# Patient Record
Sex: MALE | Race: WHITE | NOT HISPANIC OR LATINO | Employment: OTHER | URBAN - METROPOLITAN AREA
[De-identification: names, ages, dates, MRNs, and addresses within clinical notes are randomized per-mention and may not be internally consistent; named-entity substitution may affect disease eponyms.]

---

## 2017-01-24 ENCOUNTER — HOSPITAL ENCOUNTER (OUTPATIENT)
Dept: LAB | Facility: MEDICAL CENTER | Age: 62
DRG: 683 | End: 2017-01-24
Attending: NURSE PRACTITIONER
Payer: MEDICARE

## 2017-01-24 ENCOUNTER — OFFICE VISIT (OUTPATIENT)
Dept: MEDICAL GROUP | Facility: PHYSICIAN GROUP | Age: 62
End: 2017-01-24
Payer: MEDICARE

## 2017-01-24 VITALS
TEMPERATURE: 98.2 F | HEART RATE: 87 BPM | WEIGHT: 194 LBS | BODY MASS INDEX: 27.77 KG/M2 | SYSTOLIC BLOOD PRESSURE: 124 MMHG | DIASTOLIC BLOOD PRESSURE: 86 MMHG | RESPIRATION RATE: 20 BRPM | HEIGHT: 70 IN | OXYGEN SATURATION: 95 %

## 2017-01-24 DIAGNOSIS — E66.3 OVERWEIGHT (BMI 25.0-29.9): ICD-10-CM

## 2017-01-24 DIAGNOSIS — M10.30 GOUT DUE TO RENAL IMPAIRMENT, UNSPECIFIED CHRONICITY, UNSPECIFIED SITE: ICD-10-CM

## 2017-01-24 DIAGNOSIS — I10 ESSENTIAL HYPERTENSION: ICD-10-CM

## 2017-01-24 DIAGNOSIS — Z12.5 SCREENING FOR PROSTATE CANCER: ICD-10-CM

## 2017-01-24 DIAGNOSIS — M25.561 RIGHT ANTERIOR KNEE PAIN: Primary | ICD-10-CM

## 2017-01-24 DIAGNOSIS — G89.29 CHRONIC PAIN OF RIGHT KNEE: ICD-10-CM

## 2017-01-24 DIAGNOSIS — M25.561 CHRONIC PAIN OF RIGHT KNEE: ICD-10-CM

## 2017-01-24 DIAGNOSIS — F41.1 GAD (GENERALIZED ANXIETY DISORDER): ICD-10-CM

## 2017-01-24 LAB
ALBUMIN SERPL BCP-MCNC: 4.4 G/DL (ref 3.2–4.9)
ALBUMIN/GLOB SERPL: 1.3 G/DL
ALP SERPL-CCNC: 86 U/L (ref 30–99)
ALT SERPL-CCNC: 23 U/L (ref 2–50)
ANION GAP SERPL CALC-SCNC: 5 MMOL/L (ref 0–11.9)
AST SERPL-CCNC: 20 U/L (ref 12–45)
BILIRUB SERPL-MCNC: 0.3 MG/DL (ref 0.1–1.5)
BUN SERPL-MCNC: 72 MG/DL (ref 8–22)
CALCIUM SERPL-MCNC: 9.5 MG/DL (ref 8.5–10.5)
CHLORIDE SERPL-SCNC: 107 MMOL/L (ref 96–112)
CHOLEST SERPL-MCNC: 284 MG/DL (ref 100–199)
CO2 SERPL-SCNC: 22 MMOL/L (ref 20–33)
CREAT SERPL-MCNC: 4.24 MG/DL (ref 0.5–1.4)
GLOBULIN SER CALC-MCNC: 3.5 G/DL (ref 1.9–3.5)
GLUCOSE SERPL-MCNC: 85 MG/DL (ref 65–99)
HDLC SERPL-MCNC: 34 MG/DL
LDLC SERPL CALC-MCNC: ABNORMAL MG/DL
POTASSIUM SERPL-SCNC: 6.2 MMOL/L (ref 3.6–5.5)
PROT SERPL-MCNC: 7.9 G/DL (ref 6–8.2)
PSA SERPL DL<=0.01 NG/ML-MCNC: 2.24 NG/ML (ref 0–4)
SODIUM SERPL-SCNC: 134 MMOL/L (ref 135–145)
TRIGL SERPL-MCNC: 472 MG/DL (ref 0–149)
URATE SERPL-MCNC: 4.8 MG/DL (ref 2.5–8.3)

## 2017-01-24 PROCEDURE — G8432 DEP SCR NOT DOC, RNG: HCPCS | Performed by: NURSE PRACTITIONER

## 2017-01-24 PROCEDURE — 99214 OFFICE O/P EST MOD 30 MIN: CPT | Performed by: NURSE PRACTITIONER

## 2017-01-24 PROCEDURE — G8419 CALC BMI OUT NRM PARAM NOF/U: HCPCS | Performed by: NURSE PRACTITIONER

## 2017-01-24 PROCEDURE — 1036F TOBACCO NON-USER: CPT | Performed by: NURSE PRACTITIONER

## 2017-01-24 PROCEDURE — G8484 FLU IMMUNIZE NO ADMIN: HCPCS | Performed by: NURSE PRACTITIONER

## 2017-01-24 PROCEDURE — 3017F COLORECTAL CA SCREEN DOC REV: CPT | Performed by: NURSE PRACTITIONER

## 2017-01-24 RX ORDER — METOPROLOL SUCCINATE 100 MG/1
100 TABLET, EXTENDED RELEASE ORAL DAILY
Qty: 90 TAB | Refills: 3 | Status: SHIPPED | OUTPATIENT
Start: 2017-01-24 | End: 2017-01-24

## 2017-01-24 RX ORDER — COLCHICINE 0.6 MG/1
TABLET ORAL
Qty: 30 TAB | Refills: 0 | Status: SHIPPED | OUTPATIENT
Start: 2017-01-24 | End: 2017-01-25

## 2017-01-24 RX ORDER — SERTRALINE HYDROCHLORIDE 100 MG/1
TABLET, FILM COATED ORAL
Qty: 90 TAB | Refills: 3 | Status: SHIPPED | OUTPATIENT
Start: 2017-01-24 | End: 2017-01-24

## 2017-01-24 RX ORDER — LOSARTAN POTASSIUM 50 MG/1
50 TABLET ORAL DAILY
Qty: 30 TAB | Refills: 0 | Status: ON HOLD | OUTPATIENT
Start: 2017-01-24 | End: 2017-01-27

## 2017-01-24 RX ORDER — SERTRALINE HYDROCHLORIDE 100 MG/1
TABLET, FILM COATED ORAL
Qty: 30 TAB | Refills: 0 | Status: SHIPPED | OUTPATIENT
Start: 2017-01-24 | End: 2017-01-25

## 2017-01-24 RX ORDER — METOPROLOL SUCCINATE 100 MG/1
100 TABLET, EXTENDED RELEASE ORAL DAILY
Qty: 30 TAB | Refills: 0 | Status: SHIPPED | OUTPATIENT
Start: 2017-01-24 | End: 2019-05-08 | Stop reason: SDUPTHER

## 2017-01-24 RX ORDER — COLCHICINE 0.6 MG/1
TABLET ORAL
Qty: 90 TAB | Refills: 1 | Status: SHIPPED | OUTPATIENT
Start: 2017-01-24 | End: 2017-01-24

## 2017-01-24 RX ORDER — ALLOPURINOL 100 MG/1
100 TABLET ORAL DAILY
Qty: 90 TAB | Refills: 3 | Status: SHIPPED | OUTPATIENT
Start: 2017-01-24 | End: 2017-01-24

## 2017-01-24 RX ORDER — ALLOPURINOL 100 MG/1
100 TABLET ORAL DAILY
Qty: 30 TAB | Refills: 0 | Status: ON HOLD | OUTPATIENT
Start: 2017-01-24 | End: 2017-01-27

## 2017-01-24 RX ORDER — LOSARTAN POTASSIUM 50 MG/1
50 TABLET ORAL DAILY
Qty: 90 TAB | Refills: 3 | Status: SHIPPED | OUTPATIENT
Start: 2017-01-24 | End: 2017-01-24

## 2017-01-24 ASSESSMENT — PATIENT HEALTH QUESTIONNAIRE - PHQ9: CLINICAL INTERPRETATION OF PHQ2 SCORE: 0

## 2017-01-24 NOTE — MR AVS SNAPSHOT
"        Garry Benites   2017 1:20 PM   Office Visit   MRN: [id number]    Department:  [facility]   Dept Phone:  [phone]    Description:  Male : 1955   Provider:  PERRY Fu           Reason for Visit     Knee Pain     Medication Refill           Allergies as of 2017     Allergen Noted Reactions    Pcn [Penicillins] 10/21/2015         You were diagnosed with     Right anterior knee pain   [426838]  -  Primary     Chronic pain of right knee   [1048592]       Essential hypertension   [3955886]       SAMMI (generalized anxiety disorder)   [802990]       Solitary kidney   [553933]       Gout due to renal impairment, unspecified chronicity, unspecified site   [7748030]       Overweight (BMI 25.0-29.9)   [815527]       Screening for prostate cancer   [659517]         Vital Signs     Blood Pressure Pulse Temperature Respirations Height Weight    124/86 mmHg 87 36.8 °C (98.2 °F) 20 1.778 m (5' 10\") 87.998 kg (194 lb)    Body Mass Index Oxygen Saturation Smoking Status             27.84 kg/m2 95% Former Smoker         Basic Information     Date Of Birth Sex Race Ethnicity Preferred Language    1955 Male White Non- English      Problem List              ICD-10-CM Priority Class Noted - Resolved    Solitary kidney Q60.0   10/21/2015 - Present    Family history of stroke Z82.3   10/21/2015 - Present    Essential hypertension I10   10/21/2015 - Present    Irregular heart beat I49.9   10/21/2015 - Present    SAMMI (generalized anxiety disorder) F41.1   10/21/2015 - Present    Gout due to renal impairment M10.30   10/21/2015 - Present    First degree heart block by electrocardiogram I44.0   10/21/2015 - Present      Health Maintenance        Date Due Completion Dates    IMM DTaP/Tdap/Td Vaccine (1 - Tdap) 1974 ---    IMM ZOSTER VACCINE 2015 ---    IMM INFLUENZA (1) 2016 10/21/2015    COLONOSCOPY 2023 (Done)    Override on 2013: Done            "   Current Immunizations     Influenza Vaccine Quad Inj (Preserved) 10/21/2015      Below and/or attached are the medications your provider expects you to take. Review all of your home medications and newly ordered medications with your provider and/or pharmacist. Follow medication instructions as directed by your provider and/or pharmacist. Please keep your medication list with you and share with your provider. Update the information when medications are discontinued, doses are changed, or new medications (including over-the-counter products) are added; and carry medication information at all times in the event of emergency situations     Allergies:  PCN - (reactions not documented)               Medications  Valid as of: January 24, 2017 -  6:33 PM    Generic Name Brand Name Tablet Size Instructions for use    Allopurinol (Tab) ZYLOPRIM 100 MG Take 1 Tab by mouth every day.        Colchicine (Tab) COLCRYS 0.6 MG Take 1 tablet by mouth  every day        Hydrocodone-Acetaminophen (Tab) NORCO 5-325 MG Take 1 Tab by mouth every 6 hours as needed.        Losartan Potassium (Tab) COZAAR 50 MG Take 1 Tab by mouth every day.        Metoprolol Succinate (TABLET SR 24 HR) TOPROL  MG Take 1 Tab by mouth every day.        Sertraline HCl (Tab) ZOLOFT 100 MG Take 1 tablet by mouth  every day        .                 Medicines prescribed today were sent to:     [facility] - [address] [address]    Phone: [phone] Fax: [phone]    Open 24 Hours?: Yes    [facility] [address] [place] [address] [address] [address]    Phone: [phone] Fax: [phone]    Open 24 Hours?: No      Medication refill instructions:       If your prescription bottle indicates you have medication refills left, it is not necessary to call your provider’s office. Please contact your pharmacy and they will refill  your medication.    If your prescription bottle indicates you do not have any refills left, you may request refills at any time through one of the following ways: The online LIQUITY system (except Urgent Care), by calling your provider’s office, or by asking your pharmacy to contact your provider’s office with a refill request. Medication refills are processed only during regular business hours and may not be available until the next business day. Your provider may request additional information or to have a follow-up visit with you prior to refilling your medication.   *Please Note: Medication refills are assigned a new Rx number when refilled electronically. Your pharmacy may indicate that no refills were authorized even though a new prescription for the same medication is available at the pharmacy. Please request the medicine by name with the pharmacy before contacting your provider for a refill.        Your To Do List     Future Labs/Procedures Complete By Expires    COMP METABOLIC PANEL  As directed 1/24/2018    LIPID PROFILE  As directed 1/24/2018    PROSTATE SPECIFIC AG SCREENING  As directed 1/25/2018      Referral     A referral request has been sent to our patient care coordination department. Please allow 3-5 business days for us to process this request and contact you either by phone or mail. If you do not hear from us by the 5th business day, please call us at [phone].           LIQUITY Access Code: [email or web address]  Expires: 2/23/2017  1:42 PM    LIQUITY  A secure, online tool to manage your health information     [employer]’s LIQUITY® is a secure, online tool that connects you to your personalized health information from the privacy of your home -- day or night - making it very easy for you to manage your healthcare. Once the activation process is completed, you can even access your medical information using the LIQUITY eliceo, which is available for free in the Apple Eliceo store or Google Play  store.     [employer] provides the following levels of access (as shown below):   My Chart Features   [facility] Doctor [facility]  Specialists [facility]  Urgent  Care Non-[facility]  Primary Care  Doctor   Email your healthcare team securely and privately 24/7 X X X    Manage appointments: schedule your next appointment; view details of past/upcoming appointments X      Request prescription refills. X      View recent personal medical records, including lab and immunizations X X X X   View health record, including health history, allergies, medications X X X X   Read reports about your outpatient visits, procedures, consult and ER notes X X X X   See your discharge summary, which is a recap of your hospital and/or ER visit that includes your diagnosis, lab results, and care plan. X X       How to register for [employer]:  1. Go to  [email or web address].  2. Click on the Sign Up Now box, which takes you to the New Member Sign Up page. You will need to provide the following information:  a. Enter your [employer] Access Code exactly as it appears at the top of this page. (You will not need to use this code after you’ve completed the sign-up process. If you do not sign up before the expiration date, you must request a new code.)   b. Enter your date of birth.   c. Enter your home email address.   d. Click Submit, and follow the next screen’s instructions.  3. Create a [employer] ID. This will be your [employer] login ID and cannot be changed, so think of one that is secure and easy to remember.  4. Create a [employer] password. You can change your password at any time.  5. Enter your Password Reset Question and Answer. This can be used at a later time if you forget your password.   6. Enter your e-mail address. This allows you to receive e-mail notifications when new information is available in [employer].  7. Click Sign Up. You can now view your health information.    For assistance activating your [employer] account, call [phone]

## 2017-01-24 NOTE — PROGRESS NOTES
Chief Complaint   Patient presents with   • Knee Pain   • Medication Refill       HISTORY OF PRESENT ILLNESS: Patient is a 61 y.o. male established patient who presents today to discuss the following.  He has recently moved back from [place] in the cystic back on track with medications and labs.    1. Right anterior knee pain  2. Chronic pain of right knee  Patient fell October 2015.  Significant patellar injury was suspected and an MRI scan was performed.  Unfortunately patient was unable to follow-up with the orthopedic surgeon due to urgent trip to [place] due to the death of his brother.  The pain continues and he would like a new referral to follow-up with some more aggressive treatment.  He states that the knee pain is constant, he avoids over-the-counter or pain medication.  He tries to avoid aggravating activities.    3. Essential hypertension  Patient's blood pressure remains very well controlled with his current medication regimen.  He continues to use metoprolol 100 mg daily as well as losartan 50 mg daily.  He does not monitor his blood pressure routinely at home.  Denies any headache, blurry vision or chest pain.    4. SAMIM (generalized anxiety disorder)  Patient's generalized anxiety remains adequately controlled with current dose of sertraline 100 mg.  He denies any side effects with the medication.  Denies desire to adjust or change the dose at this time.    5. Solitary kidney    6. Gout due to renal impairment, unspecified chronicity, unspecified site  Patient suffers from gout, somewhat severe.  He takes allopurinol 100 mg daily as well as colchicine every other day.  He states that if he does not take the colchicine, he suffers from extreme flareups of gout.  He has tried taking a higher dose of allopurinol however this seems to create gout flareups.    7. Overweight (BMI 25.0-29.9)    8. Screening for prostate cancer     Allergies:Pcn    Current Outpatient Prescriptions Ordered in [facility]    Medication Sig Dispense Refill   • sertraline (ZOLOFT) 100 MG Tab Take 1 tablet by mouth  every day 30 Tab 0   • metoprolol SR (TOPROL XL) 100 MG TABLET SR 24 HR Take 1 Tab by mouth every day. 30 Tab 0   • losartan (COZAAR) 50 MG Tab Take 1 Tab by mouth every day. 30 Tab 0   • colchicine (COLCRYS) 0.6 MG Tab Take 1 tablet by mouth  every day 30 Tab 0   • allopurinol (ZYLOPRIM) 100 MG Tab Take 1 Tab by mouth every day. 30 Tab 0   • hydrocodone-acetaminophen (NORCO) 5-325 MG Tab per tablet Take 1 Tab by mouth every 6 hours as needed. 20 Tab 0     No current Epic-ordered facility-administered medications on file.       Past Medical History   Diagnosis Date   • Gout due to renal impairment    • Hypertension    • Anxiety        Social History   Substance Use Topics   • Smoking status: Former Smoker -- 1.00 packs/day for 25 years     Types: Cigarettes     Quit date: 10/21/1990   • Smokeless tobacco: Never Used   • Alcohol Use: No       Family Status   Relation Status Death Age   • Mother     • Father       Family History   Problem Relation Age of Onset   • Cancer Mother      lymphoma   • Heart Attack Father    • Stroke Father    • Diabetes Neg Hx        ROS:  Review of Systems   Constitutional: Negative for fever, chills, weight loss and malaise/fatigue.   HENT: Negative for ear pain, nosebleeds, congestion, sore throat and neck pain.    Eyes: Negative for blurred vision.   Respiratory: Negative for cough, sputum production, shortness of breath and wheezing.    Cardiovascular: Negative for chest pain, palpitations, orthopnea and leg swelling.   Gastrointestinal: Negative for heartburn, nausea, vomiting and abdominal pain.   Genitourinary: Negative for dysuria, urgency and frequency.   Musculoskeletal: Negative for myalgias, back pain and joint pain.   Skin: Negative for rash and itching.   Neurological: Negative for dizziness, tingling, tremors, sensory change, focal weakness and headaches.  "  Endo/Heme/Allergies: Does not bruise/bleed easily.   Psychiatric/Behavioral: Negative for depression, suicidal ideas and memory loss.  The patient is not nervous/anxious and does not have insomnia.        Exam:  Blood pressure 124/86, pulse 87, temperature 36.8 °C (98.2 °F), resp. rate 20, height 1.778 m (5' 10\"), weight 87.998 kg (194 lb), SpO2 95 %.  General:  Well nourished, well developed male in NAD  Head is grossly normal.  Neck: Thyroid is not enlarged.  Pulmonary: Normal effort. No rales, ronchi, or wheezing.  Cardiovascular: Regular rate and rhythm without murmur.   Extremities: no clubbing, cyanosis.  Prepatellar edema.    Psych:  Mood and affect are normal.  Answering questions appropriately with good eye contact.      Please note that this dictation was created using voice recognition software. I have made every reasonable attempt to correct obvious errors, but I expect that there are errors of grammar and possibly content that I did not discover before finalizing the note.    Assessment/Plan:    1. Right anterior knee pain  REFERRAL TO ORTHOPEDICS   2. Chronic pain of right knee  REFERRAL TO ORTHOPEDICS   3. Essential hypertension  sertraline (ZOLOFT) 100 MG Tab    metoprolol SR (TOPROL XL) 100 MG TABLET SR 24 HR    losartan (COZAAR) 50 MG Tab    colchicine (COLCRYS) 0.6 MG Tab    DISCONTINUED: sertraline (ZOLOFT) 100 MG Tab    DISCONTINUED: metoprolol SR (TOPROL XL) 100 MG TABLET SR 24 HR    DISCONTINUED: losartan (COZAAR) 50 MG Tab    DISCONTINUED: colchicine (COLCRYS) 0.6 MG Tab   4. SAMMI (generalized anxiety disorder)     5. Solitary kidney     6. Gout due to renal impairment, unspecified chronicity, unspecified site  allopurinol (ZYLOPRIM) 100 MG Tab    URIC ACID, SERUM    DISCONTINUED: allopurinol (ZYLOPRIM) 100 MG Tab   7. Overweight (BMI 25.0-29.9)  LIPID PROFILE    COMP METABOLIC PANEL   8. Screening for prostate cancer  PROSTATE SPECIFIC AG SCREENING     1.  Referral to ortho  2.  Fasting " labs today  3.  Refill medications as previously prescribed  4.  RTC in 3 months, sooner if labs warrant discussion.

## 2017-01-25 ENCOUNTER — APPOINTMENT (OUTPATIENT)
Dept: RADIOLOGY | Facility: MEDICAL CENTER | Age: 62
DRG: 683 | End: 2017-01-25
Attending: INTERNAL MEDICINE
Payer: MEDICARE

## 2017-01-25 ENCOUNTER — TELEPHONE (OUTPATIENT)
Dept: MEDICAL GROUP | Facility: PHYSICIAN GROUP | Age: 62
End: 2017-01-25

## 2017-01-25 ENCOUNTER — HOSPITAL ENCOUNTER (INPATIENT)
Facility: MEDICAL CENTER | Age: 62
LOS: 2 days | DRG: 683 | End: 2017-01-27
Attending: EMERGENCY MEDICINE | Admitting: INTERNAL MEDICINE
Payer: MEDICARE

## 2017-01-25 ENCOUNTER — RESOLUTE PROFESSIONAL BILLING HOSPITAL PROF FEE (OUTPATIENT)
Dept: HOSPITALIST | Facility: MEDICAL CENTER | Age: 62
End: 2017-01-25
Payer: MEDICARE

## 2017-01-25 DIAGNOSIS — N18.9 ACUTE ON CHRONIC RENAL FAILURE (HCC): ICD-10-CM

## 2017-01-25 DIAGNOSIS — N17.9 ACUTE ON CHRONIC RENAL FAILURE (HCC): ICD-10-CM

## 2017-01-25 PROBLEM — M10.9 GOUT: Status: ACTIVE | Noted: 2017-01-25

## 2017-01-25 PROBLEM — F41.8 DEPRESSION WITH ANXIETY: Status: ACTIVE | Noted: 2017-01-25

## 2017-01-25 LAB
25(OH)D3 SERPL-MCNC: 21 NG/ML (ref 30–100)
ANION GAP SERPL CALC-SCNC: 9 MMOL/L (ref 0–11.9)
APPEARANCE UR: CLEAR
APTT PPP: 24.6 SEC (ref 24.7–36)
BASOPHILS # BLD AUTO: 0.6 % (ref 0–1.8)
BASOPHILS # BLD: 0.04 K/UL (ref 0–0.12)
BILIRUB UR QL STRIP.AUTO: NEGATIVE
BUN SERPL-MCNC: 76 MG/DL (ref 8–22)
CALCIUM SERPL-MCNC: 9.2 MG/DL (ref 8.5–10.5)
CALCIUM SERPL-MCNC: 9.3 MG/DL (ref 8.5–10.5)
CHLORIDE SERPL-SCNC: 107 MMOL/L (ref 96–112)
CO2 SERPL-SCNC: 21 MMOL/L (ref 20–33)
COLOR UR: YELLOW
CREAT SERPL-MCNC: 4.47 MG/DL (ref 0.5–1.4)
CREAT UR-MCNC: 70 MG/DL
CULTURE IF INDICATED INDCX: NO UA CULTURE
EKG IMPRESSION: NORMAL
EOSINOPHIL # BLD AUTO: 0.29 K/UL (ref 0–0.51)
EOSINOPHIL NFR BLD: 4.6 % (ref 0–6.9)
EPI CELLS #/AREA URNS HPF: ABNORMAL /HPF
ERYTHROCYTE [DISTWIDTH] IN BLOOD BY AUTOMATED COUNT: 47.2 FL (ref 35.9–50)
EST. AVERAGE GLUCOSE BLD GHB EST-MCNC: 117 MG/DL
GFR SERPL CREATININE-BSD FRML MDRD: 13 ML/MIN/1.73 M 2
GLUCOSE SERPL-MCNC: 77 MG/DL (ref 65–99)
GLUCOSE UR STRIP.AUTO-MCNC: 70 MG/DL
HBA1C MFR BLD: 5.7 % (ref 0–5.6)
HCT VFR BLD AUTO: 43.6 % (ref 42–52)
HGB BLD-MCNC: 13.6 G/DL (ref 14–18)
IMM GRANULOCYTES # BLD AUTO: 0.08 K/UL (ref 0–0.11)
IMM GRANULOCYTES NFR BLD AUTO: 1.3 % (ref 0–0.9)
INR PPP: 1.09 (ref 0.87–1.13)
KETONES UR STRIP.AUTO-MCNC: NEGATIVE MG/DL
LEUKOCYTE ESTERASE UR QL STRIP.AUTO: NEGATIVE
LYMPHOCYTES # BLD AUTO: 1.5 K/UL (ref 1–4.8)
LYMPHOCYTES NFR BLD: 23.6 % (ref 22–41)
MAGNESIUM SERPL-MCNC: 2.5 MG/DL (ref 1.5–2.5)
MCH RBC QN AUTO: 29.1 PG (ref 27–33)
MCHC RBC AUTO-ENTMCNC: 31.2 G/DL (ref 33.7–35.3)
MCV RBC AUTO: 93.4 FL (ref 81.4–97.8)
MICRO URNS: ABNORMAL
MONOCYTES # BLD AUTO: 0.54 K/UL (ref 0–0.85)
MONOCYTES NFR BLD AUTO: 8.5 % (ref 0–13.4)
NEUTROPHILS # BLD AUTO: 3.91 K/UL (ref 1.82–7.42)
NEUTROPHILS NFR BLD: 61.4 % (ref 44–72)
NITRITE UR QL STRIP.AUTO: NEGATIVE
NRBC # BLD AUTO: 0 K/UL
NRBC BLD AUTO-RTO: 0 /100 WBC
PH UR STRIP.AUTO: 6 [PH]
PHOSPHATE SERPL-MCNC: 3.9 MG/DL (ref 2.5–4.5)
PLATELET # BLD AUTO: 216 K/UL (ref 164–446)
PMV BLD AUTO: 9.2 FL (ref 9–12.9)
POTASSIUM SERPL-SCNC: 4.8 MMOL/L (ref 3.6–5.5)
PROT UR QL STRIP: 100 MG/DL
PROT UR-MCNC: 186.4 MG/DL (ref 0–15)
PROTHROMBIN TIME: 14.5 SEC (ref 12–14.6)
PTH-INTACT SERPL-MCNC: 235.8 PG/ML (ref 14–72)
RBC # BLD AUTO: 4.67 M/UL (ref 4.7–6.1)
RBC # URNS HPF: ABNORMAL /HPF
RBC UR QL AUTO: NEGATIVE
SODIUM SERPL-SCNC: 137 MMOL/L (ref 135–145)
SODIUM UR-SCNC: 78 MMOL/L
SP GR UR STRIP.AUTO: 1.01
TROPONIN I SERPL-MCNC: <0.01 NG/ML (ref 0–0.04)
WBC # BLD AUTO: 6.4 K/UL (ref 4.8–10.8)
WBC #/AREA URNS HPF: ABNORMAL /HPF

## 2017-01-25 PROCEDURE — 82570 ASSAY OF URINE CREATININE: CPT

## 2017-01-25 PROCEDURE — 84484 ASSAY OF TROPONIN QUANT: CPT

## 2017-01-25 PROCEDURE — 36415 COLL VENOUS BLD VENIPUNCTURE: CPT

## 2017-01-25 PROCEDURE — 82306 VITAMIN D 25 HYDROXY: CPT

## 2017-01-25 PROCEDURE — 99285 EMERGENCY DEPT VISIT HI MDM: CPT

## 2017-01-25 PROCEDURE — 84156 ASSAY OF PROTEIN URINE: CPT

## 2017-01-25 PROCEDURE — 83735 ASSAY OF MAGNESIUM: CPT

## 2017-01-25 PROCEDURE — 81001 URINALYSIS AUTO W/SCOPE: CPT

## 2017-01-25 PROCEDURE — 76775 US EXAM ABDO BACK WALL LIM: CPT

## 2017-01-25 PROCEDURE — 85025 COMPLETE CBC W/AUTO DIFF WBC: CPT

## 2017-01-25 PROCEDURE — 700111 HCHG RX REV CODE 636 W/ 250 OVERRIDE (IP): Performed by: INTERNAL MEDICINE

## 2017-01-25 PROCEDURE — 85730 THROMBOPLASTIN TIME PARTIAL: CPT

## 2017-01-25 PROCEDURE — 84300 ASSAY OF URINE SODIUM: CPT

## 2017-01-25 PROCEDURE — 99223 1ST HOSP IP/OBS HIGH 75: CPT | Mod: AI | Performed by: INTERNAL MEDICINE

## 2017-01-25 PROCEDURE — 84100 ASSAY OF PHOSPHORUS: CPT

## 2017-01-25 PROCEDURE — 770006 HCHG ROOM/CARE - MED/SURG/GYN SEMI*

## 2017-01-25 PROCEDURE — 80048 BASIC METABOLIC PNL TOTAL CA: CPT

## 2017-01-25 PROCEDURE — 700105 HCHG RX REV CODE 258: Performed by: INTERNAL MEDICINE

## 2017-01-25 PROCEDURE — 83970 ASSAY OF PARATHORMONE: CPT

## 2017-01-25 PROCEDURE — 93005 ELECTROCARDIOGRAM TRACING: CPT | Performed by: EMERGENCY MEDICINE

## 2017-01-25 PROCEDURE — 85610 PROTHROMBIN TIME: CPT

## 2017-01-25 PROCEDURE — 83036 HEMOGLOBIN GLYCOSYLATED A1C: CPT

## 2017-01-25 RX ORDER — DOCUSATE SODIUM 100 MG/1
100 CAPSULE, LIQUID FILLED ORAL EVERY MORNING
Status: DISCONTINUED | OUTPATIENT
Start: 2017-01-25 | End: 2017-01-27 | Stop reason: HOSPADM

## 2017-01-25 RX ORDER — LORAZEPAM 2 MG/ML
0.5 INJECTION INTRAMUSCULAR EVERY 6 HOURS PRN
Status: DISCONTINUED | OUTPATIENT
Start: 2017-01-25 | End: 2017-01-27 | Stop reason: HOSPADM

## 2017-01-25 RX ORDER — ONDANSETRON 2 MG/ML
4 INJECTION INTRAMUSCULAR; INTRAVENOUS EVERY 4 HOURS PRN
Status: DISCONTINUED | OUTPATIENT
Start: 2017-01-25 | End: 2017-01-27 | Stop reason: HOSPADM

## 2017-01-25 RX ORDER — ENEMA 19; 7 G/133ML; G/133ML
1 ENEMA RECTAL
Status: DISCONTINUED | OUTPATIENT
Start: 2017-01-25 | End: 2017-01-25

## 2017-01-25 RX ORDER — PROMETHAZINE HYDROCHLORIDE 25 MG/1
12.5-25 SUPPOSITORY RECTAL EVERY 4 HOURS PRN
Status: DISCONTINUED | OUTPATIENT
Start: 2017-01-25 | End: 2017-01-27 | Stop reason: HOSPADM

## 2017-01-25 RX ORDER — COLCHICINE 0.6 MG/1
0.6 TABLET ORAL
COMMUNITY
End: 2017-05-23 | Stop reason: SDUPTHER

## 2017-01-25 RX ORDER — LORAZEPAM 0.5 MG/1
0.5 TABLET ORAL EVERY 6 HOURS PRN
Status: DISCONTINUED | OUTPATIENT
Start: 2017-01-25 | End: 2017-01-27 | Stop reason: HOSPADM

## 2017-01-25 RX ORDER — AMOXICILLIN 250 MG
1 CAPSULE ORAL NIGHTLY
Status: DISCONTINUED | OUTPATIENT
Start: 2017-01-25 | End: 2017-01-27 | Stop reason: HOSPADM

## 2017-01-25 RX ORDER — AMOXICILLIN 250 MG
1 CAPSULE ORAL
Status: DISCONTINUED | OUTPATIENT
Start: 2017-01-25 | End: 2017-01-27 | Stop reason: HOSPADM

## 2017-01-25 RX ORDER — LACTULOSE 20 G/30ML
30 SOLUTION ORAL
Status: DISCONTINUED | OUTPATIENT
Start: 2017-01-25 | End: 2017-01-27 | Stop reason: HOSPADM

## 2017-01-25 RX ORDER — ONDANSETRON 4 MG/1
4 TABLET, ORALLY DISINTEGRATING ORAL EVERY 4 HOURS PRN
Status: DISCONTINUED | OUTPATIENT
Start: 2017-01-25 | End: 2017-01-27 | Stop reason: HOSPADM

## 2017-01-25 RX ORDER — LABETALOL HYDROCHLORIDE 5 MG/ML
10 INJECTION, SOLUTION INTRAVENOUS EVERY 4 HOURS PRN
Status: DISCONTINUED | OUTPATIENT
Start: 2017-01-25 | End: 2017-01-27 | Stop reason: HOSPADM

## 2017-01-25 RX ORDER — SODIUM CHLORIDE 9 MG/ML
INJECTION, SOLUTION INTRAVENOUS CONTINUOUS
Status: DISCONTINUED | OUTPATIENT
Start: 2017-01-25 | End: 2017-01-26

## 2017-01-25 RX ORDER — PROMETHAZINE HYDROCHLORIDE 25 MG/1
12.5-25 TABLET ORAL EVERY 4 HOURS PRN
Status: DISCONTINUED | OUTPATIENT
Start: 2017-01-25 | End: 2017-01-27 | Stop reason: HOSPADM

## 2017-01-25 RX ORDER — HEPARIN SODIUM 5000 [USP'U]/ML
5000 INJECTION, SOLUTION INTRAVENOUS; SUBCUTANEOUS EVERY 8 HOURS
Status: DISCONTINUED | OUTPATIENT
Start: 2017-01-25 | End: 2017-01-27 | Stop reason: HOSPADM

## 2017-01-25 RX ORDER — ACETAMINOPHEN 325 MG/1
650 TABLET ORAL EVERY 6 HOURS PRN
Status: DISCONTINUED | OUTPATIENT
Start: 2017-01-25 | End: 2017-01-27 | Stop reason: HOSPADM

## 2017-01-25 RX ORDER — SERTRALINE HYDROCHLORIDE 100 MG/1
100 TABLET, FILM COATED ORAL DAILY
Status: DISCONTINUED | OUTPATIENT
Start: 2017-01-26 | End: 2017-01-27 | Stop reason: HOSPADM

## 2017-01-25 RX ORDER — SERTRALINE HYDROCHLORIDE 100 MG/1
100 TABLET, FILM COATED ORAL DAILY
COMMUNITY
End: 2017-04-21 | Stop reason: SDUPTHER

## 2017-01-25 RX ORDER — METOPROLOL SUCCINATE 100 MG/1
100 TABLET, EXTENDED RELEASE ORAL DAILY
Status: DISCONTINUED | OUTPATIENT
Start: 2017-01-26 | End: 2017-01-27 | Stop reason: HOSPADM

## 2017-01-25 RX ORDER — BISACODYL 10 MG
10 SUPPOSITORY, RECTAL RECTAL
Status: DISCONTINUED | OUTPATIENT
Start: 2017-01-25 | End: 2017-01-27 | Stop reason: HOSPADM

## 2017-01-25 RX ADMIN — HEPARIN SODIUM 5000 UNITS: 5000 INJECTION, SOLUTION INTRAVENOUS; SUBCUTANEOUS at 21:58

## 2017-01-25 ASSESSMENT — PAIN SCALES - GENERAL
PAINLEVEL_OUTOF10: 0

## 2017-01-25 ASSESSMENT — COPD QUESTIONNAIRES
DURING THE PAST 4 WEEKS HOW MUCH DID YOU FEEL SHORT OF BREATH: NONE/LITTLE OF THE TIME
DO YOU EVER COUGH UP ANY MUCUS OR PHLEGM?: NO/ONLY WITH OCCASIONAL COLDS OR INFECTIONS
COPD SCREENING SCORE: 2
HAVE YOU SMOKED AT LEAST 100 CIGARETTES IN YOUR ENTIRE LIFE: NO/DON'T KNOW

## 2017-01-25 ASSESSMENT — LIFESTYLE VARIABLES
EVER_SMOKED: YES
DO YOU DRINK ALCOHOL: NO

## 2017-01-25 NOTE — CONSULTS
[facility] NEPHROLOGY CONSULT    DATE OF CONSULTATION: 17     REASON FOR CONSULTATION: acute kidney injury and hyperkalemia in setting of solitary kidney and CKD    HISTORY OF PRESENT ILLNESS:   Mr. Benites is a 60 y/o male with PMHx of congenital solitary kidney, HTN, CKD, anxiety, chronic knee pain, and gout who was sent to the ER by his PCP (Dr. Jane) after routine lab work revealed hyperkalemia and acute kidney injury.  Patient denies any symptoms currently and states he is in his normal state of health.  He denies use of NSAIDs and has not started any new medications or changed his diet in any way.  He denies decreased urinary output or dysuria.  He reports his prior nephrologist (Dr. Guillaume in [place]) told him he was at the stage of CKD before needing dialysis and reports his baseline creat was about 1.9-2.1 about 1-2 years ago.      PAST MEDICAL HISTORY:  Essential hypertension  CKD-4 with congenital solitary kidney (baseline creat 2.15)  Gout  Generalized anxiety disorder  Chronic right knee pain    SURGICAL HISTORY:  Left foot orthopedic surgery after crush injury    SOCIAL HISTORY:  25 pack year smoking history.  Quit in .  Denies alcohol or drug use.    FAMILY HISTORY:  Mother had lymphoma.   Father  of stroke.    HOME MEDICATIONS:   Metoprolol  mg QD  Losartan 50 mg QD  Colchicine 0.6 mg every other day  Allopurinol 100 mg QD  Zoloft 100 mg QD    REVIEW OF SYSTEMS:  Constitutional: no fevers, chills, weight change  Eyes: no blurred vision, discharge, eye pain  ENT: no rhinorrhea, sore throat, neck masses  Cardiovascular: no angina, palpitations, PND, orthopnea, edema  Respiratory: no cough, sputum, or dyspnea  GI: no nausea, vomiting, abdominal pain, constipation, or diarrhea  : no dysuria, hematuria, frequency   Musculo-skeletal: no joint or muscle pain  Skin: no rashes or open wounds  Neurological: no headaches, dizziness, motor/sensory loss  Psychological: no anxiety or  depression  All others negative    PHYSICAL EXAMINATION:  GENERAL: Patient is resting comfortably in bed in no acute distress.  VITAL SIGNS: Temp is 97.2 F, HR is 81 bpm, BP is 134/88 mmHg, SpO2 is 96% on RA.  HEAD, EYES, EARS, NOSE, AND THROAT: Examination is atraumatic and normocephalic. Pupils are equal, round, and reactive to light. Mucous membranes are moist.  NECK: Supple. There is no lymphadenopathy.  No JVD  CHEST: Clear to auscultation and percussion.  No rales/ronchi.  HEART: normal S1 and S2, without murmurs, gallops, or rubs.  ABDOMEN: Soft, nontender without organomegaly or mass. Bowel sounds are normal.  NEURO: Patient is A&Ox4. No focal neurological deficits.  EXTREMITIES: Distal pulses palpable bilaterally.  No pedal edema.    LABORATORY DATA:   BUN/creat 72/4.24 > 79/4.47  Na 124 > 137  K 6.2 > 4.8  CO2 22 > 21  Anion gap 5 > 9  Ca 9.5 > 9.2  Alb 4.4  Uric acid 4.8  Hb 13.6  UA: 70 glucose, 100 protein, no infection, pH 6  EKG: HR 74 bpm, QTc 418 ms, normal intervals, NSR, no ischemic changes, no T-wave changes    ASSESSMENT AND PLAN:     # acute kidney injury   # chronic kidney disease  - unsure of patient's baseline; not anuric or oliguric per patient  - no clear inciting factors for MARKY; may be natural progression of patient's CKD-4  - UA shows 100 mg/dL protein with prot/creat ratio 2657  - renal US pending  - FeNa 3.3% suggesting intrinsic kidney injury  - will continue to monitor kidney function and order further workup for bone mineral disease (vit D, PTH)  - renal diet  - renally dose all meds for GFR <15    # glycosuria  - patient is euglycemic with mild anemia (Hb 13.6)  - will check SPEP and UPEP to r/o Fanconi's syndrome    # electrolytes  - hyperkalemia resolved now  - will check Mg/P  - CO2 wnl  - avoid meds with Mg/P/K  - continue to monitor    # hypertension  -  BP stable    # volume status  - patient appears euvolemic    # gout  - continue allopurinol

## 2017-01-25 NOTE — ED NOTES
"Pt ambulatory to triage c/o Abnormal labs. Pt states that his PCP called him today and told him to come to the ER because \"my kidney is failing and my potassium is high\". Pt reports that he only has one kidney.   "

## 2017-01-25 NOTE — IP AVS SNAPSHOT
" After Visit Summary                                                                                                                  Name:Garry Benites  Medical Record Number:[id number]  CSN: [id number]    YOB: 1955   Age: 61 y.o.  Sex: male  HT:1.778 m (5' 10\") WT: 88.5 kg (195 lb 1.7 oz)          Admit Date: 1/25/2017     Discharge Date:   Today's Date: 1/27/2017  Attending Doctor:  Kenny Mishra M.D.                  Allergies:  Pcn            Discharge Instructions       Discharge Instructions    Discharged to home by car with self. Discharged via walking, hospital escort: Yes.  Special equipment needed: Not Applicable    Be sure to schedule a follow-up appointment with your primary care doctor or any specialists as instructed.     Discharge Plan:   Diet Plan: Discussed  Activity Level: Discussed  Confirmed Follow up Appointment: Appointment Scheduled  Confirmed Symptoms Management: Discussed  Medication Reconciliation Updated: Yes  Pneumococcal Vaccine Given - only chart on this line when given: Given (See MAR)  Influenza Vaccine Indication: Indicated: 9 to 64 years of age  Influenza Vaccine Given - only chart on this line when given: Influenza Vaccine Given (See MAR)    I understand that a diet low in cholesterol, fat, and sodium is recommended for good health. Unless I have been given specific instructions below for another diet, I accept this instruction as my diet prescription.   Other diet: Renal Diet      Special Instructions: None    · Is patient discharged on Warfarin / Coumadin?   No                                                                 · Is patient Post Blood Transfusion?  No    Depression / Suicide Risk    As you are discharged from this [facility] facility, it is important to learn how to keep safe from harming yourself.    Recognize the warning signs:  · Abrupt changes in personality, positive or negative- including increase in energy   · Giving away possessions  · Change " in eating patterns- significant weight changes-  positive or negative  · Change in sleeping patterns- unable to sleep or sleeping all the time   · Unwillingness or inability to communicate  · Depression  · Unusual sadness, discouragement and loneliness  · Talk of wanting to die  · Neglect of personal appearance   · Rebelliousness- reckless behavior  · Withdrawal from people/activities they love  · Confusion- inability to concentrate     If you or a loved one observes any of these behaviors or has concerns about self-harm, here's what you can do:  · Talk about it- your feelings and reasons for harming yourself  · Remove any means that you might use to hurt yourself (examples: pills, rope, extension cords, firearm)  · Get professional help from the community (Mental Health, Substance Abuse, psychological counseling)  · Do not be alone:Call your Safe Contact- someone whom you trust who will be there for you.  · Call your local CRISIS HOTLINE 441-6974 or 619-930-8636  · Call your local Children's Mobile Crisis Response Team [place] (081) 260-4777 or wwwTopSchool  · Call the toll free National Suicide Prevention Hotlines   · National Suicide Prevention Lifeline 544-646-SQWK (4628)  · National Hope Line Network 800-SUICIDE (053-4287)        Follow-up Information     1. Follow up with Cristofer Keller M.D. On 2/8/2017.    Specialty:  Nephrology    Contact information    [address] [place] [address]  [phone]           Discharge Medication Instructions:    Below are the medications your physician expects you to take upon discharge:    Review all your home medications and newly ordered medications with your doctor and/or pharmacist. Follow medication instructions as directed by your doctor and/or pharmacist.    Please keep your medication list with you and share with your physician.               Medication List      START taking these medications        Instructions    Cholecalciferol 1000 UNIT  Tabs   Last time this was given:  1,000 Units on 1/27/2017  9:29 AM   Commonly known as:  VITAMIND D3    Take 1 Tab by mouth every day.   Dose:  1000 Units       sodium bicarbonate 650 MG Tabs   Commonly known as:  SODIUM BICARBONATE    Take 1 Tab by mouth 2 times a day.   Dose:  650 mg         CONTINUE taking these medications        Instructions    colchicine 0.6 MG Tabs   Commonly known as:  COLCRYS    Take 0.6 mg by mouth every 48 hours.   Dose:  0.6 mg       metoprolol  MG Tb24   Last time this was given:  100 mg on 1/27/2017  9:29 AM   Commonly known as:  TOPROL XL    Take 1 Tab by mouth every day.   Dose:  100 mg       sertraline 100 MG Tabs   Last time this was given:  100 mg on 1/27/2017  9:29 AM   Commonly known as:  ZOLOFT    Take 100 mg by mouth every day.   Dose:  100 mg         STOP taking these medications     allopurinol 100 MG Tabs   Commonly known as:  ZYLOPRIM       losartan 50 MG Tabs   Commonly known as:  COZAAR               Instructions           Diet / Nutrition:    Follow any diet instructions given to you by your doctor or the dietician, including how much salt (sodium) you are allowed each day.    If you are overweight, talk to your doctor about a weight reduction plan.    Activity:    Remain physically active following your doctor's instructions about exercise and activity.    Rest often.     Any time you become even a little tired or short of breath, SIT DOWN and rest.    Worsening Symptoms:    Report any of the following signs and symptoms to the doctor's office immediately:    *Pain of jaw, arm, or neck  *Chest pain not relieved by medication                               *Dizziness or loss of consciousness  *Difficulty breathing even when at rest   *More tired than usual                                       *Bleeding drainage or swelling of surgical site  *Swelling of feet, ankles, legs or stomach                 *Fever (>100ºF)  *Pink or blood tinged sputum  *Weight gain  (3lbs/day or 5lbs /week)           *Shock from internal defibrillator (if applicable)  *Palpitations or irregular heartbeats                *Cool and/or numb extremities    Stroke Awareness    Common Risk Factors for Stroke include:    Age  Atrial Fibrillation  Carotid Artery Stenosis  Diabetes Mellitus  Excessive alcohol consumption  High blood pressure  Overweight   Physical inactivity  Smoking    Warning signs and symptoms of a stroke include:    *Sudden numbness or weakness of the face, arm or leg (especially on one side of the body).  *Sudden confusion, trouble speaking or understanding.  *Sudden trouble seeing in one or both eyes.  *Sudden trouble walking, dizziness, loss of balance or coordination.Sudden severe headache with no known cause.    It is very important to get treatment quickly when a stroke occurs. If you experience any of the above warning signs, call 911 immediately.                   Disclaimer         Quit Smoking / Tobacco Use:    I understand the use of any tobacco products increases my chance of suffering from future heart disease or stroke and could cause other illnesses which may shorten my life. Quitting the use of tobacco products is the single most important thing I can do to improve my health. For further information on smoking / tobacco cessation call a Toll Free Quit Line at 1-643.396.2550 (*National Cancer [place]) or 1-284.805.6529 (American Lung Association) or you can access the web based program at www.lungQD Vision.org.    [place] Tobacco Users Help Line:  (716) 778-2597       Toll Free: 1-104.814.9410  Quit Tobacco Program [facility] Management Services (529)541-4833    Crisis Hotline:    [place] Crisis Hotline:  8-542-HVTNRNB or 1-988.858.1026    [place] Crisis Hotline:    1-956.268.2237 or 746-444-4978    Discharge Survey:   Thank you for choosing [facility]. We hope we did everything we could to make your hospital stay a pleasant one. You may be receiving a phone survey  and we would appreciate your time and participation in answering the questions. Your input is very valuable to us in our efforts to improve our service to our patients and their families.        My signature on this form indicates that:    1. I have reviewed and understand the above information.  2. My questions regarding this information have been answered to my satisfaction.  3. I have formulated a plan with my discharge nurse to obtain my prescribed medications for home.                  Disclaimer         __________________________________                     __________       ________                       Patient Signature                                                 Date                    Time

## 2017-01-25 NOTE — IP AVS SNAPSHOT
PhotoPharmics Access Code: [email or web address]  Expires: 2/23/2017  1:42 PM    Your email address is not on file at [email or web address].  Email Addresses are required for you to sign up for PhotoPharmics, please contact [phone] to verify your personal information and to provide your email address prior to attempting to register for PhotoPharmics.    Garry Delmis  [address] [place] [address]    PhotoPharmics  A secure, online tool to manage your health information     [facility]’s PhotoPharmics® is a secure, online tool that connects you to your personalized health information from the privacy of your home -- day or night - making it very easy for you to manage your healthcare. Once the activation process is completed, you can even access your medical information using the PhotoPharmics eliceo, which is available for free in the Apple Eliceo store or Google Play store.     To learn more about PhotoPharmics, visit [email or web address]    There are two levels of access available (as shown below):   My Chart Features  [facility] Primary Care Doctor [facility]  Specialists [facility]  Urgent  Care Non-[facility] Primary Care Doctor   Email your healthcare team securely and privately 24/7 X X X    Manage appointments: schedule your next appointment; view details of past/upcoming appointments X      Request prescription refills. X      View recent personal medical records, including lab and immunizations X X X X   View health record, including health history, allergies, medications X X X X   Read reports about your outpatient visits, procedures, consult and ER notes X X X X   See your discharge summary, which is a recap of your hospital and/or ER visit that includes your diagnosis, lab results, and care plan X X  X     How to register for XMOSt:  Once your e-mail address has been verified, follow the following steps to sign up for XMOSt.     1. Go to  [email or web address]  2. Click on the Sign Up Now box, which takes you to the New Member Sign Up page. You will need to  provide the following information:  a. Enter your Sypherlink Access Code exactly as it appears at the top of this page. (You will not need to use this code after you’ve completed the sign-up process. If you do not sign up before the expiration date, you must request a new code.)   b. Enter your date of birth.   c. Enter your home email address.   d. Click Submit, and follow the next screen’s instructions.  3. Create a Sypherlink ID. This will be your Sypherlink login ID and cannot be changed, so think of one that is secure and easy to remember.  4. Create a Sypherlink password. You can change your password at any time.  5. Enter your Password Reset Question and Answer. This can be used at a later time if you forget your password.   6. Enter your e-mail address. This allows you to receive e-mail notifications when new information is available in Sypherlink.  7. Click Sign Up. You can now view your health information.    For assistance activating your Sypherlink account, call [phone]

## 2017-01-25 NOTE — ED PROVIDER NOTES
ED Provider Note    Scribed for Kumar Wynn M.D. by Deysi Burgess. 1/25/2017  9:48 AM    Primary care provider: PERRY Fu  Means of arrival: Walk-in   History obtained from: Patient   History limited by: None     CHIEF COMPLAINT  Chief Complaint   Patient presents with   • Abnormal Labs   • Sent by MD VITAL  Garry Benites is a 61 y.o. male who presents to the Emergency Department sent by his doctor for abnormal labs. His physician told him to come to the ED due to kidney failure and high potassium. 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 He has known kidney issues. His labs were part of a routine follow up and he states he was asymptomatic prior to this. Patient denies nausea or vomiting.       REVIEW OF SYSTEMS  Pertinent positives include abnormal labs.   Pertinent negatives include no nausea, vomiting.    All other systems reviewed and negative.      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    SURGICAL HISTORY  patient denies any surgical history    SOCIAL HISTORY  Social History   Substance Use Topics   • Smoking status: Former Smoker -- 1.00 packs/day for 25 years     Types: Cigarettes     Quit date: 10/21/1990   • Smokeless tobacco: Never Used   • Alcohol Use: No      History   Drug Use No       FAMILY HISTORY  Family History   Problem Relation Age of Onset   • Cancer Mother      lymphoma   • Heart Attack Father    • Stroke Father    • Diabetes Neg Hx        CURRENT MEDICATIONS  No current facility-administered medications on file prior to encounter.     Current Outpatient Prescriptions on File Prior to Encounter   Medication Sig Dispense Refill   • metoprolol SR (TOPROL XL) 100 MG TABLET SR 24 HR Take 1 Tab by mouth every day. 30 Tab 0   • losartan (COZAAR) 50 MG Tab Take 1 Tab by mouth every day. 30 Tab 0   • allopurinol (ZYLOPRIM) 100 MG Tab Take 1 Tab by mouth every day. 30 Tab 0  "      ALLERGIES  Allergies   Allergen Reactions   • Pcn [Penicillins] Unspecified     Pt states \"It was a childhood allergie\".         PHYSICAL EXAM  VITAL SIGNS: /83 mmHg  Pulse 81  Temp(Src) 36.2 °C (97.2 °F) (Temporal)  Resp 18  Ht 1.778 m (5' 10\")  Wt 88.9 kg (195 lb 15.8 oz)  BMI 28.12 kg/m2  SpO2 98%    Nursing note and vitals reviewed.  Constitutional: Well-developed and well-nourished. No distress.   HENT: Head is normocephalic and atraumatic. Oropharynx is clear and moist without exudate or erythema.   Eyes: Pupils are equal, round, and reactive to light. Conjunctiva are normal.   Cardiovascular: Normal rate and regular rhythm. No murmur heard. Normal radial pulses.  Pulmonary/Chest: Breath sounds normal. No wheezes or rales.   Abdominal: Soft and non-tender. No distention    Musculoskeletal: Extremities exhibit normal range of motion without edema or tenderness.   Neurological: Awake, alert and oriented to person, place, and time. No focal deficits noted.  Skin: Skin is warm and dry. No rash.   Psychiatric: Normal mood and affect. Appropriate for clinical situation    EKG Interpretation  See labs below. Interpreted by me.     LABS  Results for orders placed or performed during the hospital encounter of 01/25/17   CBC WITH DIFFERENTIAL   Result Value Ref Range    WBC 6.4 4.8 - 10.8 K/uL    RBC 4.67 (L) 4.70 - 6.10 M/uL    Hemoglobin 13.6 (L) 14.0 - 18.0 g/dL    Hematocrit 43.6 42.0 - 52.0 %    MCV 93.4 81.4 - 97.8 fL    MCH 29.1 27.0 - 33.0 pg    MCHC 31.2 (L) 33.7 - 35.3 g/dL    RDW 47.2 35.9 - 50.0 fL    Platelet Count 216 164 - 446 K/uL    MPV 9.2 9.0 - 12.9 fL    Neutrophils-Polys 61.40 44.00 - 72.00 %    Lymphocytes 23.60 22.00 - 41.00 %    Monocytes 8.50 0.00 - 13.40 %    Eosinophils 4.60 0.00 - 6.90 %    Basophils 0.60 0.00 - 1.80 %    Immature Granulocytes 1.30 (H) 0.00 - 0.90 %    Nucleated RBC 0.00 /100 WBC    Neutrophils (Absolute) 3.91 1.82 - 7.42 K/uL    Lymphs (Absolute) 1.50 1.00 " - 4.80 K/uL    Monos (Absolute) 0.54 0.00 - 0.85 K/uL    Eos (Absolute) 0.29 0.00 - 0.51 K/uL    Baso (Absolute) 0.04 0.00 - 0.12 K/uL    Immature Granulocytes (abs) 0.08 0.00 - 0.11 K/uL    NRBC (Absolute) 0.00 K/uL   BASIC METABOLIC PANEL   Result Value Ref Range    Sodium 137 135 - 145 mmol/L    Potassium 4.8 3.6 - 5.5 mmol/L    Chloride 107 96 - 112 mmol/L    Co2 21 20 - 33 mmol/L    Glucose 77 65 - 99 mg/dL    Bun 76 (HH) 8 - 22 mg/dL    Creatinine 4.47 (H) 0.50 - 1.40 mg/dL    Calcium 9.2 8.5 - 10.5 mg/dL    Anion Gap 9.0 0.0 - 11.9   TROPONIN   Result Value Ref Range    Troponin I <0.01 0.00 - 0.04 ng/mL   PROTHROMBIN TIME (INR)   Result Value Ref Range    PT 14.5 12.0 - 14.6 sec    INR 1.09 0.87 - 1.13   APTT   Result Value Ref Range    APTT 24.6 (L) 24.7 - 36.0 sec   ESTIMATED GFR   Result Value Ref Range    GFR If  16 (A) >60 mL/min/1.73 m 2    GFR If Non  13 (A) >60 mL/min/1.73 m 2   EKG (ER)   Result Value Ref Range    Report       [facility] Emergency Dept.    Test Date:  2017  Pt Name:    TRISTAN LOVE                Department: ER  MRN:        [id number]                      Room:       [facility]  Gender:     M                            Technician: [id number]  :        1955                   Requested By:MEENA IGNACIO  Order #:    [id number]                    Reading MD: MEENA IGNACIO MD    Measurements  Intervals                                Axis  Rate:       74                           P:          40  NJ:         152                          QRS:        -22  QRSD:       94                           T:          54  QT:         376  QTc:        418    Interpretive Statements  SINUS RHYTHM  BORDERLINE LEFT AXIS DEVIATION  No previous ECG available for comparison    Electronically Signed On 2017 10:28:02 PST by MEENA IGNACIO MD     All labs reviewed by me.    COURSE & MEDICAL DECISION MAKING  Nursing notes, VS, PMSFHx reviewed in  chart.       9:48 AM - Patient seen and examined at bedside. Ordered CBC with differential, BMP, troponin, PTT, APTT, urinalysis, and EKG to evaluate his symptoms. The differential diagnoses include but are not limited to: Acute on chronic renal failure, hyperkalemia, electrode abnormality      Lab studies reveal renal insufficiency/renal failure. Potassium has improved today since yesterday. The patient will be admitted to the hospital. I spoke with the on-call nephrologist Dr. Keller who requested the patient have a renal ultrasound, urine electrolytes, and a Taylor catheter placed. I spoke with the on-call hospitalist Dr. Boateng. He accepts the patient for admission.    DISPOSITION:  Patient will be admitted to Dr. Boateng in stable condition.      FINAL IMPRESSION  1. Acute on chronic renal failure (CMS-HCC)    2. Solitary kidney          Deysi VOGEL (Scribvernell), am scribing for, and in the presence of, Kumar Wynn M.D..    Electronically signed by: Deysi Burgess (Jennifer), 1/25/2017    Kumar VOGEL M.D. personally performed the services described in this documentation, as scribed by Deysi Burgess in my presence, and it is both accurate and complete.    The note accurately reflects work and decisions made by me.  Kumar Wynn  1/25/2017  11:35 AM

## 2017-01-25 NOTE — IP AVS SNAPSHOT
1/27/2017          Garry Benites  [address] [facility] [address]    Dear Garry:    [facility] wants to ensure your discharge home is safe and you or your loved ones have had all your questions answered regarding your care after you leave the hospital.    You may receive a telephone call within two days of your discharge.  This call is to make certain you understand your discharge instructions as well as ensure we provided you with the best care possible during your stay with us.     The call will only last approximately 3-5 minutes and will be done by a nurse.    Once again, we want to ensure your discharge home is safe and that you have a clear understanding of any next steps in your care.  If you have any questions or concerns, please do not hesitate to contact us, we are here for you.  Thank you for choosing [facility] for your healthcare needs.    Sincerely,    Clement Banks    [facility]

## 2017-01-25 NOTE — TELEPHONE ENCOUNTER
----- Message from PERRY Fu sent at 1/25/2017  6:56 AM PST -----  Patient needs to go to the ER.  He has dangerously high potassium and acute kidney failure.  Thank you

## 2017-01-25 NOTE — IP AVS SNAPSHOT
" <p align=\"LEFT\"><IMG SRC=\"//[email or web address]\" alt=\"Image\" WIDTH=\"50%\" HEIGHT=\"200\" BORDER=\"\"></p>                   Name:Garry Benites  Medical Record Number:[id number]  CSN: [id number]    YOB: 1955   Age: 61 y.o.  Sex: male  HT:1.778 m (5' 10\") WT: 88.5 kg (195 lb 1.7 oz)          Admit Date: 1/25/2017     Discharge Date:   Today's Date: 1/27/2017  Attending Doctor:  Kenny Mishra M.D.                  Allergies:  Pcn          Follow-up Information     1. Follow up with Cristofer Keller M.D. On 2/8/2017.    Specialty:  Nephrology    Contact information    [facility] [address]  [facility] [id number] [phone]           Medication List      Take these Medications        Instructions    Cholecalciferol 1000 UNIT Tabs   Commonly known as:  VITAMIND D3    Take 1 Tab by mouth every day.   Dose:  1000 Units       colchicine 0.6 MG Tabs   Commonly known as:  COLCRYS    Take 0.6 mg by mouth every 48 hours.   Dose:  0.6 mg       metoprolol  MG Tb24   Commonly known as:  TOPROL XL    Take 1 Tab by mouth every day.   Dose:  100 mg       sertraline 100 MG Tabs   Commonly known as:  ZOLOFT    Take 100 mg by mouth every day.   Dose:  100 mg       sodium bicarbonate 650 MG Tabs   Commonly known as:  SODIUM BICARBONATE    Take 1 Tab by mouth 2 times a day.   Dose:  650 mg         "

## 2017-01-25 NOTE — ED NOTES
"Med rec updated and complete  Allergies reviewed  Pt states \"No vitamins or OTC'S\".  Pt states \"No antibiotics in the last 30 days\".    "

## 2017-01-26 LAB
ALBUMIN SERPL BCP-MCNC: 3.6 G/DL (ref 3.2–4.9)
ALBUMIN/GLOB SERPL: 1.2 G/DL
ALP SERPL-CCNC: 75 U/L (ref 30–99)
ALT SERPL-CCNC: 16 U/L (ref 2–50)
ANION GAP SERPL CALC-SCNC: 6 MMOL/L (ref 0–11.9)
AST SERPL-CCNC: 15 U/L (ref 12–45)
BILIRUB SERPL-MCNC: 0.3 MG/DL (ref 0.1–1.5)
BUN SERPL-MCNC: 70 MG/DL (ref 8–22)
CALCIUM SERPL-MCNC: 8.7 MG/DL (ref 8.5–10.5)
CHLORIDE SERPL-SCNC: 113 MMOL/L (ref 96–112)
CO2 SERPL-SCNC: 18 MMOL/L (ref 20–33)
CREAT SERPL-MCNC: 4.19 MG/DL (ref 0.5–1.4)
EKG IMPRESSION: NORMAL
ERYTHROCYTE [DISTWIDTH] IN BLOOD BY AUTOMATED COUNT: 45.6 FL (ref 35.9–50)
GFR SERPL CREATININE-BSD FRML MDRD: 15 ML/MIN/1.73 M 2
GLOBULIN SER CALC-MCNC: 2.9 G/DL (ref 1.9–3.5)
GLUCOSE SERPL-MCNC: 90 MG/DL (ref 65–99)
HCT VFR BLD AUTO: 40.3 % (ref 42–52)
HGB BLD-MCNC: 12.8 G/DL (ref 14–18)
MCH RBC QN AUTO: 29.1 PG (ref 27–33)
MCHC RBC AUTO-ENTMCNC: 31.8 G/DL (ref 33.7–35.3)
MCV RBC AUTO: 91.6 FL (ref 81.4–97.8)
PLATELET # BLD AUTO: 201 K/UL (ref 164–446)
PMV BLD AUTO: 9.3 FL (ref 9–12.9)
POTASSIUM SERPL-SCNC: 5.8 MMOL/L (ref 3.6–5.5)
PROT SERPL-MCNC: 6.5 G/DL (ref 6–8.2)
RBC # BLD AUTO: 4.4 M/UL (ref 4.7–6.1)
SODIUM SERPL-SCNC: 137 MMOL/L (ref 135–145)
WBC # BLD AUTO: 5.8 K/UL (ref 4.8–10.8)

## 2017-01-26 PROCEDURE — 3E0234Z INTRODUCTION OF SERUM, TOXOID AND VACCINE INTO MUSCLE, PERCUTANEOUS APPROACH: ICD-10-PCS | Performed by: INTERNAL MEDICINE

## 2017-01-26 PROCEDURE — 90471 IMMUNIZATION ADMIN: CPT

## 2017-01-26 PROCEDURE — 700102 HCHG RX REV CODE 250 W/ 637 OVERRIDE(OP): Performed by: INTERNAL MEDICINE

## 2017-01-26 PROCEDURE — 90686 IIV4 VACC NO PRSV 0.5 ML IM: CPT | Performed by: INTERNAL MEDICINE

## 2017-01-26 PROCEDURE — 90670 PCV13 VACCINE IM: CPT | Performed by: INTERNAL MEDICINE

## 2017-01-26 PROCEDURE — 84165 PROTEIN E-PHORESIS SERUM: CPT

## 2017-01-26 PROCEDURE — 36415 COLL VENOUS BLD VENIPUNCTURE: CPT

## 2017-01-26 PROCEDURE — 85027 COMPLETE CBC AUTOMATED: CPT

## 2017-01-26 PROCEDURE — A9270 NON-COVERED ITEM OR SERVICE: HCPCS | Performed by: INTERNAL MEDICINE

## 2017-01-26 PROCEDURE — 93010 ELECTROCARDIOGRAM REPORT: CPT | Performed by: INTERNAL MEDICINE

## 2017-01-26 PROCEDURE — 700111 HCHG RX REV CODE 636 W/ 250 OVERRIDE (IP): Performed by: INTERNAL MEDICINE

## 2017-01-26 PROCEDURE — 84160 ASSAY OF PROTEIN ANY SOURCE: CPT

## 2017-01-26 PROCEDURE — 770006 HCHG ROOM/CARE - MED/SURG/GYN SEMI*

## 2017-01-26 PROCEDURE — 700105 HCHG RX REV CODE 258: Performed by: INTERNAL MEDICINE

## 2017-01-26 PROCEDURE — 99232 SBSQ HOSP IP/OBS MODERATE 35: CPT | Performed by: INTERNAL MEDICINE

## 2017-01-26 PROCEDURE — 80053 COMPREHEN METABOLIC PANEL: CPT

## 2017-01-26 PROCEDURE — 93005 ELECTROCARDIOGRAM TRACING: CPT | Performed by: INTERNAL MEDICINE

## 2017-01-26 RX ORDER — FUROSEMIDE 10 MG/ML
40 INJECTION INTRAMUSCULAR; INTRAVENOUS ONCE
Status: COMPLETED | OUTPATIENT
Start: 2017-01-26 | End: 2017-01-26

## 2017-01-26 RX ADMIN — INFLUENZA A VIRUS A/CALIFORNIA/7/2009 X-179A (H1N1) ANTIGEN (FORMALDEHYDE INACTIVATED), INFLUENZA A VIRUS A/HONG KONG/4801/2014 X-263B (H3N2) ANTIGEN (FORMALDEHYDE INACTIVATED), INFLUENZA B VIRUS B/PHUKET/3073/2013 ANTIGEN (FORMALDEHYDE INACTIVATED), AND INFLUENZA B VIRUS B/BRISBANE/60/2008 ANTIGEN (FORMALDEHYDE INACTIVATED) 0.5 ML: 15; 15; 15; 15 INJECTION, SUSPENSION INTRAMUSCULAR at 11:45

## 2017-01-26 RX ADMIN — SODIUM BICARBONATE: 84 INJECTION, SOLUTION INTRAVENOUS at 12:53

## 2017-01-26 RX ADMIN — PNEUMOCOCCAL 13-VALENT CONJUGATE VACCINE 0.5 ML: 2.2; 2.2; 2.2; 2.2; 2.2; 4.4; 2.2; 2.2; 2.2; 2.2; 2.2; 2.2; 2.2 INJECTION, SUSPENSION INTRAMUSCULAR at 11:42

## 2017-01-26 RX ADMIN — FUROSEMIDE 40 MG: 10 INJECTION, SOLUTION INTRAVENOUS at 11:50

## 2017-01-26 RX ADMIN — SERTRALINE 100 MG: 100 TABLET, FILM COATED ORAL at 08:17

## 2017-01-26 RX ADMIN — METOPROLOL SUCCINATE 100 MG: 100 TABLET, EXTENDED RELEASE ORAL at 08:17

## 2017-01-26 RX ADMIN — SODIUM CHLORIDE: 9 INJECTION, SOLUTION INTRAVENOUS at 06:20

## 2017-01-26 RX ADMIN — VITAMIN D, TAB 1000IU (100/BT) 1000 UNITS: 25 TAB at 11:59

## 2017-01-26 RX ADMIN — HEPARIN SODIUM 5000 UNITS: 5000 INJECTION, SOLUTION INTRAVENOUS; SUBCUTANEOUS at 13:42

## 2017-01-26 ASSESSMENT — ENCOUNTER SYMPTOMS
FOCAL WEAKNESS: 0
FLANK PAIN: 0
NAUSEA: 0
SHORTNESS OF BREATH: 0
ABDOMINAL PAIN: 0
VOMITING: 0
CHILLS: 0
FEVER: 0

## 2017-01-26 ASSESSMENT — PAIN SCALES - GENERAL
PAINLEVEL_OUTOF10: 0

## 2017-01-26 NOTE — PROGRESS NOTES
Two RN skin assessment complete. Bilateral lower extremities dusky and dry. Coccyx tear. Open to air. See wound flowsheet

## 2017-01-26 NOTE — CARE PLAN
Problem: Communication  Goal: The ability to communicate needs accurately and effectively will improve  Outcome: PROGRESSING AS EXPECTED  Patient is able to make needs know. Patient rings call bell appropriately.     Problem: Safety  Goal: Will remain free from injury  Outcome: PROGRESSING AS EXPECTED  Patient did not have a fall this shift. Safety measures intact. Patient up independently. Gait steady. Refusing bed alarm after further education.

## 2017-01-26 NOTE — CARE PLAN
Problem: Safety  Goal: Will remain free from injury  Cont. To refuse bed alarm, safety maintained.  Goal being met

## 2017-01-26 NOTE — PROGRESS NOTES
[facility] Nephrology Progress Note               Author: Josefa Pascal Date & Time created: 1/26/2017  9:52 AM     Interval History:  Mr. Benites is a 62 y/o male with PMHx of congenital solitary kidney, HTN, CKD, anxiety, chronic knee pain, and gout who was sent to the ER by his PCP (Dr. Jane) after routine lab work revealed hyperkalemia and acute kidney injury.      1/25 - initial consultation  1/26 - kidney function improving.  Urine output good.  CO2 trending down, so will change fluids to 1/2 NS with 1.5 amp bicarb to avoid acidosis from saline.  K trending up so will administer 1 dose lasix today.  Start Vit D supplementation.        Review of Systems:  Review of Systems   Constitutional: Negative for fever and chills.   Respiratory: Negative for shortness of breath.    Cardiovascular: Negative for chest pain.   Gastrointestinal: Negative for nausea, vomiting and abdominal pain.   Genitourinary: Negative for dysuria, urgency, frequency, hematuria and flank pain.   Neurological: Negative for focal weakness.   All other systems reviewed and are negative.      Physical Exam:  Physical Exam   Constitutional: He is oriented to person, place, and time. He appears well-developed and well-nourished. No distress.   HENT:   Head: Normocephalic and atraumatic.   Eyes: EOM are normal. Pupils are equal, round, and reactive to light.   Neck: Neck supple. No JVD present.   Cardiovascular: Normal rate, regular rhythm, normal heart sounds and intact distal pulses.  Exam reveals no friction rub.    No murmur heard.  Pulmonary/Chest: Breath sounds normal. No respiratory distress. He has no wheezes. He has no rales.   Abdominal: Soft. Bowel sounds are normal. He exhibits no distension. There is no tenderness.   Musculoskeletal: He exhibits no edema.   Neurological: He is alert and oriented to person, place, and time.   Skin: No erythema.   Psychiatric: He has a normal mood and affect.   Nursing note and vitals  reviewed.      Labs:        Invalid input(s): [id number]  Recent Labs      17   TROPONINI  <0.01     Recent Labs      17   1354  17   0917   1744  17   SODIUM  134*  137   --   137   POTASSIUM  6.2*  4.8   --   5.8*   CHLORIDE  107  107   --   113*   CO2  22  21   --   18*   BUN  72*  76*   --   70*   CREATININE  4.24*  4.47*   --   4.19*   MAGNESIUM   --    --   2.5   --    PHOSPHORUS   --    --   3.9   --    CALCIUM  9.5  9.2  9.3  8.7     Recent Labs      17   1354  17   0917   ALTSGPT  23   --   16   ASTSGOT  20   --   15   ALKPHOSPHAT  86   --   75   TBILIRUBIN  0.3   --   0.3   GLUCOSE  85  77  90     Recent Labs      17   RBC  4.67*  4.40*   HEMOGLOBIN  13.6*  12.8*   HEMATOCRIT  43.6  40.3*   PLATELETCT  216  201   PROTHROMBTM  14.5   --    APTT  24.6*   --    INR  1.09   --      Recent Labs      17   13517   WBC   --   6.4  5.8   NEUTSPOLYS   --   61.40   --    LYMPHOCYTES   --   23.60   --    MONOCYTES   --   8.50   --    EOSINOPHILS   --   4.60   --    BASOPHILS   --   0.60   --    ASTSGOT  20   --   15   ALTSGPT  23   --   16   ALKPHOSPHAT  86   --   75   TBILIRUBIN  0.3   --   0.3           Hemodynamics:  Temp (24hrs), Av.6 °C (97.8 °F), Min:36.4 °C (97.5 °F), Max:36.7 °C (98.1 °F)  Temperature: 36.7 °C (98.1 °F)  Pulse  Av.3  Min: 68  Max: 82Heart Rate (Monitored): 76  Blood Pressure: 158/92 mmHg, NIBP: 127/73 mmHg     Respiratory:    Respiration: 18, Pulse Oximetry: 97 %           Fluids:    Intake/Output Summary (Last 24 hours) at 17 0948  Last data filed at 17 0400   Gross per 24 hour   Intake    540 ml   Output      0 ml   Net    540 ml     Weight: 88.5 kg (195 lb 1.7 oz)  GI/Nutrition:  Orders Placed This Encounter   Procedures   • Diet Order     Standing Status: Standing      Number of Occurrences: 1      Standing Expiration  Date:      Order Specific Question:  Diet:     Answer:  Renal [8]     Medical Decision Making, by Problem:  Active Hospital Problems    Diagnosis   • Acute on chronic renal failure (CMS-HCC) [N17.9, N18.9]   • Depression with anxiety [F41.8]   • Gout [M10.9]   • Solitary kidney [Q60.0]   • Essential hypertension [I10]     ASSESSMENT AND PLAN:     # acute kidney injury    - FeNa 3.3% suggesting intrinsic kidney injury  - kidney function improving with IVF  - good urine ouptut  - renal US shows atrophy of right kidney without hydronephrosis  - continue hydration    # hyperkalemia  - K trending up; Mg wnl  - one dose lasix to reduce K  - continue to monitor    # metabolic acidosis  - likely due to saline administration  - change IVF to 1/2 NS with 75mEq bicarb    # chronic kidney disease  - unsure of patient's baseline; not anuric or oliguric per patient  - UA shows 100 mg/dL protein with prot/creat ratio 2657  - renal diet  - renally dose all meds for GFR <15    # bone mineral disease  - Vit D 21,   - start vit D supplementation  - Ca/PO4 wnl    # glycosuria  - patient is euglycemic with mild anemia (Hb 13.6)  - SPEP and UPEP to r/o Fanconi's syndrome pending    # hypertension  -  BP stable    # volume status  - patient appears euvolemic    # gout  - continue allopurinol  - advise less frequent colchicine use      Core Measures

## 2017-01-26 NOTE — PROGRESS NOTES
Patient arrived to unit at 1630. Patient Orientedx4. Patient denies pain this shift or the need for interventions. Patient has NS running at 125 mL/hr per active order. Patient has dusky BLE; see flowsheet. Coccyx has skin tear, open to air. Wound consults placed. Patient able to reposition self.Patient up independently gait steady. Pt has history of falls and refusing bed alarm after further explanation. No s/sx of distress this shift. Call bell in reach. Safety measures intact. Hourly rounding completed.    1800- Page to Dr. Boateng for the need for Barr placement. Paitent denies difficulty voiding. This RN witnessed pt void in bathroom  1814- Ok to d/c barr order per Dr. Boateng

## 2017-01-26 NOTE — H&P
ATTENDING:  [facility] hospitalist.    CONSULTS:  Nephrology, Dr. Keller.    PRIMARY CARE PHYSICIAN:  CELINA Pineda     CODE STATUS:  Full code.    CHIEF COMPLAINT:  Lab abnormality.    HISTORY OF PRESENT ILLNESS:  This is a 61-year-old male who presented to the   emergency department due to new elevation in his creatinine.  Patient did have   routine labs yesterday, was noted to have a creatinine of 4, his baseline is   around 2.  The patient does have a history of solitary kidney, since birth.    Patient has no complaints, states he has had normal urinary output, has not   been sick, he has had normal oral intake.    PAST MEDICAL HISTORY:  1.  Chronic renal disease.  2.  Hypertension.  3.  Depression with anxiety.  4.  Gout.    PAST SURGICAL HISTORY:  Left foot surgery.    MEDICATIONS:  1.  Allopurinol 100 mg daily.  2.  Colchicine 0.6 mg every 48 hours.  3.  Losartan 50 mg daily.  4.  Metoprolol 100 mg daily.  5.  Zoloft 100 mg daily.    ALLERGIES:  PENICILLIN.    SOCIAL HISTORY:  Denies any tobacco, alcohol or illicit drug use.    FAMILY HISTORY:  Significant for stroke and unknown cancer.    REVIEW OF SYSTEMS:  Complete review of systems obtained with positives noted   in the HPI above, all others negative.    PHYSICAL EXAMINATION:  VITAL SIGNS:  Temperature 36.2, pulse 81, respirations 18, blood pressure   153/83, satting 98% on room air.  GENERAL:  No acute distress, alert and oriented x3.  HEENT:  Normocephalic, atraumatic, moist mucous membranes.  NECK:  No JVD, bruit, thyromegaly, cervical or supraclavicular adenopathy   noted.  CARDIOVASCULAR:  Regular rate and rhythm.  No murmurs, rubs or gallops.  LUNGS:  Clear to auscultation bilaterally.  No crackles, rhonchi or wheezes.  ABDOMEN:  Bowel sounds x4, soft, nontender, nondistended, no   hepatosplenomegaly.  EXTREMITIES:  No clubbing, cyanosis or edema.  SKIN:  No rash or erythema.  NEUROLOGIC:  Cranial nerves II-XII intact.  Extraocular muscles  intact.    LABORATORY DATA:  White count 6.4, hemoglobin 13.6, hematocrit 43.6, platelets   216.  Sodium 134, potassium 4.8, chloride 107, CO2 21, BUN 76, creatinine   4.47, glucose is 77.  Urinalysis was negative for urinary tract infection.    ASSESSMENT AND PLAN:  1.  Acute on chronic renal function --his kidney function has significantly   worsened, creatinine is more than double, Dr. Keller has been consulted,   await additional recommendations.  Patient will receive a renal ultrasound,   urine electrolytes and a Taylor catheter.  We will repeat a BMP in the morning.    The patient will require at least  days in the hospital.  2.  Hypertension -- continue home medications except for his losartan, place   p.r.n., adjust as necessary, restart losartan when kidney function improves.  3.  Gout -- hold his gout medicine at this time, he is not in acute gouty   attack and he does have worsening kidney function.  4.  Depression with anxiety -- continue home meds.  5.  Solitary kidney -- chronic.       ____________________________________     DO TONI UGARTE / BENJAMIN    DD:  01/25/2017 16:19:25  DT:  01/25/2017 18:52:45    D#:  [id number]  Job#:  [id number]

## 2017-01-26 NOTE — PROGRESS NOTES
Hospital Medicine Progress Note, Adult, Complex               Author: Kenny PARISI Luisanabelia Date & Time created: 1/26/2017  11:44 AM    CC:  Kidney failure    Interval History:  Chart was reviewed. The pt is a 60 yo man who was admitted last night with worsening creatinine. He has a solitary kidney. His vrea is down to 4.16 today but K increased. He is making urine. EKG did not show peaked T waves. He has no complaints.     Sono of kidney:    Echogenic right kidney with renal parenchyma thinning consistent with medical renal disease.  No right hydronephrosis.             Review of Systems:  ROS   Pertinent positives/negative as mentioned above.     A complete review of systems was done. All other systems were negative.     Physical Exam:  Physical Exam   Constitutional: Well-developed, well-nourished, (-) diaphoresis, (-) distress  HENT: Normocephalic, atraumatic, (-) tonsillopharyngal congestion, (-) tonsillopharyngeal exudate  Eyes: PERRLA, pink conjuctivae, (-) icteric sclerae  Neck: (-) cervical lymphadenopathy, (-) rigidity  Cardiovascular: RRR, (-) murmurs, (-) rubs, (-) gallops, (-) edema  Pulmonary: Equal chest expansion, (+) clear breath sounds, (-) wheezes/rhonchi, (-) crackles/rales  Abdominal: (+) bowel sounds, soft, (-) tenderness, (-) masses, (-) guarding/rebound  Musculoskeletal: (-) joint swelling, (-) joint tenderness, (-) joint deformities, (-) muscle tenderness, (-) gross limitation of movement of 4 extremities  Neurologic: Non-focal, moves all 4 extremities, sensory grossly intact  Skin: (+) multiple tattoos  Psychiatric: mood/affect WNL, thought content WNL, behavior age appropriate    Labs:        Invalid input(s): [id number]  Recent Labs      01/25/17   0905   TROPONINI  <0.01     Recent Labs      01/24/17   1354  01/25/17   0905  01/25/17   1744  01/26/17   0333   SODIUM  134*  137   --   137   POTASSIUM  6.2*  4.8   --   5.8*   CHLORIDE  107  107   --   113*   CO2  22  21   --   18*   BUN  72*   76*   --   70*   CREATININE  4.24*  4.47*   --   4.19*   MAGNESIUM   --    --   2.5   --    PHOSPHORUS   --    --   3.9   --    CALCIUM  9.5  9.2  9.3  8.7     Recent Labs      17   1354  17   0333   ALTSGPT  23   --   16   ASTSGOT  20   --   15   ALKPHOSPHAT  86   --   75   TBILIRUBIN  0.3   --   0.3   GLUCOSE  85  77  90     Recent Labs      17   0917   0333   RBC  4.67*  4.40*   HEMOGLOBIN  13.6*  12.8*   HEMATOCRIT  43.6  40.3*   PLATELETCT  216  201   PROTHROMBTM  14.5   --    APTT  24.6*   --    INR  1.09   --      Recent Labs      17   1354  17   0333   WBC   --   6.4  5.8   NEUTSPOLYS   --   61.40   --    LYMPHOCYTES   --   23.60   --    MONOCYTES   --   8.50   --    EOSINOPHILS   --   4.60   --    BASOPHILS   --   0.60   --    ASTSGOT  20   --   15   ALTSGPT  23   --   16   ALKPHOSPHAT  86   --   75   TBILIRUBIN  0.3   --   0.3           Hemodynamics:  Temp (24hrs), Av.6 °C (97.8 °F), Min:36.4 °C (97.5 °F), Max:36.7 °C (98.1 °F)  Temperature: 36.7 °C (98.1 °F)  Pulse  Av.3  Min: 68  Max: 82Heart Rate (Monitored): 76  Blood Pressure: 158/92 mmHg, NIBP: 127/73 mmHg     Respiratory:    Respiration: 18, Pulse Oximetry: 97 %           Fluids:    Intake/Output Summary (Last 24 hours) at 17 1144  Last data filed at 17 0400   Gross per 24 hour   Intake    540 ml   Output      0 ml   Net    540 ml     Weight: 88.5 kg (195 lb 1.7 oz)  GI/Nutrition:  Orders Placed This Encounter   Procedures   • Diet Order     Standing Status: Standing      Number of Occurrences: 1      Standing Expiration Date:      Order Specific Question:  Diet:     Answer:  Renal [8]     Medical Decision Making, by Problem:  Active Hospital Problems    Diagnosis   • Acute on chronic renal failure (CMS-HCC) [N17.9, N18.9]  Solitary kidney [Q60.0]  monitor intake and output  EKG-done this morning  Losartan on hold  BMP tomorrow       • Depression with  anxiety [F41.8]  Zoloft     • Gout [M10.9]  Allopurinol     •    • Essential hypertension [I10]  metoprolol       Core Measures  Full code  SQ heparin

## 2017-01-26 NOTE — WOUND TEAM
Wound team consult placed regarding wound to Pt's buttocks.  Upon observation, Pt with a small abrasion on right medial buttock.  Area is dry and showing signs of healing.  No wound team interventions needed at this time.  Leave area USMAN.

## 2017-01-26 NOTE — CARE PLAN
Problem: Safety  Goal: Will remain free from injury  Outcome: PROGRESSING AS EXPECTED  Patient did not have a fall this shift. Safety measure intact. Patient up independently; gait steady. Patient refuses bed alarm    Problem: Bowel/Gastric:  Goal: Normal bowel function is maintained or improved  Outcome: PROGRESSING AS EXPECTED  Patient had a bowel movement this morning 1/26/17 without difficulty

## 2017-01-27 VITALS
SYSTOLIC BLOOD PRESSURE: 133 MMHG | HEART RATE: 75 BPM | HEIGHT: 70 IN | TEMPERATURE: 98.2 F | DIASTOLIC BLOOD PRESSURE: 96 MMHG | WEIGHT: 195.11 LBS | BODY MASS INDEX: 27.93 KG/M2 | RESPIRATION RATE: 15 BRPM | OXYGEN SATURATION: 96 %

## 2017-01-27 LAB
ANION GAP SERPL CALC-SCNC: 8 MMOL/L (ref 0–11.9)
BUN SERPL-MCNC: 67 MG/DL (ref 8–22)
CALCIUM SERPL-MCNC: 9 MG/DL (ref 8.5–10.5)
CHLORIDE SERPL-SCNC: 108 MMOL/L (ref 96–112)
CO2 SERPL-SCNC: 20 MMOL/L (ref 20–33)
CREAT SERPL-MCNC: 4.07 MG/DL (ref 0.5–1.4)
GFR SERPL CREATININE-BSD FRML MDRD: 15 ML/MIN/1.73 M 2
GLUCOSE SERPL-MCNC: 84 MG/DL (ref 65–99)
POTASSIUM SERPL-SCNC: 4.9 MMOL/L (ref 3.6–5.5)
SODIUM SERPL-SCNC: 136 MMOL/L (ref 135–145)

## 2017-01-27 PROCEDURE — 99239 HOSP IP/OBS DSCHRG MGMT >30: CPT | Performed by: INTERNAL MEDICINE

## 2017-01-27 PROCEDURE — 700102 HCHG RX REV CODE 250 W/ 637 OVERRIDE(OP): Performed by: INTERNAL MEDICINE

## 2017-01-27 PROCEDURE — 83883 ASSAY NEPHELOMETRY NOT SPEC: CPT

## 2017-01-27 PROCEDURE — 700105 HCHG RX REV CODE 258: Performed by: INTERNAL MEDICINE

## 2017-01-27 PROCEDURE — A9270 NON-COVERED ITEM OR SERVICE: HCPCS | Performed by: INTERNAL MEDICINE

## 2017-01-27 PROCEDURE — 84156 ASSAY OF PROTEIN URINE: CPT

## 2017-01-27 PROCEDURE — 36415 COLL VENOUS BLD VENIPUNCTURE: CPT

## 2017-01-27 PROCEDURE — 80048 BASIC METABOLIC PNL TOTAL CA: CPT

## 2017-01-27 PROCEDURE — 700112 HCHG RX REV CODE 229: Performed by: INTERNAL MEDICINE

## 2017-01-27 PROCEDURE — 700111 HCHG RX REV CODE 636 W/ 250 OVERRIDE (IP): Performed by: INTERNAL MEDICINE

## 2017-01-27 RX ORDER — SODIUM BICARBONATE 650 MG/1
650 TABLET ORAL 2 TIMES DAILY
Qty: 60 TAB | Refills: 0 | Status: SHIPPED | OUTPATIENT
Start: 2017-01-27 | End: 2019-06-19

## 2017-01-27 RX ADMIN — SERTRALINE 100 MG: 100 TABLET, FILM COATED ORAL at 09:29

## 2017-01-27 RX ADMIN — SODIUM BICARBONATE: 84 INJECTION, SOLUTION INTRAVENOUS at 01:23

## 2017-01-27 RX ADMIN — VITAMIN D, TAB 1000IU (100/BT) 1000 UNITS: 25 TAB at 09:29

## 2017-01-27 RX ADMIN — DOCUSATE SODIUM 100 MG: 100 CAPSULE ORAL at 09:29

## 2017-01-27 RX ADMIN — METOPROLOL SUCCINATE 100 MG: 100 TABLET, EXTENDED RELEASE ORAL at 09:29

## 2017-01-27 ASSESSMENT — ENCOUNTER SYMPTOMS
NAUSEA: 0
VOMITING: 0
ABDOMINAL PAIN: 0
FEVER: 0
FOCAL WEAKNESS: 0
SHORTNESS OF BREATH: 0
CHILLS: 0
FLANK PAIN: 0

## 2017-01-27 ASSESSMENT — LIFESTYLE VARIABLES: EVER_SMOKED: YES

## 2017-01-27 ASSESSMENT — PAIN SCALES - GENERAL: PAINLEVEL_OUTOF10: 0

## 2017-01-27 NOTE — DISCHARGE INSTRUCTIONS
Discharge Instructions    Discharged to home by car with self. Discharged via walking, hospital escort: Yes.  Special equipment needed: Not Applicable    Be sure to schedule a follow-up appointment with your primary care doctor or any specialists as instructed.     Discharge Plan:   Diet Plan: Discussed  Activity Level: Discussed  Confirmed Follow up Appointment: Appointment Scheduled  Confirmed Symptoms Management: Discussed  Medication Reconciliation Updated: Yes  Pneumococcal Vaccine Given - only chart on this line when given: Given (See MAR)  Influenza Vaccine Indication: Indicated: 9 to 64 years of age  Influenza Vaccine Given - only chart on this line when given: Influenza Vaccine Given (See MAR)    I understand that a diet low in cholesterol, fat, and sodium is recommended for good health. Unless I have been given specific instructions below for another diet, I accept this instruction as my diet prescription.   Other diet: Renal Diet      Special Instructions: None    · Is patient discharged on Warfarin / Coumadin?   No                                                                  · Is patient Post Blood Transfusion?  No    Depression / Suicide Risk    As you are discharged from this [facility] facility, it is important to learn how to keep safe from harming yourself.    Recognize the warning signs:  · Abrupt changes in personality, positive or negative- including increase in energy   · Giving away possessions  · Change in eating patterns- significant weight changes-  positive or negative  · Change in sleeping patterns- unable to sleep or sleeping all the time   · Unwillingness or inability to communicate  · Depression  · Unusual sadness, discouragement and loneliness  · Talk of wanting to die  · Neglect of personal appearance   · Rebelliousness- reckless behavior  · Withdrawal from people/activities they love  · Confusion- inability to concentrate     If you or a loved one observes any of these  behaviors or has concerns about self-harm, here's what you can do:  · Talk about it- your feelings and reasons for harming yourself  · Remove any means that you might use to hurt yourself (examples: pills, rope, extension cords, firearm)  · Get professional help from the community (Mental Health, Substance Abuse, psychological counseling)  · Do not be alone:Call your Safe Contact- someone whom you trust who will be there for you.  · Call your local CRISIS HOTLINE 360-9467 or 399-800-1138  · Call your local Children's Mobile Crisis Response Team [place] (616) 118-1522 or www.Cleeng  · Call the toll free National Suicide Prevention Hotlines   · National Suicide Prevention Lifeline 642-958-SURZ (1061)  · National Hope Line Network 800-SUICIDE (372-7693)

## 2017-01-27 NOTE — CARE PLAN
Problem: Safety  Goal: Will remain free from falls  Outcome: PROGRESSING AS EXPECTED  Safety precautions in place. Bed placed in low position. Pt is alert and oriented. Up self, steady. Calls appropriately.     Problem: Venous Thromboembolism (VTW)/Deep Vein Thrombosis (DVT) Prevention:  Goal: Patient will participate in Venous Thrombosis (VTE)/Deep Vein Thrombosis (DVT)Prevention Measures  Outcome: PROGRESSING AS EXPECTED  Receiving Heparin as DVT prophylaxis

## 2017-01-27 NOTE — DISCHARGE SUMMARY
HOSPITAL MEDICINE DISCHARGE SUMMARY    Name: Garry Benites  MRN: [id number]  : 1955  Admit Date: 2017  Discharge Date: 2017  Attending Provider: Kenny Mishra M.D.  Primary Care Physician: PERRY Fu    DISCHARGE DIAGNOSES:   Active Problems:    Acute on chronic renal failure (CMS-HCC) POA: Unknown    Solitary kidney POA: Yes    Essential hypertension POA: Yes    Depression with anxiety POA: Unknown    Gout POA: Unknown  Resolved Problems:    * No resolved hospital problems. *        SUMMARY OF EVENTS LEADING TO ADMISSION:   60 yo man admitted with kidney failure     For further details of history of present illness, past medical/social/family histories, allergies and medication, please refer to copy of admission note by Dr. Boateng on 17.     HOSPITAL COURSE:   The patient was admitted to the hospitalist service after being initially evaluated in the ED where he presented with acute on chronic kidney failure. He said that his creatine wa around 2, a year ago. On admission, it went up to as high as 4.47. IVF was given. Losartan was stopped. Nephrology was consulted as well. When the HCO3 level dropped to 18, bicarbonate drip was started. He is still diuresing well. Dr. Keller said that he believes that the rise in crea is just a gradual progression of his kidney disease. He recommended to discharge the patient and follow up with him on .     With hisclinical improvement, he was deemed ready to be discharged from the hospital as he did not have any further inpatient needs. Patient felt comfortable going home. The discharge plan was discussed with the patient, and he agreed to it.     The patient was subsequently sent home in improved and stable condition.     FOLLOW-UP ISSUES:   Kidney failure  HTN  Gout      DISCHARGE MEDICATIONS:   Prior to Admission medications    Medication Sig Start Date End Date Taking? Authorizing Provider   vitamin D (VITAMIND  D3) 1000 UNIT Tab Take 1 Tab by mouth every day. 1/27/17  Yes Kenny Mishra M.D.   sodium bicarbonate (SODIUM BICARBONATE) 650 MG Tab Take 1 Tab by mouth 2 times a day. 1/27/17  Yes Kenny Mishra M.D.   colchicine (COLCRYS) 0.6 MG Tab Take 0.6 mg by mouth every 48 hours.   Yes Not In System Provider   sertraline (ZOLOFT) 100 MG Tab Take 100 mg by mouth every day.   Yes Not In System Provider   metoprolol SR (TOPROL XL) 100 MG TABLET SR 24 HR Take 1 Tab by mouth every day. 1/24/17   PERRY Fu          FOLLOW-UP APPOINTMENTS:   1. Dr Keller on feb 8. Pt may need to start back on maintenace gout medications aside from colchicine. This is deferred to the nephrologist.       For further details on discharge medications, patient education, diet, and activity, please refer to electronic copy of discharge instructions.       TIME SPENT: 36 minutes, with greater than 50% of the time spent on face-to-face encounter, addressing medical issues, coordination of care, counseling, discharge planning, medication reconciliation, and documentation.

## 2017-01-27 NOTE — PROGRESS NOTES
Patient is being D/C'ed home per MD order. D/C instructions reviewed with patient, verbalized understanding. Patient D/C home with friend/walking, refused W/C. Patient instructed to follow up with Dr. Keller, Nephrology on 2/8/2017, patient verbalized understanding.

## 2017-01-27 NOTE — PROGRESS NOTES
Received report from day shift RN. Discussed POC with pt., verbalized understanding, assumed care @1915. A&Ox4. On room air. Denies pain at this time. Running Sodium Bicarb 8.4% mEq in 1/2 NS at 85ml/hr. Up self, steady. Strict I&O. Refusing bed alarm at this time. Safety precautions in place; treaded socks on, call light within reach, personal belongings within reach, upper bed rails up.

## 2017-01-28 LAB
ALBUMIN SERPL-MCNC: 3.73 G/DL (ref 3.75–5.01)
ALPHA1 GLOB SERPL ELPH-MCNC: 0.24 G/DL (ref 0.19–0.46)
ALPHA2 GLOB SERPL ELPH-MCNC: 0.79 G/DL (ref 0.48–1.05)
B-GLOBULIN SERPL ELPH-MCNC: 0.94 G/DL (ref 0.48–1.1)
GAMMA GLOB SERPL ELPH-MCNC: 1.2 G/DL (ref 0.62–1.51)
INTERPRETATION SERPL IFE-IMP: ABNORMAL
MONOCLON BAND OBS SERPL: ABNORMAL
PATHOLOGY STUDY: ABNORMAL
PROT SERPL-MCNC: 6.9 G/DL (ref 6–8.3)

## 2017-01-29 LAB
ALBUMIN 24H UR QL ELPH: DETECTED
ALPHA1 GLOB 24H UR QL ELPH: DETECTED
ALPHA2 GLOB 24H UR QL ELPH: DETECTED
B-GLOBULIN UR QL ELPH: DETECTED
COLLECT DURATION TIME SPEC: ABNORMAL H
GAMMA GLOB UR QL ELPH: DETECTED
INTERPRETATION UR IFE-IMP: ABNORMAL
KAPPA LC FREE UR-MCNC: 15.6 MG/DL (ref 0.14–2.42)
KAPPA LC FREE/LAMBDA FREE UR: 12.68 RATIO (ref 2.04–10.37)
LAMBDA LC FREE UR-MCNC: 1.23 MG/DL (ref 0.02–0.67)
PROT 24H UR-MRATE: ABNORMAL MG/D (ref 10–140)
TOTAL VOLUME 1105: ABNORMAL ML

## 2017-02-08 ENCOUNTER — HOSPITAL ENCOUNTER (OUTPATIENT)
Dept: LAB | Facility: MEDICAL CENTER | Age: 62
End: 2017-02-08
Attending: NURSE PRACTITIONER
Payer: MEDICARE

## 2017-02-08 LAB
ALBUMIN SERPL BCP-MCNC: 4.4 G/DL (ref 3.2–4.9)
ALBUMIN/GLOB SERPL: 1.4 G/DL
ALP SERPL-CCNC: 107 U/L (ref 30–99)
ALT SERPL-CCNC: 21 U/L (ref 2–50)
ANION GAP SERPL CALC-SCNC: 6 MMOL/L (ref 0–11.9)
APPEARANCE UR: CLEAR
AST SERPL-CCNC: 21 U/L (ref 12–45)
BILIRUB SERPL-MCNC: 0.4 MG/DL (ref 0.1–1.5)
BILIRUB UR QL STRIP.AUTO: NEGATIVE
BUN SERPL-MCNC: 56 MG/DL (ref 8–22)
CALCIUM SERPL-MCNC: 10.1 MG/DL (ref 8.5–10.5)
CHLORIDE SERPL-SCNC: 107 MMOL/L (ref 96–112)
CO2 SERPL-SCNC: 23 MMOL/L (ref 20–33)
COLOR UR AUTO: ABNORMAL
CREAT SERPL-MCNC: 3.46 MG/DL (ref 0.5–1.4)
EPITHELIAL CELLS 1715: ABNORMAL /HPF
GLOBULIN SER CALC-MCNC: 3.2 G/DL (ref 1.9–3.5)
GLUCOSE SERPL-MCNC: 91 MG/DL (ref 65–99)
GLUCOSE UR STRIP.AUTO-MCNC: ABNORMAL MG/DL
KETONES UR STRIP.AUTO-MCNC: NEGATIVE MG/DL
LEUKOCYTE ESTERASE UR QL STRIP.AUTO: NEGATIVE
MAGNESIUM SERPL-MCNC: 2 MG/DL (ref 1.5–2.5)
MICRO URNS: ABNORMAL
NITRITE UR QL STRIP.AUTO: NEGATIVE
PH UR: 6.5 [PH]
PHOSPHATE SERPL-MCNC: 3.4 MG/DL (ref 2.5–4.5)
POTASSIUM SERPL-SCNC: 5.2 MMOL/L (ref 3.6–5.5)
PROT SERPL-MCNC: 7.6 G/DL (ref 6–8.2)
PROT UR QL STRIP: 300 MG/DL
RBC #/AREA URNS HPF: ABNORMAL /HPF
RBC UR QL AUTO: NEGATIVE
SODIUM SERPL-SCNC: 136 MMOL/L (ref 135–145)
SP GR UR STRIP.AUTO: 1.01
TRANS CELLS URNS QL MICRO: ABNORMAL /HPF
URATE SERPL-MCNC: 6 MG/DL (ref 2.5–8.3)
WBC #/AREA URNS HPF: ABNORMAL /HPF

## 2017-02-08 PROCEDURE — 84550 ASSAY OF BLOOD/URIC ACID: CPT

## 2017-02-08 PROCEDURE — 36415 COLL VENOUS BLD VENIPUNCTURE: CPT

## 2017-02-08 PROCEDURE — 83735 ASSAY OF MAGNESIUM: CPT

## 2017-02-08 PROCEDURE — 81001 URINALYSIS AUTO W/SCOPE: CPT

## 2017-02-08 PROCEDURE — 84100 ASSAY OF PHOSPHORUS: CPT

## 2017-02-08 PROCEDURE — 80053 COMPREHEN METABOLIC PANEL: CPT

## 2017-04-21 RX ORDER — SERTRALINE HYDROCHLORIDE 100 MG/1
TABLET, FILM COATED ORAL
Qty: 90 TAB | Refills: 1 | Status: SHIPPED | OUTPATIENT
Start: 2017-04-21 | End: 2017-10-23 | Stop reason: SDUPTHER

## 2017-04-21 NOTE — TELEPHONE ENCOUNTER
Was the patient seen in the last year in this department? Yes     Does patient have an active prescription for medications requested? No     Received Request Via: Pharmacy      Pt met protocol?: Yes, LABS 2/17 OV 1/17

## 2017-05-05 ENCOUNTER — TELEPHONE (OUTPATIENT)
Dept: MEDICAL GROUP | Facility: PHYSICIAN GROUP | Age: 62
End: 2017-05-05

## 2017-05-05 ENCOUNTER — HOSPITAL ENCOUNTER (OUTPATIENT)
Dept: LAB | Facility: MEDICAL CENTER | Age: 62
End: 2017-05-05
Attending: NURSE PRACTITIONER
Payer: MEDICARE

## 2017-05-05 LAB
25(OH)D3 SERPL-MCNC: 21 NG/ML (ref 30–100)
ALBUMIN SERPL BCP-MCNC: 3.8 G/DL (ref 3.2–4.9)
APPEARANCE UR: CLEAR
BASOPHILS # BLD AUTO: 1 % (ref 0–1.8)
BASOPHILS # BLD: 0.05 K/UL (ref 0–0.12)
BILIRUB UR QL STRIP.AUTO: NEGATIVE
BUN SERPL-MCNC: 53 MG/DL (ref 8–22)
CALCIUM SERPL-MCNC: 9.3 MG/DL (ref 8.5–10.5)
CHLORIDE SERPL-SCNC: 111 MMOL/L (ref 96–112)
CO2 SERPL-SCNC: 19 MMOL/L (ref 20–33)
COLOR UR: ABNORMAL
CREAT SERPL-MCNC: 3.4 MG/DL (ref 0.5–1.4)
CREAT UR-MCNC: 102.2 MG/DL
EOSINOPHIL # BLD AUTO: 0.43 K/UL (ref 0–0.51)
EOSINOPHIL NFR BLD: 8.9 % (ref 0–6.9)
EPI CELLS #/AREA URNS HPF: ABNORMAL /HPF
ERYTHROCYTE [DISTWIDTH] IN BLOOD BY AUTOMATED COUNT: 44.6 FL (ref 35.9–50)
GFR SERPL CREATININE-BSD FRML MDRD: 18 ML/MIN/1.73 M 2
GLUCOSE SERPL-MCNC: 97 MG/DL (ref 65–99)
GLUCOSE UR STRIP.AUTO-MCNC: 70 MG/DL
HCT VFR BLD AUTO: 38.9 % (ref 42–52)
HGB BLD-MCNC: 12.4 G/DL (ref 14–18)
IMM GRANULOCYTES # BLD AUTO: 0.03 K/UL (ref 0–0.11)
IMM GRANULOCYTES NFR BLD AUTO: 0.6 % (ref 0–0.9)
KETONES UR STRIP.AUTO-MCNC: NEGATIVE MG/DL
LEUKOCYTE ESTERASE UR QL STRIP.AUTO: NEGATIVE
LYMPHOCYTES # BLD AUTO: 1.39 K/UL (ref 1–4.8)
LYMPHOCYTES NFR BLD: 28.9 % (ref 22–41)
MCH RBC QN AUTO: 28.3 PG (ref 27–33)
MCHC RBC AUTO-ENTMCNC: 31.9 G/DL (ref 33.7–35.3)
MCV RBC AUTO: 88.8 FL (ref 81.4–97.8)
MICRO URNS: ABNORMAL
MONOCYTES # BLD AUTO: 0.51 K/UL (ref 0–0.85)
MONOCYTES NFR BLD AUTO: 10.6 % (ref 0–13.4)
NEUTROPHILS # BLD AUTO: 2.4 K/UL (ref 1.82–7.42)
NEUTROPHILS NFR BLD: 50 % (ref 44–72)
NITRITE UR QL STRIP.AUTO: NEGATIVE
NRBC # BLD AUTO: 0 K/UL
NRBC BLD AUTO-RTO: 0 /100 WBC
PH UR STRIP.AUTO: 6 [PH]
PHOSPHATE SERPL-MCNC: 3.7 MG/DL (ref 2.5–4.5)
PLATELET # BLD AUTO: 189 K/UL (ref 164–446)
PMV BLD AUTO: 10.1 FL (ref 9–12.9)
POTASSIUM SERPL-SCNC: 4.9 MMOL/L (ref 3.6–5.5)
PROT UR QL STRIP: 200 MG/DL
PROT UR-MCNC: 242.7 MG/DL (ref 0–15)
PROT/CREAT UR: 2375 MG/G (ref 15–68)
PTH-INTACT SERPL-MCNC: 199.8 PG/ML (ref 14–72)
RBC # BLD AUTO: 4.38 M/UL (ref 4.7–6.1)
RBC # URNS HPF: ABNORMAL /HPF
RBC UR QL AUTO: NEGATIVE
SODIUM SERPL-SCNC: 140 MMOL/L (ref 135–145)
SP GR UR STRIP.AUTO: 1.01
URATE SERPL-MCNC: 6.2 MG/DL (ref 2.5–8.3)
WBC # BLD AUTO: 4.8 K/UL (ref 4.8–10.8)
WBC #/AREA URNS HPF: ABNORMAL /HPF

## 2017-05-05 PROCEDURE — 84550 ASSAY OF BLOOD/URIC ACID: CPT

## 2017-05-05 PROCEDURE — 81001 URINALYSIS AUTO W/SCOPE: CPT

## 2017-05-05 PROCEDURE — 36415 COLL VENOUS BLD VENIPUNCTURE: CPT

## 2017-05-05 PROCEDURE — 82570 ASSAY OF URINE CREATININE: CPT

## 2017-05-05 PROCEDURE — 83970 ASSAY OF PARATHORMONE: CPT

## 2017-05-05 PROCEDURE — 85025 COMPLETE CBC W/AUTO DIFF WBC: CPT

## 2017-05-05 PROCEDURE — 84156 ASSAY OF PROTEIN URINE: CPT

## 2017-05-05 PROCEDURE — 82306 VITAMIN D 25 HYDROXY: CPT | Mod: GA

## 2017-05-05 PROCEDURE — 80069 RENAL FUNCTION PANEL: CPT

## 2017-05-05 NOTE — TELEPHONE ENCOUNTER
ESTABLISHED PATIENT PRE-VISIT PLANNING     Note: Patient will not be contacted if there is no indication to call.     1.  Reviewed note from last office visit with PCP and/or other med group provider: Yes    2.  If any orders were placed at last visit, do we have Results/Consult Notes?        •  Labs - Labs ordered, completed and results are in chart.       •  Imaging - Imaging was not ordered at last office visit.       •  Referrals - No referrals were ordered at last office visit.    3.  Immunizations were updated in Epic using WebIZ?: Epic matches WebIZ       •  Web Iz Recommendations: PNEUMOVAX (PPSV23) TDAP    4.  Patient is due for the following Health Maintenance Topics:   Health Maintenance Due   Topic Date Due   • Annual Wellness Visit  1955   • IMM DTaP/Tdap/Td Vaccine (1 - Tdap) 09/12/1974   • IMM ZOSTER VACCINE  09/12/2015

## 2017-05-09 ENCOUNTER — OFFICE VISIT (OUTPATIENT)
Dept: MEDICAL GROUP | Facility: PHYSICIAN GROUP | Age: 62
End: 2017-05-09
Payer: MEDICARE

## 2017-05-09 VITALS
OXYGEN SATURATION: 97 % | DIASTOLIC BLOOD PRESSURE: 88 MMHG | HEIGHT: 70 IN | BODY MASS INDEX: 27.63 KG/M2 | RESPIRATION RATE: 16 BRPM | WEIGHT: 193 LBS | HEART RATE: 80 BPM | TEMPERATURE: 98.2 F | SYSTOLIC BLOOD PRESSURE: 130 MMHG

## 2017-05-09 DIAGNOSIS — Z87.891 FORMER SMOKER, STOPPED SMOKING MANY YEARS AGO: ICD-10-CM

## 2017-05-09 DIAGNOSIS — B35.3 TINEA PEDIS OF RIGHT FOOT: ICD-10-CM

## 2017-05-09 DIAGNOSIS — I73.9 ARTERIAL INSUFFICIENCY OF LOWER EXTREMITY (HCC): Primary | ICD-10-CM

## 2017-05-09 PROCEDURE — 99214 OFFICE O/P EST MOD 30 MIN: CPT | Performed by: NURSE PRACTITIONER

## 2017-05-09 PROCEDURE — 3017F COLORECTAL CA SCREEN DOC REV: CPT | Performed by: NURSE PRACTITIONER

## 2017-05-09 PROCEDURE — G8419 CALC BMI OUT NRM PARAM NOF/U: HCPCS | Performed by: NURSE PRACTITIONER

## 2017-05-09 PROCEDURE — G8432 DEP SCR NOT DOC, RNG: HCPCS | Performed by: NURSE PRACTITIONER

## 2017-05-09 PROCEDURE — 1036F TOBACCO NON-USER: CPT | Performed by: NURSE PRACTITIONER

## 2017-05-09 RX ORDER — CLOTRIMAZOLE AND BETAMETHASONE DIPROPIONATE 10; .5 MG/ML; MG/ML
30 LOTION TOPICAL 2 TIMES DAILY
Qty: 1 BOTTLE | Refills: 1 | Status: SHIPPED | OUTPATIENT
Start: 2017-05-09 | End: 2019-06-19 | Stop reason: SDUPTHER

## 2017-05-09 NOTE — MR AVS SNAPSHOT
"        Garry Benites   2017 11:20 AM   Office Visit   MRN: [id number]    Department:  [facility]   Dept Phone:  [phone]    Description:  Male : 1955   Provider:  PERRY Fu           Reason for Visit     Laryngitis     Rash on legs possible allergy to med per patient       Allergies as of 2017     Allergen Noted Reactions    Pcn [Penicillins] 10/21/2015   Unspecified    Pt states \"It was a childhood allergie\".        You were diagnosed with     Arterial insufficiency of lower extremity (CMS-HCC)   [4305089]  -  Primary     Tinea pedis of right foot   [464263]       Former smoker, stopped smoking many years ago   [505164]         Vital Signs     Blood Pressure Pulse Temperature Respirations Height Weight    130/88 mmHg 80 36.8 °C (98.2 °F) 16 1.778 m (5' 10\") 87.544 kg (193 lb)    Body Mass Index Oxygen Saturation Smoking Status             27.69 kg/m2 97% Former Smoker         Basic Information     Date Of Birth Sex Race Ethnicity Preferred Language    1955 Male White Non- English      Problem List              ICD-10-CM Priority Class Noted - Resolved    Solitary kidney Q60.0   10/21/2015 - Present    Family history of stroke Z82.3   10/21/2015 - Present    Essential hypertension I10   10/21/2015 - Present    Irregular heart beat I49.9   10/21/2015 - Present    SAMMI (generalized anxiety disorder) F41.1   10/21/2015 - Present    Gout due to renal impairment M10.30   10/21/2015 - Present    First degree heart block by electrocardiogram I44.0   10/21/2015 - Present    Acute on chronic renal failure (CMS-HCC) N17.9, N18.9 High  2017 - Present    Depression with anxiety F41.8   2017 - Present    Gout M10.9   2017 - Present      Health Maintenance        Date Due Completion Dates    IMM DTaP/Tdap/Td Vaccine (1 - Tdap) 1974 ---    IMM ZOSTER VACCINE 2015 ---    COLONOSCOPY 2023 (Done)    Override on 2013: Done            "   Current Immunizations     13-VALENT PCV PREVNAR 1/26/2017 11:42 AM    Influenza Vaccine Quad Inj (Pf) 1/26/2017 11:45 AM    Influenza Vaccine Quad Inj (Preserved) 10/21/2015      Below and/or attached are the medications your provider expects you to take. Review all of your home medications and newly ordered medications with your provider and/or pharmacist. Follow medication instructions as directed by your provider and/or pharmacist. Please keep your medication list with you and share with your provider. Update the information when medications are discontinued, doses are changed, or new medications (including over-the-counter products) are added; and carry medication information at all times in the event of emergency situations     Allergies:  PCN - Unspecified               Medications  Valid as of: May 09, 2017 -  1:13 PM    Generic Name Brand Name Tablet Size Instructions for use    Cholecalciferol (Tab) VITAMIND D3 1000 UNIT Take 1 Tab by mouth every day.        Clotrimazole-Betamethasone (Lotion) LOTRISONE 1-0.05 % Apply 30 mL to affected area(s) 2 Times a Day.        Colchicine (Tab) COLCRYS 0.6 MG Take 0.6 mg by mouth every 48 hours.        Metoprolol Succinate (TABLET SR 24 HR) TOPROL  MG Take 1 Tab by mouth every day.        Sertraline HCl (Tab) ZOLOFT 100 MG Take 1 tablet by mouth  every day        Sodium Bicarbonate (Tab) SODIUM BICARBONATE 650 MG Take 1 Tab by mouth 2 times a day.        .                 Medicines prescribed today were sent to:     [facility] - [address] [address]    Phone: [phone] Fax: [phone]    Open 24 Hours?: Yes    [facility] [address] [place] [address] [address] [facility]    [address] [facility] [address]    Phone: [phone] Fax: [phone]    Open 24 Hours?: No    [facility] - [place] - [address] [place] [address]  [address] [facility] [address]    Phone: [phone] Fax: [phone]    Open 24 Hours?: No    [facility] [address]    [address] [facility] [place] [address]    Phone: [phone] Fax: [phone]    Open 24 Hours?: No      Medication refill instructions:       If your prescription bottle indicates you have medication refills left, it is not necessary to call your provider’s office. Please contact your pharmacy and they will refill your medication.    If your prescription bottle indicates you do not have any refills left, you may request refills at any time through one of the following ways: The online [employer] system (except Urgent Care), by calling your provider’s office, or by asking your pharmacy to contact your provider’s office with a refill request. Medication refills are processed only during regular business hours and may not be available until the next business day. Your provider may request additional information or to have a follow-up visit with you prior to refilling your medication.   *Please Note: Medication refills are assigned a new Rx number when refilled electronically. Your pharmacy may indicate that no refills were authorized even though a new prescription for the same medication is available at the pharmacy. Please request the medicine by name with the pharmacy before contacting your provider for a refill.        Referral     A referral request has been sent to our patient care coordination department. Please allow 3-5 business days for us to process this request and contact you either by phone or mail. If you do not hear from us by the 5th business day, please call us at [phone].           [employer] Access Code: [email or web address]  Expires: 6/8/2017  1:13 PM    [employer]  A secure, online tool to manage your health information     [employer]® is a secure, online tool that connects you to your personalized health information from the  privacy of your home -- day or night - making it very easy for you to manage your healthcare. Once the activation process is completed, you can even access your medical information using the [employer] eliceo, which is available for free in the [employer] Eliceo store or Google Play store.     [employer] provides the following levels of access (as shown below):   My Chart Features   [facility] Doctor [facility]  Specialists [facility]  Urgent  Care Non-[facility]  Primary Care  Doctor   Email your healthcare team securely and privately 24/7 X X X    Manage appointments: schedule your next appointment; view details of past/upcoming appointments X      Request prescription refills. X      View recent personal medical records, including lab and immunizations X X X X   View health record, including health history, allergies, medications X X X X   Read reports about your outpatient visits, procedures, consult and ER notes X X X X   See your discharge summary, which is a recap of your hospital and/or ER visit that includes your diagnosis, lab results, and care plan. X X       How to register for [employer]:  1. Go to  [email or web address].  2. Click on the Sign Up Now box, which takes you to the New Member Sign Up page. You will need to provide the following information:  a. Enter your [employer] Access Code exactly as it appears at the top of this page. (You will not need to use this code after you’ve completed the sign-up process. If you do not sign up before the expiration date, you must request a new code.)   b. Enter your date of birth.   c. Enter your home email address.   d. Click Submit, and follow the next screen’s instructions.  3. Create a [employer] ID. This will be your [employer] login ID and cannot be changed, so think of one that is secure and easy to remember.  4. Create a [employer] password. You can change your password at any time.  5. Enter your Password Reset Question and Answer. This can be used at a later time if you forget  your password.   6. Enter your e-mail address. This allows you to receive e-mail notifications when new information is available in [employer].  7. Click Sign Up. You can now view your health information.    For assistance activating your [employer] account, call [phone]

## 2017-05-09 NOTE — PROGRESS NOTES
"Chief Complaint   Patient presents with   • Laryngitis   • Rash     on legs possible allergy to med per patient        HISTORY OF PRESENT ILLNESS: Patient is a 61 y.o. male established patient who presents today to discuss the followin. Arterial insufficiency of lower extremity (CMS-HCC) 2. Tinea pedis of right foot 3. Former smoker, stopped smoking many years ago  Patient is concerned about persistent \"rash\" on his lower extremities right worse than left.  He states that he has had open sores in the past that have required wound care.  He has been told many times that he has poor circulation.  He has a 25-pack-year history of smoking, quitting in 19 nineties.  He is not currently under the care of any vascular specialist nor does he wear any compression stockings.  He denies any significant pain in the legs if he is standing or walking for too long.  He has not applied any topical remedies to the leg since the onset.  Denies any drainage from the sites.  Patient recently had right knee surgery, approximately 2 weeks ago.  He believes that the rash has worsened since surgery.    Allergies:Pcn    Current Outpatient Prescriptions Ordered in [facility]   Medication Sig Dispense Refill   • clotrimazole-betamethasone (LOTRISONE) 1-0.05 % Lotion Apply 30 mL to affected area(s) 2 Times a Day. 1 Bottle 1   • sertraline (ZOLOFT) 100 MG Tab Take 1 tablet by mouth  every day 90 Tab 1   • vitamin D (VITAMIND D3) 1000 UNIT Tab Take 1 Tab by mouth every day. 60 Tab 0   • sodium bicarbonate (SODIUM BICARBONATE) 650 MG Tab Take 1 Tab by mouth 2 times a day. 60 Tab 0   • colchicine (COLCRYS) 0.6 MG Tab Take 0.6 mg by mouth every 48 hours.     • metoprolol SR (TOPROL XL) 100 MG TABLET SR 24 HR Take 1 Tab by mouth every day. 30 Tab 0     No current Epic-ordered facility-administered medications on file.       Past Medical History   Diagnosis Date   • Gout due to renal impairment    • Hypertension    • Anxiety        Social History " "  Substance Use Topics   • Smoking status: Former Smoker -- 1.00 packs/day for 25 years     Types: Cigarettes     Quit date: 10/21/1990   • Smokeless tobacco: Never Used   • Alcohol Use: No       Family Status   Relation Status Death Age   • Mother     • Father       Family History   Problem Relation Age of Onset   • Cancer Mother      lymphoma   • Heart Attack Father    • Stroke Father    • Diabetes Neg Hx        ROS: see above    Review of Systems   Constitutional: Negative for fever, chills, weight loss and malaise/fatigue.   HENT: Negative for ear pain, nosebleeds, congestion, sore throat and neck pain.    Eyes: Negative for blurred vision.   Respiratory: Negative for cough, sputum production, shortness of breath and wheezing.    Cardiovascular: Negative for chest pain, palpitations, orthopnea and leg swelling.    Gastrointestinal: Negative for heartburn, nausea, vomiting and abdominal pain.   Genitourinary: Negative for dysuria, urgency and frequency.   Musculoskeletal: Negative for myalgias, back pain and joint pain.   Skin: Negative for rash and itching.   Neurological: Negative for dizziness, tingling, tremors, sensory change, focal weakness and headaches.   Endo/Heme/Allergies: Does not bruise/bleed easily.   Psychiatric/Behavioral: Negative for depression, suicidal ideas and memory loss.  The patient is not nervous/anxious and does not have insomnia.        Exam:  Blood pressure 130/88, pulse 80, temperature 36.8 °C (98.2 °F), resp. rate 16, height 1.778 m (5' 10\"), weight 87.544 kg (193 lb), SpO2 97 %.  General:  Well nourished, well developed male in NAD  Head is grossly normal.  Neck: Thyroid is not enlarged.  Pulmonary: Normal effort.   Cardiovascular: Regular rate.   Extremities: no clubbing, cyanosis.  Significant erythema both lower extremities, right worse than left.  There is flaking, dry skin without any open wounds.  Pedal pulses are diminished.  There is chronic pigmentation " changes throughout the lower extremities with no hair growth past the knee.  Psych:  Mood and affect are normal.  Answering questions appropriately with good eye contact.      Please note that this dictation was created using voice recognition software. I have made every reasonable attempt to correct obvious errors, but I expect that there are errors of grammar and possibly content that I did not discover before finalizing the note.    Assessment/Plan:    1. Arterial insufficiency of lower extremity (CMS-HCC)  REFERRAL TO WOUND CLINIC    ANTI-EMBOLISM STOCKINGS LGE REG   2. Tinea pedis of right foot  clotrimazole-betamethasone (LOTRISONE) 1-0.05 % Lotion        1.  Strongly encouraged patient to establish back with wound care to prevent further breakdown.  2.  Encourage patient to wear compression stockings  3.  Trial of topical Lotrisone  4.  Encourage patient to remain smoke free.  5.  Patient states that he will be traveling for the next 2 weeks.  Strongly encourage patient to be seen in [place] if the symptoms worsen or fail to improve.

## 2017-05-24 RX ORDER — COLCHICINE 0.6 MG/1
TABLET ORAL
Qty: 90 TAB | Refills: 1 | Status: SHIPPED | OUTPATIENT
Start: 2017-05-24 | End: 2017-12-04 | Stop reason: SDUPTHER

## 2017-05-24 NOTE — TELEPHONE ENCOUNTER
Was the patient seen in the last year in this department? Yes     Does patient have an active prescription for medications requested? No     Received Request Via: Pharmacy      Pt met protocol?: Yes    LAST OV 05/09/2017    BP Readings from Last 1 Encounters:   05/09/17 130/88

## 2017-08-28 ENCOUNTER — HOSPITAL ENCOUNTER (OUTPATIENT)
Dept: LAB | Facility: MEDICAL CENTER | Age: 62
End: 2017-08-28
Attending: NURSE PRACTITIONER
Payer: MEDICARE

## 2017-08-28 LAB
ALBUMIN SERPL BCP-MCNC: 3.9 G/DL (ref 3.2–4.9)
APPEARANCE UR: CLEAR
BACTERIA #/AREA URNS HPF: NEGATIVE /HPF
BASOPHILS # BLD AUTO: 0.7 % (ref 0–1.8)
BASOPHILS # BLD: 0.05 K/UL (ref 0–0.12)
BILIRUB UR QL STRIP.AUTO: NEGATIVE
BUN SERPL-MCNC: 61 MG/DL (ref 8–22)
CALCIUM SERPL-MCNC: 9.4 MG/DL (ref 8.5–10.5)
CHLORIDE SERPL-SCNC: 108 MMOL/L (ref 96–112)
CO2 SERPL-SCNC: 17 MMOL/L (ref 20–33)
COLOR UR: YELLOW
CREAT SERPL-MCNC: 4.83 MG/DL (ref 0.5–1.4)
CREAT UR-MCNC: 81.5 MG/DL
EOSINOPHIL # BLD AUTO: 0.35 K/UL (ref 0–0.51)
EOSINOPHIL NFR BLD: 4.9 % (ref 0–6.9)
EPI CELLS #/AREA URNS HPF: ABNORMAL /HPF
ERYTHROCYTE [DISTWIDTH] IN BLOOD BY AUTOMATED COUNT: 47.2 FL (ref 35.9–50)
FASTING STATUS PATIENT QL REPORTED: NORMAL
FASTING STATUS PATIENT QL REPORTED: NORMAL
GFR SERPL CREATININE-BSD FRML MDRD: 12 ML/MIN/1.73 M 2
GLUCOSE SERPL-MCNC: 82 MG/DL (ref 65–99)
GLUCOSE UR STRIP.AUTO-MCNC: 100 MG/DL
HCT VFR BLD AUTO: 41.1 % (ref 42–52)
HGB BLD-MCNC: 13 G/DL (ref 14–18)
HYALINE CASTS #/AREA URNS LPF: ABNORMAL /LPF
IMM GRANULOCYTES # BLD AUTO: 0.07 K/UL (ref 0–0.11)
IMM GRANULOCYTES NFR BLD AUTO: 1 % (ref 0–0.9)
KETONES UR STRIP.AUTO-MCNC: NEGATIVE MG/DL
LEUKOCYTE ESTERASE UR QL STRIP.AUTO: NEGATIVE
LYMPHOCYTES # BLD AUTO: 1.28 K/UL (ref 1–4.8)
LYMPHOCYTES NFR BLD: 17.9 % (ref 22–41)
MCH RBC QN AUTO: 28.6 PG (ref 27–33)
MCHC RBC AUTO-ENTMCNC: 31.6 G/DL (ref 33.7–35.3)
MCV RBC AUTO: 90.5 FL (ref 81.4–97.8)
MICRO URNS: ABNORMAL
MONOCYTES # BLD AUTO: 0.54 K/UL (ref 0–0.85)
MONOCYTES NFR BLD AUTO: 7.6 % (ref 0–13.4)
NEUTROPHILS # BLD AUTO: 4.85 K/UL (ref 1.82–7.42)
NEUTROPHILS NFR BLD: 67.9 % (ref 44–72)
NITRITE UR QL STRIP.AUTO: NEGATIVE
NRBC # BLD AUTO: 0 K/UL
NRBC BLD AUTO-RTO: 0 /100 WBC
PH UR STRIP.AUTO: 6 [PH]
PHOSPHATE SERPL-MCNC: 4.5 MG/DL (ref 2.5–4.5)
PLATELET # BLD AUTO: 202 K/UL (ref 164–446)
PMV BLD AUTO: 9.9 FL (ref 9–12.9)
POTASSIUM SERPL-SCNC: 4.4 MMOL/L (ref 3.6–5.5)
PROT UR QL STRIP: 300 MG/DL
PROT UR-MCNC: 280.8 MG/DL (ref 0–15)
PROT/CREAT UR: 3445 MG/G (ref 15–68)
PTH-INTACT SERPL-MCNC: 216.3 PG/ML (ref 14–72)
RBC # BLD AUTO: 4.54 M/UL (ref 4.7–6.1)
RBC # URNS HPF: ABNORMAL /HPF
RBC UR QL AUTO: ABNORMAL
SODIUM SERPL-SCNC: 137 MMOL/L (ref 135–145)
SP GR UR STRIP.AUTO: 1.01
URATE SERPL-MCNC: 7.2 MG/DL (ref 2.5–8.3)
UROBILINOGEN UR STRIP.AUTO-MCNC: 0.2 MG/DL
WBC # BLD AUTO: 7.1 K/UL (ref 4.8–10.8)
WBC #/AREA URNS HPF: ABNORMAL /HPF

## 2017-08-28 PROCEDURE — 82570 ASSAY OF URINE CREATININE: CPT

## 2017-08-28 PROCEDURE — 84550 ASSAY OF BLOOD/URIC ACID: CPT

## 2017-08-28 PROCEDURE — 83970 ASSAY OF PARATHORMONE: CPT

## 2017-08-28 PROCEDURE — 36415 COLL VENOUS BLD VENIPUNCTURE: CPT

## 2017-08-28 PROCEDURE — 80069 RENAL FUNCTION PANEL: CPT

## 2017-08-28 PROCEDURE — 81001 URINALYSIS AUTO W/SCOPE: CPT

## 2017-08-28 PROCEDURE — 84156 ASSAY OF PROTEIN URINE: CPT

## 2017-08-28 PROCEDURE — 85025 COMPLETE CBC W/AUTO DIFF WBC: CPT

## 2017-11-04 ENCOUNTER — HOSPITAL ENCOUNTER (OUTPATIENT)
Dept: LAB | Facility: MEDICAL CENTER | Age: 62
End: 2017-11-04
Attending: NURSE PRACTITIONER
Payer: MEDICARE

## 2017-11-04 LAB
ALBUMIN SERPL BCP-MCNC: 3.8 G/DL (ref 3.2–4.9)
ALBUMIN/GLOB SERPL: 1 G/DL
ALP SERPL-CCNC: 96 U/L (ref 30–99)
ALT SERPL-CCNC: 66 U/L (ref 2–50)
ANION GAP SERPL CALC-SCNC: 9 MMOL/L (ref 0–11.9)
ANISOCYTOSIS BLD QL SMEAR: ABNORMAL
APPEARANCE UR: CLEAR
AST SERPL-CCNC: 36 U/L (ref 12–45)
BACTERIA #/AREA URNS HPF: NEGATIVE /HPF
BASOPHILS # BLD AUTO: 0 % (ref 0–1.8)
BASOPHILS # BLD: 0 K/UL (ref 0–0.12)
BILIRUB SERPL-MCNC: 0.3 MG/DL (ref 0.1–1.5)
BILIRUB UR QL STRIP.AUTO: NEGATIVE
BUN SERPL-MCNC: 76 MG/DL (ref 8–22)
CALCIUM SERPL-MCNC: 9.8 MG/DL (ref 8.5–10.5)
CHLORIDE SERPL-SCNC: 111 MMOL/L (ref 96–112)
CO2 SERPL-SCNC: 15 MMOL/L (ref 20–33)
COLOR UR: YELLOW
CREAT SERPL-MCNC: 4.13 MG/DL (ref 0.5–1.4)
CREAT UR-MCNC: 64.5 MG/DL
EOSINOPHIL # BLD AUTO: 0.27 K/UL (ref 0–0.51)
EOSINOPHIL NFR BLD: 2.6 % (ref 0–6.9)
EPI CELLS #/AREA URNS HPF: NEGATIVE /HPF
ERYTHROCYTE [DISTWIDTH] IN BLOOD BY AUTOMATED COUNT: 45.7 FL (ref 35.9–50)
EST. AVERAGE GLUCOSE BLD GHB EST-MCNC: 128 MG/DL
GFR SERPL CREATININE-BSD FRML MDRD: 15 ML/MIN/1.73 M 2
GIANT PLATELETS BLD QL SMEAR: NORMAL
GLOBULIN SER CALC-MCNC: 3.9 G/DL (ref 1.9–3.5)
GLUCOSE SERPL-MCNC: 92 MG/DL (ref 65–99)
GLUCOSE UR STRIP.AUTO-MCNC: NEGATIVE MG/DL
HBA1C MFR BLD: 6.1 % (ref 0–5.6)
HCT VFR BLD AUTO: 43.2 % (ref 42–52)
HGB BLD-MCNC: 13.6 G/DL (ref 14–18)
HYALINE CASTS #/AREA URNS LPF: ABNORMAL /LPF
KETONES UR STRIP.AUTO-MCNC: NEGATIVE MG/DL
LEUKOCYTE ESTERASE UR QL STRIP.AUTO: NEGATIVE
LYMPHOCYTES # BLD AUTO: 1.71 K/UL (ref 1–4.8)
LYMPHOCYTES NFR BLD: 16.4 % (ref 22–41)
MAGNESIUM SERPL-MCNC: 2.3 MG/DL (ref 1.5–2.5)
MANUAL DIFF BLD: NORMAL
MCH RBC QN AUTO: 28.3 PG (ref 27–33)
MCHC RBC AUTO-ENTMCNC: 31.5 G/DL (ref 33.7–35.3)
MCV RBC AUTO: 90 FL (ref 81.4–97.8)
METAMYELOCYTES NFR BLD MANUAL: 1.7 %
MICRO URNS: ABNORMAL
MICROCYTES BLD QL SMEAR: ABNORMAL
MONOCYTES # BLD AUTO: 0.45 K/UL (ref 0–0.85)
MONOCYTES NFR BLD AUTO: 4.3 % (ref 0–13.4)
MORPHOLOGY BLD-IMP: NORMAL
MYELOCYTES NFR BLD MANUAL: 1.7 %
NEUTROPHILS # BLD AUTO: 7.62 K/UL (ref 1.82–7.42)
NEUTROPHILS NFR BLD: 71.6 % (ref 44–72)
NEUTS BAND NFR BLD MANUAL: 1.7 % (ref 0–10)
NITRITE UR QL STRIP.AUTO: NEGATIVE
NRBC # BLD AUTO: 0 K/UL
NRBC BLD AUTO-RTO: 0 /100 WBC
OVALOCYTES BLD QL SMEAR: NORMAL
PH UR STRIP.AUTO: 6 [PH]
PHOSPHATE SERPL-MCNC: 4.9 MG/DL (ref 2.5–4.5)
PLATELET # BLD AUTO: 309 K/UL (ref 164–446)
PLATELET BLD QL SMEAR: NORMAL
PMV BLD AUTO: 9.5 FL (ref 9–12.9)
POTASSIUM SERPL-SCNC: 5.5 MMOL/L (ref 3.6–5.5)
PROT SERPL-MCNC: 7.7 G/DL (ref 6–8.2)
PROT UR QL STRIP: 300 MG/DL
PROT UR-MCNC: 161.6 MG/DL (ref 0–15)
PROT/CREAT UR: 2505 MG/G (ref 15–68)
RBC # BLD AUTO: 4.8 M/UL (ref 4.7–6.1)
RBC # URNS HPF: ABNORMAL /HPF
RBC BLD AUTO: PRESENT
RBC UR QL AUTO: ABNORMAL
SODIUM SERPL-SCNC: 135 MMOL/L (ref 135–145)
SP GR UR STRIP.AUTO: 1.01
URATE SERPL-MCNC: 7.1 MG/DL (ref 2.5–8.3)
UROBILINOGEN UR STRIP.AUTO-MCNC: 0.2 MG/DL
WBC # BLD AUTO: 10.4 K/UL (ref 4.8–10.8)
WBC #/AREA URNS HPF: ABNORMAL /HPF

## 2017-11-04 PROCEDURE — 85027 COMPLETE CBC AUTOMATED: CPT

## 2017-11-04 PROCEDURE — 36415 COLL VENOUS BLD VENIPUNCTURE: CPT

## 2017-11-04 PROCEDURE — 84156 ASSAY OF PROTEIN URINE: CPT

## 2017-11-04 PROCEDURE — 85007 BL SMEAR W/DIFF WBC COUNT: CPT

## 2017-11-04 PROCEDURE — 84550 ASSAY OF BLOOD/URIC ACID: CPT

## 2017-11-04 PROCEDURE — 81001 URINALYSIS AUTO W/SCOPE: CPT

## 2017-11-04 PROCEDURE — 82570 ASSAY OF URINE CREATININE: CPT

## 2017-11-04 PROCEDURE — 83036 HEMOGLOBIN GLYCOSYLATED A1C: CPT | Mod: GA

## 2017-11-04 PROCEDURE — 80053 COMPREHEN METABOLIC PANEL: CPT

## 2017-11-04 PROCEDURE — 84100 ASSAY OF PHOSPHORUS: CPT

## 2017-11-04 PROCEDURE — 83735 ASSAY OF MAGNESIUM: CPT

## 2017-12-05 NOTE — TELEPHONE ENCOUNTER
MA- Pt needs to establish with new PCP.     Was the patient seen in the last year in this department? Yes     Does patient have an active prescription for medications requested? No     Received Request Via: Pharmacy      Pt met protocol?: Yes, OV 5/17

## 2017-12-07 RX ORDER — COLCHICINE 0.6 MG/1
TABLET ORAL
Qty: 90 TAB | Refills: 1 | Status: SHIPPED | OUTPATIENT
Start: 2017-12-07 | End: 2019-05-08 | Stop reason: SDUPTHER

## 2017-12-07 NOTE — TELEPHONE ENCOUNTER
Was the patient seen in the last year in this department? Yes     Does patient have an active prescription for medications requested? No     Received Request Via: Pharmacy      Pt met protocol?: Yes, labs 11/17, ov 5/17, note say pt est with ANDREW Yuan but no appt showing

## 2018-01-23 RX ORDER — SERTRALINE HYDROCHLORIDE 100 MG/1
TABLET, FILM COATED ORAL
OUTPATIENT
Start: 2018-01-23

## 2018-01-23 NOTE — TELEPHONE ENCOUNTER
Was the patient seen in the last year in this department? {Yes/No:20266}    Does patient have an active prescription for medications requested? {Yes/No:20266}    Received Request Via: {REFILL PATIENT/PHARMACY:[id number]}      Pt met protocol?: {YES (DEF)/NO:96903}

## 2018-04-03 ENCOUNTER — HOSPITAL ENCOUNTER (OUTPATIENT)
Dept: LAB | Facility: MEDICAL CENTER | Age: 63
End: 2018-04-03
Attending: INTERNAL MEDICINE
Payer: MEDICARE

## 2018-04-03 PROCEDURE — 80069 RENAL FUNCTION PANEL: CPT

## 2018-04-03 PROCEDURE — 36415 COLL VENOUS BLD VENIPUNCTURE: CPT

## 2018-04-04 LAB
ALBUMIN SERPL BCP-MCNC: 4 G/DL (ref 3.2–4.9)
BUN SERPL-MCNC: 71 MG/DL (ref 8–22)
CALCIUM SERPL-MCNC: 9.2 MG/DL (ref 8.5–10.5)
CHLORIDE SERPL-SCNC: 103 MMOL/L (ref 96–112)
CO2 SERPL-SCNC: 23 MMOL/L (ref 20–33)
CREAT SERPL-MCNC: 5.57 MG/DL (ref 0.5–1.4)
GLUCOSE SERPL-MCNC: 87 MG/DL (ref 65–99)
PHOSPHATE SERPL-MCNC: 4.8 MG/DL (ref 2.5–4.5)
POTASSIUM SERPL-SCNC: 4.9 MMOL/L (ref 3.6–5.5)
SODIUM SERPL-SCNC: 137 MMOL/L (ref 135–145)

## 2018-06-08 ENCOUNTER — HOSPITAL ENCOUNTER (OUTPATIENT)
Dept: LAB | Facility: MEDICAL CENTER | Age: 63
End: 2018-06-08
Attending: NURSE PRACTITIONER
Payer: MEDICARE

## 2018-06-08 LAB
ALBUMIN SERPL BCP-MCNC: 4 G/DL (ref 3.2–4.9)
ALBUMIN/GLOB SERPL: 1 G/DL
ALP SERPL-CCNC: 79 U/L (ref 30–99)
ALT SERPL-CCNC: 12 U/L (ref 2–50)
ANION GAP SERPL CALC-SCNC: 11 MMOL/L (ref 0–11.9)
APPEARANCE UR: CLEAR
AST SERPL-CCNC: 19 U/L (ref 12–45)
BACTERIA #/AREA URNS HPF: NEGATIVE /HPF
BASOPHILS # BLD AUTO: 0.6 % (ref 0–1.8)
BASOPHILS # BLD: 0.03 K/UL (ref 0–0.12)
BILIRUB SERPL-MCNC: 0.5 MG/DL (ref 0.1–1.5)
BILIRUB UR QL STRIP.AUTO: NEGATIVE
BUN SERPL-MCNC: 92 MG/DL (ref 8–22)
CALCIUM SERPL-MCNC: 9.4 MG/DL (ref 8.5–10.5)
CHLORIDE SERPL-SCNC: 107 MMOL/L (ref 96–112)
CO2 SERPL-SCNC: 18 MMOL/L (ref 20–33)
COLOR UR: YELLOW
CREAT SERPL-MCNC: 6.82 MG/DL (ref 0.5–1.4)
CREAT UR-MCNC: 69.9 MG/DL
EOSINOPHIL # BLD AUTO: 0.25 K/UL (ref 0–0.51)
EOSINOPHIL NFR BLD: 4.6 % (ref 0–6.9)
EPI CELLS #/AREA URNS HPF: NEGATIVE /HPF
ERYTHROCYTE [DISTWIDTH] IN BLOOD BY AUTOMATED COUNT: 44.4 FL (ref 35.9–50)
EST. AVERAGE GLUCOSE BLD GHB EST-MCNC: 111 MG/DL
GLOBULIN SER CALC-MCNC: 3.9 G/DL (ref 1.9–3.5)
GLUCOSE SERPL-MCNC: 82 MG/DL (ref 65–99)
GLUCOSE UR STRIP.AUTO-MCNC: 100 MG/DL
HBA1C MFR BLD: 5.5 % (ref 0–5.6)
HCT VFR BLD AUTO: 39.5 % (ref 42–52)
HGB BLD-MCNC: 12.5 G/DL (ref 14–18)
HYALINE CASTS #/AREA URNS LPF: ABNORMAL /LPF
IMM GRANULOCYTES # BLD AUTO: 0.05 K/UL (ref 0–0.11)
IMM GRANULOCYTES NFR BLD AUTO: 0.9 % (ref 0–0.9)
KETONES UR STRIP.AUTO-MCNC: NEGATIVE MG/DL
LEUKOCYTE ESTERASE UR QL STRIP.AUTO: NEGATIVE
LYMPHOCYTES # BLD AUTO: 1.37 K/UL (ref 1–4.8)
LYMPHOCYTES NFR BLD: 25.3 % (ref 22–41)
MAGNESIUM SERPL-MCNC: 2.3 MG/DL (ref 1.5–2.5)
MCH RBC QN AUTO: 28.5 PG (ref 27–33)
MCHC RBC AUTO-ENTMCNC: 31.6 G/DL (ref 33.7–35.3)
MCV RBC AUTO: 90.2 FL (ref 81.4–97.8)
MICRO URNS: ABNORMAL
MONOCYTES # BLD AUTO: 0.43 K/UL (ref 0–0.85)
MONOCYTES NFR BLD AUTO: 7.9 % (ref 0–13.4)
NEUTROPHILS # BLD AUTO: 3.28 K/UL (ref 1.82–7.42)
NEUTROPHILS NFR BLD: 60.7 % (ref 44–72)
NITRITE UR QL STRIP.AUTO: NEGATIVE
NRBC # BLD AUTO: 0 K/UL
NRBC BLD-RTO: 0 /100 WBC
PH UR STRIP.AUTO: 6 [PH]
PHOSPHATE SERPL-MCNC: 6.8 MG/DL (ref 2.5–4.5)
PLATELET # BLD AUTO: 232 K/UL (ref 164–446)
PMV BLD AUTO: 9.6 FL (ref 9–12.9)
POTASSIUM SERPL-SCNC: 5.1 MMOL/L (ref 3.6–5.5)
PROT SERPL-MCNC: 7.9 G/DL (ref 6–8.2)
PROT UR QL STRIP: 300 MG/DL
PROT UR-MCNC: 206 MG/DL (ref 0–15)
PROT/CREAT UR: 2947 MG/G (ref 15–68)
RBC # BLD AUTO: 4.38 M/UL (ref 4.7–6.1)
RBC # URNS HPF: ABNORMAL /HPF
RBC UR QL AUTO: ABNORMAL
SODIUM SERPL-SCNC: 136 MMOL/L (ref 135–145)
SP GR UR STRIP.AUTO: 1.01
URATE SERPL-MCNC: 8.2 MG/DL (ref 2.5–8.3)
UROBILINOGEN UR STRIP.AUTO-MCNC: 0.2 MG/DL
WBC # BLD AUTO: 5.4 K/UL (ref 4.8–10.8)
WBC #/AREA URNS HPF: ABNORMAL /HPF

## 2018-06-08 PROCEDURE — 82570 ASSAY OF URINE CREATININE: CPT

## 2018-06-08 PROCEDURE — 84100 ASSAY OF PHOSPHORUS: CPT

## 2018-06-08 PROCEDURE — 84550 ASSAY OF BLOOD/URIC ACID: CPT

## 2018-06-08 PROCEDURE — 80053 COMPREHEN METABOLIC PANEL: CPT

## 2018-06-08 PROCEDURE — 84156 ASSAY OF PROTEIN URINE: CPT

## 2018-06-08 PROCEDURE — 81001 URINALYSIS AUTO W/SCOPE: CPT

## 2018-06-08 PROCEDURE — 36415 COLL VENOUS BLD VENIPUNCTURE: CPT

## 2018-06-08 PROCEDURE — 83735 ASSAY OF MAGNESIUM: CPT

## 2018-06-08 PROCEDURE — 85025 COMPLETE CBC W/AUTO DIFF WBC: CPT

## 2018-06-08 PROCEDURE — 83036 HEMOGLOBIN GLYCOSYLATED A1C: CPT | Mod: GZ

## 2018-07-12 ENCOUNTER — HOSPITAL ENCOUNTER (OUTPATIENT)
Dept: LAB | Facility: MEDICAL CENTER | Age: 63
End: 2018-07-12
Attending: NURSE PRACTITIONER
Payer: MEDICARE

## 2018-07-18 ENCOUNTER — OFFICE VISIT (OUTPATIENT)
Dept: URGENT CARE | Facility: PHYSICIAN GROUP | Age: 63
End: 2018-07-18
Payer: MEDICARE

## 2018-07-18 ENCOUNTER — HOSPITAL ENCOUNTER (OUTPATIENT)
Facility: MEDICAL CENTER | Age: 63
End: 2018-07-18
Attending: NURSE PRACTITIONER
Payer: MEDICARE

## 2018-07-18 VITALS
WEIGHT: 199 LBS | HEART RATE: 97 BPM | SYSTOLIC BLOOD PRESSURE: 152 MMHG | BODY MASS INDEX: 28.49 KG/M2 | RESPIRATION RATE: 18 BRPM | OXYGEN SATURATION: 95 % | TEMPERATURE: 98.3 F | HEIGHT: 70 IN | DIASTOLIC BLOOD PRESSURE: 98 MMHG

## 2018-07-18 DIAGNOSIS — L03.114 LEFT ARM CELLULITIS: ICD-10-CM

## 2018-07-18 PROCEDURE — 83970 ASSAY OF PARATHORMONE: CPT

## 2018-07-18 PROCEDURE — 36415 COLL VENOUS BLD VENIPUNCTURE: CPT

## 2018-07-18 PROCEDURE — 82306 VITAMIN D 25 HYDROXY: CPT

## 2018-07-18 PROCEDURE — 84156 ASSAY OF PROTEIN URINE: CPT

## 2018-07-18 PROCEDURE — 99214 OFFICE O/P EST MOD 30 MIN: CPT | Performed by: PHYSICIAN ASSISTANT

## 2018-07-18 PROCEDURE — 81001 URINALYSIS AUTO W/SCOPE: CPT

## 2018-07-18 PROCEDURE — 82570 ASSAY OF URINE CREATININE: CPT

## 2018-07-18 RX ORDER — DOXYCYCLINE HYCLATE 100 MG
100 TABLET ORAL 2 TIMES DAILY
Qty: 20 TAB | Refills: 0 | Status: SHIPPED | OUTPATIENT
Start: 2018-07-18 | End: 2019-05-08

## 2018-07-18 NOTE — PROGRESS NOTES
Chief Complaint   Patient presents with   • Arm Pain     left arm pain/ swelling/ redness from shunts x1day        HISTORY OF PRESENT ILLNESS: Patient is a 62 y.o. male who presents today for the following:    Patient comes in for evaluation of left arm redness that started yesterday. He had surgery for fistula placement 7/16/18. He has not yet started dialysis. No change in urine output. He denies fever, night sweats, and chills. He has all but the surgeon tomorrow. He has not taken any over-the-counter medication.    Patient Active Problem List    Diagnosis Date Noted   • Acute on chronic renal failure (HCC) 01/25/2017     Priority: High   • Depression with anxiety 01/25/2017   • Gout 01/25/2017   • Solitary kidney 10/21/2015   • Family history of stroke 10/21/2015   • Essential hypertension 10/21/2015   • Irregular heart beat 10/21/2015   • SAMMI (generalized anxiety disorder) 10/21/2015   • Gout due to renal impairment 10/21/2015   • First degree heart block by electrocardiogram 10/21/2015       Allergies:Pcn [penicillins]    Current Outpatient Prescriptions Ordered in [facility]   Medication Sig Dispense Refill   • doxycycline (VIBRAMYCIN) 100 MG Tab Take 1 Tab by mouth 2 times a day. 20 Tab 0   • colchicine (COLCRYS) 0.6 MG Tab TAKE 1 TABLET BY MOUTH  EVERY DAY 90 Tab 1   • sertraline (ZOLOFT) 100 MG Tab TAKE 1 TABLET BY MOUTH  EVERY DAY 90 Tab 0   • vitamin D (VITAMIND D3) 1000 UNIT Tab Take 1 Tab by mouth every day. 60 Tab 0   • metoprolol SR (TOPROL XL) 100 MG TABLET SR 24 HR Take 1 Tab by mouth every day. 30 Tab 0   • clotrimazole-betamethasone (LOTRISONE) 1-0.05 % Lotion Apply 30 mL to affected area(s) 2 Times a Day. 1 Bottle 1   • sodium bicarbonate (SODIUM BICARBONATE) 650 MG Tab Take 1 Tab by mouth 2 times a day. 60 Tab 0     No current Epic-ordered facility-administered medications on file.        Past Medical History:   Diagnosis Date   • Anxiety    • Gout due to renal impairment    • Hypertension   "      Social History   Substance Use Topics   • Smoking status: Former Smoker     Packs/day: 1.00     Years: 25.00     Types: Cigarettes     Quit date: 10/21/1990   • Smokeless tobacco: Never Used   • Alcohol use No       Family Status   Relation Status   • Mother    • Father    • Neg Hx      Family History   Problem Relation Age of Onset   • Cancer Mother      lymphoma   • Heart Attack Father    • Stroke Father    • Diabetes Neg Hx        Review of Systems:    Constitutional ROS: No unexpected change in weight, No weakness, No fatigue  Musculoskeletal/Extremities ROS: No peripheral edema, No pain, redness or swelling on the joints  Hematologic/Lymphatic ROS: No chills, No night sweats, No weight loss    Exam:  Blood pressure 152/98, pulse 97, temperature 36.8 °C (98.3 °F), resp. rate 18, height 1.778 m (5' 10\"), weight 90.3 kg (199 lb), SpO2 95 %.  General: Well developed, well nourished. No distress.  Pulmonary: Unlabored respiratory effort.    Cardiovascular: Brisk cap refill left hand.  Extremities: Bandage at the AC fossa of the left arm, consistent with patient's history of present illness. Diffuse erythema is noted extending from the bandage to the left shoulder with a well-demarcated line. Generalized tenderness is noted in the area of erythema. Erythema is not quite circumferentially around the left upper arm. No erythema is distal to the AC fossa.  Neurologic: Grossly nonfocal. No facial asymmetry noted.  Skin: Warm, dry, good turgor.    Psych: Normal mood. Alert and oriented x3. Judgment and insight is normal.    Assessment/Plan:  Take all medications as directed. Follow-up with surgeon tomorrow as scheduled. Discussed strict ER precautions.  1. Left arm cellulitis  doxycycline (VIBRAMYCIN) 100 MG Tab       "

## 2018-07-19 ENCOUNTER — HOSPITAL ENCOUNTER (OUTPATIENT)
Dept: LAB | Facility: MEDICAL CENTER | Age: 63
End: 2018-07-19
Attending: NURSE PRACTITIONER
Payer: MEDICARE

## 2018-07-19 LAB
25(OH)D3 SERPL-MCNC: 20 NG/ML (ref 30–100)
25(OH)D3 SERPL-MCNC: NORMAL NG/ML (ref 30–100)
ALBUMIN SERPL BCP-MCNC: 4 G/DL (ref 3.2–4.9)
ALBUMIN/GLOB SERPL: 1.1 G/DL
ALP SERPL-CCNC: 79 U/L (ref 30–99)
ALT SERPL-CCNC: 17 U/L (ref 2–50)
ANION GAP SERPL CALC-SCNC: 10 MMOL/L (ref 0–11.9)
APPEARANCE UR: CLEAR
APPEARANCE UR: NORMAL
AST SERPL-CCNC: 17 U/L (ref 12–45)
BACTERIA #/AREA URNS HPF: NEGATIVE /HPF
BACTERIA #/AREA URNS HPF: NORMAL /HPF
BASOPHILS # BLD AUTO: 0.7 % (ref 0–1.8)
BASOPHILS # BLD: 0.07 K/UL (ref 0–0.12)
BILIRUB SERPL-MCNC: 0.3 MG/DL (ref 0.1–1.5)
BILIRUB UR QL STRIP.AUTO: NEGATIVE
BILIRUB UR QL STRIP.AUTO: NORMAL
BUN SERPL-MCNC: 55 MG/DL (ref 8–22)
CALCIUM SERPL-MCNC: 9.7 MG/DL (ref 8.5–10.5)
CHLORIDE SERPL-SCNC: 105 MMOL/L (ref 96–112)
CO2 SERPL-SCNC: 20 MMOL/L (ref 20–33)
COLOR UR: NORMAL
COLOR UR: YELLOW
CREAT SERPL-MCNC: 5.22 MG/DL (ref 0.5–1.4)
CREAT UR-MCNC: 102.9 MG/DL
CREAT UR-MCNC: NORMAL MG/DL
EOSINOPHIL # BLD AUTO: 0.34 K/UL (ref 0–0.51)
EOSINOPHIL NFR BLD: 3.6 % (ref 0–6.9)
EPI CELLS #/AREA URNS HPF: ABNORMAL /HPF
EPI CELLS #/AREA URNS HPF: NORMAL /HPF
ERYTHROCYTE [DISTWIDTH] IN BLOOD BY AUTOMATED COUNT: 47.5 FL (ref 35.9–50)
GLOBULIN SER CALC-MCNC: 3.7 G/DL (ref 1.9–3.5)
GLUCOSE SERPL-MCNC: 75 MG/DL (ref 65–99)
GLUCOSE UR STRIP.AUTO-MCNC: 100 MG/DL
GLUCOSE UR STRIP.AUTO-MCNC: NORMAL MG/DL
HCT VFR BLD AUTO: 40.5 % (ref 42–52)
HGB BLD-MCNC: 12.6 G/DL (ref 14–18)
HYALINE CASTS #/AREA URNS LPF: ABNORMAL /LPF
HYALINE CASTS #/AREA URNS LPF: NORMAL /LPF
IMM GRANULOCYTES # BLD AUTO: 0.11 K/UL (ref 0–0.11)
IMM GRANULOCYTES NFR BLD AUTO: 1.1 % (ref 0–0.9)
KETONES UR STRIP.AUTO-MCNC: NEGATIVE MG/DL
KETONES UR STRIP.AUTO-MCNC: NORMAL MG/DL
LEUKOCYTE ESTERASE UR QL STRIP.AUTO: ABNORMAL
LEUKOCYTE ESTERASE UR QL STRIP.AUTO: NORMAL
LYMPHOCYTES # BLD AUTO: 1.81 K/UL (ref 1–4.8)
LYMPHOCYTES NFR BLD: 18.9 % (ref 22–41)
MAGNESIUM SERPL-MCNC: 2 MG/DL (ref 1.5–2.5)
MCH RBC QN AUTO: 28.6 PG (ref 27–33)
MCHC RBC AUTO-ENTMCNC: 31.1 G/DL (ref 33.7–35.3)
MCV RBC AUTO: 92 FL (ref 81.4–97.8)
MICRO URNS: ABNORMAL
MICRO URNS: NORMAL
MONOCYTES # BLD AUTO: 0.84 K/UL (ref 0–0.85)
MONOCYTES NFR BLD AUTO: 8.8 % (ref 0–13.4)
NEUTROPHILS # BLD AUTO: 6.4 K/UL (ref 1.82–7.42)
NEUTROPHILS NFR BLD: 66.9 % (ref 44–72)
NITRITE UR QL STRIP.AUTO: NEGATIVE
NITRITE UR QL STRIP.AUTO: NORMAL
NRBC # BLD AUTO: 0 K/UL
NRBC BLD-RTO: 0 /100 WBC
PH UR STRIP.AUTO: 6 [PH]
PH UR STRIP.AUTO: NORMAL [PH]
PHOSPHATE SERPL-MCNC: 5 MG/DL (ref 2.5–4.5)
PLATELET # BLD AUTO: 221 K/UL (ref 164–446)
PMV BLD AUTO: 10 FL (ref 9–12.9)
POTASSIUM SERPL-SCNC: 5.7 MMOL/L (ref 3.6–5.5)
PROT SERPL-MCNC: 7.7 G/DL (ref 6–8.2)
PROT UR QL STRIP: 300 MG/DL
PROT UR QL STRIP: NORMAL MG/DL
PROT UR-MCNC: 332.5 MG/DL (ref 0–15)
PROT UR-MCNC: NORMAL MG/DL (ref 0–15)
PROT/CREAT UR: 3231 MG/G (ref 15–68)
PROT/CREAT UR: NORMAL MG/G (ref 15–68)
PTH-INTACT SERPL-MCNC: 434.5 PG/ML (ref 14–72)
PTH-INTACT SERPL-MCNC: NORMAL PG/ML (ref 14–72)
RBC # BLD AUTO: 4.4 M/UL (ref 4.7–6.1)
RBC # URNS HPF: ABNORMAL /HPF
RBC # URNS HPF: NORMAL /HPF
RBC UR QL AUTO: ABNORMAL
RBC UR QL AUTO: NORMAL
SODIUM SERPL-SCNC: 135 MMOL/L (ref 135–145)
SP GR UR STRIP.AUTO: 1.01
SP GR UR STRIP.AUTO: NORMAL
URATE SERPL-MCNC: 7.4 MG/DL (ref 2.5–8.3)
UROBILINOGEN UR STRIP.AUTO-MCNC: 0.2 MG/DL
UROBILINOGEN UR STRIP.AUTO-MCNC: NORMAL MG/DL
WBC # BLD AUTO: 9.6 K/UL (ref 4.8–10.8)
WBC #/AREA URNS HPF: ABNORMAL /HPF
WBC #/AREA URNS HPF: NORMAL /HPF

## 2018-07-19 PROCEDURE — 82570 ASSAY OF URINE CREATININE: CPT

## 2018-07-19 PROCEDURE — 87077 CULTURE AEROBIC IDENTIFY: CPT

## 2018-07-19 PROCEDURE — 82306 VITAMIN D 25 HYDROXY: CPT

## 2018-07-19 PROCEDURE — 80053 COMPREHEN METABOLIC PANEL: CPT

## 2018-07-19 PROCEDURE — 87086 URINE CULTURE/COLONY COUNT: CPT

## 2018-07-19 PROCEDURE — 84550 ASSAY OF BLOOD/URIC ACID: CPT

## 2018-07-19 PROCEDURE — 84100 ASSAY OF PHOSPHORUS: CPT

## 2018-07-19 PROCEDURE — 83970 ASSAY OF PARATHORMONE: CPT

## 2018-07-19 PROCEDURE — 81001 URINALYSIS AUTO W/SCOPE: CPT

## 2018-07-19 PROCEDURE — 83735 ASSAY OF MAGNESIUM: CPT

## 2018-07-19 PROCEDURE — 84156 ASSAY OF PROTEIN URINE: CPT

## 2018-07-19 PROCEDURE — 85025 COMPLETE CBC W/AUTO DIFF WBC: CPT

## 2018-07-21 LAB
BACTERIA UR CULT: ABNORMAL
BACTERIA UR CULT: ABNORMAL
SIGNIFICANT IND 70042: ABNORMAL
SITE SITE: ABNORMAL
SOURCE SOURCE: ABNORMAL

## 2018-08-23 ENCOUNTER — HOSPITAL ENCOUNTER (OUTPATIENT)
Dept: LAB | Facility: MEDICAL CENTER | Age: 63
End: 2018-08-23
Attending: NURSE PRACTITIONER
Payer: MEDICARE

## 2018-08-23 LAB
ALBUMIN SERPL BCP-MCNC: 4.1 G/DL (ref 3.2–4.9)
ALBUMIN/GLOB SERPL: 1.1 G/DL
ALP SERPL-CCNC: 66 U/L (ref 30–99)
ALT SERPL-CCNC: 18 U/L (ref 2–50)
ANION GAP SERPL CALC-SCNC: 7 MMOL/L (ref 0–11.9)
AST SERPL-CCNC: 17 U/L (ref 12–45)
BILIRUB SERPL-MCNC: 0.5 MG/DL (ref 0.1–1.5)
BUN SERPL-MCNC: 60 MG/DL (ref 8–22)
CALCIUM SERPL-MCNC: 9.3 MG/DL (ref 8.5–10.5)
CHLORIDE SERPL-SCNC: 107 MMOL/L (ref 96–112)
CO2 SERPL-SCNC: 22 MMOL/L (ref 20–33)
CREAT SERPL-MCNC: 6 MG/DL (ref 0.5–1.4)
GLOBULIN SER CALC-MCNC: 3.6 G/DL (ref 1.9–3.5)
GLUCOSE SERPL-MCNC: 81 MG/DL (ref 65–99)
POTASSIUM SERPL-SCNC: 5.4 MMOL/L (ref 3.6–5.5)
PROT SERPL-MCNC: 7.7 G/DL (ref 6–8.2)
SODIUM SERPL-SCNC: 136 MMOL/L (ref 135–145)

## 2018-08-23 PROCEDURE — 80053 COMPREHEN METABOLIC PANEL: CPT

## 2018-08-23 PROCEDURE — 36415 COLL VENOUS BLD VENIPUNCTURE: CPT

## 2018-09-19 ENCOUNTER — HOSPITAL ENCOUNTER (OUTPATIENT)
Dept: LAB | Facility: MEDICAL CENTER | Age: 63
End: 2018-09-19
Attending: INTERNAL MEDICINE
Payer: MEDICARE

## 2018-09-19 LAB
25(OH)D3 SERPL-MCNC: 35 NG/ML (ref 30–100)
ALBUMIN SERPL BCP-MCNC: 3.8 G/DL (ref 3.2–4.9)
ALBUMIN/GLOB SERPL: 1.1 G/DL
ALP SERPL-CCNC: 71 U/L (ref 30–99)
ALT SERPL-CCNC: 10 U/L (ref 2–50)
ANION GAP SERPL CALC-SCNC: 11 MMOL/L (ref 0–11.9)
APPEARANCE UR: CLEAR
AST SERPL-CCNC: 12 U/L (ref 12–45)
BACTERIA #/AREA URNS HPF: NEGATIVE /HPF
BASOPHILS # BLD AUTO: 0.5 % (ref 0–1.8)
BASOPHILS # BLD: 0.03 K/UL (ref 0–0.12)
BILIRUB SERPL-MCNC: 0.3 MG/DL (ref 0.1–1.5)
BILIRUB UR QL STRIP.AUTO: NEGATIVE
BUN SERPL-MCNC: 68 MG/DL (ref 8–22)
CALCIUM SERPL-MCNC: 9.1 MG/DL (ref 8.5–10.5)
CHLORIDE SERPL-SCNC: 107 MMOL/L (ref 96–112)
CO2 SERPL-SCNC: 19 MMOL/L (ref 20–33)
COLOR UR: YELLOW
CREAT SERPL-MCNC: 5.82 MG/DL (ref 0.5–1.4)
EOSINOPHIL # BLD AUTO: 0.35 K/UL (ref 0–0.51)
EOSINOPHIL NFR BLD: 5.5 % (ref 0–6.9)
EPI CELLS #/AREA URNS HPF: NEGATIVE /HPF
ERYTHROCYTE [DISTWIDTH] IN BLOOD BY AUTOMATED COUNT: 48.1 FL (ref 35.9–50)
FASTING STATUS PATIENT QL REPORTED: NORMAL
GLOBULIN SER CALC-MCNC: 3.5 G/DL (ref 1.9–3.5)
GLUCOSE SERPL-MCNC: 101 MG/DL (ref 65–99)
GLUCOSE UR STRIP.AUTO-MCNC: 100 MG/DL
HCT VFR BLD AUTO: 38.4 % (ref 42–52)
HGB BLD-MCNC: 12.2 G/DL (ref 14–18)
HYALINE CASTS #/AREA URNS LPF: ABNORMAL /LPF
IMM GRANULOCYTES # BLD AUTO: 0.03 K/UL (ref 0–0.11)
IMM GRANULOCYTES NFR BLD AUTO: 0.5 % (ref 0–0.9)
KETONES UR STRIP.AUTO-MCNC: NEGATIVE MG/DL
LEUKOCYTE ESTERASE UR QL STRIP.AUTO: NEGATIVE
LYMPHOCYTES # BLD AUTO: 1.14 K/UL (ref 1–4.8)
LYMPHOCYTES NFR BLD: 18.1 % (ref 22–41)
MAGNESIUM SERPL-MCNC: 2.1 MG/DL (ref 1.5–2.5)
MCH RBC QN AUTO: 29 PG (ref 27–33)
MCHC RBC AUTO-ENTMCNC: 31.8 G/DL (ref 33.7–35.3)
MCV RBC AUTO: 91.2 FL (ref 81.4–97.8)
MICRO URNS: ABNORMAL
MONOCYTES # BLD AUTO: 0.54 K/UL (ref 0–0.85)
MONOCYTES NFR BLD AUTO: 8.6 % (ref 0–13.4)
NEUTROPHILS # BLD AUTO: 4.22 K/UL (ref 1.82–7.42)
NEUTROPHILS NFR BLD: 66.8 % (ref 44–72)
NITRITE UR QL STRIP.AUTO: NEGATIVE
NRBC # BLD AUTO: 0 K/UL
NRBC BLD-RTO: 0 /100 WBC
PH UR STRIP.AUTO: 6.5 [PH]
PHOSPHATE SERPL-MCNC: 5.2 MG/DL (ref 2.5–4.5)
PLATELET # BLD AUTO: 272 K/UL (ref 164–446)
PMV BLD AUTO: 9.6 FL (ref 9–12.9)
POTASSIUM SERPL-SCNC: 5.5 MMOL/L (ref 3.6–5.5)
PROT SERPL-MCNC: 7.3 G/DL (ref 6–8.2)
PROT UR QL STRIP: 100 MG/DL
PTH-INTACT SERPL-MCNC: 291.8 PG/ML (ref 14–72)
RBC # BLD AUTO: 4.21 M/UL (ref 4.7–6.1)
RBC # URNS HPF: ABNORMAL /HPF
RBC UR QL AUTO: ABNORMAL
SODIUM SERPL-SCNC: 137 MMOL/L (ref 135–145)
SP GR UR STRIP.AUTO: 1.01
URATE SERPL-MCNC: 7.6 MG/DL (ref 2.5–8.3)
UROBILINOGEN UR STRIP.AUTO-MCNC: 0.2 MG/DL
WBC # BLD AUTO: 6.3 K/UL (ref 4.8–10.8)
WBC #/AREA URNS HPF: ABNORMAL /HPF

## 2018-09-19 PROCEDURE — 84156 ASSAY OF PROTEIN URINE: CPT

## 2018-09-19 PROCEDURE — 85025 COMPLETE CBC W/AUTO DIFF WBC: CPT

## 2018-09-19 PROCEDURE — 36415 COLL VENOUS BLD VENIPUNCTURE: CPT

## 2018-09-19 PROCEDURE — 80053 COMPREHEN METABOLIC PANEL: CPT

## 2018-09-19 PROCEDURE — 82306 VITAMIN D 25 HYDROXY: CPT

## 2018-09-19 PROCEDURE — 84550 ASSAY OF BLOOD/URIC ACID: CPT

## 2018-09-19 PROCEDURE — 84100 ASSAY OF PHOSPHORUS: CPT

## 2018-09-19 PROCEDURE — 82570 ASSAY OF URINE CREATININE: CPT

## 2018-09-19 PROCEDURE — 81001 URINALYSIS AUTO W/SCOPE: CPT

## 2018-09-19 PROCEDURE — 83735 ASSAY OF MAGNESIUM: CPT

## 2018-09-19 PROCEDURE — 83970 ASSAY OF PARATHORMONE: CPT

## 2018-09-20 LAB
CREAT UR-MCNC: 72.3 MG/DL
PROT UR-MCNC: 198.7 MG/DL (ref 0–15)
PROT/CREAT UR: 2748 MG/G (ref 15–68)

## 2018-10-16 ENCOUNTER — HOSPITAL ENCOUNTER (OUTPATIENT)
Dept: LAB | Facility: MEDICAL CENTER | Age: 63
End: 2018-10-16
Attending: INTERNAL MEDICINE
Payer: MEDICARE

## 2018-10-16 LAB
BASOPHILS # BLD AUTO: 0.5 % (ref 0–1.8)
BASOPHILS # BLD: 0.04 K/UL (ref 0–0.12)
EOSINOPHIL # BLD AUTO: 0.34 K/UL (ref 0–0.51)
EOSINOPHIL NFR BLD: 4.6 % (ref 0–6.9)
ERYTHROCYTE [DISTWIDTH] IN BLOOD BY AUTOMATED COUNT: 47 FL (ref 35.9–50)
HCT VFR BLD AUTO: 42.1 % (ref 42–52)
HGB BLD-MCNC: 13.4 G/DL (ref 14–18)
IMM GRANULOCYTES # BLD AUTO: 0.04 K/UL (ref 0–0.11)
IMM GRANULOCYTES NFR BLD AUTO: 0.5 % (ref 0–0.9)
LYMPHOCYTES # BLD AUTO: 1.17 K/UL (ref 1–4.8)
LYMPHOCYTES NFR BLD: 15.9 % (ref 22–41)
MCH RBC QN AUTO: 28.9 PG (ref 27–33)
MCHC RBC AUTO-ENTMCNC: 31.8 G/DL (ref 33.7–35.3)
MCV RBC AUTO: 90.9 FL (ref 81.4–97.8)
MONOCYTES # BLD AUTO: 0.47 K/UL (ref 0–0.85)
MONOCYTES NFR BLD AUTO: 6.4 % (ref 0–13.4)
NEUTROPHILS # BLD AUTO: 5.31 K/UL (ref 1.82–7.42)
NEUTROPHILS NFR BLD: 72.1 % (ref 44–72)
NRBC # BLD AUTO: 0 K/UL
NRBC BLD-RTO: 0 /100 WBC
PLATELET # BLD AUTO: 207 K/UL (ref 164–446)
PMV BLD AUTO: 9.8 FL (ref 9–12.9)
PTH-INTACT SERPL-MCNC: 464.9 PG/ML (ref 14–72)
RBC # BLD AUTO: 4.63 M/UL (ref 4.7–6.1)
WBC # BLD AUTO: 7.4 K/UL (ref 4.8–10.8)

## 2018-10-16 PROCEDURE — 84550 ASSAY OF BLOOD/URIC ACID: CPT

## 2018-10-16 PROCEDURE — 83970 ASSAY OF PARATHORMONE: CPT

## 2018-10-16 PROCEDURE — 80053 COMPREHEN METABOLIC PANEL: CPT

## 2018-10-16 PROCEDURE — 36415 COLL VENOUS BLD VENIPUNCTURE: CPT

## 2018-10-16 PROCEDURE — 85025 COMPLETE CBC W/AUTO DIFF WBC: CPT

## 2018-10-17 LAB
ALBUMIN SERPL BCP-MCNC: 4.1 G/DL (ref 3.2–4.9)
ALBUMIN/GLOB SERPL: 1.1 G/DL
ALP SERPL-CCNC: 75 U/L (ref 30–99)
ALT SERPL-CCNC: 15 U/L (ref 2–50)
ANION GAP SERPL CALC-SCNC: 11 MMOL/L (ref 0–11.9)
AST SERPL-CCNC: 15 U/L (ref 12–45)
BILIRUB SERPL-MCNC: 0.5 MG/DL (ref 0.1–1.5)
BUN SERPL-MCNC: 79 MG/DL (ref 8–22)
CALCIUM SERPL-MCNC: 8.5 MG/DL (ref 8.5–10.5)
CHLORIDE SERPL-SCNC: 107 MMOL/L (ref 96–112)
CO2 SERPL-SCNC: 23 MMOL/L (ref 20–33)
CREAT SERPL-MCNC: 7.4 MG/DL (ref 0.5–1.4)
GLOBULIN SER CALC-MCNC: 3.7 G/DL (ref 1.9–3.5)
GLUCOSE SERPL-MCNC: 99 MG/DL (ref 65–99)
POTASSIUM SERPL-SCNC: 5.4 MMOL/L (ref 3.6–5.5)
PROT SERPL-MCNC: 7.8 G/DL (ref 6–8.2)
SODIUM SERPL-SCNC: 141 MMOL/L (ref 135–145)
URATE SERPL-MCNC: 8.5 MG/DL (ref 2.5–8.3)

## 2018-11-09 ENCOUNTER — HOSPITAL ENCOUNTER (OUTPATIENT)
Dept: LAB | Facility: MEDICAL CENTER | Age: 63
End: 2018-11-09
Attending: NURSE PRACTITIONER
Payer: MEDICARE

## 2018-11-09 LAB
ALBUMIN SERPL BCP-MCNC: 4 G/DL (ref 3.2–4.9)
ALBUMIN/GLOB SERPL: 1.2 G/DL
ALP SERPL-CCNC: 85 U/L (ref 30–99)
ALT SERPL-CCNC: 16 U/L (ref 2–50)
ANION GAP SERPL CALC-SCNC: 13 MMOL/L (ref 0–11.9)
AST SERPL-CCNC: 20 U/L (ref 12–45)
BILIRUB SERPL-MCNC: 0.3 MG/DL (ref 0.1–1.5)
BUN SERPL-MCNC: 74 MG/DL (ref 8–22)
CALCIUM SERPL-MCNC: 9.6 MG/DL (ref 8.5–10.5)
CHLORIDE SERPL-SCNC: 105 MMOL/L (ref 96–112)
CO2 SERPL-SCNC: 28 MMOL/L (ref 20–33)
CREAT SERPL-MCNC: 7.41 MG/DL (ref 0.5–1.4)
GLOBULIN SER CALC-MCNC: 3.4 G/DL (ref 1.9–3.5)
GLUCOSE SERPL-MCNC: 81 MG/DL (ref 65–99)
HAV IGM SERPL QL IA: NEGATIVE
HBV CORE IGM SER QL: NEGATIVE
HBV SURFACE AG SER QL: NEGATIVE
HCV AB SER QL: NEGATIVE
PHOSPHATE SERPL-MCNC: 5.7 MG/DL (ref 2.5–4.5)
POTASSIUM SERPL-SCNC: 4.4 MMOL/L (ref 3.6–5.5)
PROT SERPL-MCNC: 7.4 G/DL (ref 6–8.2)
SODIUM SERPL-SCNC: 146 MMOL/L (ref 135–145)
URATE SERPL-MCNC: 7.9 MG/DL (ref 2.5–8.3)

## 2018-11-09 PROCEDURE — 84550 ASSAY OF BLOOD/URIC ACID: CPT

## 2018-11-09 PROCEDURE — 36415 COLL VENOUS BLD VENIPUNCTURE: CPT

## 2018-11-09 PROCEDURE — 80074 ACUTE HEPATITIS PANEL: CPT | Mod: GA

## 2018-11-09 PROCEDURE — 80053 COMPREHEN METABOLIC PANEL: CPT

## 2018-11-09 PROCEDURE — 86480 TB TEST CELL IMMUN MEASURE: CPT

## 2018-11-09 PROCEDURE — 84100 ASSAY OF PHOSPHORUS: CPT

## 2018-11-11 LAB
M TB TUBERC IFN-G BLD QL: NEGATIVE
M TB TUBERC IFN-G/MITOGEN IGNF BLD: -0
M TB TUBERC IGNF/MITOGEN IGNF CONTROL: 48.83 [IU]/ML
MITOGEN IGNF BCKGRD COR BLD-ACNC: 0.02 [IU]/ML

## 2019-05-08 ENCOUNTER — OFFICE VISIT (OUTPATIENT)
Dept: MEDICAL GROUP | Facility: PHYSICIAN GROUP | Age: 64
End: 2019-05-08
Payer: MEDICARE

## 2019-05-08 ENCOUNTER — HOSPITAL ENCOUNTER (OUTPATIENT)
Dept: LAB | Facility: MEDICAL CENTER | Age: 64
End: 2019-05-08
Attending: FAMILY MEDICINE
Payer: MEDICARE

## 2019-05-08 VITALS
DIASTOLIC BLOOD PRESSURE: 86 MMHG | BODY MASS INDEX: 29.77 KG/M2 | OXYGEN SATURATION: 95 % | TEMPERATURE: 97.2 F | HEIGHT: 69 IN | WEIGHT: 201 LBS | HEART RATE: 81 BPM | RESPIRATION RATE: 12 BRPM | SYSTOLIC BLOOD PRESSURE: 142 MMHG

## 2019-05-08 DIAGNOSIS — Z51.81 MEDICATION MONITORING ENCOUNTER: ICD-10-CM

## 2019-05-08 DIAGNOSIS — R53.83 FATIGUE, UNSPECIFIED TYPE: ICD-10-CM

## 2019-05-08 DIAGNOSIS — E53.8 VITAMIN B12 DEFICIENCY: ICD-10-CM

## 2019-05-08 DIAGNOSIS — F41.8 DEPRESSION WITH ANXIETY: ICD-10-CM

## 2019-05-08 DIAGNOSIS — B35.1 ONYCHOMYCOSIS: ICD-10-CM

## 2019-05-08 DIAGNOSIS — M25.512 CHRONIC PAIN OF BOTH SHOULDERS: ICD-10-CM

## 2019-05-08 DIAGNOSIS — Z11.3 ROUTINE SCREENING FOR STI (SEXUALLY TRANSMITTED INFECTION): ICD-10-CM

## 2019-05-08 DIAGNOSIS — N18.6 ACUTE RENAL FAILURE SUPERIMPOSED ON CHRONIC KIDNEY DISEASE, ON CHRONIC DIALYSIS, UNSPECIFIED ACUTE RENAL FAILURE TYPE (HCC): ICD-10-CM

## 2019-05-08 DIAGNOSIS — E78.5 DYSLIPIDEMIA: ICD-10-CM

## 2019-05-08 DIAGNOSIS — I10 ESSENTIAL HYPERTENSION: ICD-10-CM

## 2019-05-08 DIAGNOSIS — R73.9 HYPERGLYCEMIA: ICD-10-CM

## 2019-05-08 DIAGNOSIS — N17.9 ACUTE RENAL FAILURE SUPERIMPOSED ON CHRONIC KIDNEY DISEASE, ON CHRONIC DIALYSIS, UNSPECIFIED ACUTE RENAL FAILURE TYPE (HCC): ICD-10-CM

## 2019-05-08 DIAGNOSIS — E55.9 VITAMIN D DEFICIENCY: ICD-10-CM

## 2019-05-08 DIAGNOSIS — G89.29 CHRONIC PAIN OF BOTH SHOULDERS: ICD-10-CM

## 2019-05-08 DIAGNOSIS — H26.9 CATARACT OF BOTH EYES, UNSPECIFIED CATARACT TYPE: ICD-10-CM

## 2019-05-08 DIAGNOSIS — M25.511 CHRONIC PAIN OF BOTH SHOULDERS: ICD-10-CM

## 2019-05-08 DIAGNOSIS — Z99.2 ACUTE RENAL FAILURE SUPERIMPOSED ON CHRONIC KIDNEY DISEASE, ON CHRONIC DIALYSIS, UNSPECIFIED ACUTE RENAL FAILURE TYPE (HCC): ICD-10-CM

## 2019-05-08 DIAGNOSIS — M10.30 GOUT DUE TO RENAL IMPAIRMENT, UNSPECIFIED CHRONICITY, UNSPECIFIED SITE: ICD-10-CM

## 2019-05-08 DIAGNOSIS — L60.0 INGROWN TOENAIL: ICD-10-CM

## 2019-05-08 PROBLEM — M10.9 GOUT: Status: RESOLVED | Noted: 2017-01-25 | Resolved: 2019-05-08

## 2019-05-08 LAB
25(OH)D3 SERPL-MCNC: 41 NG/ML (ref 30–100)
ALBUMIN SERPL BCP-MCNC: 4.1 G/DL (ref 3.2–4.9)
ALBUMIN/GLOB SERPL: 1.3 G/DL
ALP SERPL-CCNC: 65 U/L (ref 30–99)
ALT SERPL-CCNC: 31 U/L (ref 2–50)
ANION GAP SERPL CALC-SCNC: 8 MMOL/L (ref 0–11.9)
AST SERPL-CCNC: 19 U/L (ref 12–45)
BASOPHILS # BLD AUTO: 1 % (ref 0–1.8)
BASOPHILS # BLD: 0.06 K/UL (ref 0–0.12)
BILIRUB SERPL-MCNC: 0.4 MG/DL (ref 0.1–1.5)
BUN SERPL-MCNC: 66 MG/DL (ref 8–22)
CALCIUM SERPL-MCNC: 9.5 MG/DL (ref 8.5–10.5)
CHLORIDE SERPL-SCNC: 103 MMOL/L (ref 96–112)
CHOLEST SERPL-MCNC: 177 MG/DL (ref 100–199)
CO2 SERPL-SCNC: 26 MMOL/L (ref 20–33)
CREAT SERPL-MCNC: 7.58 MG/DL (ref 0.5–1.4)
EOSINOPHIL # BLD AUTO: 0.25 K/UL (ref 0–0.51)
EOSINOPHIL NFR BLD: 4.1 % (ref 0–6.9)
ERYTHROCYTE [DISTWIDTH] IN BLOOD BY AUTOMATED COUNT: 55.5 FL (ref 35.9–50)
FASTING STATUS PATIENT QL REPORTED: NORMAL
GLOBULIN SER CALC-MCNC: 3.2 G/DL (ref 1.9–3.5)
GLUCOSE SERPL-MCNC: 85 MG/DL (ref 65–99)
HAV IGM SERPL QL IA: NEGATIVE
HBV CORE IGM SER QL: NEGATIVE
HBV SURFACE AG SER QL: NEGATIVE
HCT VFR BLD AUTO: 42.2 % (ref 42–52)
HCV AB SER QL: NEGATIVE
HDLC SERPL-MCNC: 33 MG/DL
HGB BLD-MCNC: 13.5 G/DL (ref 14–18)
IMM GRANULOCYTES # BLD AUTO: 0.07 K/UL (ref 0–0.11)
IMM GRANULOCYTES NFR BLD AUTO: 1.1 % (ref 0–0.9)
LDLC SERPL CALC-MCNC: 78 MG/DL
LYMPHOCYTES # BLD AUTO: 1.71 K/UL (ref 1–4.8)
LYMPHOCYTES NFR BLD: 27.9 % (ref 22–41)
MCH RBC QN AUTO: 31.6 PG (ref 27–33)
MCHC RBC AUTO-ENTMCNC: 32 G/DL (ref 33.7–35.3)
MCV RBC AUTO: 98.8 FL (ref 81.4–97.8)
MONOCYTES # BLD AUTO: 0.76 K/UL (ref 0–0.85)
MONOCYTES NFR BLD AUTO: 12.4 % (ref 0–13.4)
NEUTROPHILS # BLD AUTO: 3.29 K/UL (ref 1.82–7.42)
NEUTROPHILS NFR BLD: 53.5 % (ref 44–72)
NRBC # BLD AUTO: 0 K/UL
NRBC BLD-RTO: 0 /100 WBC
PLATELET # BLD AUTO: 175 K/UL (ref 164–446)
PMV BLD AUTO: 9.8 FL (ref 9–12.9)
POTASSIUM SERPL-SCNC: 4.6 MMOL/L (ref 3.6–5.5)
PROT SERPL-MCNC: 7.3 G/DL (ref 6–8.2)
RBC # BLD AUTO: 4.27 M/UL (ref 4.7–6.1)
SODIUM SERPL-SCNC: 137 MMOL/L (ref 135–145)
TRIGL SERPL-MCNC: 331 MG/DL (ref 0–149)
TSH SERPL DL<=0.005 MIU/L-ACNC: 0.87 UIU/ML (ref 0.38–5.33)
VIT B12 SERPL-MCNC: 887 PG/ML (ref 211–911)
WBC # BLD AUTO: 6.1 K/UL (ref 4.8–10.8)

## 2019-05-08 PROCEDURE — 99215 OFFICE O/P EST HI 40 MIN: CPT | Performed by: FAMILY MEDICINE

## 2019-05-08 PROCEDURE — 80061 LIPID PANEL: CPT

## 2019-05-08 PROCEDURE — 80074 ACUTE HEPATITIS PANEL: CPT

## 2019-05-08 PROCEDURE — 82306 VITAMIN D 25 HYDROXY: CPT

## 2019-05-08 PROCEDURE — 85025 COMPLETE CBC W/AUTO DIFF WBC: CPT

## 2019-05-08 PROCEDURE — 36415 COLL VENOUS BLD VENIPUNCTURE: CPT

## 2019-05-08 PROCEDURE — 82607 VITAMIN B-12: CPT

## 2019-05-08 PROCEDURE — 84443 ASSAY THYROID STIM HORMONE: CPT

## 2019-05-08 PROCEDURE — 80053 COMPREHEN METABOLIC PANEL: CPT

## 2019-05-08 RX ORDER — SERTRALINE HYDROCHLORIDE 100 MG/1
100 TABLET, FILM COATED ORAL
Qty: 90 TAB | Refills: 0 | Status: SHIPPED | OUTPATIENT
Start: 2019-05-08 | End: 2019-06-19 | Stop reason: SDUPTHER

## 2019-05-08 RX ORDER — METOPROLOL SUCCINATE 100 MG/1
100 TABLET, EXTENDED RELEASE ORAL DAILY
Qty: 90 TAB | Refills: 0 | Status: SHIPPED | OUTPATIENT
Start: 2019-05-08 | End: 2019-06-19 | Stop reason: SDUPTHER

## 2019-05-08 RX ORDER — CINACALCET HYDROCHLORIDE 30 MG/1
30 TABLET, COATED ORAL DAILY
Refills: 11 | Status: ON HOLD | COMMUNITY
Start: 2019-03-02 | End: 2021-01-16 | Stop reason: SDUPTHER

## 2019-05-08 RX ORDER — COLCHICINE 0.6 MG/1
0.6 TABLET ORAL
Qty: 90 TAB | Refills: 0 | Status: SHIPPED | OUTPATIENT
Start: 2019-05-08 | End: 2019-06-19 | Stop reason: SDUPTHER

## 2019-05-08 ASSESSMENT — PATIENT HEALTH QUESTIONNAIRE - PHQ9: CLINICAL INTERPRETATION OF PHQ2 SCORE: 0

## 2019-05-08 NOTE — PATIENT INSTRUCTIONS
Diagnoses and all orders for this visit:    Cataract of both eyes, unspecified cataract type  -     REFERRAL TO OPHTHALMOLOGY    Onychomycosis  -     REFERRAL TO PODIATRY    Ingrown toenail  -     REFERRAL TO PODIATRY    Chronic pain of both shoulders  -     DX-SHOULDER 2+ LEFT; Future  -     DX-SHOULDER 2+ RIGHT; Future    Fatigue, unspecified type  -     CBC WITH DIFFERENTIAL; Future  -     TSH WITH REFLEX TO FT4; Future    Routine screening for STI (sexually transmitted infection)  -     HIV AG/AB COMBO ASSAY SCREENING; Future  -     HEPATITIS PANEL ACUTE(4 COMPONENTS); Future  -     MISCELLANEOUS TEST; Future  -     Chlamydia/GC PCR Urine Or Swab; Future    Medication monitoring encounter  -     Comp Metabolic Panel; Future    Hyperglycemia  -     HEMOGLOBIN A1C; Future    Dyslipidemia  -     Lipid Profile; Future    Vitamin D deficiency  -     VITAMIN D,25 HYDROXY; Future    Vitamin B12 deficiency  -     VITAMIN B12; Future    Depression with anxiety  -     sertraline (ZOLOFT) 100 MG Tab; Take 1 Tab by mouth every day.    Essential hypertension  -     metoprolol SR (TOPROL XL) 100 MG TABLET SR 24 HR; Take 1 Tab by mouth every day.    Gout due to renal impairment, unspecified chronicity, unspecified site  -     colchicine (COLCRYS) 0.6 MG Tab; Take 1 Tab by mouth every day.    Solitary kidney    Acute renal failure superimposed on chronic kidney disease, on chronic dialysis, unspecified acute renal failure type (HCC)    -Will refill metoprolol 100 mg for his blood pressure and sertraline 100 mg for anxiety and depression which is stable and colchicine 0.6 mg every other day for his gout.  At least 2 weeks before your 6-week appointment get fasting lab work 8 hours of no eating but drink plenty of water.  Referral for ophthalmology is sent and for podiatry.  Please call where he got her colonoscopy done to sign HIPAA release form to have this faxed.  Kidney transplant at [facility] and also send a copy to us and  have a copy for yourself.  We will also get bilateral shoulder x-rays as he would like to have a joint injection at his next appointment as he can only take Tylenol 1-2 times a day due to his kidney issues for pain and please also try icing or putting heat compress for 15 to 20 minutes and trying over-the-counter icy  rubs or patches for this pain and doing the stretching and yoga shoulder exercises to avoid frozen shoulder and we will see what the x-rays show and can try at injection at the next appointment or will refer to orthopedic as appropriate.  ER precautions given if any chest pain, shortness of breath, passing out then please go to the ER or call 911.  He has at his dialysis appointment afterwards for which he does dialysis Monday, Wednesday, Friday and working to get as above transplant for kidney for his congenital solitary kidney.    Return in about 6 weeks (around 6/19/2019), or R shoulder injection/FU shoulder imaging, for Long, 40 min appnt, Procedure appnt.

## 2019-05-08 NOTE — PROGRESS NOTES
cc: Establish care, chronic kidney failure on dialysis, congenital solitary kidney    Subjective:     Garry Benites is a 63 y.o. male presenting Establish care, chronic kidney failure on dialysis, congenital solitary kidney.   He reports that he was born with one kidney and over the last 8 months has been on dialysis Monday, Wednesday, Friday and has to go to for dialysis later today so that is why he did not take his metoprolol 100 mg that he takes for hypertension.  He found a match for kidney transplant and is going to [facility] for transplant.   Lives with his brother. Work as . Social Security Disability for Gout and OA and Dialysis. No social or domestic concerns.  He was asked by [facility] transplant clinic to get studies and test done by July 8 such as blood and tissue typing and kit they provided an echo and EKG and stress test and chest x-ray and CT angiogram of the pelvis.  He also needs to go to the dentist for dental clearance and he has that appointment so the other appointments they will be scheduling.  He reports he is having some vision issues and had cataracts in the past and has not seen in ophthalmology needs a new ophthalmologist.  Also requires a podiatrist as he has fungal infections and ingrown nails and had has toenail removal in the past would like this before his transplant.  He also reports bilateral shoulder pain and has injections in the past but it has been over 5 years and denies any recent injury but reports it is hard to raise his bilateral hands above his head and some locking and popping in his shoulders and he denies any surgeries in his shoulders but has had injections and right now he only takes Tylenol sometimes every second day for the pain.  He reports shoulder injections helped in the past and because of his kidneys he cannot take any pain medication so he is only taking some Tylenol and he reports that it is hard for him to lift his hands above his head and pain with  "his shoulders but denies any recent trauma.  Review of systems:     Constitutional: Negative for fever, chills and positive fatigue.   HENT: Negative for sinus pressure, negative for ear pain or hearing loss  Eyes: Negative for blurriness, negative for double vision  Respiratory: Negative for cough and shortness of breath, negative for exertional shortness of breath  Cardiovascular: Negative for leg swelling, negative for palpitations, negative for chest pain  Gastrointestinal: Negative for nausea, vomiting, abdominal pain, constipation and diarrhea.  Genitourinary: Negative for dysuria and hematuria.   Skin: Positive for fungal nails  Neurological: Negative for dizziness, focal weakness and headaches.   Endo/Heme/Allergies: Denies bleeding, bruising, and recurrent allergies.  Psychiatric/Behavioral: Negative for depression and anxiety.        Current Outpatient Prescriptions:   •  SENSIPAR 30 MG Tab, Take 1 Tab by mouth every day., Disp: , Rfl: 11  •  metoprolol SR (TOPROL XL) 100 MG TABLET SR 24 HR, Take 1 Tab by mouth every day., Disp: 90 Tab, Rfl: 0  •  colchicine (COLCRYS) 0.6 MG Tab, Take 1 Tab by mouth every day., Disp: 90 Tab, Rfl: 0  •  sertraline (ZOLOFT) 100 MG Tab, Take 1 Tab by mouth every day., Disp: 90 Tab, Rfl: 0  •  clotrimazole-betamethasone (LOTRISONE) 1-0.05 % Lotion, Apply 30 mL to affected area(s) 2 Times a Day., Disp: 1 Bottle, Rfl: 1  •  vitamin D (VITAMIND D3) 1000 UNIT Tab, Take 1 Tab by mouth every day., Disp: 60 Tab, Rfl: 0  •  sodium bicarbonate (SODIUM BICARBONATE) 650 MG Tab, Take 1 Tab by mouth 2 times a day., Disp: 60 Tab, Rfl: 0    Allergies, past medical history, past surgical history, family history, social history reviewed and updated    Objective:     Vitals: /86 (BP Location: Right arm, Patient Position: Sitting, BP Cuff Size: Adult) Comment: Hasn't taken BP meds due to having dialysis today  Pulse 81   Temp 36.2 °C (97.2 °F) (Temporal)   Resp 12   Ht 1.74 m (5' 8.5\") "   Wt 91.2 kg (201 lb)   SpO2 95%   BMI 30.12 kg/m²   General: Alert, pleasant, NAD  HEENT: Normocephalic.  Nontraumatic. EOMI, no icterus or pallor.  Conjunctivae and lids normal. External ears normal.   Heart: Regular rate and rhythm.  S1 and S2 normal.  No murmurs appreciated.  Respiratory: Normal respiratory effort.  Clear to auscultation bilaterally.  Skin: Warm, dry, no rashes.  Musculoskeletal: Gait is normal.  Moves all extremities well.  Extremities: No leg edema.  Pedal pulses 2+ symmetric.  Bilateral feet fungal and ingrown nails.  Psych:  Affect/mood is normal, judgement is good, memory is intact, grooming is appropriate.    Assessment/Plan:     Diagnoses and all orders for this visit:    Cataract of both eyes, unspecified cataract type  -     REFERRAL TO OPHTHALMOLOGY    Onychomycosis  -     REFERRAL TO PODIATRY    Ingrown toenail  -     REFERRAL TO PODIATRY    Chronic pain of both shoulders  -     DX-SHOULDER 2+ LEFT; Future  -     DX-SHOULDER 2+ RIGHT; Future    Fatigue, unspecified type  -     CBC WITH DIFFERENTIAL; Future  -     TSH WITH REFLEX TO FT4; Future    Routine screening for STI (sexually transmitted infection)  -     HIV AG/AB COMBO ASSAY SCREENING; Future  -     HEPATITIS PANEL ACUTE(4 COMPONENTS); Future  -     MISCELLANEOUS TEST; Future  -     Chlamydia/GC PCR Urine Or Swab; Future    Medication monitoring encounter  -     Comp Metabolic Panel; Future    Hyperglycemia  -     HEMOGLOBIN A1C; Future    Dyslipidemia  -     Lipid Profile; Future    Vitamin D deficiency  -     VITAMIN D,25 HYDROXY; Future    Vitamin B12 deficiency  -     VITAMIN B12; Future    Depression with anxiety  -     sertraline (ZOLOFT) 100 MG Tab; Take 1 Tab by mouth every day.    Essential hypertension  -     metoprolol SR (TOPROL XL) 100 MG TABLET SR 24 HR; Take 1 Tab by mouth every day.    Gout due to renal impairment, unspecified chronicity, unspecified site  -     colchicine (COLCRYS) 0.6 MG Tab; Take 1 Tab by  mouth every day.    Solitary kidney    Acute renal failure superimposed on chronic kidney disease, on chronic dialysis, unspecified acute renal failure type (HCC)    -Will refill metoprolol 100 mg for his blood pressure and sertraline 100 mg for anxiety and depression which is stable and colchicine 0.6 mg every other day for his gout.  At least 2 weeks before your 6-week appointment get fasting lab work 8 hours of no eating but drink plenty of water.  Referral for ophthalmology is sent and for podiatry.  Please call where he got her colonoscopy done to sign HIPAA release form to have this faxed.  Kidney transplant at [facility] and also send a copy to us and have a copy for yourself.  We will also get bilateral shoulder x-rays as he would like to have a joint injection at his next appointment as he can only take Tylenol 1-2 times a day due to his kidney issues for pain and please also try icing or putting heat compress for 15 to 20 minutes and trying over-the-counter icy  rubs or patches for this pain and doing the stretching and yoga shoulder exercises to avoid frozen shoulder and we will see what the x-rays show and can try at injection at the next appointment or will refer to orthopedic as appropriate.  ER precautions given if any chest pain, shortness of breath, passing out then please go to the ER or call 911.  He has at his dialysis appointment afterwards for which he does dialysis Monday, Wednesday, Friday and working to get as above transplant for kidney for his congenital solitary kidney.    Return in about 6 weeks (around 6/19/2019), or R shoulder injection/FU shoulder imaging, for Long, 40 min appnt, Procedure appnt.    Patient was seen for 60 minutes face-to-face of which greater than 50% of the visit was spent in counseling and coordination of care as documented above in their assessment and plan.

## 2019-05-09 ENCOUNTER — TELEPHONE (OUTPATIENT)
Dept: MEDICAL GROUP | Facility: PHYSICIAN GROUP | Age: 64
End: 2019-05-09

## 2019-05-09 NOTE — TELEPHONE ENCOUNTER
Patient is a dialysis patient and please have labs also sent to his nephrologist.  So this is normal and no need to call the patient and please let the lab know that he is a dialysis patient.  Thanks.

## 2019-05-09 NOTE — TELEPHONE ENCOUNTER
1. Caller Name: [facility]                                         Call Back Number: [phone]      Patient approves a detailed voicemail message: N\A    [facility] called to report Critical Lab result. Creatinine came back at 7.58, the normal rage is 0.50-1.40. Please advise.

## 2019-05-10 NOTE — TELEPHONE ENCOUNTER
----- Message from Mark Eric M.D. sent at 5/9/2019  1:05 PM PDT -----  Please call patient and let patient know Creatine is high but stable and continue dialysis so his new baseline and we can do POC A1C at his next appointment, thank you.

## 2019-05-10 NOTE — TELEPHONE ENCOUNTER
Phone Number Called: [phone] (home)       Message: Pt notified on results and message,     Left Message for patient to call back: no

## 2019-05-15 ENCOUNTER — TELEPHONE (OUTPATIENT)
Dept: MEDICAL GROUP | Facility: PHYSICIAN GROUP | Age: 64
End: 2019-05-15

## 2019-05-15 DIAGNOSIS — B35.1 ONYCHOMYCOSIS: ICD-10-CM

## 2019-05-15 DIAGNOSIS — L60.0 INGROWN TOENAIL: ICD-10-CM

## 2019-05-16 NOTE — TELEPHONE ENCOUNTER
Podiatry referral signed and ordered for this patient.  Thanks.  For diagnosis of ingrown toenail and fungal infection onychomycosis.

## 2019-05-17 ENCOUNTER — HOSPITAL ENCOUNTER (OUTPATIENT)
Dept: RADIOLOGY | Facility: MEDICAL CENTER | Age: 64
End: 2019-05-17
Attending: FAMILY MEDICINE
Payer: MEDICARE

## 2019-05-17 DIAGNOSIS — G89.29 CHRONIC PAIN OF BOTH SHOULDERS: ICD-10-CM

## 2019-05-17 DIAGNOSIS — M25.511 CHRONIC PAIN OF BOTH SHOULDERS: ICD-10-CM

## 2019-05-17 DIAGNOSIS — M25.512 CHRONIC PAIN OF BOTH SHOULDERS: ICD-10-CM

## 2019-05-17 PROCEDURE — 73030 X-RAY EXAM OF SHOULDER: CPT | Mod: RT

## 2019-05-20 ENCOUNTER — TELEPHONE (OUTPATIENT)
Dept: MEDICAL GROUP | Facility: PHYSICIAN GROUP | Age: 64
End: 2019-05-20

## 2019-05-21 ENCOUNTER — TELEPHONE (OUTPATIENT)
Dept: MEDICAL GROUP | Facility: PHYSICIAN GROUP | Age: 64
End: 2019-05-21

## 2019-06-19 ENCOUNTER — OFFICE VISIT (OUTPATIENT)
Dept: MEDICAL GROUP | Facility: PHYSICIAN GROUP | Age: 64
End: 2019-06-19
Payer: MEDICARE

## 2019-06-19 VITALS
HEART RATE: 82 BPM | RESPIRATION RATE: 12 BRPM | SYSTOLIC BLOOD PRESSURE: 128 MMHG | BODY MASS INDEX: 30.07 KG/M2 | OXYGEN SATURATION: 95 % | TEMPERATURE: 98.2 F | DIASTOLIC BLOOD PRESSURE: 82 MMHG | HEIGHT: 69 IN | WEIGHT: 203 LBS

## 2019-06-19 DIAGNOSIS — M77.8 RIGHT SHOULDER TENDINITIS: ICD-10-CM

## 2019-06-19 DIAGNOSIS — F41.8 DEPRESSION WITH ANXIETY: ICD-10-CM

## 2019-06-19 DIAGNOSIS — M19.011 LOCALIZED OSTEOARTHRITIS OF RIGHT SHOULDER: ICD-10-CM

## 2019-06-19 DIAGNOSIS — M10.30 GOUT DUE TO RENAL IMPAIRMENT, UNSPECIFIED CHRONICITY, UNSPECIFIED SITE: ICD-10-CM

## 2019-06-19 DIAGNOSIS — E78.1 HIGH TRIGLYCERIDES: ICD-10-CM

## 2019-06-19 DIAGNOSIS — B35.3 TINEA PEDIS OF RIGHT FOOT: ICD-10-CM

## 2019-06-19 DIAGNOSIS — I10 ESSENTIAL HYPERTENSION: ICD-10-CM

## 2019-06-19 DIAGNOSIS — E78.6 LOW HDL (UNDER 40): ICD-10-CM

## 2019-06-19 PROCEDURE — 20610 DRAIN/INJ JOINT/BURSA W/O US: CPT | Mod: RT | Performed by: FAMILY MEDICINE

## 2019-06-19 PROCEDURE — 99999 PR NO CHARGE: CPT | Performed by: FAMILY MEDICINE

## 2019-06-19 PROCEDURE — 99214 OFFICE O/P EST MOD 30 MIN: CPT | Mod: 25 | Performed by: FAMILY MEDICINE

## 2019-06-19 RX ORDER — CINACALCET 30 MG/1
TABLET, FILM COATED ORAL
Refills: 11 | COMMUNITY
Start: 2019-05-22 | End: 2019-06-19

## 2019-06-19 RX ORDER — COLCHICINE 0.6 MG/1
TABLET ORAL
Qty: 90 TAB | Refills: 0 | Status: SHIPPED | OUTPATIENT
Start: 2019-06-19 | End: 2019-06-26 | Stop reason: SDUPTHER

## 2019-06-19 RX ORDER — SERTRALINE HYDROCHLORIDE 100 MG/1
100 TABLET, FILM COATED ORAL
Qty: 90 TAB | Refills: 1 | Status: SHIPPED | OUTPATIENT
Start: 2019-06-19 | End: 2019-06-26 | Stop reason: SDUPTHER

## 2019-06-19 RX ORDER — SEVELAMER CARBONATE 800 MG/1
TABLET, FILM COATED ORAL SEE ADMIN INSTRUCTIONS
Refills: 5 | Status: ON HOLD | COMMUNITY
Start: 2019-06-03 | End: 2021-01-16 | Stop reason: SDUPTHER

## 2019-06-19 RX ORDER — METOPROLOL SUCCINATE 100 MG/1
100 TABLET, EXTENDED RELEASE ORAL DAILY
Qty: 90 TAB | Refills: 1 | Status: SHIPPED | OUTPATIENT
Start: 2019-06-19 | End: 2019-06-26 | Stop reason: SDUPTHER

## 2019-06-19 RX ORDER — TERBINAFINE HYDROCHLORIDE 250 MG/1
250 TABLET ORAL DAILY
COMMUNITY
End: 2019-08-22

## 2019-06-19 RX ORDER — CLOTRIMAZOLE AND BETAMETHASONE DIPROPIONATE 10; .5 MG/ML; MG/ML
30 LOTION TOPICAL 2 TIMES DAILY
Qty: 1 BOTTLE | Refills: 1 | Status: SHIPPED | OUTPATIENT
Start: 2019-06-19 | End: 2019-06-26 | Stop reason: SDUPTHER

## 2019-06-19 RX ORDER — B,C/FOLIC/ZINC/SELENOMETH/D3/E 0.8-12.5MG
TABLET ORAL
Refills: 3 | COMMUNITY
Start: 2019-05-06 | End: 2020-09-02

## 2019-06-19 RX ORDER — TRIAMCINOLONE ACETONIDE 40 MG/ML
40 INJECTION, SUSPENSION INTRA-ARTICULAR; INTRAMUSCULAR ONCE
Status: COMPLETED | OUTPATIENT
Start: 2019-06-19 | End: 2019-06-19

## 2019-06-19 RX ADMIN — TRIAMCINOLONE ACETONIDE 40 MG: 40 INJECTION, SUSPENSION INTRA-ARTICULAR; INTRAMUSCULAR at 12:26

## 2019-06-19 NOTE — PATIENT INSTRUCTIONS
Diagnoses and all orders for this visit:    Right shoulder tendinitis  -     lidocaine-epinephrine 1% [id number] injection 10 mL; 10 mL by Injection route Once.  -     triamcinolone acetonide (KENALOG-40) injection 40 mg; 1 mL by Injection route Once.  -     Consent for Surgery / Procedure    Tinea pedis of right foot  -     clotrimazole-betamethasone (LOTRISONE) 1-0.05 % Lotion; Apply 30 mL to affected area(s) 2 Times a Day.    Gout due to renal impairment, unspecified chronicity, unspecified site  -     colchicine (COLCRYS) 0.6 MG Tab; Take 1 tab p.o. every other day.    Depression with anxiety  -     sertraline (ZOLOFT) 100 MG Tab; Take 1 Tab by mouth every day.    Essential hypertension  -     metoprolol SR (TOPROL XL) 100 MG TABLET SR 24 HR; Take 1 Tab by mouth every day.    Low HDL (under 40)    High triglycerides    Localized osteoarthritis of right shoulder  -     lidocaine-epinephrine 1% [id number] injection 10 mL; 10 mL by Injection route Once.  -     triamcinolone acetonide (KENALOG-40) injection 40 mg; 1 mL by Injection route Once.  -     Consent for Surgery / Procedure    -Went over lab work as above CBC with some mild anemia, hemoglobin 13.5, his GFR is 7 and he is on dialysis, his creatinine is 7.58 but he is on dialysis.  His last creatinine was 7.41.  His TSH thyroid is within normal limits, his lipids show that his HDL is low at 33 and his triglycerides are high at 331.  Vitamin B12 within normal limits, vitamin D within normal limits, hepatitis panel negative.  And due to his kidney disease and being on dialysis Monday Wednesday Friday he will talk to his nephrologist and we will hold and not start his atorvastatin cholesterol medication due to his kidneys and he will find out with nephrology.  Refilled and continue all other medication as his blood pressure stable on metoprolol 100 mg once a day and sertraline 100 mg daily for depression and colchicine every other day for gout and refilled his  antifungal and steroid cream for his foot and will continue to see podiatrist for his fungal toe infection which she is on terbinafine and they will recheck his kidney function and liver function and please let nephrologist kidney doctor know that you are on a new medication of terbinafine.  He has not had any gout attack and his blood pressure is stable.  He tolerated his right shoulder anterior injection with Kenalog 40 mg well and he would like to consider left shoulder injection in 8 weeks and we went over x-rays where he has moderate osteoarthritis and aftercare instructions given to keep pressure dressing on and ice for 15 minutes every couple of hours until tomorrow and then can take pressure dressing off and do light exercise to prevent any frozen shoulder and then do exercises in 3 days after light activity and resume exercises and icing as needed and IcyHot over-the- rubs and patches with lidocaine as needed for shoulder pain as he has to avoid all medications due to his kidney.  Aftercare instructions please read if there is any signs of infection inflammation, redness streaking bruising or pus please get this shoulder checked out if there is any signs of infection with fever or streaking.  If there is any bleeding hold pressure for 2 minutes and if the bleeding still does not stop please seek attention.  Otherwise he tolerated this procedure well and he will keep the site area clean and dry and we will see him back in 8 weeks to possibly inject his left shoulder.    Return in about 8 weeks (around 8/14/2019), or left shoulder injection, for Procedure appnt.  Shoulder Exercises  Ask your health care provider which exercises are safe for you. Do exercises exactly as told by your health care provider and adjust them as directed. It is normal to feel mild stretching, pulling, tightness, or discomfort as you do these exercises, but you should stop right away if you feel sudden pain or your pain  gets worse. Do not begin these exercises until told by your health care provider.  RANGE OF MOTION EXERCISES   These exercises warm up your muscles and joints and improve the movement and flexibility of your shoulder. These exercises also help to relieve pain, numbness, and tingling. These exercises involve stretching your injured shoulder directly.  Exercise A: Pendulum   1. Stand near a wall or a surface that you can hold onto for balance.  2. Bend at the waist and let your left / right arm hang straight down. Use your other arm to support you. Keep your back straight and do not lock your knees.  3. Relax your left / right arm and shoulder muscles, and move your hips and your trunk so your left / right arm swings freely. Your arm should swing because of the motion of your body, not because you are using your arm or shoulder muscles.  4. Keep moving your body so your arm swings in the following directions, as told by your health care provider:  ¨ Side to side.  ¨ Forward and backward.  ¨ In clockwise and counterclockwise circles.  5. Continue each motion for __________ seconds, or for as long as told by your health care provider.  6. Slowly return to the starting position.  Repeat __________ times. Complete this exercise __________ times a day.  Exercise B: Flexion, Standing   1. Stand and hold a broomstick, a cane, or a similar object. Place your hands a little more than shoulder-width apart on the object. Your left / right hand should be palm-up, and your other hand should be palm-down.  2. Keep your elbow straight and keep your shoulder muscles relaxed. Push the stick down with your healthy arm to raise your left / right arm in front of your body, and then over your head until you feel a stretch in your shoulder.  ¨ Avoid shrugging your shoulder while you raise your arm. Keep your shoulder blade tucked down toward the middle of your back.  3. Hold for __________ seconds.  4. Slowly return to the starting  position.  Repeat __________ times. Complete this exercise __________ times a day.  Exercise C: Abduction, Standing  1. Stand and hold a broomstick, a cane, or a similar object. Place your hands a little more than shoulder-width apart on the object. Your left / right hand should be palm-up, and your other hand should be palm-down.  2. While keeping your elbow straight and your shoulder muscles relaxed, push the stick across your body toward your left / right side. Raise your left / right arm to the side of your body and then over your head until you feel a stretch in your shoulder.  ¨ Do not raise your arm above shoulder height, unless your health care provider tells you to do that.  ¨ Avoid shrugging your shoulder while you raise your arm. Keep your shoulder blade tucked down toward the middle of your back.  3. Hold for __________ seconds.  4. Slowly return to the starting position.  Repeat __________ times. Complete this exercise __________ times a day.  Exercise D: Internal Rotation   1. Place your left / right hand behind your back, palm-up.  2. Use your other hand to dangle an exercise band, a towel, or a similar object over your shoulder. Grasp the band with your left / right hand so you are holding onto both ends.  3. Gently pull up on the band until you feel a stretch in the front of your left / right shoulder.  ¨ Avoid shrugging your shoulder while you raise your arm. Keep your shoulder blade tucked down toward the middle of your back.  4. Hold for __________ seconds.  5. Release the stretch by letting go of the band and lowering your hands.  Repeat __________ times. Complete this exercise __________ times a day.  STRETCHING EXERCISES   These exercises warm up your muscles and joints and improve the movement and flexibility of your shoulder. These exercises also help to relieve pain, numbness, and tingling. These exercises are done using your healthy shoulder to help stretch the muscles of your injured  shoulder.  Exercise E: Corner Stretch (External Rotation and Abduction)   1.  a doorway with one of your feet slightly in front of the other. This is called a staggered stance. If you cannot reach your forearms to the door frame, stand facing a corner of a room.  2. Choose one of the following positions as told by your health care provider:  ¨ Place your hands and forearms on the door frame above your head.  ¨ Place your hands and forearms on the door frame at the height of your head.  ¨ Place your hands on the door frame at the height of your elbows.  3. Slowly move your weight onto your front foot until you feel a stretch across your chest and in the front of your shoulders. Keep your head and chest upright and keep your abdominal muscles tight.  4. Hold for __________ seconds.  5. To release the stretch, shift your weight to your back foot.  Repeat __________ times. Complete this stretch __________ times a day.  Exercise F: Extension, Standing  1. Stand and hold a broomstick, a cane, or a similar object behind your back.  ¨ Your hands should be a little wider than shoulder-width apart.  ¨ Your palms should face away from your back.  2. Keeping your elbows straight and keeping your shoulder muscles relaxed, move the stick away from your body until you feel a stretch in your shoulder.  ¨ Avoid shrugging your shoulders while you move the stick. Keep your shoulder blade tucked down toward the middle of your back.  3. Hold for __________ seconds.  4. Slowly return to the starting position.  Repeat __________ times. Complete this exercise __________ times a day.  STRENGTHENING EXERCISES   These exercises build strength and endurance in your shoulder. Endurance is the ability to use your muscles for a long time, even after they get tired.  Exercise G: External Rotation   1. Sit in a stable chair without armrests.  2. Secure an exercise band at elbow height on your left / right side.  3. Place a soft object, such  as a folded towel or a small pillow, between your left / right upper arm and your body to move your elbow a few inches away (about 10 cm) from your side.  4. Hold the end of the band so it is tight and there is no slack.  5. Keeping your elbow pressed against the soft object, move your left / right forearm out, away from your abdomen. Keep your body steady so only your forearm moves.  6. Hold for __________ seconds.  7. Slowly return to the starting position.  Repeat __________ times. Complete this exercise __________ times a day.  Exercise H: Shoulder Abduction   1. Sit in a stable chair without armrests, or stand.  2. Hold a __________ weight in your left / right hand, or hold an exercise band with both hands.  3. Start with your arms straight down and your left / right palm facing in, toward your body.  4. Slowly lift your left / right hand out to your side. Do not lift your hand above shoulder height unless your health care provider tells you that this is safe.  ¨ Keep your arms straight.  ¨ Avoid shrugging your shoulder while you do this movement. Keep your shoulder blade tucked down toward the middle of your back.  5. Hold for __________ seconds.  6. Slowly lower your arm, and return to the starting position.  Repeat __________ times. Complete this exercise __________ times a day.  Exercise I: Shoulder Extension  1. Sit in a stable chair without armrests, or stand.  2. Secure an exercise band to a stable object in front of you where it is at shoulder height.  3. Hold one end of the exercise band in each hand. Your palms should face each other.  4. Straighten your elbows and lift your hands up to shoulder height.  5. Step back, away from the secured end of the exercise band, until the band is tight and there is no slack.  6. Squeeze your shoulder blades together as you pull your hands down to the sides of your thighs. Stop when your hands are straight down by your sides. Do not let your hands go behind your  "body.  7. Hold for __________ seconds.  8. Slowly return to the starting position.  Repeat __________ times. Complete this exercise __________ times a day.  Exercise J: Standing Shoulder Row  1. Sit in a stable chair without armrests, or stand.  2. Secure an exercise band to a stable object in front of you so it is at waist height.  3. Hold one end of the exercise band in each hand. Your palms should be in a thumbs-up position.  4. Bend each of your elbows to an \"L\" shape (about 90 degrees) and keep your upper arms at your sides.  5. Step back until the band is tight and there is no slack.  6. Slowly pull your elbows back behind you.  7. Hold for __________ seconds.  8. Slowly return to the starting position.  Repeat __________ times. Complete this exercise __________ times a day.  Exercise K: Shoulder Press-Ups   1. Sit in a stable chair that has armrests. Sit upright, with your feet flat on the floor.  2. Put your hands on the armrests so your elbows are bent and your fingers are pointing forward. Your hands should be about even with the sides of your body.  3. Push down on the armrests and use your arms to lift yourself off of the chair. Straighten your elbows and lift yourself up as much as you comfortably can.  ¨ Move your shoulder blades down, and avoid letting your shoulders move up toward your ears.  ¨ Keep your feet on the ground. As you get stronger, your feet should support less of your body weight as you lift yourself up.  4. Hold for __________ seconds.  5. Slowly lower yourself back into the chair.  Repeat __________ times. Complete this exercise __________ times a day.  Exercise L: Wall Push-Ups   1. Stand so you are facing a stable wall. Your feet should be about one arm-length away from the wall.  2. Lean forward and place your palms on the wall at shoulder height.  3. Keep your feet flat on the floor as you bend your elbows and lean forward toward the wall.  4. Hold for __________ " seconds.  5. Straighten your elbows to push yourself back to the starting position.  Repeat __________ times. Complete this exercise __________ times a day.  This information is not intended to replace advice given to you by your health care provider. Make sure you discuss any questions you have with your health care provider.  Document Released: 11/01/2006 Document Revised: 09/11/2017 Document Reviewed: 08/28/2016  [employer] Patient Education © 2017 [employer]  Joint Injection, Care After  Refer to this sheet in the next few days. These instructions provide you with information on caring for yourself after you have had a joint injection. Your caregiver also may give you more specific instructions. Your treatment has been planned according to current medical practices, but problems sometimes occur. Call your caregiver if you have any problems or questions after your procedure.  After any type of joint injection, it is not uncommon to experience:  · Soreness, swelling, or bruising around the injection site.  · Mild numbness, tingling, or weakness around the injection site caused by the numbing medicine used before or with the injection.  It also is possible to experience the following effects associated with the specific agent after injection:  · Iodine-based contrast agents:  ¨ Allergic reaction (itching, hives, widespread redness, and swelling beyond the injection site).  · Corticosteroids (These effects are rare.):  ¨ Allergic reaction.  ¨ Increased blood sugar levels (If you have diabetes and you notice that your blood sugar levels have increased, notify your caregiver).  ¨ Increased blood pressure levels.  ¨ Mood swings.  · Hyaluronic acid in the use of viscosupplementation.  ¨ Temporary heat or redness.  ¨ Temporary rash and itching.  ¨ Increased fluid accumulation in the injected joint.  These effects all should resolve within a day after your procedure.   HOME CARE INSTRUCTIONS  · Limit yourself  to light activity the day of your procedure. Avoid lifting heavy objects, bending, stooping, or twisting.  · Take prescription or over-the-counter pain medication as directed by your caregiver.  · You may apply ice to your injection site to reduce pain and swelling the day of your procedure. Ice may be applied 03-04 times:  ¨ Put ice in a plastic bag.  ¨ Place a towel between your skin and the bag.  ¨ Leave the ice on for no longer than 15-20 minutes each time.  SEEK IMMEDIATE MEDICAL CARE IF:   · Pain and swelling get worse rather than better or extend beyond the injection site.  · Numbness does not go away.  · Blood or fluid continues to leak from the injection site.  · You have chest pain.  · You have swelling of your face or tongue.  · You have trouble breathing or you become dizzy.  · You develop a fever, chills, or severe tenderness at the injection site that last longer than 1 day.  MAKE SURE YOU:  · Understand these instructions.  · Watch your condition.  · Get help right away if you are not doing well or if you get worse.     This information is not intended to replace advice given to you by your health care provider. Make sure you discuss any questions you have with your health care provider.     Document Released: 08/30/2012 Document Revised: 01/08/2016 Document Reviewed: 08/30/2012  [employer] Patient Education ©2016 [employer]
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
## 2019-06-26 DIAGNOSIS — I10 ESSENTIAL HYPERTENSION: ICD-10-CM

## 2019-06-26 DIAGNOSIS — B35.3 TINEA PEDIS OF RIGHT FOOT: ICD-10-CM

## 2019-06-26 DIAGNOSIS — M10.30 GOUT DUE TO RENAL IMPAIRMENT, UNSPECIFIED CHRONICITY, UNSPECIFIED SITE: ICD-10-CM

## 2019-06-26 DIAGNOSIS — F41.8 DEPRESSION WITH ANXIETY: ICD-10-CM

## 2019-06-26 RX ORDER — SERTRALINE HYDROCHLORIDE 100 MG/1
100 TABLET, FILM COATED ORAL
Qty: 90 TAB | Refills: 1 | Status: SHIPPED | OUTPATIENT
Start: 2019-06-26 | End: 2019-08-22

## 2019-06-26 RX ORDER — METOPROLOL SUCCINATE 100 MG/1
100 TABLET, EXTENDED RELEASE ORAL DAILY
Qty: 90 TAB | Refills: 1 | Status: SHIPPED | OUTPATIENT
Start: 2019-06-26 | End: 2020-05-18

## 2019-06-26 RX ORDER — COLCHICINE 0.6 MG/1
TABLET ORAL
Qty: 90 TAB | Refills: 0 | Status: SHIPPED | OUTPATIENT
Start: 2019-06-26 | End: 2019-08-22 | Stop reason: SDUPTHER

## 2019-06-26 RX ORDER — CLOTRIMAZOLE AND BETAMETHASONE DIPROPIONATE 10; .5 MG/ML; MG/ML
30 LOTION TOPICAL 2 TIMES DAILY
Qty: 1 BOTTLE | Refills: 1 | Status: SHIPPED | OUTPATIENT
Start: 2019-06-26 | End: 2019-08-22

## 2019-06-27 ENCOUNTER — TELEPHONE (OUTPATIENT)
Dept: MEDICAL GROUP | Facility: PHYSICIAN GROUP | Age: 64
End: 2019-06-27

## 2019-06-27 NOTE — TELEPHONE ENCOUNTER
Please call back pharmacy and let pharmacy know that only to apply thin layer to affected area twice a day only so thin layer as this was not on the electronic section and to fill the 30 mL bottle so please change that.  Thanks.

## 2019-06-27 NOTE — TELEPHONE ENCOUNTER
Please let patient know that steroid as needed cream refilled with 1 extra refill of 2.  Also 3-month supply of colchicine refilled and 3-month supply of metoprolol and sertraline refilled with another 3-month supply.  Please keep follow-up appointment in August for further refills and to do a possible joint procedure and see how he is doing and any other follow-up plans.  Thanks.

## 2019-06-27 NOTE — TELEPHONE ENCOUNTER
[facility] [address] [place] [address], [place] - [id number] [facility] [address]  Phone: [phone] Fax: [phone]    Pharmacy received rx for clotrimazole-betamethasone (LOTRISONE) 1-0.05 % Lotion that states apply 30ml 2 times a day.  They state 1 bottle is 30 ml and are asking for verification

## 2019-07-18 ENCOUNTER — OFFICE VISIT (OUTPATIENT)
Dept: URGENT CARE | Facility: PHYSICIAN GROUP | Age: 64
End: 2019-07-18
Payer: MEDICARE

## 2019-07-18 VITALS
TEMPERATURE: 98.1 F | HEART RATE: 82 BPM | RESPIRATION RATE: 16 BRPM | WEIGHT: 203 LBS | OXYGEN SATURATION: 95 % | DIASTOLIC BLOOD PRESSURE: 90 MMHG | HEIGHT: 69 IN | SYSTOLIC BLOOD PRESSURE: 144 MMHG | BODY MASS INDEX: 30.07 KG/M2

## 2019-07-18 DIAGNOSIS — L08.9 INFECTED ABRASION OF LEFT LEG, INITIAL ENCOUNTER: Primary | ICD-10-CM

## 2019-07-18 DIAGNOSIS — L03.119 RECURRENT CELLULITIS OF LOWER LEG: ICD-10-CM

## 2019-07-18 DIAGNOSIS — S80.812A INFECTED ABRASION OF LEFT LEG, INITIAL ENCOUNTER: Primary | ICD-10-CM

## 2019-07-18 PROCEDURE — 99214 OFFICE O/P EST MOD 30 MIN: CPT | Performed by: PHYSICIAN ASSISTANT

## 2019-07-18 RX ORDER — DOXYCYCLINE HYCLATE 100 MG
100 TABLET ORAL 2 TIMES DAILY
Qty: 20 TAB | Refills: 0 | Status: SHIPPED | OUTPATIENT
Start: 2019-07-18 | End: 2019-07-28

## 2019-07-18 RX ORDER — CEPHALEXIN 500 MG/1
500 CAPSULE ORAL 4 TIMES DAILY
COMMUNITY
End: 2019-07-31
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
## 2019-07-23 ASSESSMENT — ENCOUNTER SYMPTOMS: FEVER: 0

## 2019-07-31 ENCOUNTER — NON-PROVIDER VISIT (OUTPATIENT)
Dept: WOUND CARE | Facility: MEDICAL CENTER | Age: 64
End: 2019-07-31
Attending: PHYSICIAN ASSISTANT
Payer: MEDICARE

## 2019-07-31 DIAGNOSIS — I83.028 VENOUS STASIS ULCER OF OTHER PART OF LEFT LOWER LEG LIMITED TO BREAKDOWN OF SKIN WITH VARICOSE VEINS (HCC): Primary | ICD-10-CM

## 2019-07-31 DIAGNOSIS — L97.821 VENOUS STASIS ULCER OF OTHER PART OF LEFT LOWER LEG LIMITED TO BREAKDOWN OF SKIN WITH VARICOSE VEINS (HCC): Primary | ICD-10-CM

## 2019-07-31 PROCEDURE — 97597 DBRDMT OPN WND 1ST 20 CM/<: CPT

## 2019-07-31 PROCEDURE — 99211 OFF/OP EST MAY X REQ PHY/QHP: CPT

## 2019-07-31 NOTE — CERTIFICATION
"Non Provider Encounter- Lower Extremity Ulcer    HISTORY OF PRESENT ILLNESS    START OF CARE IN CLINIC: 07/31/2019    REFERRING PROVIDER: Ariane Vieira PA-c     WOUND- L medial Lower Extremity Ulcer   LOCATION: L medial LE   VARICOSE VEINS: yes   WOUND HISTORY: Pt states he has had wounds in this area 3-4 times over the last 7 years. Most recently it opened up 2 weeks ago when he was working on a motorbike and he scraped it on a kick stand. He has lipodermatosclerosis, hemosiderin staining, mild stasis dermatitis all consistent with CVI. There are no vascular studies on record in Epic. He was given abx by the physician at dialysis and referred to [facility] for management.      PERTINENT PMH: Acute and chronic renal failure (dialyses 3 x week), CVI      IMAGING:none pertinent   VASCULAR STUDIES: none. Ordered venous and arterial studies today 07/31/2019.     LAST  WOUND CULTURE DATE:  Na. Deferred today as there are no clinical ss infection.           COMPRESSION: yes - pt states he was told to wear compression stockings by his  \"years ago\" and wears 20-30mmhg stockings from [facility]. He says he is inconsistent about wearing them.        DIABETES:  no  A1C: na    TOBACCO USE: quit in 1996, smoked for 3 years    RESULTS: none pertinent      CLINIC PRISCILA: performed in clinic 07/31/2019 -  L dp/pt systolics are both 180mmhg. R brachial (dialysis shunt in L) is 150, for PRISCILA's of 1.2. Doppler waveforms are multiphasic.     FALL RISK ASSESSMENT: Pt denies and is not identified as a fall risk   65 years or older     Fall within the last 2 years   Uses ambulatory devices  Loss of protective sensation in feet   Use of prostethic/orthotic    Presence of lower extremity/foot/toe amputation   Taking medication that increases risk (per facility policy)    Interventions Recommended (if any of the above are selected):   Use of Assistive Device:   Supervision with ambulation: Caregiver   Assistance with ambulation: Caregiver   Home safety " "education: Educational material provided    MOST RECENT VASCULAR STUDIES: none. Ordered 07/31/2019    PAST MEDICAL HISTORY:   Past Medical History:   Diagnosis Date   • Allergy    • Anxiety    • Cataract    • Depression    • Gout due to renal impairment    • Hyperlipidemia    • Hypertension    • Kidney disease     Congenital solitary kidney on dialysis 3 times a week.   • Localized osteoarthritis of right shoulder 6/19/2019       PAST SURGICAL HISTORY:   Past Surgical History:   Procedure Laterality Date   • AV FISTULA CREATION     • OTHER ORTHOPEDIC SURGERY          MEDICATIONS:   Current Outpatient Medications   Medication   • cephALEXin (KEFLEX) 500 MG Cap   • clotrimazole-betamethasone (LOTRISONE) 1-0.05 % Lotion   • colchicine (COLCRYS) 0.6 MG Tab   • metoprolol SR (TOPROL XL) 100 MG TABLET SR 24 HR   • sertraline (ZOLOFT) 100 MG Tab   • Multiple Vitamins-Minerals (RENAPLEX-D) Tab   • sevelamer carbonate (RENVELA) 800 MG Tab tablet   • terbinafine (LAMISIL) 250 MG Tab   • SENSIPAR 30 MG Tab   • vitamin D (VITAMIND D3) 1000 UNIT Tab     No current facility-administered medications for this visit.        ALLERGIES:    Allergies   Allergen Reactions   • Cephalexin Rash   • Pcn [Penicillins] Unspecified     Pt states \"It was a childhood allergie\".           SOCIAL HISTORY:   Social History     Socioeconomic History   • Marital status:      Spouse name: Not on file   • Number of children: Not on file   • Years of education: Not on file   • Highest education level: Not on file   Occupational History   • Not on file   Social Needs   • Financial resource strain: Not on file   • Food insecurity:     Worry: Not on file     Inability: Not on file   • Transportation needs:     Medical: Not on file     Non-medical: Not on file   Tobacco Use   • Smoking status: Former Smoker     Packs/day: 0.50     Years: 25.00     Pack years: 12.50     Types: Cigarettes     Last attempt to quit: 10/21/1996     Years since quitting: " 22.7   • Smokeless tobacco: Never Used   Substance and Sexual Activity   • Alcohol use: No     Alcohol/week: 0.0 oz   • Drug use: No   • Sexual activity: Yes     Partners: Female     Comment: Lives with his brother. Work as . Social Security Disability for [place] and OA and Dialysis. No social or domestic concerns.   Lifestyle   • Physical activity:     Days per week: Not on file     Minutes per session: Not on file   • Stress: Not on file   Relationships   • Social connections:     Talks on phone: Not on file     Gets together: Not on file     Attends Anabaptism service: Not on file     Active member of club or organization: Not on file     Attends meetings of clubs or organizations: Not on file     Relationship status: Not on file   • Intimate partner violence:     Fear of current or ex partner: Not on file     Emotionally abused: Not on file     Physically abused: Not on file     Forced sexual activity: Not on file   Other Topics Concern   • Not on file   Social History Narrative   • Not on file       FAMILY HISTORY:   Family History   Problem Relation Age of Onset   • Cancer Mother         lymphoma   • Heart Attack Father    • Stroke Father    • Heart Disease Father        WOUND ASSESSMENT        Wound 07/31/19 Venous Ulcer Leg L medial LE CVI ulcer (Active)   Wound Image   7/31/2019 10:00 AM   Site Assessment Red 7/31/2019 10:00 AM   Deann-wound Assessment Dry;Intact;Callused;Hyperpigmented;Yellow-brown 7/31/2019 10:00 AM   Margins Attached edges;Undefined edges 7/31/2019 10:00 AM   Wound Length (cm) 9 cm 7/31/2019 10:00 AM   Wound Width (cm) 3 cm 7/31/2019 10:00 AM   Wound Depth (cm) 0.1 cm 7/31/2019 10:00 AM   Wound Surface Area (cm^2) 27 cm^2 7/31/2019 10:00 AM   Post Wound Length (cm) 9 cm 7/31/2019 10:00 AM    Post Wound Width (cm) 3 cm 7/31/2019 10:00 AM   Post Wound Depth (cm) 9.1 cm 7/31/2019 10:00 AM   Post Wound Surface Area (cm^2) 27 cm^2 7/31/2019 10:00 AM   Tunneling 0 cm 7/31/2019 10:00 AM      Undermining 0 cm 7/31/2019 10:00 AM   Closure Secondary intention 7/31/2019 10:00 AM   Drainage Amount Small 7/31/2019 10:00 AM   Drainage Description Serosanguineous 7/31/2019 10:00 AM   Non-staged Wound Description Partial thickness 7/31/2019 10:00 AM   Treatments Cleansed 7/31/2019 10:00 AM   Cleansing Approved Wound Cleanser 7/31/2019 10:00 AM   Periwound Protectant Barrier Paste;Skin Protectant wipes to Periwound 7/31/2019 10:00 AM   Dressing Options Viscopaste;Unna Boot 7/31/2019 10:00 AM   Dressing Cleansing/Solutions Normal Saline 7/31/2019 10:00 AM   Dressing Changed New 7/31/2019 10:00 AM   Dressing Status Clean;Dry;Intact 7/31/2019 10:00 AM   Dressing Change Frequency Weekly 7/31/2019 10:00 AM   WOUND NURSE ONLY - Odor None 7/31/2019 10:00 AM   WOUND NURSE ONLY - Pulses 2+;Left;DP;PT;Other (Comments) 7/31/2019 10:00 AM   WOUND NURSE ONLY - Tissue Type and Percentage 100% red viable tissue post CSWD 7/31/2019 10:00 AM            Procedures:     Debridement :  Wound was selectively debrided with a tweezers to remove dipti wound callus and non viable tissue from wound bed. Area debrided <20cm2. Pt deniz well.     Cleansed with:     No rinse cleanser and warm water on washcloth to LE, then wound cleansed with NSS                                                                     Periwound protected with:skin prep, zinc oxide barrier paste   Primary dressing:viscopatch to wound areas   Secondary Dressing:dionnea's boot, coban    Other: nylon      Post debridement photo:               PATIENT EDUCATION VENOUS- Etiology of venous ulceration discussed with patient  - Importance of managing edema for healing of ulcer, and for prevention of new ulcer development  -Need for lifelong compression of lower legs   -Elevate legs above the level of the heart periodically throughout the day.  - Importance of adequate nutrition for wound healing  -Increase protein intake (unless contraindicated by renal status)  -Discussed  importance of smoking cessation.  -Advised to go to ER for any increased redness, swelling, drainage or odor, or if patient develops fever, chills, nausea or vomiting.      Instr pt to call [phone] to schedule arterial and venous studies asap. Instr he will need to unwrap the Unna's boot prior to the study. I gave him a faom dressing and a tubi E to apply afterwards. He understands.

## 2019-07-31 NOTE — PROCEDURES
Wound was selectively debrided with a tweezers to remove dipti wound callus and non viable tissue from wound bed. Area debrided <20cm2. Pt deniz well.

## 2019-07-31 NOTE — PATIENT INSTRUCTIONS
Avoid prolonged standing or sitting without elevating your legs.  - Apply tubigrip to your legs ending 2 fingers below back of knee without wrinkles.     - Multilayer compression wrap (unna boot) to L leg. Do not get wet and keep on for the week. Only remove if temperature or sensation changes.   If compression needs to be removed, un-wrap it do not cut it off.     Should you experience any significant changes in your wound(s), such as infection (redness, swelling, localized heat, increased pain, fever > 101 F, chills) or have any questions regarding your home care instructions, please contact the wound center at [phone]. If after hours, contact your primary care physician or go to the hospital emergency room.   Keep dressing clean, dry and covered while bathing. Only change dressing if it becomes over saturated, soiled or falls off.   Call vascular lab at [phone] to schedule scans of veins and arteries. You must remove the unna boot prior to the scan. Place a foam adhesive dressing and a tubi E compression sock on after scan, until next appt at wound clinic.

## 2019-08-06 ENCOUNTER — OFFICE VISIT (OUTPATIENT)
Dept: WOUND CARE | Facility: MEDICAL CENTER | Age: 64
End: 2019-08-06
Attending: PHYSICIAN ASSISTANT
Payer: MEDICARE

## 2019-08-06 VITALS
RESPIRATION RATE: 17 BRPM | TEMPERATURE: 97 F | SYSTOLIC BLOOD PRESSURE: 136 MMHG | HEART RATE: 80 BPM | DIASTOLIC BLOOD PRESSURE: 87 MMHG

## 2019-08-06 DIAGNOSIS — L97.821 VENOUS STASIS ULCER OF OTHER PART OF LEFT LOWER LEG LIMITED TO BREAKDOWN OF SKIN WITH VARICOSE VEINS (HCC): ICD-10-CM

## 2019-08-06 DIAGNOSIS — I83.028 VENOUS STASIS ULCER OF OTHER PART OF LEFT LOWER LEG LIMITED TO BREAKDOWN OF SKIN WITH VARICOSE VEINS (HCC): ICD-10-CM

## 2019-08-06 DIAGNOSIS — N18.6 ESRD ON DIALYSIS (HCC): ICD-10-CM

## 2019-08-06 DIAGNOSIS — Z99.2 ESRD ON DIALYSIS (HCC): ICD-10-CM

## 2019-08-06 PROCEDURE — 11042 DBRDMT SUBQ TIS 1ST 20SQCM/<: CPT

## 2019-08-06 PROCEDURE — 11042 DBRDMT SUBQ TIS 1ST 20SQCM/<: CPT | Performed by: NURSE PRACTITIONER

## 2019-08-06 ASSESSMENT — ENCOUNTER SYMPTOMS
MYALGIAS: 0
DIZZINESS: 0
NAUSEA: 0
COUGH: 0
VOMITING: 0
HEADACHES: 0
SHORTNESS OF BREATH: 0
NERVOUS/ANXIOUS: 0
ABDOMINAL PAIN: 0
DEPRESSION: 0
CHILLS: 0
FEVER: 0

## 2019-08-06 NOTE — PROGRESS NOTES
" Provider Encounter- Venous Stasis Ulcer    HISTORY OF PRESENT ILLNESS     START OF CARE IN CLINIC: 07/31/2019               REFERRING PROVIDER: Ariane Vieira PA-c                 WOUND- L medial Lower Extremity Ulcer              LOCATION: L medial LE              VARICOSE VEINS: yes              WOUND HISTORY: Pt states he has had wounds in this area 3-4 times over the last 7 years. Most recently it opened up 2 weeks ago when he was working on a motorbike and he scraped it on a kick stand. He has lipodermatosclerosis, hemosiderin staining, mild stasis dermatitis all consistent with CVI. There are no vascular studies on record in Epic. He was given abx by the physician at dialysis and referred to [facility] for management.                 PERTINENT PMH: Acute and chronic renal failure (dialyses 3 x week), CVI                  IMAGING:none pertinent              VASCULAR STUDIES: none. Ordered venous and arterial studies today 07/31/2019.                         LAST  WOUND CULTURE DATE:  Na. Deferred today as there are no clinical ss infection.                       COMPRESSION: yes - pt states he was told to wear compression stockings by his  \"years ago\" and wears 20-30mmhg stockings from [facility]. He says he is inconsistent about wearing them.                 DIABETES:  no  A1C: na     TOBACCO USE: quit in 1996, smoked for 3 years     RESULTS:      CLINIC PRISCILA: performed in clinic 07/31/2019 -  L dp/pt systolics are both 180mmhg. R brachial (dialysis shunt in L) is 150, for PRISCILA's of 1.2. Doppler waveforms are multiphasic.     PAST MEDICAL HISTORY:   Past Medical History:   Diagnosis Date   • Allergy    • Anxiety    • Cataract    • Depression    • Gout due to renal impairment    • Hyperlipidemia    • Hypertension    • Kidney disease     Congenital solitary kidney on dialysis 3 times a week.   • Localized osteoarthritis of right shoulder 6/19/2019     Pt is on dialysis 3x per week.  He states that he hopes to move forward " "with a kidney transplant (living donor) once this wound is healed.     PAST SURGICAL HISTORY:   Past Surgical History:   Procedure Laterality Date   • AV FISTULA CREATION     • OTHER ORTHOPEDIC SURGERY          MEDICATIONS:   Current Outpatient Medications   Medication   • clotrimazole-betamethasone (LOTRISONE) 1-0.05 % Lotion   • colchicine (COLCRYS) 0.6 MG Tab   • metoprolol SR (TOPROL XL) 100 MG TABLET SR 24 HR   • sertraline (ZOLOFT) 100 MG Tab   • Multiple Vitamins-Minerals (RENAPLEX-D) Tab   • sevelamer carbonate (RENVELA) 800 MG Tab tablet   • terbinafine (LAMISIL) 250 MG Tab   • SENSIPAR 30 MG Tab   • vitamin D (VITAMIND D3) 1000 UNIT Tab     No current facility-administered medications for this visit.        ALLERGIES:    Allergies   Allergen Reactions   • Cephalexin Rash   • Pcn [Penicillins] Unspecified     Pt states \"It was a childhood allergie\".         SOCIAL HISTORY:   Social History     Socioeconomic History   • Marital status:      Spouse name: Not on file   • Number of children: Not on file   • Years of education: Not on file   • Highest education level: Not on file   Occupational History   • Not on file   Social Needs   • Financial resource strain: Not on file   • Food insecurity:     Worry: Not on file     Inability: Not on file   • Transportation needs:     Medical: Not on file     Non-medical: Not on file   Tobacco Use   • Smoking status: Former Smoker     Packs/day: 0.50     Years: 25.00     Pack years: 12.50     Types: Cigarettes     Last attempt to quit: 10/21/1996     Years since quittin.8   • Smokeless tobacco: Never Used   Substance and Sexual Activity   • Alcohol use: No     Alcohol/week: 0.0 oz   • Drug use: No   • Sexual activity: Yes     Partners: Female     Comment: Lives with his brother. Work as . Social Security Disability for Gouth and OA and Dialysis. No social or domestic concerns.   Lifestyle   • Physical activity:     Days per week: Not on file     " Minutes per session: Not on file   • Stress: Not on file   Relationships   • Social connections:     Talks on phone: Not on file     Gets together: Not on file     Attends Confucianism service: Not on file     Active member of club or organization: Not on file     Attends meetings of clubs or organizations: Not on file     Relationship status: Not on file   • Intimate partner violence:     Fear of current or ex partner: Not on file     Emotionally abused: Not on file     Physically abused: Not on file     Forced sexual activity: Not on file   Other Topics Concern   • Not on file   Social History Narrative   • Not on file       FAMILY HISTORY:   Family History   Problem Relation Age of Onset   • Cancer Mother         lymphoma   • Heart Attack Father    • Stroke Father    • Heart Disease Father         REVIEW OF SYSTEMS:   Review of Systems   Constitutional: Negative for chills, fever and malaise/fatigue.   Respiratory: Negative for cough and shortness of breath.    Cardiovascular: Negative for chest pain and leg swelling.   Gastrointestinal: Negative for abdominal pain, nausea and vomiting.   Musculoskeletal: Negative for myalgias.   Skin: Negative for itching and rash.   Neurological: Negative for dizziness and headaches.   Psychiatric/Behavioral: Negative for depression. The patient is not nervous/anxious.        PHYSICAL EXAMINATION:   /87   Pulse 80   Temp 36.1 °C (97 °F) (Temporal)   Resp 17   Physical Exam   Constitutional: He is oriented to person, place, and time and well-developed, well-nourished, and in no distress.   HENT:   Head: Normocephalic and atraumatic.   Eyes: Pupils are equal, round, and reactive to light. Conjunctivae and EOM are normal.   Neck: Normal range of motion. Neck supple.   Cardiovascular: Normal rate and intact distal pulses.   Pulmonary/Chest: Effort normal. No respiratory distress.   Musculoskeletal: Normal range of motion. He exhibits no edema or tenderness.   Neurological: He  is alert and oriented to person, place, and time.   Skin: Skin is warm and dry.   LUE AV fistula  Healing left medial LE wound. Hemosiderin staining and skin changes consistent with PVD.  See wound care assessment and photos. +maceration to periwound.    Psychiatric: Mood, affect and judgment normal.     WOUND ASSESSMENT      Wound 07/31/19 Venous Ulcer Leg L medial LE CVI ulcer (Active)   Wound Image    8/6/2019  8:30 AM   Site Assessment Red 8/6/2019  8:30 AM   Deann-wound Assessment Maceration 8/6/2019  8:30 AM   Margins Attached edges;Undefined edges 8/6/2019  8:30 AM   Wound Length (cm) 7.5 cm 8/6/2019  8:30 AM   Wound Width (cm) 3 cm 8/6/2019  8:30 AM   Wound Depth (cm) 0.2 cm 8/6/2019  8:30 AM   Wound Surface Area (cm^2) 22.5 cm^2 8/6/2019  8:30 AM   Post Wound Length (cm) 7.6 cm 8/6/2019  8:30 AM    Post Wound Width (cm) 3.2 cm 8/6/2019  8:30 AM   Post Wound Depth (cm) 0.2 cm 8/6/2019  8:30 AM   Post Wound Surface Area (cm^2) 24.32 cm^2 8/6/2019  8:30 AM   Tunneling 0 cm 8/6/2019  8:30 AM   Undermining 0 cm 8/6/2019  8:30 AM   Closure Secondary intention 8/6/2019  8:30 AM   Drainage Amount Small 8/6/2019  8:30 AM   Drainage Description Serosanguineous 8/6/2019  8:30 AM   Non-staged Wound Description Partial thickness 8/6/2019  8:30 AM   Treatments Cleansed 8/6/2019  8:30 AM   Cleansing Normal Saline Irrigation 8/6/2019  8:30 AM   Periwound Protectant Barrier Paste;Skin Protectant wipes to Periwound 8/6/2019  8:30 AM   Dressing Options Hydrofiber Silver;Hydrofiber;Viscopaste;Unna Boot 8/6/2019  8:30 AM   Dressing Cleansing/Solutions Normal Saline 8/6/2019  8:30 AM   Dressing Changed New 8/6/2019  8:30 AM   Dressing Status Clean;Dry;Intact 8/6/2019  8:30 AM   Dressing Change Frequency Weekly 8/6/2019  8:30 AM   WOUND NURSE ONLY - Odor None 8/6/2019  8:30 AM   WOUND NURSE ONLY - Pulses 2+;Right;Left;DP 8/6/2019  8:30 AM   WOUND NURSE ONLY - Tissue Type and Percentage 100% red viable tissue  8/6/2019  8:30 AM        Pre-debridement photo  Left medial LE- pre debridement        PROCEDURE:   -2% viscous lidocaine applied topically to wound bed for approximately 5 minutes prior to debridement  - curette used to debride wound bed.  Excisional debridement was performed to remove devitalized tissue until healthy, bleeding tissue was visualized.   Approx 5 cm2, debrided  Tissue debrided into the subcutaneous layer.    -Bleeding controlled with manual pressure.   -Wound care completed by Nick Hartley RN, refer to wound flowsheet      Post debridement photo:         PATIENT EDUCATION  - Etiology of venous stasis ulceration discussed with patient  - Importance of managing edema for healing of ulcer, and for prevention of new ulcer development  -Need for lifelong compression of lower legs   -Elevate legs above the level of the heart periodically throughout the day.  - Importance of adequate nutrition for wound healing  -Increase protein intake (unless contraindicated by renal status)  -Advised to go to ER for any increased redness, swelling, drainage or odor, or if patient develops fever, chills, nausea or vomiting.    ASSESSMENT AND PLAN:   1. Venous stasis ulcer of other part of left lower leg limited to breakdown of skin with varicose veins (HCC)  Comments:  Pt states he has had wounds in this area 3-4 times over the last 7 years. Most recently it opened up mid July when he was working on a motorbike and he scraped it on a kick stand.    7/30: Wound macerated. Debridement today. Excisional debridement in clinic today, medically necessary to promote wound healing.    Wound care: zinc to help with maceration. Hydrofiber Silver;Hydrofiber;Viscopaste;Unna Boot     2. ESRD on dialysis (HCC)  Comments: solitary kidney. Pt awaiting transplant. Wounds need to be healed prior.     FU 1 week for assessment/debridement    Please note that this dictation was created using voice recognition software. I have worked with technical experts  from [facility] to optimize the interface.  I have made every reasonable attempt to correct obvious errors, but there may be errors of grammar and possibly content that I did not discover before finalizing the note.

## 2019-08-13 ENCOUNTER — HOSPITAL ENCOUNTER (OUTPATIENT)
Dept: RADIOLOGY | Facility: MEDICAL CENTER | Age: 64
End: 2019-08-13
Attending: NURSE PRACTITIONER
Payer: MEDICARE

## 2019-08-13 ENCOUNTER — OFFICE VISIT (OUTPATIENT)
Dept: WOUND CARE | Facility: MEDICAL CENTER | Age: 64
End: 2019-08-13
Attending: PHYSICIAN ASSISTANT
Payer: MEDICARE

## 2019-08-13 VITALS
RESPIRATION RATE: 18 BRPM | TEMPERATURE: 98.6 F | OXYGEN SATURATION: 94 % | DIASTOLIC BLOOD PRESSURE: 93 MMHG | HEART RATE: 90 BPM | SYSTOLIC BLOOD PRESSURE: 148 MMHG

## 2019-08-13 DIAGNOSIS — L97.821 VENOUS STASIS ULCER OF OTHER PART OF LEFT LOWER LEG LIMITED TO BREAKDOWN OF SKIN WITH VARICOSE VEINS (HCC): ICD-10-CM

## 2019-08-13 DIAGNOSIS — Z99.2 ESRD ON DIALYSIS (HCC): ICD-10-CM

## 2019-08-13 DIAGNOSIS — I83.028 VENOUS STASIS ULCER OF OTHER PART OF LEFT LOWER LEG LIMITED TO BREAKDOWN OF SKIN WITH VARICOSE VEINS (HCC): ICD-10-CM

## 2019-08-13 DIAGNOSIS — N18.6 ESRD ON DIALYSIS (HCC): ICD-10-CM

## 2019-08-13 PROCEDURE — 11042 DBRDMT SUBQ TIS 1ST 20SQCM/<: CPT

## 2019-08-13 PROCEDURE — 11042 DBRDMT SUBQ TIS 1ST 20SQCM/<: CPT | Performed by: NURSE PRACTITIONER

## 2019-08-13 PROCEDURE — 93970 EXTREMITY STUDY: CPT

## 2019-08-13 PROCEDURE — 93922 UPR/L XTREMITY ART 2 LEVELS: CPT

## 2019-08-13 ASSESSMENT — ENCOUNTER SYMPTOMS
FEVER: 0
VOMITING: 0
ABDOMINAL PAIN: 0
SHORTNESS OF BREATH: 0
HEADACHES: 0
CHILLS: 0
DEPRESSION: 0
NAUSEA: 0
DIZZINESS: 0
MYALGIAS: 0
NERVOUS/ANXIOUS: 0
COUGH: 0

## 2019-08-13 NOTE — PROGRESS NOTES
" Provider Encounter- Venous Stasis Ulcer    HISTORY OF PRESENT ILLNESS     START OF CARE IN CLINIC: 07/31/2019               REFERRING PROVIDER: Ariane Vieira PA-c                 WOUND- L medial Lower Extremity Ulcer              LOCATION: L medial LE              VARICOSE VEINS: yes              WOUND HISTORY: Pt states he has had wounds in this area 3-4 times over the last 7 years. Most recently it opened up 2 weeks ago when he was working on a motorbike and he scraped it on a kick stand. He has lipodermatosclerosis, hemosiderin staining, mild stasis dermatitis all consistent with CVI. There are no vascular studies on record in Epic. He was given abx by the physician at dialysis and referred to [facility] for management.                 PERTINENT PMH: Acute and chronic renal failure (dialyses 3 x week), CVI                  IMAGING:none pertinent              VASCULAR STUDIES: none. Ordered venous and arterial studies today 07/31/2019.                         LAST  WOUND CULTURE DATE:  Na. Deferred today as there are no clinical ss infection.                       COMPRESSION: yes - pt states he was told to wear compression stockings by his DrSharon \"years ago\" and wears 20-30mmhg stockings from [employer]. He says he is inconsistent about wearing them.   He just completed venous and arterial studies earlier today, results still pending.                DIABETES:  no  A1C: na     TOBACCO USE: quit in 1996, smoked for 3 years        8/13/2019 : Clinic visit with LYNNE Lopez.  Wound is improving, area decreasing.  Edema well managed with current compression.  Discussed with him again the need for lifelong compression once his wounds are resolved.  Advised him to purchase compression stockings 15 to 20 mmHg, from online or from local drugstore.     RESULTS:      CLINIC PRISCILA: performed in clinic 07/31/2019 -  L dp/pt systolics are both 180mmhg. R brachial (dialysis shunt in L) is 150, for PRISCILA's of 1.2. Doppler waveforms are " "multiphasic.     PAST MEDICAL HISTORY:   Past Medical History:   Diagnosis Date   • Allergy    • Anxiety    • Cataract    • Depression    • Gout due to renal impairment    • Hyperlipidemia    • Hypertension    • Kidney disease     Congenital solitary kidney on dialysis 3 times a week.   • Localized osteoarthritis of right shoulder 6/19/2019     Pt is on dialysis 3x per week.  He states that he hopes to move forward with a kidney transplant (living donor) once this wound is healed.     PAST SURGICAL HISTORY:   Past Surgical History:   Procedure Laterality Date   • AV FISTULA CREATION     • OTHER ORTHOPEDIC SURGERY          MEDICATIONS:   Current Outpatient Medications   Medication   • clotrimazole-betamethasone (LOTRISONE) 1-0.05 % Lotion   • colchicine (COLCRYS) 0.6 MG Tab   • metoprolol SR (TOPROL XL) 100 MG TABLET SR 24 HR   • sertraline (ZOLOFT) 100 MG Tab   • Multiple Vitamins-Minerals (RENAPLEX-D) Tab   • sevelamer carbonate (RENVELA) 800 MG Tab tablet   • terbinafine (LAMISIL) 250 MG Tab   • SENSIPAR 30 MG Tab   • vitamin D (VITAMIND D3) 1000 UNIT Tab     No current facility-administered medications for this visit.        ALLERGIES:    Allergies   Allergen Reactions   • Cephalexin Rash   • Pcn [Penicillins] Unspecified     Pt states \"It was a childhood allergie\".         SOCIAL HISTORY:   Social History     Socioeconomic History   • Marital status:      Spouse name: Not on file   • Number of children: Not on file   • Years of education: Not on file   • Highest education level: Not on file   Occupational History   • Not on file   Social Needs   • Financial resource strain: Not on file   • Food insecurity:     Worry: Not on file     Inability: Not on file   • Transportation needs:     Medical: Not on file     Non-medical: Not on file   Tobacco Use   • Smoking status: Former Smoker     Packs/day: 0.50     Years: 25.00     Pack years: 12.50     Types: Cigarettes     Last attempt to quit: 10/21/1996     " Years since quittin.8   • Smokeless tobacco: Never Used   Substance and Sexual Activity   • Alcohol use: No     Alcohol/week: 0.0 oz   • Drug use: No   • Sexual activity: Yes     Partners: Female     Comment: Lives with his brother. Work as . Social Security Disability for Gouth and OA and Dialysis. No social or domestic concerns.   Lifestyle   • Physical activity:     Days per week: Not on file     Minutes per session: Not on file   • Stress: Not on file   Relationships   • Social connections:     Talks on phone: Not on file     Gets together: Not on file     Attends Sabianist service: Not on file     Active member of club or organization: Not on file     Attends meetings of clubs or organizations: Not on file     Relationship status: Not on file   • Intimate partner violence:     Fear of current or ex partner: Not on file     Emotionally abused: Not on file     Physically abused: Not on file     Forced sexual activity: Not on file   Other Topics Concern   • Not on file   Social History Narrative   • Not on file       FAMILY HISTORY:   Family History   Problem Relation Age of Onset   • Cancer Mother         lymphoma   • Heart Attack Father    • Stroke Father    • Heart Disease Father         REVIEW OF SYSTEMS:   Review of Systems   Constitutional: Negative for chills, fever and malaise/fatigue.   Respiratory: Negative for cough and shortness of breath.    Cardiovascular: Negative for chest pain and leg swelling.   Gastrointestinal: Negative for abdominal pain, nausea and vomiting.   Musculoskeletal: Negative for myalgias.   Skin: Negative for itching and rash.   Neurological: Negative for dizziness and headaches.   Psychiatric/Behavioral: Negative for depression. The patient is not nervous/anxious.        PHYSICAL EXAMINATION:   /93   Pulse 90   Temp 37 °C (98.6 °F)   Resp 18   SpO2 94%   Physical Exam   Constitutional: He is oriented to person, place, and time and well-developed,  well-nourished, and in no distress.   HENT:   Head: Normocephalic and atraumatic.   Eyes: Pupils are equal, round, and reactive to light. Conjunctivae and EOM are normal.   Neck: Normal range of motion. Neck supple.   Cardiovascular: Normal rate and intact distal pulses.   Pulmonary/Chest: Effort normal. No respiratory distress.   Musculoskeletal: Normal range of motion. He exhibits no edema or tenderness.   Neurological: He is alert and oriented to person, place, and time.   Skin: Skin is warm and dry.   LUE AV fistula  Healing left medial LE wound. Hemosiderin staining and skin changes consistent with PVD.  See wound care assessment and photos. +maceration to periwound.    Psychiatric: Mood, affect and judgment normal.     WOUND ASSESSMENT    Wound 07/31/19 Venous Ulcer Leg L medial LE CVI ulcer (Active)   Wound Image    8/13/2019  1:30 PM   Site Assessment Red 8/13/2019  1:30 PM   Deann-wound Assessment Maceration 8/13/2019  1:30 PM   Margins Attached edges;Undefined edges 8/13/2019  1:30 PM   Wound Length (cm) 7 cm 8/13/2019  1:30 PM   Wound Width (cm) 2.4 cm 8/13/2019  1:30 PM   Wound Depth (cm) 0.2 cm 8/13/2019  1:30 PM   Wound Surface Area (cm^2) 16.8 cm^2 8/13/2019  1:30 PM   Post Wound Length (cm) 7 cm 8/13/2019  1:30 PM    Post Wound Width (cm) 2.4 cm 8/13/2019  1:30 PM   Post Wound Depth (cm) 0.2 cm 8/13/2019  1:30 PM   Post Wound Surface Area (cm^2) 16.8 cm^2 8/13/2019  1:30 PM   Tunneling 0 cm 8/13/2019  1:30 PM   Undermining 0 cm 8/13/2019  1:30 PM   Closure Secondary intention 8/13/2019  1:30 PM   Drainage Amount Small 8/13/2019  1:30 PM   Drainage Description Serosanguineous 8/13/2019  1:30 PM   Non-staged Wound Description Partial thickness 8/13/2019  1:30 PM   Treatments Cleansed 8/13/2019  1:30 PM   Cleansing Normal Saline Irrigation 8/13/2019  1:30 PM   Periwound Protectant Barrier Paste;Skin Protectant wipes to Periwound 8/13/2019  1:30 PM   Dressing Options Hydrofiber  Silver;Hydrofiber;Viscopaste;Unna Boot 8/13/2019  1:30 PM   Dressing Cleansing/Solutions Normal Saline 8/13/2019  1:30 PM   Dressing Changed Changed 8/13/2019  1:30 PM   Dressing Status Clean;Dry;Intact 8/13/2019  1:30 PM   Dressing Change Frequency Weekly 8/13/2019  1:30 PM   WOUND NURSE ONLY - Odor None 8/13/2019  1:30 PM   WOUND NURSE ONLY - Pulses Left;2+;DP;PT 8/13/2019  1:30 PM   WOUND NURSE ONLY - Exposed Structures None 8/13/2019  1:30 PM   WOUND NURSE ONLY - Tissue Type and Percentage Pre 50% exudate, 50% pink; post: 100% red viable 8/13/2019  1:30 PM          PROCEDURE:   -2% viscous lidocaine applied topically to wound bed for approximately 5 minutes prior to debridement  - curette used to debride wound bed.  Excisional debridement was performed to remove devitalized tissue until healthy, bleeding tissue was visualized.   Approx 3 cm2, debrided  Tissue debrided into the subcutaneous layer.    -Bleeding controlled with manual pressure.   -Wound care completed by Jennifer Lance RN, CWOCN, CFCN, refer to wound flowsheet            PATIENT EDUCATION  - Etiology of venous stasis ulceration discussed with patient  - Importance of managing edema for healing of ulcer, and for prevention of new ulcer development  -Need for lifelong compression of lower legs   -Elevate legs above the level of the heart periodically throughout the day.  - Importance of adequate nutrition for wound healing  -Increase protein intake (unless contraindicated by renal status)  -Advised to go to ER for any increased redness, swelling, drainage or odor, or if patient develops fever, chills, nausea or vomiting.    ASSESSMENT AND PLAN:   1. Venous stasis ulcer of other part of left lower leg limited to breakdown of skin with varicose veins (HCC)  Comments:  Pt states he has had wounds in this area 3-4 times over the last 7 years. Most recently it opened up mid July when he was working on a motorbike and he scraped it on a kick  stand.    8/13: Wound area has decreased significantly since last assessment.  Measured as a cluster rather than 3 separate wounds, and thus total area is somewhat distorted.  -Arterial and venous studies completed today, results pending.  Vascular referral if indicated by study results.  -Excisional debridement of wound in clinic today, medically necessary to promote wound healing.  -Patient to return to clinic weekly for assessment debridement     Wound care: zinc to help with maceration. Hydrofiber Silver;Hydrofiber;Viscopaste;Unna Boot     2. ESRD on dialysis (HCC)  Comments: Complicating factor.  Solitary kidney. Pt awaiting transplant. Wounds need to be healed prior.         Please note that this dictation was created using voice recognition software. I have worked with technical experts from [employer] to optimize the interface.  I have made every reasonable attempt to correct obvious errors, but there may be errors of grammar and possibly content that I did not discover before finalizing the note.

## 2019-08-20 ENCOUNTER — OFFICE VISIT (OUTPATIENT)
Dept: WOUND CARE | Facility: MEDICAL CENTER | Age: 64
End: 2019-08-20
Attending: PHYSICIAN ASSISTANT
Payer: MEDICARE

## 2019-08-20 VITALS
DIASTOLIC BLOOD PRESSURE: 79 MMHG | SYSTOLIC BLOOD PRESSURE: 122 MMHG | HEART RATE: 100 BPM | OXYGEN SATURATION: 92 % | TEMPERATURE: 98.6 F

## 2019-08-20 DIAGNOSIS — Z99.2 ESRD ON DIALYSIS (HCC): ICD-10-CM

## 2019-08-20 DIAGNOSIS — I83.028 VENOUS STASIS ULCER OF OTHER PART OF LEFT LOWER LEG LIMITED TO BREAKDOWN OF SKIN WITH VARICOSE VEINS (HCC): ICD-10-CM

## 2019-08-20 DIAGNOSIS — L97.821 VENOUS STASIS ULCER OF OTHER PART OF LEFT LOWER LEG LIMITED TO BREAKDOWN OF SKIN WITH VARICOSE VEINS (HCC): ICD-10-CM

## 2019-08-20 DIAGNOSIS — N18.6 ESRD ON DIALYSIS (HCC): ICD-10-CM

## 2019-08-20 DIAGNOSIS — I73.9 PVD (PERIPHERAL VASCULAR DISEASE) (HCC): ICD-10-CM

## 2019-08-20 PROCEDURE — 11042 DBRDMT SUBQ TIS 1ST 20SQCM/<: CPT

## 2019-08-20 PROCEDURE — 11042 DBRDMT SUBQ TIS 1ST 20SQCM/<: CPT | Performed by: NURSE PRACTITIONER

## 2019-08-20 ASSESSMENT — ENCOUNTER SYMPTOMS
NERVOUS/ANXIOUS: 0
NAUSEA: 0
FEVER: 0
COUGH: 0
VOMITING: 0
MYALGIAS: 0
HEADACHES: 0
DIZZINESS: 0
CHILLS: 0
SHORTNESS OF BREATH: 0
DEPRESSION: 0
ABDOMINAL PAIN: 0

## 2019-08-20 ASSESSMENT — PAIN SCALES - GENERAL: PAINLEVEL: NO PAIN

## 2019-08-20 NOTE — PATIENT INSTRUCTIONS
Avoid prolonged standing or sitting without elevating your legs.    - Multilayer compression wrap to left leg. Do not get wet and keep on for the week. Only remove if temperature or sensation changes.   If compression needs to be removed, un-wrap it do not cut it off.     Should you experience any significant changes in your wound(s), such as infection (redness, swelling, localized heat, increased pain, fever > 101 F, chills) or have any questions regarding your home care instructions, please contact the wound center at [phone]. If after hours, contact your primary care physician or go to the hospital emergency room.   Keep dressing clean, dry and covered while bathing. Only change dressing if it becomes over saturated, soiled or falls off.     Your compression stockings will come from [employer].

## 2019-08-20 NOTE — PROGRESS NOTES
" Provider Encounter- Venous Stasis Ulcer    HISTORY OF PRESENT ILLNESS     START OF CARE IN CLINIC: 07/31/2019               REFERRING PROVIDER: Ariane Vieira PA-c                 WOUND- L medial Lower Extremity Ulcer              LOCATION: L medial LE              VARICOSE VEINS: yes              WOUND HISTORY: Pt states he has had wounds in this area 3-4 times over the last 7 years. Most recently it opened up 2 weeks ago when he was working on a motorbike and he scraped it on a kick stand. He has lipodermatosclerosis, hemosiderin staining, mild stasis dermatitis all consistent with CVI. There are no vascular studies on record in Epic. He was given abx by the physician at dialysis and referred to [facility] for management.                 PERTINENT PMH: Acute and chronic renal failure (dialyses 3 x week), CVI                  IMAGING:none pertinent              VASCULAR STUDIES: none. Ordered venous and arterial studies today 07/31/2019.                         LAST  WOUND CULTURE DATE:  Na. Deferred today as there are no clinical ss infection.                       COMPRESSION: yes - pt states he was told to wear compression stockings by his Dr. \"years ago\" and wears 20-30mmhg stockings from [employer]. He says he is inconsistent about wearing them.   He just completed venous and arterial studies earlier today, results still pending.                DIABETES:  no  A1C: na     TOBACCO USE: quit in 1996, smoked for 3 years    8/13/2019 : Clinic visit with LYNNE Lopez.  Wound is improving, area decreasing.  Edema well managed with current compression.  Discussed with him again the need for lifelong compression once his wounds are resolved.  Advised him to purchase compression stockings 15 to 20 mmHg, from online or from local drugstore.    8/20/19: Clinic visit with LYNNE Chan, FNP-BC. Patient reports feeling well. pt is very happy with the wound progress stating the area is significantly decreasing. Denies wound " drainage, odor. Denies erythema, pain. Chronic swelling. Pt reports working long hours on his feet. Wound improving with compression.      RESULTS:      CLINIC PRISCILA: performed in clinic 07/31/2019 -  L dp/pt systolics are both 180mmhg. R brachial (dialysis shunt in L) is 150, for PRISCILA's of 1.2. Doppler waveforms are multiphasic.     Venous US 8/13/19:    LE Vein Reflux   Report     Vascular Laboratory   CONCLUSIONS   Left lower extremity -   Chronic, fully recanalized fibrous thrombus throughout the popliteal vein.    Significant reflux within the femoral, popliteal and greater saphenous    veins.     Right lower extremity -   No DVT.   Significant reflux within the femoral, popliteal and greater saphenous    veins.    PRISCILA 8/13/19:    Vascular Laboratory   Conclusions   There is no evidence of major arterial disease demonstrated.     PAST MEDICAL HISTORY:   Past Medical History:   Diagnosis Date   • Allergy    • Anxiety    • Cataract    • Depression    • Gout due to renal impairment    • Hyperlipidemia    • Hypertension    • Kidney disease     Congenital solitary kidney on dialysis 3 times a week.   • Localized osteoarthritis of right shoulder 6/19/2019     Pt is on dialysis 3x per week.  He states that he hopes to move forward with a kidney transplant (living donor) once this wound is healed.     PAST SURGICAL HISTORY:   Past Surgical History:   Procedure Laterality Date   • AV FISTULA CREATION     • OTHER ORTHOPEDIC SURGERY          MEDICATIONS:   Current Outpatient Medications   Medication   • clotrimazole-betamethasone (LOTRISONE) 1-0.05 % Lotion   • colchicine (COLCRYS) 0.6 MG Tab   • metoprolol SR (TOPROL XL) 100 MG TABLET SR 24 HR   • sertraline (ZOLOFT) 100 MG Tab   • Multiple Vitamins-Minerals (RENAPLEX-D) Tab   • sevelamer carbonate (RENVELA) 800 MG Tab tablet   • terbinafine (LAMISIL) 250 MG Tab   • SENSIPAR 30 MG Tab   • vitamin D (VITAMIND D3) 1000 UNIT Tab     No current facility-administered medications  "for this visit.        ALLERGIES:    Allergies   Allergen Reactions   • Cephalexin Rash   • Pcn [Penicillins] Unspecified     Pt states \"It was a childhood allergie\".         SOCIAL HISTORY:   Social History     Socioeconomic History   • Marital status:      Spouse name: Not on file   • Number of children: Not on file   • Years of education: Not on file   • Highest education level: Not on file   Occupational History   • Not on file   Social Needs   • Financial resource strain: Not on file   • Food insecurity:     Worry: Not on file     Inability: Not on file   • Transportation needs:     Medical: Not on file     Non-medical: Not on file   Tobacco Use   • Smoking status: Former Smoker     Packs/day: 0.50     Years: 25.00     Pack years: 12.50     Types: Cigarettes     Last attempt to quit: 10/21/1996     Years since quittin.8   • Smokeless tobacco: Never Used   Substance and Sexual Activity   • Alcohol use: No     Alcohol/week: 0.0 oz   • Drug use: No   • Sexual activity: Yes     Partners: Female     Comment: Lives with his brother. Work as . Social Security Disability for [place] and OA and Dialysis. No social or domestic concerns.   Lifestyle   • Physical activity:     Days per week: Not on file     Minutes per session: Not on file   • Stress: Not on file   Relationships   • Social connections:     Talks on phone: Not on file     Gets together: Not on file     Attends [employer] service: Not on file     Active member of club or organization: Not on file     Attends meetings of clubs or organizations: Not on file     Relationship status: Not on file   • Intimate partner violence:     Fear of current or ex partner: Not on file     Emotionally abused: Not on file     Physically abused: Not on file     Forced sexual activity: Not on file   Other Topics Concern   • Not on file   Social History Narrative   • Not on file       FAMILY HISTORY:   Family History   Problem Relation Age of Onset   • Cancer " Mother         lymphoma   • Heart Attack Father    • Stroke Father    • Heart Disease Father         REVIEW OF SYSTEMS:   Review of Systems   Constitutional: Negative for chills, fever and malaise/fatigue.   Respiratory: Negative for cough and shortness of breath.    Cardiovascular: Negative for chest pain and leg swelling.   Gastrointestinal: Negative for abdominal pain, nausea and vomiting.   Musculoskeletal: Negative for myalgias.   Skin: Negative for itching and rash.   Neurological: Negative for dizziness and headaches.   Psychiatric/Behavioral: Negative for depression. The patient is not nervous/anxious.        PHYSICAL EXAMINATION:   /79 (BP Location: Left arm, Patient Position: Sitting, BP Cuff Size: Adult)   Pulse 100   Temp 37 °C (98.6 °F) (Temporal)   SpO2 92%   Physical Exam   Constitutional: He is oriented to person, place, and time and well-developed, well-nourished, and in no distress.   HENT:   Head: Normocephalic and atraumatic.   Eyes: Pupils are equal, round, and reactive to light. Conjunctivae and EOM are normal.   Neck: Normal range of motion. Neck supple.   Cardiovascular: Normal rate and intact distal pulses.   Pulmonary/Chest: Effort normal. No respiratory distress.   Musculoskeletal: Normal range of motion. He exhibits no edema or tenderness.   Neurological: He is alert and oriented to person, place, and time.   Skin: Skin is warm and dry.   LUE AV fistula  Healing left medial LE wound. Hemosiderin staining and skin changes consistent with PVD.  See wound care assessment and photos. +maceration to periwound.    Psychiatric: Mood, affect and judgment normal.     WOUND ASSESSMENT       Wound 07/31/19 Venous Ulcer Leg L medial LE CVI ulcer (Active)   Wound Image    8/20/2019  9:15 AM   Site Assessment Red 8/20/2019  9:15 AM   Deann-wound Assessment Yellow-brown;Scar tissue 8/20/2019  9:15 AM   Margins Attached edges 8/20/2019  9:15 AM   Wound Length (cm) 1.5 cm 8/20/2019  9:15 AM    Wound Width (cm) 1.2 cm 8/20/2019  9:15 AM   Wound Depth (cm) 0.1 cm 8/20/2019  9:15 AM   Wound Surface Area (cm^2) 1.8 cm^2 8/20/2019  9:15 AM   Post Wound Length (cm) 1 cm 8/20/2019  9:15 AM    Post Wound Width (cm) 1 cm 8/20/2019  9:15 AM   Post Wound Depth (cm) 0.1 cm 8/20/2019  9:15 AM   Post Wound Surface Area (cm^2) 1 cm^2 8/20/2019  9:15 AM   Tunneling 0 cm 8/20/2019  9:15 AM   Undermining 0 cm 8/20/2019  9:15 AM   Closure Secondary intention 8/20/2019  9:15 AM   Drainage Amount Small 8/20/2019  9:15 AM   Drainage Description Serosanguineous 8/20/2019  9:15 AM   Non-staged Wound Description Partial thickness 8/20/2019  9:15 AM   Treatments Cleansed;Site care 8/20/2019  9:15 AM   Cleansing Normal Saline Irrigation 8/20/2019  9:15 AM   Periwound Protectant Skin Protectant wipes to Periwound;Skin Moisturizer 8/20/2019  9:15 AM   Dressing Options Hydrofiber Silver;Viscopaste;Unna Boot 8/20/2019  9:15 AM   Dressing Cleansing/Solutions Normal Saline 8/20/2019  9:15 AM   Dressing Changed Changed 8/20/2019  9:15 AM   Dressing Status Clean;Dry;Intact 8/20/2019  9:15 AM   Dressing Change Frequency Weekly 8/20/2019  9:15 AM   WOUND NURSE ONLY - Odor None 8/20/2019  9:15 AM   WOUND NURSE ONLY - Pulses 2+;Left;DP 8/20/2019  9:15 AM   WOUND NURSE ONLY - Exposed Structures None 8/20/2019  9:15 AM   WOUND NURSE ONLY - Tissue Type and Percentage 100% red with non-viable flaky tissue intermixed pre debridement 8/20/2019  9:15 AM       PROCEDURE:   -2% viscous lidocaine applied topically to wound bed for approximately 5 minutes prior to debridement  - curette used to debride wound bed.  Excisional debridement was performed to remove devitalized tissue until healthy, bleeding tissue was visualized.   Approx 1 cm2, debrided  Tissue debrided into the subcutaneous layer.    -Bleeding controlled with manual pressure.   -Wound care completed by Gail Galindo RN, CWOCN, CFCN, refer to wound flowsheet        PATIENT EDUCATION  -  Etiology of venous stasis ulceration discussed with patient  - Importance of managing edema for healing of ulcer, and for prevention of new ulcer development  -Need for lifelong compression of lower legs   -Elevate legs above the level of the heart periodically throughout the day.  - Importance of adequate nutrition for wound healing  -Increase protein intake (unless contraindicated by renal status)  -Advised to go to ER for any increased redness, swelling, drainage or odor, or if patient develops fever, chills, nausea or vomiting.    ASSESSMENT AND PLAN:   1. Venous stasis ulcer of other part of left lower leg limited to breakdown of skin with varicose veins (HCC), PVD  Comments:  Pt states he has had wounds in this area 3-4 times over the last 7 years. Most recently it opened up mid July when he was working on a motorbike and he scraped it on a kick stand.    8/20/19: Wound area has decreased significantly since last assessment.  Measured as a cluster rather than separate wounds, and thus total area is somewhat distorted.  -Vascular referral  -Excisional debridement of wound in clinic today, medically necessary to promote wound healing.  -Patient to return to clinic weekly for assessment debridement     Wound care: Hydrofiber Silver;Viscopaste;Unna Boot    2. ESRD on dialysis ([facility])  Comments: Complicating factor.  Solitary kidney. Pt awaiting transplant. Wounds need to be healed prior.         Please note that this dictation was created using voice recognition software. I have worked with technical experts from [employer] to optimize the interface.  I have made every reasonable attempt to correct obvious errors, but there may be errors of grammar and possibly content that I did not discover before finalizing the note.

## 2019-08-20 NOTE — PROCEDURES
Non-selective debridement with ajay board to superior aspect of wound area to reveal 100% epithelialized intact skin beneath of flaky non-viable area removed.

## 2019-08-22 ENCOUNTER — OFFICE VISIT (OUTPATIENT)
Dept: MEDICAL GROUP | Facility: PHYSICIAN GROUP | Age: 64
End: 2019-08-22
Payer: MEDICARE

## 2019-08-22 VITALS
HEIGHT: 70 IN | SYSTOLIC BLOOD PRESSURE: 138 MMHG | OXYGEN SATURATION: 94 % | RESPIRATION RATE: 14 BRPM | WEIGHT: 207.4 LBS | DIASTOLIC BLOOD PRESSURE: 92 MMHG | HEART RATE: 79 BPM | TEMPERATURE: 97.9 F | BODY MASS INDEX: 29.69 KG/M2

## 2019-08-22 DIAGNOSIS — M10.30 GOUT DUE TO RENAL IMPAIRMENT, UNSPECIFIED CHRONICITY, UNSPECIFIED SITE: ICD-10-CM

## 2019-08-22 DIAGNOSIS — F32.5 DEPRESSION, MAJOR, IN REMISSION (HCC): ICD-10-CM

## 2019-08-22 DIAGNOSIS — M19.012 LOCALIZED OSTEOARTHRITIS OF LEFT SHOULDER: ICD-10-CM

## 2019-08-22 DIAGNOSIS — L03.116 CELLULITIS OF LEFT LOWER EXTREMITY: ICD-10-CM

## 2019-08-22 DIAGNOSIS — I10 ESSENTIAL HYPERTENSION: ICD-10-CM

## 2019-08-22 PROBLEM — B35.1 ONYCHOMYCOSIS: Status: RESOLVED | Noted: 2019-05-08 | Resolved: 2019-08-22

## 2019-08-22 PROCEDURE — 99214 OFFICE O/P EST MOD 30 MIN: CPT | Performed by: FAMILY MEDICINE

## 2019-08-22 RX ORDER — COLCHICINE 0.6 MG/1
TABLET ORAL
Qty: 90 TAB | Refills: 0 | Status: SHIPPED | OUTPATIENT
Start: 2019-08-22 | End: 2020-07-23 | Stop reason: SDUPTHER

## 2019-08-22 NOTE — PROGRESS NOTES
cc: Hypertension stable, cellulitis of left lower leg improving, depression in remission, osteoarthritis of left shoulder    Subjective:     Garry Benites is a 63 y.o. male presenting June 19 we did his right shoulder injection which he tolerated well and which did help his right shoulder pain and he still has some tenderness but it still doing better.  He would like to get his left shoulder injection done in the future.  He is still doing dialysis.  He is still waiting on the kidney transplant.  He is taking his blood pressure medications.  Sertraline is working okay for his depression.  He Ramesh saw the foot doctor.  He saw vascular surgery and he might be getting surgery for the veins in his legs.  He was in the urgent care for urinary tract infection for which they gave antibiotics and he is urinating okay now.  He also had cellulitis and was given antibiotics for this and is getting wound care on his left lower leg.  Reports this is healing well.  It is currently dressed.  He was given doxycycline for his cellulitis July 18.  He has wound care appointment again next Tuesday.  He is going to try to get compression socks as this will definitely help.  For dialysis his blood pressure is 130s over 90s but after dialysis is 120s over 80s and he is taking his medications.  Today blood pressure is little elevated but it could be as he often does in the doctor's office.  He was given Cipro for his UTI and doxycycline for the cellulitis and he has 1 more pill of Cipro left.  He would like to get his left shoulder injected in the left shoulder showing moderate osteoarthritis most prominent at the AC joint.    Review of systems:     Constitutional: Negative for fever, chills and negative fatigue.   HENT: Negative for sinus pressure  Eyes: Negative for blurriness, negative for double vision  Respiratory: Negative for cough and shortness of breath, negative for exertional shortness of breath  Cardiovascular: Negative for  "leg swelling, negative for palpitations, negative for chest pain  Gastrointestinal: Negative for nausea, vomiting, abdominal pain, constipation and diarrhea.  Genitourinary: Negative for dysuria and hematuria.   Skin: Positive rash of left lower leg improving  Neurological: Negative for dizziness, focal weakness and headaches.   Endo/Heme/Allergies: Denies bleeding, bruising, and recurrent allergies.  Psychiatric/Behavioral: Negative for depression and anxiety.  Denies HI SI or panic attacks        Current Outpatient Medications:   •  sertraline (ZOLOFT) 50 MG Tab, Take 50 mg by mouth every day., Disp: , Rfl:   •  colchicine (COLCRYS) 0.6 MG Tab, Take 1 tab p.o. every other day., Disp: 90 Tab, Rfl: 0  •  metoprolol SR (TOPROL XL) 100 MG TABLET SR 24 HR, Take 1 Tab by mouth every day., Disp: 90 Tab, Rfl: 1  •  Multiple Vitamins-Minerals (RENAPLEX-D) Tab, TAKE 1 TABLET BY MOUTH FOUR DAYS (TIMES) A WEEK, Disp: , Rfl: 3  •  sevelamer carbonate (RENVELA) 800 MG Tab tablet, TAKE 2 TABLETS BY MOUTH THREE TIMES DAILY WITH MEALS AND 1 TABLET TWICE DAILY WITH SNACKS, Disp: , Rfl: 5  •  SENSIPAR 30 MG Tab, Take 1 Tab by mouth every day., Disp: , Rfl: 11  •  vitamin D (VITAMIND D3) 1000 UNIT Tab, Take 1 Tab by mouth every day., Disp: 60 Tab, Rfl: 0    Allergies, past medical history, past surgical history, family history, social history reviewed and updated    Objective:     Vitals: /92 (BP Location: Right arm, Patient Position: Sitting, BP Cuff Size: Adult)   Pulse 79   Temp 36.6 °C (97.9 °F) (Temporal)   Resp 14   Ht 1.778 m (5' 10\")   Wt 94.1 kg (207 lb 6.4 oz)   SpO2 94%   BMI 29.76 kg/m²   General: Alert, pleasant, NAD  HEENT: Normocephalic.  Nontraumatic. EOMI, no icterus or pallor.  Conjunctivae and lids normal. External ears normal.   Heart: Regular rate and rhythm.  S1 and S2 normal.  No murmurs appreciated.  Respiratory: Normal respiratory effort.  Clear to auscultation bilaterally.  Skin: Warm, dry, no " rashes.  Musculoskeletal: Gait is normal.  Moves all extremities well.  Extremities: Mild lower left leg edema with no pitting edema and good pulses and erythema and cellulitis improving well of left lower leg  Psych:  Affect/mood is normal, judgement is good, memory is intact, grooming is appropriate.    Assessment/Plan:     Diagnoses and all orders for this visit:    Essential hypertension    Cellulitis of left lower extremity    Depression with anxiety    Localized osteoarthritis of left shoulder    Gout due to renal impairment, unspecified chronicity, unspecified site  -     colchicine (COLCRYS) 0.6 MG Tab; Take 1 tab p.o. every other day.    -Depression with anxiety is doing better and he would like to get off the sertraline and he is currently doing 100 mg so I would suggest cutting them in half and doing 50 mg of sertraline which she will start decreasing until I see him next and then we could decide at that visit what to do after 4 weeks to may be stop altogether.  Blood pressure stable as he checks this and is normally 120s over 70s after dialysis and continue metoprolol 100 mg daily extended release.  For his gout he is taking colchicine every other day and does not need a refill yet.  He cut we will continue to see wound care and see them for the cellulitis of his left leg for which she was given doxycycline which she is done and this is improving and also he will finish his last dose of Cipro for his urinary tract infection and he is urinating well now.  He will follow-up for his kidney transplant and continue dialysis 3 times a week.  He is done seeing the podiatrist and his fungal infection is improved.  We will schedule him for his left shoulder injection as we Ramesh finished his right shoulder.  But please wait until her cellulitis is healed and so scheduled for about 3 weeks and then we could your left shoulder injection as we do give localized steroid and we do not want to delay any wound healing  with the steroid.  ER precautions given if any chest pain, shortness of breath, increased swelling or oozing or any signs of worsening infection and please go to the ER call 911 otherwise we will see him back for his left shoulder injection    Return in about 3 weeks (around 9/12/2019), or left shoulder injection, for Procedure appnt, Long, 40 min appnt.

## 2019-08-22 NOTE — PATIENT INSTRUCTIONS
Diagnoses and all orders for this visit:    Essential hypertension    Cellulitis of left lower extremity    Depression with anxiety    Localized osteoarthritis of left shoulder    Gout due to renal impairment, unspecified chronicity, unspecified site  -     colchicine (COLCRYS) 0.6 MG Tab; Take 1 tab p.o. every other day.    -Depression with anxiety is doing better and he would like to get off the sertraline and he is currently doing 100 mg so I would suggest cutting them in half and doing 50 mg of sertraline which she will start decreasing until I see him next and then we could decide at that visit what to do after 4 weeks to may be stop altogether.  Blood pressure stable as he checks this and is normally 120s over 70s after dialysis and continue metoprolol 100 mg daily extended release.  For his gout he is taking colchicine every other day and does not need a refill yet.  He cut we will continue to see wound care and see them for the cellulitis of his left leg for which she was given doxycycline which she is done and this is improving and also he will finish his last dose of Cipro for his urinary tract infection and he is urinating well now.  He will follow-up for his kidney transplant and continue dialysis 3 times a week.  He is done seeing the podiatrist and his fungal infection is improved.  We will schedule him for his left shoulder injection as we Ramesh finished his right shoulder.  But please wait until her cellulitis is healed and so scheduled for about 3 weeks and then we could your left shoulder injection as we do give localized steroid and we do not want to delay any wound healing with the steroid.  ER precautions given if any chest pain, shortness of breath, increased swelling or oozing or any signs of worsening infection and please go to the ER call 911 otherwise we will see him back for his left shoulder injection    Return in about 3 weeks (around 9/12/2019), or left shoulder injection, for  Procedure Yobany noriega, 40 min leann.

## 2019-08-27 ENCOUNTER — OFFICE VISIT (OUTPATIENT)
Dept: WOUND CARE | Facility: MEDICAL CENTER | Age: 64
End: 2019-08-27
Attending: PHYSICIAN ASSISTANT
Payer: MEDICARE

## 2019-08-27 VITALS
OXYGEN SATURATION: 96 % | DIASTOLIC BLOOD PRESSURE: 82 MMHG | RESPIRATION RATE: 16 BRPM | SYSTOLIC BLOOD PRESSURE: 141 MMHG | TEMPERATURE: 97.8 F | HEART RATE: 96 BPM

## 2019-08-27 DIAGNOSIS — Z99.2 ESRD ON DIALYSIS (HCC): ICD-10-CM

## 2019-08-27 DIAGNOSIS — N18.6 ESRD ON DIALYSIS (HCC): ICD-10-CM

## 2019-08-27 DIAGNOSIS — R52 PAIN ASSOCIATED WITH WOUND: ICD-10-CM

## 2019-08-27 DIAGNOSIS — T14.8XXA PAIN ASSOCIATED WITH WOUND: ICD-10-CM

## 2019-08-27 DIAGNOSIS — L97.821 VENOUS STASIS ULCER OF OTHER PART OF LEFT LOWER LEG LIMITED TO BREAKDOWN OF SKIN WITH VARICOSE VEINS (HCC): ICD-10-CM

## 2019-08-27 DIAGNOSIS — I83.028 VENOUS STASIS ULCER OF OTHER PART OF LEFT LOWER LEG LIMITED TO BREAKDOWN OF SKIN WITH VARICOSE VEINS (HCC): ICD-10-CM

## 2019-08-27 PROCEDURE — 11042 DBRDMT SUBQ TIS 1ST 20SQCM/<: CPT

## 2019-08-27 PROCEDURE — 11042 DBRDMT SUBQ TIS 1ST 20SQCM/<: CPT | Performed by: NURSE PRACTITIONER

## 2019-08-27 ASSESSMENT — ENCOUNTER SYMPTOMS
NERVOUS/ANXIOUS: 0
FEVER: 0
DIZZINESS: 0
COUGH: 0
CHILLS: 0
SHORTNESS OF BREATH: 0
VOMITING: 0
ABDOMINAL PAIN: 0
DEPRESSION: 0
NAUSEA: 0
HEADACHES: 0
MYALGIAS: 0

## 2019-08-27 ASSESSMENT — PAIN SCALES - GENERAL: PAINLEVEL: 2=MINIMAL-SLIGHT

## 2019-08-27 NOTE — PROGRESS NOTES
" Provider Encounter- Venous Stasis Ulcer    HISTORY OF PRESENT ILLNESS     START OF CARE IN CLINIC: 07/31/2019               REFERRING PROVIDER: Ariane Vieira PA-c                 WOUND- L medial Lower Extremity Ulcer              LOCATION: L medial LE              VARICOSE VEINS: yes              WOUND HISTORY: Pt states he has had wounds in this area 3-4 times over the last 7 years. Most recently it opened up 2 weeks ago when he was working on a motorbike and he scraped it on a kick stand. He has lipodermatosclerosis, hemosiderin staining, mild stasis dermatitis all consistent with CVI. There are no vascular studies on record in Epic. He was given abx by the physician at dialysis and referred to [facility] for management.                 PERTINENT PMH: Acute and chronic renal failure (dialyses 3 x week), CVI                  IMAGING:none pertinent              VASCULAR STUDIES: none. Ordered venous and arterial studies today 07/31/2019.                         LAST  WOUND CULTURE DATE:  Na. Deferred today as there are no clinical ss infection.                       COMPRESSION: yes - pt states he was told to wear compression stockings by his Dr. \"years ago\" and wears 20-30mmhg stockings from [employer]. He says he is inconsistent about wearing them.   He just completed venous and arterial studies earlier today, results still pending.                DIABETES:  no  A1C: na     TOBACCO USE: quit in 1996, smoked for 3 years    8/13/2019 : Clinic visit with LYNNE Lopez.  Wound is improving, area decreasing.  Edema well managed with current compression.  Discussed with him again the need for lifelong compression once his wounds are resolved.  Advised him to purchase compression stockings 15 to 20 mmHg, from online or from local drugstore.    8/20/19: Clinic visit with LYNNE Chan, FNP-BC. Patient reports feeling well. pt is very happy with the wound progress stating the area is significantly decreasing. Denies wound " drainage, odor. Denies erythema, pain. Chronic swelling. Pt reports working long hours on his feet. Wound improving with compression.     8/27/2019 : Clinic visit with LYNNE Lopez.  Wound very continues to decrease.  He states that [employer] called him last week and informed him that his insurance would not cover anything but 30 to 40 mm compression stockings.  Reviewed order, we had ordered 30 to 40 mmHg.  Will clarify.  His wound should be resolved in the next 7 to 14 days.  He understands that he will need lifelong compression.     RESULTS:      CLINIC PRISCILA: performed in clinic 07/31/2019 -  L dp/pt systolics are both 180mmhg. R brachial (dialysis shunt in L) is 150, for PRISCILA's of 1.2. Doppler waveforms are multiphasic.     Venous US 8/13/19:    LE Vein Reflux   Report     Vascular Laboratory   CONCLUSIONS   Left lower extremity -   Chronic, fully recanalized fibrous thrombus throughout the popliteal vein.    Significant reflux within the femoral, popliteal and greater saphenous    veins.     Right lower extremity -   No DVT.   Significant reflux within the femoral, popliteal and greater saphenous    veins.    PRISCILA 8/13/19:    Vascular Laboratory   Conclusions   There is no evidence of major arterial disease demonstrated.     PAST MEDICAL HISTORY:   Past Medical History:   Diagnosis Date   • Allergy    • Anxiety    • Cataract    • Depression    • Gout due to renal impairment    • Hyperlipidemia    • Hypertension    • Kidney disease     Congenital solitary kidney on dialysis 3 times a week.   • Localized osteoarthritis of right shoulder 6/19/2019     Pt is on dialysis 3x per week.  He states that he hopes to move forward with a kidney transplant (living donor) once this wound is healed.     PAST SURGICAL HISTORY:   Past Surgical History:   Procedure Laterality Date   • AV FISTULA CREATION     • OTHER ORTHOPEDIC SURGERY          MEDICATIONS:   Current Outpatient Medications   Medication   • sertraline (ZOLOFT) 50  "MG Tab   • colchicine (COLCRYS) 0.6 MG Tab   • metoprolol SR (TOPROL XL) 100 MG TABLET SR 24 HR   • Multiple Vitamins-Minerals (RENAPLEX-D) Tab   • sevelamer carbonate (RENVELA) 800 MG Tab tablet   • SENSIPAR 30 MG Tab   • vitamin D (VITAMIND D3) 1000 UNIT Tab     No current facility-administered medications for this visit.        ALLERGIES:    Allergies   Allergen Reactions   • Cephalexin Rash   • Pcn [Penicillins] Unspecified     Pt states \"It was a childhood allergie\".         SOCIAL HISTORY:   Social History     Socioeconomic History   • Marital status:      Spouse name: Not on file   • Number of children: Not on file   • Years of education: Not on file   • Highest education level: Not on file   Occupational History   • Not on file   Social Needs   • Financial resource strain: Not on file   • Food insecurity:     Worry: Not on file     Inability: Not on file   • Transportation needs:     Medical: Not on file     Non-medical: Not on file   Tobacco Use   • Smoking status: Former Smoker     Packs/day: 0.50     Years: 25.00     Pack years: 12.50     Types: Cigarettes     Last attempt to quit: 10/21/1996     Years since quittin.8   • Smokeless tobacco: Never Used   Substance and Sexual Activity   • Alcohol use: No     Alcohol/week: 0.0 oz   • Drug use: No   • Sexual activity: Yes     Partners: Female     Comment: Lives with his brother. Work as . Social Security Disability for [place] and OA and Dialysis. No social or domestic concerns.   Lifestyle   • Physical activity:     Days per week: Not on file     Minutes per session: Not on file   • Stress: Not on file   Relationships   • Social connections:     Talks on phone: Not on file     Gets together: Not on file     Attends Taoism service: Not on file     Active member of club or organization: Not on file     Attends meetings of clubs or organizations: Not on file     Relationship status: Not on file   • Intimate partner violence:     Fear of " current or ex partner: Not on file     Emotionally abused: Not on file     Physically abused: Not on file     Forced sexual activity: Not on file   Other Topics Concern   • Not on file   Social History Narrative   • Not on file       FAMILY HISTORY:   Family History   Problem Relation Age of Onset   • Cancer Mother         lymphoma   • Heart Attack Father    • Stroke Father    • Heart Disease Father         REVIEW OF SYSTEMS:   Review of Systems   Constitutional: Negative for chills, fever and malaise/fatigue.   Respiratory: Negative for cough and shortness of breath.    Cardiovascular: Negative for chest pain and leg swelling.   Gastrointestinal: Negative for abdominal pain, nausea and vomiting.   Musculoskeletal: Negative for myalgias.   Skin: Negative for itching and rash.   Neurological: Negative for dizziness and headaches.   Psychiatric/Behavioral: Negative for depression. The patient is not nervous/anxious.        PHYSICAL EXAMINATION:   /82   Pulse 96   Temp 36.6 °C (97.8 °F)   Resp 16   SpO2 96%   Physical Exam   Constitutional: He is oriented to person, place, and time and well-developed, well-nourished, and in no distress.   HENT:   Head: Normocephalic and atraumatic.   Eyes: Pupils are equal, round, and reactive to light. Conjunctivae and EOM are normal.   Neck: Normal range of motion. Neck supple.   Cardiovascular: Normal rate and intact distal pulses.   Pulmonary/Chest: Effort normal. No respiratory distress.   Musculoskeletal: Normal range of motion. He exhibits no edema or tenderness.   Neurological: He is alert and oriented to person, place, and time.   Skin: Skin is warm and dry.   LUE AV fistula  Healing left medial LE wound. Hemosiderin staining and skin changes consistent with PVD.  See wound care assessment and photos. +maceration to periwound.    Psychiatric: Mood, affect and judgment normal.     WOUND ASSESSMENT     Wound 07/31/19 Venous Ulcer Leg L medial LE CVI ulcer (Active)    Wound Image    8/27/2019  9:15 AM   Site Assessment Red 8/27/2019  9:15 AM   Deann-wound Assessment Yellow-brown;Scar tissue 8/27/2019  9:15 AM   Margins Attached edges 8/27/2019  9:15 AM   Wound Length (cm) 0.5 cm 8/27/2019  9:15 AM   Wound Width (cm) 0.5 cm 8/27/2019  9:15 AM   Wound Depth (cm) 0.1 cm 8/27/2019  9:15 AM   Wound Surface Area (cm^2) 0.25 cm^2 8/27/2019  9:15 AM   Post Wound Length (cm) 0.6 cm 8/27/2019  9:15 AM    Post Wound Width (cm) 0.6 cm 8/27/2019  9:15 AM   Post Wound Depth (cm) 0.2 cm 8/27/2019  9:15 AM   Post Wound Surface Area (cm^2) 0.36 cm^2 8/27/2019  9:15 AM   Tunneling 0 cm 8/27/2019  9:15 AM   Undermining 0 cm 8/27/2019  9:15 AM   Closure Secondary intention 8/27/2019  9:15 AM   Drainage Amount Small 8/27/2019  9:15 AM   Drainage Description Serosanguineous 8/27/2019  9:15 AM   Non-staged Wound Description Partial thickness 8/27/2019  9:15 AM   Treatments Cleansed;Pharmaceutical agent 8/27/2019  9:15 AM   Cleansing Normal Saline Irrigation 8/27/2019  9:15 AM   Periwound Protectant Skin Protectant wipes to Periwound;Skin Moisturizer 8/27/2019  9:15 AM   Dressing Options Hydrofiber Silver;Viscopaste;Unna Boot 8/27/2019  9:15 AM   Dressing Cleansing/Solutions Normal Saline 8/20/2019  9:15 AM   Dressing Changed Changed 8/27/2019  9:15 AM   Dressing Status Clean;Dry;Intact 8/27/2019  9:15 AM   Dressing Change Frequency Weekly 8/27/2019  9:15 AM   NEXT Dressing Change  09/03/19 8/27/2019  9:15 AM   WOUND NURSE ONLY - Odor None 8/27/2019  9:15 AM   WOUND NURSE ONLY - Pulses 2+ 8/27/2019  9:15 AM   WOUND NURSE ONLY - Exposed Structures None 8/27/2019  9:15 AM   WOUND NURSE ONLY - Tissue Type and Percentage 100% red post CSWD 8/27/2019  9:15 AM       PROCEDURE:   -2% viscous lidocaine applied topically to wound bed for approximately 5 minutes prior to debridement  - curette used to debride wound bed.  Excisional debridement was performed to remove devitalized tissue until healthy, bleeding  tissue was visualized.  Total area debrided 0.36 cm².  Tissue debrided into the subcutaneous layer.    -Bleeding controlled with manual pressure.   -Wound care completed by  Samara Kaur RN- refer to wound flowsheet        PATIENT EDUCATION  - Etiology of venous stasis ulceration discussed with patient  - Importance of managing edema for healing of ulcer, and for prevention of new ulcer development  -Need for lifelong compression of lower legs   -Elevate legs above the level of the heart periodically throughout the day.  - Importance of adequate nutrition for wound healing  -Increase protein intake (unless contraindicated by renal status)  -Advised to go to ER for any increased redness, swelling, drainage or odor, or if patient develops fever, chills, nausea or vomiting.    ASSESSMENT AND PLAN:   1. Venous stasis ulcer of other part of left lower leg limited to breakdown of skin with varicose veins (HCC), PVD  Comments:  Pt states he has had wounds in this area 3-4 times over the last 7 years. Most recently it opened up mid July when he was working on a motorbike and he scraped it on a kick stand.    8/27: Wound area has decreased by 0.64 cm² since last assessment. .  -Excisional debridement of wound in clinic today, medically necessary to promote wound healing.  -Patient to return to clinic weekly for assessment debridement  -Patient to change dressing 1-2 times per week in between clinic visits  -Order for compression stockings resent to [facility], will clarify     Wound care: Hydrofiber Silver;Viscopaste;Unna Boot    2. ESRD on dialysis (HCC)  Comments: Complicating factor.  Solitary kidney. Pt awaiting transplant. Wounds need to be healed prior.     3.  Pain associated with wound    8/27:  -2% viscous lidocaine applied topically to wound bed for approximately 5 minutes prior to debridement  -Patient tolerated procedure today with no complaints of discomfort.      Please note that this dictation was created using  voice recognition software. I have worked with technical experts from [facility] to optimize the interface.  I have made every reasonable attempt to correct obvious errors, but there may be errors of grammar and possibly content that I did not discover before finalizing the note.

## 2019-08-27 NOTE — PROGRESS NOTES
"Spoke to [facility] about the order for compression stockings. Aurelia, the rep I spoke with states that the order was for 20-30 mmHg compression stockings. Am not sure what occurred as I had sitting in front of me the order for 30-40 mmHg compression stocking order that had been faxed to [facility]. I resent the correct order to [facility]. Called and left a message for patient that he should be receiving the compression stocking soon. Also, patient had inquired about the referral to the \"vein doctor\" for a follow up appointment following his US. Gave him the phone number to [facility]  ([phone]) and asked him to call.    "

## 2019-08-27 NOTE — PATIENT INSTRUCTIONS
Should you experience any significant changes in your wound(s) such as infection (redness, swelling, localized heat, increased pain, fever >101 F, chills) or have any questions regarding your home care instructions, please contact the wound center [phone]. If after hours, contact your primary care physician or go the hospital emergency room.  Keep dressing clean and dry and cover while bathing. Only change dressing if over saturated, soiled or its falling off.

## 2019-09-03 ENCOUNTER — OFFICE VISIT (OUTPATIENT)
Dept: WOUND CARE | Facility: MEDICAL CENTER | Age: 64
End: 2019-09-03
Attending: PHYSICIAN ASSISTANT
Payer: MEDICARE

## 2019-09-03 VITALS
DIASTOLIC BLOOD PRESSURE: 96 MMHG | SYSTOLIC BLOOD PRESSURE: 156 MMHG | TEMPERATURE: 98.4 F | HEART RATE: 89 BPM | RESPIRATION RATE: 18 BRPM | OXYGEN SATURATION: 95 %

## 2019-09-03 DIAGNOSIS — T14.8XXA PAIN ASSOCIATED WITH WOUND: ICD-10-CM

## 2019-09-03 DIAGNOSIS — Z99.2 ESRD ON DIALYSIS (HCC): ICD-10-CM

## 2019-09-03 DIAGNOSIS — N18.6 ESRD ON DIALYSIS (HCC): ICD-10-CM

## 2019-09-03 DIAGNOSIS — R52 PAIN ASSOCIATED WITH WOUND: ICD-10-CM

## 2019-09-03 DIAGNOSIS — I83.028 VENOUS STASIS ULCER OF OTHER PART OF LEFT LOWER LEG LIMITED TO BREAKDOWN OF SKIN WITH VARICOSE VEINS (HCC): ICD-10-CM

## 2019-09-03 DIAGNOSIS — L97.821 VENOUS STASIS ULCER OF OTHER PART OF LEFT LOWER LEG LIMITED TO BREAKDOWN OF SKIN WITH VARICOSE VEINS (HCC): ICD-10-CM

## 2019-09-03 PROCEDURE — 11042 DBRDMT SUBQ TIS 1ST 20SQCM/<: CPT | Performed by: NURSE PRACTITIONER

## 2019-09-03 PROCEDURE — 11042 DBRDMT SUBQ TIS 1ST 20SQCM/<: CPT

## 2019-09-03 ASSESSMENT — ENCOUNTER SYMPTOMS
COUGH: 0
NERVOUS/ANXIOUS: 0
CHILLS: 0
DEPRESSION: 0
NAUSEA: 0
MYALGIAS: 0
DIZZINESS: 0
FEVER: 0
ABDOMINAL PAIN: 0
SHORTNESS OF BREATH: 0
HEADACHES: 0
VOMITING: 0

## 2019-09-03 NOTE — PROGRESS NOTES
" Provider Encounter- Venous Stasis Ulcer    HISTORY OF PRESENT ILLNESS     START OF CARE IN CLINIC: 07/31/2019               REFERRING PROVIDER: Ariane Vieira PA-c                 WOUND- L medial Lower Extremity Ulcer              LOCATION: L medial LE              VARICOSE VEINS: yes              WOUND HISTORY: Pt states he has had wounds in this area 3-4 times over the last 7 years. Most recently it opened up 2 weeks ago when he was working on a motorbike and he scraped it on a kick stand. He has lipodermatosclerosis, hemosiderin staining, mild stasis dermatitis all consistent with CVI. There are no vascular studies on record in Epic. He was given abx by the physician at dialysis and referred to [facility] for management.                 PERTINENT PMH: Acute and chronic renal failure (dialyses 3 x week), CVI                  IMAGING:none pertinent              VASCULAR STUDIES: none. Ordered venous and arterial studies today 07/31/2019.                         LAST  WOUND CULTURE DATE:  Na. Deferred today as there are no clinical ss infection.                       COMPRESSION: yes - pt states he was told to wear compression stockings by his Dr. \"years ago\" and wears 20-30mmhg stockings from [employer]. He says he is inconsistent about wearing them.   He just completed venous and arterial studies earlier today, results still pending.                DIABETES:  no  A1C: na     TOBACCO USE: quit in 1996, smoked for 3 years    8/13/2019 : Clinic visit with LYNNE Lopez.  Wound is improving, area decreasing.  Edema well managed with current compression.  Discussed with him again the need for lifelong compression once his wounds are resolved.  Advised him to purchase compression stockings 15 to 20 mmHg, from online or from local drugstore.    8/20/19: Clinic visit with LYNNE Chan, FNP-BC. Patient reports feeling well. pt is very happy with the wound progress stating the area is significantly decreasing. Denies wound " drainage, odor. Denies erythema, pain. Chronic swelling. Pt reports working long hours on his feet. Wound improving with compression.     8/27/2019 : Clinic visit with LYNNE Lopez.  Wound very continues to decrease.  He states that [employer] called him last week and informed him that his insurance would not cover anything but 30 to 40 mm compression stockings.  Reviewed order, we had ordered 30 to 40 mmHg.  Will clarify.  His wound should be resolved in the next 7 to 14 days.  He understands that he will need lifelong compression.    9/3/19: Clinic visit with LYNNE Chan, [facility]. Patient reports feeling well. He is happy with the wound progress, stating that it is slowly improving. He is requesting to review vascular study results today. Denies wound drainage, odor. Denies erythema, swelling, pain. Pt on dialysis 3 days per week.      RESULTS: below results reviewed with patient in clinic 9/3/19     CLINIC PRISCILA: performed in clinic 07/31/2019 -  L dp/pt systolics are both 180mmhg. R brachial (dialysis shunt in L) is 150, for PRISCILA's of 1.2. Doppler waveforms are multiphasic.     Venous US 8/13/19:    LE Vein Reflux   Report     Vascular Laboratory   CONCLUSIONS   Left lower extremity -   Chronic, fully recanalized fibrous thrombus throughout the popliteal vein.    Significant reflux within the femoral, popliteal and greater saphenous    veins.     Right lower extremity -   No DVT.   Significant reflux within the femoral, popliteal and greater saphenous    veins.    PRISCILA 8/13/19:    Vascular Laboratory   Conclusions   There is no evidence of major arterial disease demonstrated.     PAST MEDICAL HISTORY:   Past Medical History:   Diagnosis Date   • Allergy    • Anxiety    • Cataract    • Depression    • Gout due to renal impairment    • Hyperlipidemia    • Hypertension    • Kidney disease     Congenital solitary kidney on dialysis 3 times a week.   • Localized osteoarthritis of right shoulder 6/19/2019     Pt is  "on dialysis 3x per week.  He states that he hopes to move forward with a kidney transplant (living donor) once this wound is healed.     PAST SURGICAL HISTORY:   Past Surgical History:   Procedure Laterality Date   • AV FISTULA CREATION     • OTHER ORTHOPEDIC SURGERY          MEDICATIONS:   Current Outpatient Medications   Medication   • sertraline (ZOLOFT) 50 MG Tab   • colchicine (COLCRYS) 0.6 MG Tab   • metoprolol SR (TOPROL XL) 100 MG TABLET SR 24 HR   • Multiple Vitamins-Minerals (RENAPLEX-D) Tab   • sevelamer carbonate (RENVELA) 800 MG Tab tablet   • SENSIPAR 30 MG Tab   • vitamin D (VITAMIND D3) 1000 UNIT Tab     No current facility-administered medications for this visit.        ALLERGIES:    Allergies   Allergen Reactions   • Cephalexin Rash   • Pcn [Penicillins] Unspecified     Pt states \"It was a childhood allergie\".         SOCIAL HISTORY:   Social History     Socioeconomic History   • Marital status:      Spouse name: Not on file   • Number of children: Not on file   • Years of education: Not on file   • Highest education level: Not on file   Occupational History   • Not on file   Social Needs   • Financial resource strain: Not on file   • Food insecurity:     Worry: Not on file     Inability: Not on file   • Transportation needs:     Medical: Not on file     Non-medical: Not on file   Tobacco Use   • Smoking status: Former Smoker     Packs/day: 0.50     Years: 25.00     Pack years: 12.50     Types: Cigarettes     Last attempt to quit: 10/21/1996     Years since quittin.8   • Smokeless tobacco: Never Used   Substance and Sexual Activity   • Alcohol use: No     Alcohol/week: 0.0 oz   • Drug use: No   • Sexual activity: Yes     Partners: Female     Comment: Lives with his brother. Work as . Social Security Disability for Gouth and OA and Dialysis. No social or domestic concerns.   Lifestyle   • Physical activity:     Days per week: Not on file     Minutes per session: Not on file   • " Stress: Not on file   Relationships   • Social connections:     Talks on phone: Not on file     Gets together: Not on file     Attends Baptism service: Not on file     Active member of club or organization: Not on file     Attends meetings of clubs or organizations: Not on file     Relationship status: Not on file   • Intimate partner violence:     Fear of current or ex partner: Not on file     Emotionally abused: Not on file     Physically abused: Not on file     Forced sexual activity: Not on file   Other Topics Concern   • Not on file   Social History Narrative   • Not on file       FAMILY HISTORY:   Family History   Problem Relation Age of Onset   • Cancer Mother         lymphoma   • Heart Attack Father    • Stroke Father    • Heart Disease Father         REVIEW OF SYSTEMS:   Review of Systems   Constitutional: Negative for chills, fever and malaise/fatigue.   Respiratory: Negative for cough and shortness of breath.    Cardiovascular: Negative for chest pain and leg swelling.   Gastrointestinal: Negative for abdominal pain, nausea and vomiting.   Musculoskeletal: Negative for myalgias.   Skin: Negative for itching and rash.   Neurological: Negative for dizziness and headaches.   Psychiatric/Behavioral: Negative for depression. The patient is not nervous/anxious.        PHYSICAL EXAMINATION:   /96   Pulse 89   Temp 36.9 °C (98.4 °F)   Resp 18   SpO2 95%   Physical Exam   Constitutional: He is oriented to person, place, and time and well-developed, well-nourished, and in no distress.   HENT:   Head: Normocephalic and atraumatic.   Eyes: Pupils are equal, round, and reactive to light. Conjunctivae and EOM are normal.   Neck: Normal range of motion. Neck supple.   Cardiovascular: Normal rate and intact distal pulses.   Pulmonary/Chest: Effort normal. No respiratory distress.   Musculoskeletal: Normal range of motion. He exhibits no edema or tenderness.   Neurological: He is alert and oriented to person,  place, and time.   Skin: Skin is warm and dry.   LUE AV fistula  Healing left medial LE wound. Hemosiderin staining and skin changes consistent with PVD.  See wound care assessment and photos. +maceration to periwound.    Psychiatric: Mood, affect and judgment normal.     WOUND ASSESSMENT       Wound 07/31/19 Venous Ulcer Leg L medial LE CVI ulcer (Active)   Wound Image    9/3/2019 10:30 AM   Site Assessment Other (Comment) 9/3/2019 10:30 AM   Deann-wound Assessment Yellow-brown;Scar tissue 9/3/2019 10:30 AM   Margins Attached edges 9/3/2019 10:30 AM   Wound Length (cm) 0.5 cm 8/27/2019  9:15 AM   Wound Width (cm) 0.5 cm 8/27/2019  9:15 AM   Wound Depth (cm) 0.1 cm 8/27/2019  9:15 AM   Wound Surface Area (cm^2) 0.25 cm^2 8/27/2019  9:15 AM   Post Wound Length (cm) 0.5 cm 9/3/2019 10:30 AM    Post Wound Width (cm) 0.4 cm 9/3/2019 10:30 AM   Post Wound Depth (cm) 0.1 cm 9/3/2019 10:30 AM   Post Wound Surface Area (cm^2) 0.2 cm^2 9/3/2019 10:30 AM   Tunneling 0 cm 8/27/2019  9:15 AM   Undermining 0 cm 8/27/2019  9:15 AM   Closure Secondary intention 8/27/2019  9:15 AM   Drainage Amount Small 9/3/2019 10:30 AM   Drainage Description Serosanguineous 9/3/2019 10:30 AM   Non-staged Wound Description Partial thickness 9/3/2019 10:30 AM   Treatments Cleansed;Pharmaceutical agent;Other (Comment) 9/3/2019 10:30 AM   Cleansing Normal Saline Irrigation 9/3/2019 10:30 AM   Periwound Protectant Barrier Paste 9/3/2019 10:30 AM   Dressing Options Hydrofiber Silver;Unna Boot 9/3/2019 10:30 AM   Dressing Cleansing/Solutions Normal Saline 8/20/2019  9:15 AM   Dressing Changed Changed 8/27/2019  9:15 AM   Dressing Status Clean;Dry;Intact 8/27/2019  9:15 AM   Dressing Change Frequency Weekly 8/27/2019  9:15 AM   NEXT Dressing Change  09/03/19 8/27/2019  9:15 AM   WOUND NURSE ONLY - Odor None 9/3/2019 10:30 AM   WOUND NURSE ONLY - Pulses 2+ 8/27/2019  9:15 AM   WOUND NURSE ONLY - Exposed Structures None 9/3/2019 10:30 AM   WOUND NURSE ONLY  - Tissue Type and Percentage Pre: 100% scab crust 9/3/2019 10:30 AM        PROCEDURE:   -2% viscous lidocaine applied topically to wound bed for approximately 5 minutes prior to debridement  - curette used to debride wound bed.  Excisional debridement was performed to remove devitalized tissue until healthy, bleeding tissue was visualized.  Total area debrided 0.36 cm².  Tissue debrided into the subcutaneous layer.    -Bleeding controlled with manual pressure.   -Wound care completed by  Samara Kaur RN- refer to wound flowsheet        PATIENT EDUCATION  - Etiology of venous stasis ulceration discussed with patient  - Importance of managing edema for healing of ulcer, and for prevention of new ulcer development  -Need for lifelong compression of lower legs   -Elevate legs above the level of the heart periodically throughout the day.  - Importance of adequate nutrition for wound healing  -Increase protein intake (unless contraindicated by renal status)  -Advised to go to ER for any increased redness, swelling, drainage or odor, or if patient develops fever, chills, nausea or vomiting.    ASSESSMENT AND PLAN:   1. Venous stasis ulcer of other part of left lower leg limited to breakdown of skin with varicose veins (HCC), PVD  Comments:  Pt states he has had wounds in this area 3-4 times over the last 7 years. Most recently it opened up mid July when he was working on a motorbike and he scraped it on a kick stand.    9/3/19: Wound area has decreased since last assessment. .  -Excisional debridement of wound in clinic today, medically necessary to promote wound healing.  -Patient to return to clinic weekly for assessment debridement  -Patient to change dressing 1-2 times per week in between clinic visits    Significant reflux within the femoral, popliteal and greater saphenous veins.  Will follow up with Dr. Wynn.      Wound care: Hydrofiber Silver;Viscopaste;Unna Boot    2. ESRD on dialysis (HCC)  Comments:  Complicating factor.  Solitary kidney. Pt awaiting transplant. Wounds need to be healed prior.     3.  Pain associated with wound    9/3/19:  -2% viscous lidocaine applied topically to wound bed for approximately 5 minutes prior to debridement  -Patient tolerated procedure today with no complaints of discomfort.

## 2019-09-03 NOTE — PATIENT INSTRUCTIONS
Keep dressing clean and dry and cover while bathing. Only change dressing if over saturated, soiled or its falling off.     Should you experience any significant changes in your wound(s) such as infection (redness, swelling, localized heat, increased pain, fever >101 F, chills) or have any questions regarding your home care instructions, please contact the wound center [phone]. If after hours, contact your primary care physician or go the hospital emergency room.

## 2019-09-10 ENCOUNTER — DOCUMENTATION (OUTPATIENT)
Dept: WOUND CARE | Facility: MEDICAL CENTER | Age: 64
End: 2019-09-10

## 2019-09-16 ENCOUNTER — OFFICE VISIT (OUTPATIENT)
Dept: WOUND CARE | Facility: MEDICAL CENTER | Age: 64
End: 2019-09-16
Attending: PHYSICIAN ASSISTANT
Payer: MEDICARE

## 2019-09-16 VITALS
OXYGEN SATURATION: 97 % | DIASTOLIC BLOOD PRESSURE: 87 MMHG | SYSTOLIC BLOOD PRESSURE: 140 MMHG | RESPIRATION RATE: 16 BRPM | HEART RATE: 79 BPM | TEMPERATURE: 97.3 F

## 2019-09-16 DIAGNOSIS — N18.6 ESRD ON DIALYSIS (HCC): ICD-10-CM

## 2019-09-16 DIAGNOSIS — I83.028 VENOUS STASIS ULCER OF OTHER PART OF LEFT LOWER LEG LIMITED TO BREAKDOWN OF SKIN WITH VARICOSE VEINS (HCC): ICD-10-CM

## 2019-09-16 DIAGNOSIS — Z99.2 ESRD ON DIALYSIS (HCC): ICD-10-CM

## 2019-09-16 DIAGNOSIS — L97.821 VENOUS STASIS ULCER OF OTHER PART OF LEFT LOWER LEG LIMITED TO BREAKDOWN OF SKIN WITH VARICOSE VEINS (HCC): ICD-10-CM

## 2019-09-16 PROCEDURE — 99213 OFFICE O/P EST LOW 20 MIN: CPT

## 2019-09-16 ASSESSMENT — PAIN SCALES - GENERAL: PAINLEVEL: NO PAIN

## 2019-09-17 ENCOUNTER — APPOINTMENT (OUTPATIENT)
Dept: WOUND CARE | Facility: MEDICAL CENTER | Age: 64
End: 2019-09-17
Attending: PHYSICIAN ASSISTANT
Payer: MEDICARE

## 2019-09-20 ASSESSMENT — ENCOUNTER SYMPTOMS
ABDOMINAL PAIN: 0
COUGH: 0
NERVOUS/ANXIOUS: 0
SHORTNESS OF BREATH: 0
CHILLS: 0
HEADACHES: 0
DEPRESSION: 0
FEVER: 0
NAUSEA: 0
DIZZINESS: 0
MYALGIAS: 0
VOMITING: 0

## 2019-09-20 NOTE — PROGRESS NOTES
" Provider Encounter- Venous Stasis Ulcer    HISTORY OF PRESENT ILLNESS     START OF CARE IN CLINIC: 07/31/2019               REFERRING PROVIDER: Ariane Vieira PA-c                 WOUND- L medial Lower Extremity Ulcer              LOCATION: L medial LE              VARICOSE VEINS: yes              WOUND HISTORY: Pt states he has had wounds in this area 3-4 times over the last 7 years. Most recently it opened up 2 weeks ago when he was working on a motorbike and he scraped it on a kick stand. He has lipodermatosclerosis, hemosiderin staining, mild stasis dermatitis all consistent with CVI. There are no vascular studies on record in Epic. He was given abx by the physician at dialysis and referred to [facility] for management.              PERTINENT PMH: chronic renal failure (dialyses 3 x week), CVI               IMAGING:none pertinent              VASCULAR STUDIES: venous and arterial - 8/13                        LAST  WOUND CULTURE DATE:  None               COMPRESSION: yes - pt states he was told to wear compression stockings by his Dr. \"years ago\" and wears 20-30mmhg stockings from [employer]. He says he is inconsistent about wearing them.          DIABETES:  no  A1C: na  TOBACCO USE: quit in 1996, smoked for 3 years    8/13/2019 : Clinic visit with LYNNE Lopez.  Wound is improving, area decreasing.  Edema well managed with current compression.  Discussed with him again the need for lifelong compression once his wounds are resolved.  Advised him to purchase compression stockings 15 to 20 mmHg, from online or from local drugstore.    8/20/19: Clinic visit with LYNNE Chan, [facility] [place]. Patient reports feeling well. pt is very happy with the wound progress stating the area is significantly decreasing. Denies wound drainage, odor. Denies erythema, pain. Chronic swelling. Pt reports working long hours on his feet. Wound improving with compression.     8/27/2019 : Clinic visit with LYNNE Lopez.  Wound very " continues to decrease.  He states that [facility] called him last week and informed him that his insurance would not cover anything but 30 to 40 mm compression stockings.  Reviewed order, we had ordered 30 to 40 mmHg.  Will clarify.  His wound should be resolved in the next 7 to 14 days.  He understands that he will need lifelong compression.    9/3/19: Clinic visit with LYNNE Chan, [facility]. Patient reports feeling well. He is happy with the wound progress, stating that it is slowly improving. He is requesting to review vascular study results today. Denies wound drainage, odor. Denies erythema, swelling, pain. Pt on dialysis 3 days per week.     9/16/29:  Clinic visit with Dr. Wynn.  Patient's wound is healed.  I was asked to see him with regard to the wound and reflux, treatment to prevent further recurrence.      RESULTS: below results reviewed with patient in clinic 9/3/19     CLINIC PRISCILA: performed in clinic 07/31/2019 -  L dp/pt systolic are both 180mmhg. R brachial (dialysis shunt in L) is 150, for PRISCILA's of 1.2. Doppler waveforms are multiphasic.     Venous US 8/13/19:    LE Vein Reflux   Report     Vascular Laboratory   CONCLUSIONS   Left lower extremity -   Chronic, fully recanalized fibrous thrombus throughout the popliteal vein.    Significant reflux within the femoral, popliteal and greater saphenous    veins.     Right lower extremity -   No DVT.   Significant reflux within the femoral, popliteal and greater saphenous    veins.    PRISCILA 8/13/19:    Vascular Laboratory   Conclusions   There is no evidence of major arterial disease demonstrated.     PAST MEDICAL HISTORY:   Past Medical History:   Diagnosis Date   • Allergy    • Anxiety    • Cataract    • Depression    • Gout due to renal impairment    • Hyperlipidemia    • Hypertension    • Kidney disease     Congenital solitary kidney on dialysis 3 times a week.   • Localized osteoarthritis of right shoulder 6/19/2019     Pt is on dialysis 3x per week.   "He states that he hopes to move forward with a kidney transplant (living donor) once this wound is healed.     PAST SURGICAL HISTORY:   Past Surgical History:   Procedure Laterality Date   • AV FISTULA CREATION     • OTHER ORTHOPEDIC SURGERY          MEDICATIONS:   Current Outpatient Medications   Medication   • sertraline (ZOLOFT) 50 MG Tab   • colchicine (COLCRYS) 0.6 MG Tab   • metoprolol SR (TOPROL XL) 100 MG TABLET SR 24 HR   • Multiple Vitamins-Minerals (RENAPLEX-D) Tab   • sevelamer carbonate (RENVELA) 800 MG Tab tablet   • SENSIPAR 30 MG Tab   • vitamin D (VITAMIND D3) 1000 UNIT Tab     No current facility-administered medications for this visit.        ALLERGIES:    Allergies   Allergen Reactions   • Cephalexin Rash   • Pcn [Penicillins] Unspecified     Pt states \"It was a childhood allergie\".         SOCIAL HISTORY:   Social History     Socioeconomic History   • Marital status:      Spouse name: Not on file   • Number of children: Not on file   • Years of education: Not on file   • Highest education level: Not on file   Occupational History   • Not on file   Social Needs   • Financial resource strain: Not on file   • Food insecurity:     Worry: Not on file     Inability: Not on file   • Transportation needs:     Medical: Not on file     Non-medical: Not on file   Tobacco Use   • Smoking status: Former Smoker     Packs/day: 0.50     Years: 25.00     Pack years: 12.50     Types: Cigarettes     Last attempt to quit: 10/21/1996     Years since quittin.9   • Smokeless tobacco: Never Used   Substance and Sexual Activity   • Alcohol use: No     Alcohol/week: 0.0 oz   • Drug use: No   • Sexual activity: Yes     Partners: Female     Comment: Lives with his brother. Work as . Social Security Disability for Gouth and OA and Dialysis. No social or domestic concerns.   Lifestyle   • Physical activity:     Days per week: Not on file     Minutes per session: Not on file   • Stress: Not on file "   Relationships   • Social connections:     Talks on phone: Not on file     Gets together: Not on file     Attends [employer] service: Not on file     Active member of club or organization: Not on file     Attends meetings of clubs or organizations: Not on file     Relationship status: Not on file   • Intimate partner violence:     Fear of current or ex partner: Not on file     Emotionally abused: Not on file     Physically abused: Not on file     Forced sexual activity: Not on file   Other Topics Concern   • Not on file   Social History Narrative   • Not on file       FAMILY HISTORY:   Family History   Problem Relation Age of Onset   • Cancer Mother         lymphoma   • Heart Attack Father    • Stroke Father    • Heart Disease Father         REVIEW OF SYSTEMS:   Review of Systems   Constitutional: Negative for chills, fever and malaise/fatigue.   Respiratory: Negative for cough and shortness of breath.    Cardiovascular: Negative for chest pain and leg swelling.   Gastrointestinal: Negative for abdominal pain, nausea and vomiting.   Musculoskeletal: Negative for myalgias.   Skin: Negative for itching and rash.   Neurological: Negative for dizziness and headaches.   Psychiatric/Behavioral: Negative for depression. The patient is not nervous/anxious.        PHYSICAL EXAMINATION:   /87   Pulse 79   Temp 36.3 °C (97.3 °F)   Resp 16   SpO2 97%   Physical Exam   Constitutional: He is oriented to person, place, and time and well-developed, well-nourished, and in no distress.   HENT:   Head: Normocephalic and atraumatic.   Eyes: Pupils are equal, round, and reactive to light. Conjunctivae and EOM are normal.   Neck: Normal range of motion. Neck supple.   Cardiovascular: Normal rate and intact distal pulses.   Pulmonary/Chest: Effort normal. No respiratory distress.   Musculoskeletal: Normal range of motion. He exhibits no edema or tenderness.   Neurological: He is alert and oriented to person, place, and time.    Skin: Skin is warm and dry.   LUE AV fistula  Healed left medial LE wound. Hemosiderin staining and skin changes consistent with venous stasis.  See wound care assessment and photos.    Psychiatric: Mood, affect and judgment normal.     WOUND ASSESSMENT       Wound 07/31/19 Venous Ulcer Leg L medial LE CVI ulcer (Active)   Wound Image   9/16/2019  4:00 PM   Site Assessment Other (Comment) 9/16/2019  4:00 PM   Deann-wound Assessment Yellow-brown;Scar tissue 9/16/2019  4:00 PM   Margins Attached edges 9/16/2019  4:00 PM   Tunneling 0 cm 9/16/2019  4:00 PM   Undermining 0 cm 9/16/2019  4:00 PM   Drainage Amount Scant 9/16/2019  4:00 PM   Non-staged Wound Description Partial thickness 9/16/2019  4:00 PM   Treatments Cleansed 9/16/2019  4:00 PM   Cleansing Normal Saline Irrigation 9/16/2019  4:00 PM   Periwound Protectant Barrier Paste 9/16/2019  4:00 PM   Dressing Options Open to Air;Tubigrip 9/16/2019  4:00 PM   WOUND NURSE ONLY - Tissue Type and Percentage 100% resolved 9/16/2019  4:00 PM        PATIENT EDUCATION  - Etiology of venous stasis ulceration discussed with patient  - Importance of managing edema for healing of ulcer, and for prevention of new ulcer development  -Need for lifelong compression of lower legs   -Elevate legs above the level of the heart periodically throughout the day.  - Importance of adequate nutrition for wound healing  -Increase protein intake (unless contraindicated by renal status)  -Advised to go to ER for any increased redness, swelling, drainage or odor, or if patient develops fever, chills, nausea or vomiting.    ASSESSMENT AND PLAN:   1. Venous stasis ulcer of other part of left lower leg limited to breakdown of skin with varicose veins (HCC), PVD  Comments:  Pt states he has had wounds in this area 3-4 times over the last 7 years. Most recently it opened up mid July when he was working on a motorbike and he scraped it on a kick stand.    9/16/19: Wound has completely healed. Long  discussion with patient about venous stasis, venous insufficiency and development of wounds.  I recommend the use of VenaSeal to close the saphenous vein and help prevent development of venous ulcers in the future    2. ESRD on dialysis (HCC)  Comments: Complicating factor.Pt awaiting transplant. Wounds need to be healed prior.     3.  Pain associated with wound    9/16/19 - no pain today  -Patient tolerated procedure today with no complaints of discomfort.

## 2019-09-24 ENCOUNTER — APPOINTMENT (OUTPATIENT)
Dept: WOUND CARE | Facility: MEDICAL CENTER | Age: 64
End: 2019-09-24
Attending: PHYSICIAN ASSISTANT
Payer: MEDICARE

## 2020-05-16 DIAGNOSIS — I10 ESSENTIAL HYPERTENSION: ICD-10-CM

## 2020-05-18 RX ORDER — METOPROLOL SUCCINATE 100 MG/1
TABLET, EXTENDED RELEASE ORAL
Qty: 90 TAB | Refills: 0 | Status: SHIPPED | OUTPATIENT
Start: 2020-05-18 | End: 2020-07-23 | Stop reason: SDUPTHER

## 2020-07-23 ENCOUNTER — OFFICE VISIT (OUTPATIENT)
Dept: MEDICAL GROUP | Facility: PHYSICIAN GROUP | Age: 65
End: 2020-07-23
Payer: MEDICARE

## 2020-07-23 VITALS
BODY MASS INDEX: 29.2 KG/M2 | OXYGEN SATURATION: 97 % | SYSTOLIC BLOOD PRESSURE: 138 MMHG | HEIGHT: 70 IN | TEMPERATURE: 98.3 F | RESPIRATION RATE: 18 BRPM | DIASTOLIC BLOOD PRESSURE: 76 MMHG | WEIGHT: 204 LBS | HEART RATE: 78 BPM

## 2020-07-23 DIAGNOSIS — I10 ESSENTIAL HYPERTENSION: ICD-10-CM

## 2020-07-23 DIAGNOSIS — Z12.5 SCREENING FOR MALIGNANT NEOPLASM OF PROSTATE: ICD-10-CM

## 2020-07-23 DIAGNOSIS — F41.9 ANXIETY: ICD-10-CM

## 2020-07-23 DIAGNOSIS — M10.30 GOUT DUE TO RENAL IMPAIRMENT, UNSPECIFIED CHRONICITY, UNSPECIFIED SITE: ICD-10-CM

## 2020-07-23 DIAGNOSIS — N18.6 ESRD (END STAGE RENAL DISEASE) (HCC): ICD-10-CM

## 2020-07-23 PROBLEM — N17.9 ACUTE ON CHRONIC RENAL FAILURE (HCC): Status: RESOLVED | Noted: 2017-01-25 | Resolved: 2020-07-23

## 2020-07-23 PROBLEM — N18.9 ACUTE ON CHRONIC RENAL FAILURE (HCC): Status: RESOLVED | Noted: 2017-01-25 | Resolved: 2020-07-23

## 2020-07-23 PROBLEM — F32.5 DEPRESSION, MAJOR, IN REMISSION (HCC): Status: RESOLVED | Noted: 2017-01-25 | Resolved: 2020-07-23

## 2020-07-23 PROBLEM — M77.8 RIGHT SHOULDER TENDINITIS: Status: RESOLVED | Noted: 2019-06-19 | Resolved: 2020-07-23

## 2020-07-23 PROBLEM — B35.3 TINEA PEDIS OF RIGHT FOOT: Status: RESOLVED | Noted: 2019-06-19 | Resolved: 2020-07-23

## 2020-07-23 PROBLEM — M19.012 LOCALIZED OSTEOARTHRITIS OF LEFT SHOULDER: Status: RESOLVED | Noted: 2019-08-22 | Resolved: 2020-07-23

## 2020-07-23 PROBLEM — L03.116 CELLULITIS OF LEFT LOWER EXTREMITY: Status: RESOLVED | Noted: 2019-08-22 | Resolved: 2020-07-23

## 2020-07-23 PROCEDURE — 99204 OFFICE O/P NEW MOD 45 MIN: CPT | Performed by: INTERNAL MEDICINE

## 2020-07-23 RX ORDER — LORAZEPAM 0.5 MG/1
0.5 TABLET ORAL
Qty: 30 TAB | Refills: 2 | Status: SHIPPED | OUTPATIENT
Start: 2020-07-23 | End: 2020-08-22

## 2020-07-23 RX ORDER — METOPROLOL SUCCINATE 100 MG/1
TABLET, EXTENDED RELEASE ORAL
Qty: 90 TAB | Refills: 0 | Status: SHIPPED | OUTPATIENT
Start: 2020-07-23 | End: 2020-11-09 | Stop reason: SDUPTHER

## 2020-07-23 RX ORDER — COLCHICINE 0.6 MG/1
TABLET ORAL
Qty: 90 TAB | Refills: 0 | Status: ON HOLD | OUTPATIENT
Start: 2020-07-23 | End: 2020-09-04

## 2020-07-23 ASSESSMENT — PATIENT HEALTH QUESTIONNAIRE - PHQ9
SUM OF ALL RESPONSES TO PHQ9 QUESTIONS 1 AND 2: 0
2. FEELING DOWN, DEPRESSED, IRRITABLE, OR HOPELESS: NOT AT ALL
1. LITTLE INTEREST OR PLEASURE IN DOING THINGS: NOT AT ALL

## 2020-07-23 ASSESSMENT — FIBROSIS 4 INDEX: FIB4 SCORE: 1.25

## 2020-07-23 NOTE — PROGRESS NOTES
"CC: Meet new provider  Current anxiety    HPI: 64 y.o. Patient presents to discuss the following:     Essential hypertension  This is a chronic condition patient is currently taking metoprolol per his requesting refill.    Gout due to renal impairment  Chronic stable condition patient is currently on colchicine.  He is requesting refill    ESRD (end stage renal disease) (HCC)  This is a chronic condition the patient currently on dialysis 3 times a week Monday Wednesday and Friday.  He is now followed by a nephrologist on a regular basis.  The patient is awaiting for kidney transplant.    Anxiety  This has been a chronic condition noted since last several months.  The patient stated that he has a hard time sitting in the dialysis chair with the facemask on.  Patient felt claustrophobic.  Patient is requesting something to \"calm him down\".  Patient has no prior history of drug-seeking behavior.          REVIEW OF SYSTEMS:     Constitutional:  no fever / chills   Neurologic: no headaches, no numbness/tingling  Eyes: no changes in vision  ENT: no sore throat, no hearing loss  CV:  no chest pain, no palpitations  Pulmonary: no SOB, no cough    GI: no nausea / vomiting, no diarrhea, no constipation  :  no dysuria, no hematuria   Skin: no rash  Hematologic: no bleeding      Allergies: Cephalexin and Penicillins    Current Outpatient Medications Ordered in Epic   Medication Sig Dispense Refill   • colchicine (COLCRYS) 0.6 MG Tab Take 1 tab p.o. every other day. 90 Tab 0   • metoprolol SR (TOPROL XL) 100 MG TABLET SR 24 HR Take 1 tablet by mouth once daily 90 Tab 0   • LORazepam (ATIVAN) 0.5 MG Tab Take 1 Tab by mouth 1 time daily as needed for Anxiety for up to 30 days. 30 Tab 2   • sevelamer carbonate (RENVELA) 800 MG Tab tablet TAKE 2 TABLETS BY MOUTH THREE TIMES DAILY WITH MEALS AND 1 TABLET TWICE DAILY WITH SNACKS  5   • Multiple Vitamins-Minerals (RENAPLEX-D) Tab TAKE 1 TABLET BY MOUTH FOUR DAYS (TIMES) A WEEK  3   • " SENSIPAR 30 MG Tab Take 1 Tab by mouth every day.  11   • vitamin D (VITAMIND D3) 1000 UNIT Tab Take 1 Tab by mouth every day. 60 Tab 0     No current Epic-ordered facility-administered medications on file.        Past Medical History:   Diagnosis Date   • Allergy    • Anxiety    • Cataract    • Depression    • Gout due to renal impairment    • Hyperlipidemia    • Hypertension    • Kidney disease     Congenital solitary kidney on dialysis 3 times a week.   • Localized osteoarthritis of right shoulder 2019        Past Surgical History:   Procedure Laterality Date   • AV FISTULA CREATION     • OTHER ORTHOPEDIC SURGERY          Family History   Problem Relation Age of Onset   • Cancer Mother         lymphoma   • Heart Attack Father    • Stroke Father    • Heart Disease Father         Social History     Tobacco Use   Smoking Status Former Smoker   • Packs/day: 0.50   • Years: 25.00   • Pack years: 12.50   • Types: Cigarettes   • Last attempt to quit: 10/21/1996   • Years since quittin.7   Smokeless Tobacco Never Used          Social History     Substance and Sexual Activity   Alcohol Use No   • Alcohol/week: 0.0 oz        ---------------------------------------------------------------------     PHYSICAL EXAM:   Vitals:    20 1502   BP: 138/76   Pulse: 78   Resp: 18   Temp: 36.8 °C (98.3 °F)   SpO2: 97%      Body mass index is 29.27 kg/m².        Constitutional: no acute distress  Eyes: PERRL, EOMI  Ears/nose/mouth: OP no exudates  Neck: supple, no JVD  CV: heart RRR  Resp: normal effort, no wheezing or rales.  GI: abdomen soft, no obvious mass, no tenderness  Neuro: CN 2-12 grossly intact  Skin: no obvious rash noted        ---------------------------------------------------------------------     ASSESSMENT and PLAN:  1. Solitary kidney  2. ESRD (end stage renal disease) ([facility])  Advised the patient continue with dialysis 3 times a week.  Patient will continue to follow-up with kidney specialist as  directed.    3. Gout due to renal impairment, unspecified chronicity, unspecified site  Chronic stable condition refill for colchicine given.  - colchicine (COLCRYS) 0.6 MG Tab; Take 1 tab p.o. every other day.  Dispense: 90 Tab; Refill: 0    4. Essential hypertension  Chronic stable condition.  Continue with metoprolol.  Refill given today.  - metoprolol SR (TOPROL XL) 100 MG TABLET SR 24 HR; Take 1 tablet by mouth once daily  Dispense: 90 Tab; Refill: 0  - ALANINE AMINO-TRANS; Future  - Basic Metabolic Panel; Future  - CBC WITH DIFFERENTIAL; Future  - Lipid Profile; Future    5. Anxiety  This is a chronic condition, uncontrolled.  Recommend the patient to take Lorazepam 0.5 mg 1 tablet 1 hour prior to dialysis.  I have reviewed the medical records and have determined that a controlled substance treatment is medically indicated:    I have conducted a physical exam and documented it. I have reviewed pt's prescription history as maintained by the [place] Prescription Monitoring Program.     I have assessed the patient’s risk for abuse, dependency, and addiction using the ORT available at https://www.mdcalc.com/moixbd-eyxh-pgmh-ort-narcotic-abuse    Given the above, I believe the benefits of controlled substance therapy outweigh the risks.   Accordingly, I have discussed the risk and benefits, treatment plan, and alternative therapies with the patient.   - LORazepam (ATIVAN) 0.5 MG Tab; Take 1 Tab by mouth 1 time daily as needed for Anxiety for up to 30 days.  Dispense: 30 Tab; Refill: 2  UDS ordered today            Return in about 4 months (around 11/23/2020).       PATIENT EDUCATION:  -If any problems should arise, patient was advised to contact our office or go to ER to be evaluated.  -Advised pt to follow a healthy diet and regular aerobic exercise regimen. Advised pt to avoid alcohol and tobacco use.    Please note that this dictation was created using voice recognition software. I have made every reasonable  attempt to correct obvious errors, but it is possible there are errors of grammar and possibly content that I did not discover before finalizing the note.

## 2020-07-23 NOTE — ASSESSMENT & PLAN NOTE
This is a chronic condition the patient currently on dialysis 3 times a week Monday Wednesday and Friday.  He is now followed by a nephrologist on a regular basis.  The patient is awaiting for kidney transplant.

## 2020-07-23 NOTE — ASSESSMENT & PLAN NOTE
"This has been a chronic condition noted since last several months.  The patient stated that he has a hard time sitting in the dialysis chair with the facemask on.  Patient felt claustrophobic.  Patient is requesting something to \"calm him down\".  Patient has no prior history of drug-seeking behavior.  "

## 2020-09-02 ENCOUNTER — APPOINTMENT (OUTPATIENT)
Dept: CARDIOLOGY | Facility: MEDICAL CENTER | Age: 65
End: 2020-09-02
Attending: EMERGENCY MEDICINE
Payer: MEDICARE

## 2020-09-02 ENCOUNTER — APPOINTMENT (OUTPATIENT)
Dept: RADIOLOGY | Facility: MEDICAL CENTER | Age: 65
End: 2020-09-02
Attending: EMERGENCY MEDICINE
Payer: MEDICARE

## 2020-09-02 ENCOUNTER — HOSPITAL ENCOUNTER (OUTPATIENT)
Facility: MEDICAL CENTER | Age: 65
End: 2020-09-04
Attending: EMERGENCY MEDICINE | Admitting: INTERNAL MEDICINE
Payer: MEDICARE

## 2020-09-02 DIAGNOSIS — I31.9 PERICARDITIS, UNSPECIFIED CHRONICITY, UNSPECIFIED TYPE: ICD-10-CM

## 2020-09-02 DIAGNOSIS — R06.00 DYSPNEA, UNSPECIFIED TYPE: ICD-10-CM

## 2020-09-02 DIAGNOSIS — R07.9 CHEST PAIN, UNSPECIFIED TYPE: ICD-10-CM

## 2020-09-02 PROBLEM — R79.89 ELEVATED BRAIN NATRIURETIC PEPTIDE (BNP) LEVEL: Status: ACTIVE | Noted: 2020-09-02

## 2020-09-02 LAB
ALBUMIN SERPL BCP-MCNC: 4.1 G/DL (ref 3.2–4.9)
ALBUMIN/GLOB SERPL: 1.3 G/DL
ALP SERPL-CCNC: 84 U/L (ref 30–99)
ALT SERPL-CCNC: 14 U/L (ref 2–50)
ANION GAP SERPL CALC-SCNC: 10 MMOL/L (ref 7–16)
AST SERPL-CCNC: 10 U/L (ref 12–45)
BASOPHILS # BLD AUTO: 0.3 % (ref 0–1.8)
BASOPHILS # BLD: 0.02 K/UL (ref 0–0.12)
BILIRUB SERPL-MCNC: 0.4 MG/DL (ref 0.1–1.5)
BLOOD CULTURE HOLD CXBCH: NORMAL
BLOOD CULTURE HOLD CXBCH: NORMAL
BUN SERPL-MCNC: 30 MG/DL (ref 8–22)
CALCIUM SERPL-MCNC: 9.2 MG/DL (ref 8.5–10.5)
CHLORIDE SERPL-SCNC: 95 MMOL/L (ref 96–112)
CO2 SERPL-SCNC: 32 MMOL/L (ref 20–33)
COVID ORDER STATUS COVID19: NORMAL
CREAT SERPL-MCNC: 4.24 MG/DL (ref 0.5–1.4)
CRP SERPL HS-MCNC: 18.07 MG/DL (ref 0–0.75)
CRP SERPL HS-MCNC: 7.04 MG/DL (ref 0–0.75)
EKG IMPRESSION: NORMAL
EOSINOPHIL # BLD AUTO: 0.12 K/UL (ref 0–0.51)
EOSINOPHIL NFR BLD: 1.9 % (ref 0–6.9)
ERYTHROCYTE [DISTWIDTH] IN BLOOD BY AUTOMATED COUNT: 51.7 FL (ref 35.9–50)
ERYTHROCYTE [SEDIMENTATION RATE] IN BLOOD BY WESTERGREN METHOD: 46 MM/HOUR (ref 0–20)
ERYTHROCYTE [SEDIMENTATION RATE] IN BLOOD BY WESTERGREN METHOD: 55 MM/HOUR (ref 0–20)
GLOBULIN SER CALC-MCNC: 3.1 G/DL (ref 1.9–3.5)
GLUCOSE SERPL-MCNC: 136 MG/DL (ref 65–99)
HCT VFR BLD AUTO: 35.8 % (ref 42–52)
HGB BLD-MCNC: 11.3 G/DL (ref 14–18)
IMM GRANULOCYTES # BLD AUTO: 0.03 K/UL (ref 0–0.11)
IMM GRANULOCYTES NFR BLD AUTO: 0.5 % (ref 0–0.9)
LIPASE SERPL-CCNC: 54 U/L (ref 11–82)
LYMPHOCYTES # BLD AUTO: 0.63 K/UL (ref 1–4.8)
LYMPHOCYTES NFR BLD: 10.1 % (ref 22–41)
MCH RBC QN AUTO: 29.7 PG (ref 27–33)
MCHC RBC AUTO-ENTMCNC: 31.6 G/DL (ref 33.7–35.3)
MCV RBC AUTO: 94 FL (ref 81.4–97.8)
MONOCYTES # BLD AUTO: 0.49 K/UL (ref 0–0.85)
MONOCYTES NFR BLD AUTO: 7.9 % (ref 0–13.4)
NEUTROPHILS # BLD AUTO: 4.93 K/UL (ref 1.82–7.42)
NEUTROPHILS NFR BLD: 79.3 % (ref 44–72)
NRBC # BLD AUTO: 0 K/UL
NRBC BLD-RTO: 0 /100 WBC
NT-PROBNP SERPL IA-MCNC: 1920 PG/ML (ref 0–125)
PLATELET # BLD AUTO: 137 K/UL (ref 164–446)
PMV BLD AUTO: 9 FL (ref 9–12.9)
POTASSIUM SERPL-SCNC: 3.9 MMOL/L (ref 3.6–5.5)
PROT SERPL-MCNC: 7.2 G/DL (ref 6–8.2)
RBC # BLD AUTO: 3.81 M/UL (ref 4.7–6.1)
SARS-COV-2 RNA RESP QL NAA+PROBE: NOTDETECTED
SODIUM SERPL-SCNC: 137 MMOL/L (ref 135–145)
SPECIMEN SOURCE: NORMAL
TROPONIN T SERPL-MCNC: 19 NG/L (ref 6–19)
TROPONIN T SERPL-MCNC: 19 NG/L (ref 6–19)
TROPONIN T SERPL-MCNC: 20 NG/L (ref 6–19)
TROPONIN T SERPL-MCNC: 20 NG/L (ref 6–19)
WBC # BLD AUTO: 6.2 K/UL (ref 4.8–10.8)

## 2020-09-02 PROCEDURE — 99220 PR INITIAL OBSERVATION CARE,LEVL III: CPT | Performed by: INTERNAL MEDICINE

## 2020-09-02 PROCEDURE — 96374 THER/PROPH/DIAG INJ IV PUSH: CPT | Mod: XU

## 2020-09-02 PROCEDURE — 700102 HCHG RX REV CODE 250 W/ 637 OVERRIDE(OP): Performed by: INTERNAL MEDICINE

## 2020-09-02 PROCEDURE — 93010 ELECTROCARDIOGRAM REPORT: CPT | Performed by: INTERNAL MEDICINE

## 2020-09-02 PROCEDURE — 93306 TTE W/DOPPLER COMPLETE: CPT

## 2020-09-02 PROCEDURE — 84484 ASSAY OF TROPONIN QUANT: CPT

## 2020-09-02 PROCEDURE — G0378 HOSPITAL OBSERVATION PER HR: HCPCS

## 2020-09-02 PROCEDURE — 700111 HCHG RX REV CODE 636 W/ 250 OVERRIDE (IP): Performed by: EMERGENCY MEDICINE

## 2020-09-02 PROCEDURE — 86140 C-REACTIVE PROTEIN: CPT

## 2020-09-02 PROCEDURE — 93005 ELECTROCARDIOGRAM TRACING: CPT | Performed by: EMERGENCY MEDICINE

## 2020-09-02 PROCEDURE — A9270 NON-COVERED ITEM OR SERVICE: HCPCS | Performed by: INTERNAL MEDICINE

## 2020-09-02 PROCEDURE — A9270 NON-COVERED ITEM OR SERVICE: HCPCS | Performed by: EMERGENCY MEDICINE

## 2020-09-02 PROCEDURE — 93306 TTE W/DOPPLER COMPLETE: CPT | Mod: 26 | Performed by: INTERNAL MEDICINE

## 2020-09-02 PROCEDURE — 83880 ASSAY OF NATRIURETIC PEPTIDE: CPT

## 2020-09-02 PROCEDURE — U0003 INFECTIOUS AGENT DETECTION BY NUCLEIC ACID (DNA OR RNA); SEVERE ACUTE RESPIRATORY SYNDROME CORONAVIRUS 2 (SARS-COV-2) (CORONAVIRUS DISEASE [COVID-19]), AMPLIFIED PROBE TECHNIQUE, MAKING USE OF HIGH THROUGHPUT TECHNOLOGIES AS DESCRIBED BY CMS-2020-01-R: HCPCS

## 2020-09-02 PROCEDURE — 83690 ASSAY OF LIPASE: CPT

## 2020-09-02 PROCEDURE — 700102 HCHG RX REV CODE 250 W/ 637 OVERRIDE(OP): Performed by: EMERGENCY MEDICINE

## 2020-09-02 PROCEDURE — 85652 RBC SED RATE AUTOMATED: CPT

## 2020-09-02 PROCEDURE — C9803 HOPD COVID-19 SPEC COLLECT: HCPCS | Performed by: INTERNAL MEDICINE

## 2020-09-02 PROCEDURE — 700111 HCHG RX REV CODE 636 W/ 250 OVERRIDE (IP): Performed by: INTERNAL MEDICINE

## 2020-09-02 PROCEDURE — 36415 COLL VENOUS BLD VENIPUNCTURE: CPT

## 2020-09-02 PROCEDURE — 71045 X-RAY EXAM CHEST 1 VIEW: CPT

## 2020-09-02 PROCEDURE — 93005 ELECTROCARDIOGRAM TRACING: CPT

## 2020-09-02 PROCEDURE — 85025 COMPLETE CBC W/AUTO DIFF WBC: CPT

## 2020-09-02 PROCEDURE — 94760 N-INVAS EAR/PLS OXIMETRY 1: CPT

## 2020-09-02 PROCEDURE — 99205 OFFICE O/P NEW HI 60 MIN: CPT | Performed by: INTERNAL MEDICINE

## 2020-09-02 PROCEDURE — 93005 ELECTROCARDIOGRAM TRACING: CPT | Performed by: INTERNAL MEDICINE

## 2020-09-02 PROCEDURE — 99285 EMERGENCY DEPT VISIT HI MDM: CPT

## 2020-09-02 PROCEDURE — 80053 COMPREHEN METABOLIC PANEL: CPT

## 2020-09-02 PROCEDURE — 96375 TX/PRO/DX INJ NEW DRUG ADDON: CPT | Mod: XU

## 2020-09-02 RX ORDER — HEPARIN SODIUM 1000 [USP'U]/ML
4000 INJECTION, SOLUTION INTRAVENOUS; SUBCUTANEOUS ONCE
Status: DISCONTINUED | OUTPATIENT
Start: 2020-09-02 | End: 2020-09-02

## 2020-09-02 RX ORDER — REGADENOSON 0.08 MG/ML
0.4 INJECTION, SOLUTION INTRAVENOUS
Status: DISCONTINUED | OUTPATIENT
Start: 2020-09-02 | End: 2020-09-04 | Stop reason: HOSPADM

## 2020-09-02 RX ORDER — SEVELAMER CARBONATE 800 MG/1
800 TABLET, FILM COATED ORAL
Status: DISCONTINUED | OUTPATIENT
Start: 2020-09-02 | End: 2020-09-02

## 2020-09-02 RX ORDER — TRAMADOL HYDROCHLORIDE 50 MG/1
50 TABLET ORAL EVERY 6 HOURS PRN
COMMUNITY
End: 2020-10-13 | Stop reason: SDUPTHER

## 2020-09-02 RX ORDER — B,C/FOLIC/ZINC/SELENOMETH/D3/E 0.8-12.5MG
1 TABLET ORAL DAILY
Status: ON HOLD | COMMUNITY
End: 2021-01-16 | Stop reason: SDUPTHER

## 2020-09-02 RX ORDER — HEPARIN SODIUM 1000 [USP'U]/ML
2000 INJECTION, SOLUTION INTRAVENOUS; SUBCUTANEOUS PRN
Status: DISCONTINUED | OUTPATIENT
Start: 2020-09-02 | End: 2020-09-02

## 2020-09-02 RX ORDER — ASPIRIN 325 MG
325 TABLET ORAL EVERY 8 HOURS
Status: DISCONTINUED | OUTPATIENT
Start: 2020-09-02 | End: 2020-09-04 | Stop reason: HOSPADM

## 2020-09-02 RX ORDER — COLCHICINE 0.6 MG/1
0.6 TABLET ORAL
Status: DISCONTINUED | OUTPATIENT
Start: 2020-09-02 | End: 2020-09-04 | Stop reason: HOSPADM

## 2020-09-02 RX ORDER — POLYETHYLENE GLYCOL 3350 17 G/17G
1 POWDER, FOR SOLUTION ORAL
Status: DISCONTINUED | OUTPATIENT
Start: 2020-09-02 | End: 2020-09-04 | Stop reason: HOSPADM

## 2020-09-02 RX ORDER — BISACODYL 10 MG
10 SUPPOSITORY, RECTAL RECTAL
Status: DISCONTINUED | OUTPATIENT
Start: 2020-09-02 | End: 2020-09-04 | Stop reason: HOSPADM

## 2020-09-02 RX ORDER — ACETAMINOPHEN 325 MG/1
650 TABLET ORAL EVERY 6 HOURS PRN
Status: DISCONTINUED | OUTPATIENT
Start: 2020-09-02 | End: 2020-09-04 | Stop reason: HOSPADM

## 2020-09-02 RX ORDER — SEVELAMER CARBONATE 800 MG/1
800 TABLET, FILM COATED ORAL 2 TIMES DAILY PRN
Status: DISCONTINUED | OUTPATIENT
Start: 2020-09-02 | End: 2020-09-04 | Stop reason: HOSPADM

## 2020-09-02 RX ORDER — COLCHICINE 0.6 MG/1
0.6 TABLET ORAL 2 TIMES DAILY
Status: DISCONTINUED | OUTPATIENT
Start: 2020-09-02 | End: 2020-09-02

## 2020-09-02 RX ORDER — HEPARIN SODIUM 5000 [USP'U]/100ML
0-30 INJECTION, SOLUTION INTRAVENOUS CONTINUOUS
Status: DISCONTINUED | OUTPATIENT
Start: 2020-09-02 | End: 2020-09-02

## 2020-09-02 RX ORDER — ASPIRIN 300 MG/1
300 SUPPOSITORY RECTAL DAILY
Status: DISCONTINUED | OUTPATIENT
Start: 2020-09-02 | End: 2020-09-02

## 2020-09-02 RX ORDER — ASPIRIN 325 MG
325 TABLET ORAL DAILY
Status: DISCONTINUED | OUTPATIENT
Start: 2020-09-02 | End: 2020-09-02

## 2020-09-02 RX ORDER — METOPROLOL SUCCINATE 100 MG/1
100 TABLET, EXTENDED RELEASE ORAL EVERY EVENING
Status: DISCONTINUED | OUTPATIENT
Start: 2020-09-02 | End: 2020-09-04 | Stop reason: HOSPADM

## 2020-09-02 RX ORDER — SEVELAMER CARBONATE 800 MG/1
1600 TABLET, FILM COATED ORAL
Status: DISCONTINUED | OUTPATIENT
Start: 2020-09-02 | End: 2020-09-04 | Stop reason: HOSPADM

## 2020-09-02 RX ORDER — COLCHICINE 0.6 MG/1
0.6 TABLET ORAL
Status: DISCONTINUED | OUTPATIENT
Start: 2020-09-03 | End: 2020-09-02

## 2020-09-02 RX ORDER — AMOXICILLIN 250 MG
2 CAPSULE ORAL 2 TIMES DAILY
Status: DISCONTINUED | OUTPATIENT
Start: 2020-09-02 | End: 2020-09-04 | Stop reason: HOSPADM

## 2020-09-02 RX ORDER — ASPIRIN 81 MG/1
324 TABLET, CHEWABLE ORAL DAILY
Status: DISCONTINUED | OUTPATIENT
Start: 2020-09-02 | End: 2020-09-02

## 2020-09-02 RX ORDER — ONDANSETRON 2 MG/ML
4 INJECTION INTRAMUSCULAR; INTRAVENOUS ONCE
Status: COMPLETED | OUTPATIENT
Start: 2020-09-02 | End: 2020-09-02

## 2020-09-02 RX ORDER — PREDNISONE 20 MG/1
40 TABLET ORAL DAILY
Status: DISCONTINUED | OUTPATIENT
Start: 2020-09-02 | End: 2020-09-04 | Stop reason: HOSPADM

## 2020-09-02 RX ORDER — TRAMADOL HYDROCHLORIDE 50 MG/1
50 TABLET ORAL EVERY 6 HOURS PRN
Status: DISCONTINUED | OUTPATIENT
Start: 2020-09-02 | End: 2020-09-04 | Stop reason: HOSPADM

## 2020-09-02 RX ORDER — AMINOPHYLLINE 25 MG/ML
100 INJECTION, SOLUTION INTRAVENOUS
Status: DISCONTINUED | OUTPATIENT
Start: 2020-09-02 | End: 2020-09-04 | Stop reason: HOSPADM

## 2020-09-02 RX ORDER — LORAZEPAM 0.5 MG/1
0.5 TABLET ORAL
COMMUNITY
End: 2020-11-03

## 2020-09-02 RX ADMIN — NITROGLYCERIN 1 INCH: 20 OINTMENT TOPICAL at 11:29

## 2020-09-02 RX ADMIN — TRAMADOL HYDROCHLORIDE 50 MG: 50 TABLET, FILM COATED ORAL at 16:31

## 2020-09-02 RX ADMIN — ASPIRIN 325 MG: 325 TABLET ORAL at 12:09

## 2020-09-02 RX ADMIN — METOPROLOL SUCCINATE 100 MG: 100 TABLET, EXTENDED RELEASE ORAL at 18:35

## 2020-09-02 RX ADMIN — ASPIRIN 325 MG: 325 TABLET ORAL at 22:09

## 2020-09-02 RX ADMIN — COLCHICINE 0.6 MG: 0.6 TABLET ORAL at 18:35

## 2020-09-02 RX ADMIN — PREDNISONE 40 MG: 20 TABLET ORAL at 16:31

## 2020-09-02 RX ADMIN — ONDANSETRON 4 MG: 2 INJECTION INTRAMUSCULAR; INTRAVENOUS at 09:40

## 2020-09-02 RX ADMIN — FENTANYL CITRATE 50 MCG: 50 INJECTION INTRAMUSCULAR; INTRAVENOUS at 09:40

## 2020-09-02 RX ADMIN — SEVELAMER CARBONATE 1600 MG: 800 TABLET, FILM COATED ORAL at 18:35

## 2020-09-02 ASSESSMENT — ENCOUNTER SYMPTOMS
ABDOMINAL PAIN: 0
PALPITATIONS: 0
HEARTBURN: 0
NECK PAIN: 0
SHORTNESS OF BREATH: 0
EYE PAIN: 0
WEIGHT LOSS: 0
MYALGIAS: 0
DIZZINESS: 0
EYE DISCHARGE: 0
EYE REDNESS: 0
VOMITING: 0
FOCAL WEAKNESS: 0
FEVER: 0
DEPRESSION: 0
NAUSEA: 0
INSOMNIA: 0
BACK PAIN: 0
ORTHOPNEA: 0
DIARRHEA: 0
BLURRED VISION: 0
CHILLS: 0
SPUTUM PRODUCTION: 0
COUGH: 0
STRIDOR: 0
HEADACHES: 0
NERVOUS/ANXIOUS: 0
SEIZURES: 0

## 2020-09-02 ASSESSMENT — LIFESTYLE VARIABLES
ALCOHOL_USE: NO
HOW MANY TIMES IN THE PAST YEAR HAVE YOU HAD 5 OR MORE DRINKS IN A DAY: 0
TOTAL SCORE: 0
TOTAL SCORE: 0
HAVE PEOPLE ANNOYED YOU BY CRITICIZING YOUR DRINKING: NO
HAVE YOU EVER FELT YOU SHOULD CUT DOWN ON YOUR DRINKING: NO
DOES PATIENT WANT TO STOP DRINKING: NO
AVERAGE NUMBER OF DAYS PER WEEK YOU HAVE A DRINK CONTAINING ALCOHOL: 0
ON A TYPICAL DAY WHEN YOU DRINK ALCOHOL HOW MANY DRINKS DO YOU HAVE: 0
CONSUMPTION TOTAL: NEGATIVE
EVER HAD A DRINK FIRST THING IN THE MORNING TO STEADY YOUR NERVES TO GET RID OF A HANGOVER: NO
EVER FELT BAD OR GUILTY ABOUT YOUR DRINKING: NO
TOTAL SCORE: 0

## 2020-09-02 ASSESSMENT — PATIENT HEALTH QUESTIONNAIRE - PHQ9
2. FEELING DOWN, DEPRESSED, IRRITABLE, OR HOPELESS: NOT AT ALL
SUM OF ALL RESPONSES TO PHQ9 QUESTIONS 1 AND 2: 0
1. LITTLE INTEREST OR PLEASURE IN DOING THINGS: NOT AT ALL

## 2020-09-02 ASSESSMENT — FIBROSIS 4 INDEX
FIB4 SCORE: 1.25
FIB4 SCORE: 1.25

## 2020-09-02 NOTE — ED NOTES
Received report on patient. Pt appears to be sleeping w/ even chest rise and fall. No needs at this time.

## 2020-09-02 NOTE — PROGRESS NOTES
This is 65-year-old male with a past medical history significant for end-stage disease on dialysis, completed 3 and half hours of dialysis today at [facility]( [facility] nephrology) presented to the ER with a complaint of shortness of breath and chest pain.  He is noted to have mildly elevated troponin of 20, BNP 1920. EKG didn't show any ST and T wave changes     Dr Rosa is asking to admit this patient   1. Chest pain rule out with Serial troponin    Dr Misael Arcos will be admitting   11:27 AM      Addendum  1:39 PM  ER physician called and stated patient did have some ST changes on repeat EKG, equal cardiology, starting heparin drip.  Cardiology will be obtaining stat echo and cardiology will be consulted.

## 2020-09-02 NOTE — ED TRIAGE NOTES
Chief Complaint   Patient presents with   • Chest Pain     Pt BIBA c/o chest pain and SOB while getting HD and low bp per EMS. On arrival pt still in pain /68 but still having severe dyspnea.    • Shortness of Breath

## 2020-09-02 NOTE — ED NOTES
Report given to Mukesh RN. Pt transferred to CDU w/ Mukesh on the monitor and oxygen. Belongings with pt.

## 2020-09-02 NOTE — ED NOTES
Pt medicated with ASA. Pt still complaining of central chest pain but lethargic in between conversations. Pt provided urinal. Admitting physician at bedside

## 2020-09-02 NOTE — ED NOTES
Med rec partially complete per [facility].   Waiting o connect with [facility]  Allergies reviewed

## 2020-09-02 NOTE — ED PROVIDER NOTES
ED Provider Note    CHIEF COMPLAINT  Chief Complaint   Patient presents with   • Chest Pain     Pt BIBA c/o chest pain and SOB while getting HD and low bp per EMS. On arrival pt still in pain /68 but still having severe dyspnea.    • Shortness of Breath       HPI  Garry Benites is a 64 y.o. male who presents with retrosternal chest pain, intermittent since early this morning.  He went through dialysis, had some discomfort there and dialysis was interrupted 15 minutes prior to completion.  No vomiting.  He feels short of breath.  No pleuritic component.  He denies cough or fever.  Patient denies abdominal pain.  No vomiting or diarrhea.  He has end-stage renal disease.  Patient states he is short of breath typically, this feels worse.  No associated syncope.  Symptoms onset was sometime during the night, he awoke with some discomfort.  Patient denies history of similar chest pain    REVIEW OF SYSTEMS    Constitutional: Malaise, no fever  Respiratory: Shortness of breath, no cough  Cardiac: Chest pain  Gastrointestinal: No abdominal pain  Musculoskeletal: No acute back pain  Neurologic: Denies headache  Renal: Kidney failure, dialysis       All other systems are negative.       PAST MEDICAL HISTORY  Past Medical History:   Diagnosis Date   • Allergy    • Anxiety    • Cataract    • Depression    • Gout due to renal impairment    • Hyperlipidemia    • Hypertension    • Kidney disease     Congenital solitary kidney on dialysis 3 times a week.   • Localized osteoarthritis of right shoulder 6/19/2019       FAMILY HISTORY  Family History   Problem Relation Age of Onset   • Cancer Mother         lymphoma   • Heart Attack Father    • Stroke Father    • Heart Disease Father        SOCIAL HISTORY  Social History     Socioeconomic History   • Marital status:      Spouse name: Not on file   • Number of children: Not on file   • Years of education: Not on file   • Highest education level: Not on file   Occupational  "History   • Not on file   Social Needs   • Financial resource strain: Not on file   • Food insecurity     Worry: Not on file     Inability: Not on file   • Transportation needs     Medical: Not on file     Non-medical: Not on file   Tobacco Use   • Smoking status: Former Smoker     Packs/day: 0.50     Years: 25.00     Pack years: 12.50     Types: Cigarettes     Quit date: 10/21/1996     Years since quittin.8   • Smokeless tobacco: Never Used   Substance and Sexual Activity   • Alcohol use: No     Alcohol/week: 0.0 oz   • Drug use: No   • Sexual activity: Yes     Partners: Female     Comment: Lives with his brother. Work as . Social Security Disability for [place] and OA and Dialysis. No social or domestic concerns.   Lifestyle   • Physical activity     Days per week: Not on file     Minutes per session: Not on file   • Stress: Not on file   Relationships   • Social connections     Talks on phone: Not on file     Gets together: Not on file     Attends Yarsani service: Not on file     Active member of club or organization: Not on file     Attends meetings of clubs or organizations: Not on file     Relationship status: Not on file   • Intimate partner violence     Fear of current or ex partner: Not on file     Emotionally abused: Not on file     Physically abused: Not on file     Forced sexual activity: Not on file   Other Topics Concern   • Not on file   Social History Narrative   • Not on file       SURGICAL HISTORY  Past Surgical History:   Procedure Laterality Date   • AV FISTULA CREATION     • OTHER ORTHOPEDIC SURGERY         CURRENT MEDICATIONS  Home Medications    **Home medications have not yet been reviewed for this encounter**         ALLERGIES  Allergies   Allergen Reactions   • Cephalexin Rash   • Penicillins Unspecified and Rash     Pt states \"It was a childhood allergie\".         PHYSICAL EXAM  VITAL SIGNS: /68   Pulse 83   Temp 36.7 °C (98 °F) (Temporal)   Resp 20   Wt 91.4 kg (201 " lb 8 oz)   SpO2 100%   BMI 28.91 kg/m²   Constitutional:  Non-toxic appearance.   HENT: No facial swelling  Eyes: Anicteric, no conjunctivitis.     Cardiovascular: Normal heart rate, Normal rhythm.    Pulmonary:  No wheezing, No rales.  Juan breath sounds both lung bases  Gastrointestinal: Soft, No tenderness, No masses  Skin: Warm, Dry, No cyanosis.  No cellulitis  Neurologic: Speech is clear, follows commands, facial expression is symmetrical.  Psychiatric: Affect normal,  Mood normal.  Patient is calm and cooperative  Musculoskeletal: No chest wall tenderness    EKG/Labs  Results for orders placed or performed during the hospital encounter of 09/02/20   CBC with Differential   Result Value Ref Range    WBC 6.2 4.8 - 10.8 K/uL    RBC 3.81 (L) 4.70 - 6.10 M/uL    Hemoglobin 11.3 (L) 14.0 - 18.0 g/dL    Hematocrit 35.8 (L) 42.0 - 52.0 %    MCV 94.0 81.4 - 97.8 fL    MCH 29.7 27.0 - 33.0 pg    MCHC 31.6 (L) 33.7 - 35.3 g/dL    RDW 51.7 (H) 35.9 - 50.0 fL    Platelet Count 137 (L) 164 - 446 K/uL    MPV 9.0 9.0 - 12.9 fL    Neutrophils-Polys 79.30 (H) 44.00 - 72.00 %    Lymphocytes 10.10 (L) 22.00 - 41.00 %    Monocytes 7.90 0.00 - 13.40 %    Eosinophils 1.90 0.00 - 6.90 %    Basophils 0.30 0.00 - 1.80 %    Immature Granulocytes 0.50 0.00 - 0.90 %    Nucleated RBC 0.00 /100 WBC    Neutrophils (Absolute) 4.93 1.82 - 7.42 K/uL    Lymphs (Absolute) 0.63 (L) 1.00 - 4.80 K/uL    Monos (Absolute) 0.49 0.00 - 0.85 K/uL    Eos (Absolute) 0.12 0.00 - 0.51 K/uL    Baso (Absolute) 0.02 0.00 - 0.12 K/uL    Immature Granulocytes (abs) 0.03 0.00 - 0.11 K/uL    NRBC (Absolute) 0.00 K/uL   Complete Metabolic Panel (CMP)   Result Value Ref Range    Sodium 137 135 - 145 mmol/L    Potassium 3.9 3.6 - 5.5 mmol/L    Chloride 95 (L) 96 - 112 mmol/L    Co2 32 20 - 33 mmol/L    Anion Gap 10.0 7.0 - 16.0    Glucose 136 (H) 65 - 99 mg/dL    Bun 30 (H) 8 - 22 mg/dL    Creatinine 4.24 (H) 0.50 - 1.40 mg/dL    Calcium 9.2 8.5 - 10.5 mg/dL     AST(SGOT) 10 (L) 12 - 45 U/L    ALT(SGPT) 14 2 - 50 U/L    Alkaline Phosphatase 84 30 - 99 U/L    Total Bilirubin 0.4 0.1 - 1.5 mg/dL    Albumin 4.1 3.2 - 4.9 g/dL    Total Protein 7.2 6.0 - 8.2 g/dL    Globulin 3.1 1.9 - 3.5 g/dL    A-G Ratio 1.3 g/dL   Troponin   Result Value Ref Range    Troponin T 20 (H) 6 - 19 ng/L   proBrain Natriuretic Peptide, NT   Result Value Ref Range    NT-proBNP 1920 (H) 0 - 125 pg/mL   LIPASE   Result Value Ref Range    Lipase 54 11 - 82 U/L   ESTIMATED GFR   Result Value Ref Range    GFR If  17 (A) >60 mL/min/1.73 m 2    GFR If Non  14 (A) >60 mL/min/1.73 m 2   Blood Culture,Hold   Result Value Ref Range    Blood Culture Hold Collected    Routine (COVID/SARS COV-2 In-House PCR up to 24 hours)    Specimen: Nasopharyngeal; Respirate   Result Value Ref Range    COVID Order Status Received    Troponin - STAT Once   Result Value Ref Range    Troponin T 20 (H) 6 - 19 ng/L   SARS-CoV-2, PCR (In-House)   Result Value Ref Range    SARS-CoV-2 Source NP Swab    EKG   Result Value Ref Range    Report       [facility] Emergency Dept.    Test Date:  2020  Pt Name:    TRISTAN LOVE          Department: ER  MRN:        [id number]                      Room:       [facility]  Gender:     Male                         Technician: [id number]  :        1955                   Requested By:ER TRIAGE PROTOCOL  Order #:    [id number]                    Reading MD: GILLIAN CORTEZ MD    Measurements  Intervals                                Axis  Rate:       80                           P:          52  WI:         164                          QRS:        8  QRSD:       96                           T:          58  QT:         380  QTc:        439    Interpretive Statements  SINUS RHYTHM  Baseline artifact ST segment elevation versus baseline artifact inferior  leads  Compared to ECG 2017 10:15:22  ST (T wave) deviation now present  Baseline  artifact  No arrhythmia  No STEMI  Electronically Signed On 2020 13:11:45 PDT by GILLIAN CORTEZ MD     EKG (NOW)   Result Value Ref Range    Report       [facility]    Test Date:  2020  Pt Name:    TRISTAN LOVE          Department: ER  MRN:        [id number]                      Room:        02  Gender:     Male                         Technician: [id number]  :        1955                   Requested By:GILLIAN CORTEZ  Order #:    [id number]                    Reading MD: GILLIAN CORTEZ MD    Measurements  Intervals                                Axis  Rate:       84                           P:          48  IL:         160                          QRS:        11  QRSD:       90                           T:          53  QT:         360  QTc:        426    Interpretive Statements  SINUS RHYTHM  ST ELEVATION SUGGESTS PERICARDITIS  Compared to ECG 2020 09:12:35  ST segment elevation leads I, 2, 3, aVF  Electronically Signed On 2020 13:12:39 PDT by GILLIAN CORTEZ MD           RADIOLOGY/PROCEDURES  DX-CHEST-PORTABLE (1 VIEW)   Final Result      1.  There are findings consistent with vascular congestion/edema.      NM-CARDIAC STRESS TEST    (Results Pending)         COURSE & MEDICAL DECISION MAKING  Pertinent Labs & Imaging studies reviewed. (See chart for details)  Patient presents with chest pain, differential diagnosis including chest wall pain, esophagitis, cardiac ischemia, pneumonia.  Patient has some evidence of edema on his chest x-ray, this likely secondary to his chronic renal failure.  Patient with some persistence of chest discomfort, he looks much more comfortable.  The report is a dull retrosternal pain at this time.  The first EKG showed baseline artifact, possible ST segment elevation in leads II and III of unknown significance.  With persistent discomfort, repeat EKG obtained with less baseline artifact demonstrating inferior  and lead I ST segment elevation.  I discussed the case with on-call cardiology, Dr. Waters who is agreed to see the patient for evaluation.  Patient will be heparinized, echocardiogram ordered at request of cardiology.    FINAL IMPRESSION     1. Chest pain, unspecified type     2. Dyspnea, unspecified type                     Electronically signed by: Srinivas Rosa M.D., 9/2/2020 9:33 AM

## 2020-09-02 NOTE — H&P
Hospital Medicine History & Physical Note    Date of Service  9/2/2020    Primary Care Physician  Abel Jones M.D.    Consultants  none    Code Status  Full Code    Chief Complaint  Chief Complaint   Patient presents with   • Chest Pain     Pt BIBA c/o chest pain and SOB while getting HD and low bp per EMS. On arrival pt still in pain /68 but still having severe dyspnea.    • Shortness of Breath       History of Presenting Illness  64 y.o. male with past medical history of end-stage renal disease on hemodialysis Monday Wednesday and Friday who presented 9/2/2020 with chest pain while he was getting dialysis earlier today.  He stated that he managed to finish his dialysis at the facility.  He described the pain as pressure-like pain over the sternal area, constant in nature, no radiation.  Never had similar symptoms before.  Initial work-up in ER were negative.  He will be admitted for observation.    Review of Systems  Review of Systems   Constitutional: Negative for chills, fever and weight loss.   HENT: Negative for congestion and nosebleeds.    Eyes: Negative for blurred vision, pain, discharge and redness.   Respiratory: Negative for cough, sputum production, shortness of breath and stridor.    Cardiovascular: Positive for chest pain. Negative for palpitations and orthopnea.   Gastrointestinal: Negative for abdominal pain, diarrhea, heartburn, nausea and vomiting.   Genitourinary: Negative for dysuria, frequency and urgency.   Musculoskeletal: Negative for back pain, myalgias and neck pain.   Skin: Negative for itching and rash.   Neurological: Negative for dizziness, focal weakness, seizures and headaches.   Psychiatric/Behavioral: Negative for depression. The patient is not nervous/anxious and does not have insomnia.        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    Surgical History   has a past surgical history that includes av fistula creation and other orthopedic surgery.     Family History  family history includes Cancer in his mother; Heart Attack in his father; Heart Disease in his father; Stroke in his father.     Social History   reports that he quit smoking about 23 years ago. His smoking use included cigarettes. He has a 12.50 pack-year smoking history. He has never used smokeless tobacco. He reports that he does not drink alcohol or use drugs.    Allergies  Allergies   Allergen Reactions   • Cephalexin Rash   • Penicillins Unspecified and Rash     Pt states \"It was a childhood allergie\".         Medications  Prior to Admission Medications   Prescriptions Last Dose Informant Patient Reported? Taking?   Multiple Vitamins-Minerals (RENAPLEX-D) Tab   Yes No   Sig: TAKE 1 TABLET BY MOUTH FOUR DAYS (TIMES) A WEEK   SENSIPAR 30 MG Tab   Yes No   Sig: Take 1 Tab by mouth every day.   colchicine (COLCRYS) 0.6 MG Tab   No No   Sig: Take 1 tab p.o. every other day.   metoprolol SR (TOPROL XL) 100 MG TABLET SR 24 HR   No No   Sig: Take 1 tablet by mouth once daily   sevelamer carbonate (RENVELA) 800 MG Tab tablet   Yes No   Sig: TAKE 2 TABLETS BY MOUTH THREE TIMES DAILY WITH MEALS AND 1 TABLET TWICE DAILY WITH SNACKS   vitamin D (VITAMIND D3) 1000 UNIT Tab   No No   Sig: Take 1 Tab by mouth every day.      Facility-Administered Medications: None       Physical Exam  Temp:  [36.7 °C (98 °F)] 36.7 °C (98 °F)  Pulse:  [80-83] 83  Resp:  [18-28] 20  BP: (116-133)/(68-76) 133/70  SpO2:  [96 %-100 %] 98 %    Physical Exam  Vitals signs reviewed.   Constitutional:       General: He is not in acute distress.     Appearance: Normal appearance.   HENT:      Head: Normocephalic and atraumatic.      Nose: No congestion or rhinorrhea.   Eyes:      Extraocular Movements: Extraocular movements intact.      Pupils: Pupils are equal, " round, and reactive to light.   Neck:      Musculoskeletal: Normal range of motion and neck supple.   Cardiovascular:      Rate and Rhythm: Normal rate and regular rhythm.      Pulses: Normal pulses.   Pulmonary:      Effort: Pulmonary effort is normal. No respiratory distress.      Breath sounds: Normal breath sounds.   Abdominal:      General: Bowel sounds are normal. There is no distension.      Palpations: Abdomen is soft.      Tenderness: There is no abdominal tenderness.   Musculoskeletal:         General: No swelling or tenderness.   Skin:     General: Skin is warm.      Findings: No erythema.   Neurological:      General: No focal deficit present.      Mental Status: He is alert and oriented to person, place, and time.         Laboratory:  Recent Labs     09/02/20  0916   WBC 6.2   RBC 3.81*   HEMOGLOBIN 11.3*   HEMATOCRIT 35.8*   MCV 94.0   MCH 29.7   MCHC 31.6*   RDW 51.7*   PLATELETCT 137*   MPV 9.0     Recent Labs     09/02/20  0916   SODIUM 137   POTASSIUM 3.9   CHLORIDE 95*   CO2 32   GLUCOSE 136*   BUN 30*   CREATININE 4.24*   CALCIUM 9.2     Recent Labs     09/02/20  0916   ALTSGPT 14   ASTSGOT 10*   ALKPHOSPHAT 84   TBILIRUBIN 0.4   LIPASE 54   GLUCOSE 136*         Recent Labs     09/02/20  0916   NTPROBNP 1920*         Recent Labs     09/02/20  0916   TROPONINT 20*       Imaging:  DX-CHEST-PORTABLE (1 VIEW)   Final Result      1.  There are findings consistent with vascular congestion/edema.      NM-CARDIAC STRESS TEST    (Results Pending)         Assessment/Plan:  I anticipate this patient is appropriate for observation status at this time.    Elevated brain natriuretic peptide (BNP) level- (present on admission)  Assessment & Plan  Related to ESRD    Pain in the chest- (present on admission)  Assessment & Plan  Trend trop and ekg  NPO  Stress test in am  Asa, statin    ESRD (end stage renal disease) (HCC)- (present on admission)  Assessment & Plan  On HD MWF    Essential hypertension- (present on  admission)  Assessment & Plan  Continue outpatient meds

## 2020-09-02 NOTE — DISCHARGE PLANNING
Outpatient Dialysis Note    Confirmed patient is active at:    [facility]   [address] [place] [address]    Schedule: Monday, Wednesday, Friday   Time: 05:00am     Patient received 3.5 hours tx at HD clinic this morning.    Spoke with Ladan at [facility] who confirmed.    Patient is seen by Dr. Bassett In HD clinic.    Forwarded records for review.    Jennifer Nelson- Dialysis Coordinator  [facility] # [phone]

## 2020-09-02 NOTE — ED NOTES
Med rec complete per pt and [facility].  [facility] has not filled Sensipar, Renvela, nor Renaplex D. Pt says he still takes these.  Allergies reviewed

## 2020-09-02 NOTE — CONSULTS
Reason for Consult:  Asked by Dr Srinivas Rosa M.D. to see this patient with chest pain    CC:   Chief Complaint   Patient presents with   • Chest Pain     Pt BIBA c/o chest pain and SOB while getting HD and low bp per EMS. On arrival pt still in pain /68 but still having severe dyspnea.    • Shortness of Breath       HPI:      64 year old man with PMH HTN, HLD, ESRD on HD due to solitary kidney presents after waking with chest pain. Describes persisted all day, including through dialysis, was able to walk from car to HD today to undergo treatment. Aggravated with deep breathing and positional. Associated with dyspnea and light headedness. Compliant with medications, normally takes colchicine for gout. Compliant with HD, last today. Denies recent febrile illness. Denies toxic social habits.     Medications / Drug list prior to admission:  No current facility-administered medications on file prior to encounter.      Current Outpatient Medications on File Prior to Encounter   Medication Sig Dispense Refill   • tramadol (ULTRAM) 50 MG Tab Take 50 mg by mouth every 6 hours as needed.     • LORazepam (ATIVAN) 0.5 MG Tab Take 0.5 mg by mouth 1 time daily as needed for Anxiety.     • Multiple Vitamins-Minerals (RENAPLEX-D) Tab Take 1 Tab by mouth every day.     • colchicine (COLCRYS) 0.6 MG Tab Take 1 tab p.o. every other day. 90 Tab 0   • metoprolol SR (TOPROL XL) 100 MG TABLET SR 24 HR Take 1 tablet by mouth once daily 90 Tab 0   • sevelamer carbonate (RENVELA) 800 MG Tab tablet TAKE 2 TABLETS BY MOUTH THREE TIMES DAILY WITH MEALS AND 1 TABLET TWICE DAILY WITH SNACKS  5   • SENSIPAR 30 MG Tab Take 1 Tab by mouth every day.  11       Current list of administered Medications:    Current Facility-Administered Medications:   •  metoprolol SR (TOPROL XL) tablet 100 mg, 100 mg, Oral, Q EVENING, Misael Arcos M.D.  •  senna-docusate (PERICOLACE or SENOKOT S) 8.6-50 MG per tablet 2 Tab, 2 Tab, Oral, BID **AND**  polyethylene glycol/lytes (MIRALAX) PACKET 1 Packet, 1 Packet, Oral, QDAY PRN **AND** magnesium hydroxide (MILK OF MAGNESIA) suspension 30 mL, 30 mL, Oral, QDAY PRN **AND** bisacodyl (DULCOLAX) suppository 10 mg, 10 mg, Rectal, QDAY PRN, Misael Arcos M.D.  •  regadenoson (LEXISCAN) injection SOLN 0.4 mg, 0.4 mg, Intravenous, Once PRN, Misael Arcos M.D.  •  aminophylline injection 100 mg, 100 mg, Intravenous, Q5 MIN PRN, Misael Arcos M.D.  •  acetaminophen (TYLENOL) tablet 650 mg, 650 mg, Oral, Q6HRS PRN, Misael Arcos M.D.  •  sevelamer carbonate (RENVELA) tablet 1,600 mg, 1,600 mg, Oral, TID WITH MEALS, Misael Arcos M.D.  •  sevelamer carbonate (RENVELA) tablet 800 mg, 800 mg, Oral, BID PRN, Misael Arcos M.D.  •  colchicine (COLCRYS) tablet 0.6 mg, 0.6 mg, Oral, BID, Nico Hurtado M.D.  •  aspirin (ASA) tablet 325 mg, 325 mg, Oral, Q8HRS, Nico Hurtado M.D.    Current Outpatient Medications:   •  tramadol (ULTRAM) 50 MG Tab, Take 50 mg by mouth every 6 hours as needed., Disp: , Rfl:   •  LORazepam (ATIVAN) 0.5 MG Tab, Take 0.5 mg by mouth 1 time daily as needed for Anxiety., Disp: , Rfl:   •  Multiple Vitamins-Minerals (RENAPLEX-D) Tab, Take 1 Tab by mouth every day., Disp: , Rfl:   •  colchicine (COLCRYS) 0.6 MG Tab, Take 1 tab p.o. every other day., Disp: 90 Tab, Rfl: 0  •  metoprolol SR (TOPROL XL) 100 MG TABLET SR 24 HR, Take 1 tablet by mouth once daily, Disp: 90 Tab, Rfl: 0  •  sevelamer carbonate (RENVELA) 800 MG Tab tablet, TAKE 2 TABLETS BY MOUTH THREE TIMES DAILY WITH MEALS AND 1 TABLET TWICE DAILY WITH SNACKS, Disp: , Rfl: 5  •  SENSIPAR 30 MG Tab, Take 1 Tab by mouth every day., Disp: , Rfl: 11    Past Medical History:   Diagnosis Date   • Allergy    • Anxiety    • Cataract    • Depression    • Gout due to renal impairment    • Hyperlipidemia    • Hypertension    • Kidney disease     Congenital solitary kidney on dialysis 3 times a week.   • Localized osteoarthritis of right shoulder 6/19/2019       Past  Surgical History:   Procedure Laterality Date   • AV FISTULA CREATION     • OTHER ORTHOPEDIC SURGERY         Family History   Problem Relation Age of Onset   • Cancer Mother         lymphoma   • Heart Attack Father    • Stroke Father    • Heart Disease Father      Patient family history was personally reviewed, no pertinent family history to current presentation    Social History     Socioeconomic History   • Marital status:      Spouse name: Not on file   • Number of children: Not on file   • Years of education: Not on file   • Highest education level: Not on file   Occupational History   • Not on file   Social Needs   • Financial resource strain: Not on file   • Food insecurity     Worry: Not on file     Inability: Not on file   • Transportation needs     Medical: Not on file     Non-medical: Not on file   Tobacco Use   • Smoking status: Former Smoker     Packs/day: 0.50     Years: 25.00     Pack years: 12.50     Types: Cigarettes     Quit date: 10/21/1996     Years since quittin.8   • Smokeless tobacco: Never Used   Substance and Sexual Activity   • Alcohol use: No     Alcohol/week: 0.0 oz   • Drug use: No   • Sexual activity: Yes     Partners: Female     Comment: Lives with his brother. Work as . Social Security Disability for [place] and OA and Dialysis. No social or domestic concerns.   Lifestyle   • Physical activity     Days per week: Not on file     Minutes per session: Not on file   • Stress: Not on file   Relationships   • Social connections     Talks on phone: Not on file     Gets together: Not on file     Attends Congregational service: Not on file     Active member of club or organization: Not on file     Attends meetings of clubs or organizations: Not on file     Relationship status: Not on file   • Intimate partner violence     Fear of current or ex partner: Not on file     Emotionally abused: Not on file     Physically abused: Not on file     Forced sexual activity: Not on file   Other  "Topics Concern   • Not on file   Social History Narrative   • Not on file       ALLERGIES:  Allergies   Allergen Reactions   • Cephalexin Rash   • Penicillins Unspecified and Rash     Pt states \"It was a childhood allergie\".         Review of systems:  A complete review of symptoms was reviewed with patient. This is reviewed in H&P and PMH. ALL OTHERS reviewed and negative    Physical exam:  Patient Vitals for the past 24 hrs:   BP Temp Temp src Pulse Resp SpO2 Height Weight   20 1301 108/58 -- -- 84 17 96 % -- --   20 1300 -- -- -- -- -- -- 1.778 m (5' 10\") --   20 1246 109/56 -- -- 81 20 91 % -- --   20 1217 105/57 -- -- 80 19 97 % -- --   20 1201 126/67 -- -- 81 16 97 % -- --   20 1101 133/70 -- -- 83 20 98 % -- --   20 1001 131/76 -- -- 81 18 97 % -- --   20 0950 118/73 -- -- 80 (!) 28 96 % -- --   20 0913 116/68 36.7 °C (98 °F) Temporal 83 20 100 % -- 91.4 kg (201 lb 8 oz)   20 0912 -- -- -- 80 19 97 % -- --     General: No acute distress.   EYES: no jaundice  HEENT: OP clear   Neck: No bruits No JVD.   CVS:  RRR. S1 + S2. No M/R/G. No edema.  Resp: CTAB. No wheezing or crackles/rhonchi.  Abdomen: Soft, NT, ND,  Skin: Grossly nothing acute no obvious rashes  Neurological: Alert, Moves all extremities, no cranial nerve defects on limited exam  Extremities: Pulse 2+ in b/l LE. No cyanosis.     Data:  Laboratory studies personally reviewed by me:  Recent Results (from the past 24 hour(s))   EKG    Collection Time: 20  9:12 AM   Result Value Ref Range    Report       [facility]    Test Date:  2020  Pt Name:    TRISTAN LOVE          Department: ER  MRN:        [id number]                      Room:        02  Gender:     Male                         Technician: [id number]  :        1955                   Requested By:ER TRIAGE PROTOCOL  Order #:    [id number]                    Harmony MD: GILLIAN MURPHY" MD DIEGO    Measurements  Intervals                                Axis  Rate:       80                           P:          52  MI:         164                          QRS:        8  QRSD:       96                           T:          58  QT:         380  QTc:        439    Interpretive Statements  SINUS RHYTHM  Baseline artifact ST segment elevation versus baseline artifact inferior  leads  Compared to ECG 01/26/2017 10:15:22  ST (T wave) deviation now present  Baseline artifact  No arrhythmia  No STEMI  Electronically Signed On 9-2-2020 13:11:45 PDT by GILLIAN CORTEZ MD     CBC with Differential    Collection Time: 09/02/20  9:16 AM   Result Value Ref Range    WBC 6.2 4.8 - 10.8 K/uL    RBC 3.81 (L) 4.70 - 6.10 M/uL    Hemoglobin 11.3 (L) 14.0 - 18.0 g/dL    Hematocrit 35.8 (L) 42.0 - 52.0 %    MCV 94.0 81.4 - 97.8 fL    MCH 29.7 27.0 - 33.0 pg    MCHC 31.6 (L) 33.7 - 35.3 g/dL    RDW 51.7 (H) 35.9 - 50.0 fL    Platelet Count 137 (L) 164 - 446 K/uL    MPV 9.0 9.0 - 12.9 fL    Neutrophils-Polys 79.30 (H) 44.00 - 72.00 %    Lymphocytes 10.10 (L) 22.00 - 41.00 %    Monocytes 7.90 0.00 - 13.40 %    Eosinophils 1.90 0.00 - 6.90 %    Basophils 0.30 0.00 - 1.80 %    Immature Granulocytes 0.50 0.00 - 0.90 %    Nucleated RBC 0.00 /100 WBC    Neutrophils (Absolute) 4.93 1.82 - 7.42 K/uL    Lymphs (Absolute) 0.63 (L) 1.00 - 4.80 K/uL    Monos (Absolute) 0.49 0.00 - 0.85 K/uL    Eos (Absolute) 0.12 0.00 - 0.51 K/uL    Baso (Absolute) 0.02 0.00 - 0.12 K/uL    Immature Granulocytes (abs) 0.03 0.00 - 0.11 K/uL    NRBC (Absolute) 0.00 K/uL   Complete Metabolic Panel (CMP)    Collection Time: 09/02/20  9:16 AM   Result Value Ref Range    Sodium 137 135 - 145 mmol/L    Potassium 3.9 3.6 - 5.5 mmol/L    Chloride 95 (L) 96 - 112 mmol/L    Co2 32 20 - 33 mmol/L    Anion Gap 10.0 7.0 - 16.0    Glucose 136 (H) 65 - 99 mg/dL    Bun 30 (H) 8 - 22 mg/dL    Creatinine 4.24 (H) 0.50 - 1.40 mg/dL    Calcium 9.2 8.5 - 10.5 mg/dL     AST(SGOT) 10 (L) 12 - 45 U/L    ALT(SGPT) 14 2 - 50 U/L    Alkaline Phosphatase 84 30 - 99 U/L    Total Bilirubin 0.4 0.1 - 1.5 mg/dL    Albumin 4.1 3.2 - 4.9 g/dL    Total Protein 7.2 6.0 - 8.2 g/dL    Globulin 3.1 1.9 - 3.5 g/dL    A-G Ratio 1.3 g/dL   Troponin    Collection Time: 20  9:16 AM   Result Value Ref Range    Troponin T 20 (H) 6 - 19 ng/L   proBrain Natriuretic Peptide, NT    Collection Time: 20  9:16 AM   Result Value Ref Range    NT-proBNP 1920 (H) 0 - 125 pg/mL   LIPASE    Collection Time: 20  9:16 AM   Result Value Ref Range    Lipase 54 11 - 82 U/L   ESTIMATED GFR    Collection Time: 20  9:16 AM   Result Value Ref Range    GFR If  17 (A) >60 mL/min/1.73 m 2    GFR If Non  14 (A) >60 mL/min/1.73 m 2   Blood Culture,Hold    Collection Time: 20  9:16 AM   Result Value Ref Range    Blood Culture Hold Collected    Routine (COVID/SARS COV-2 In-House PCR up to 24 hours)    Collection Time: 20 12:31 PM    Specimen: Nasopharyngeal; Respirate   Result Value Ref Range    COVID Order Status Received    Troponin - STAT Once    Collection Time: 20 12:31 PM   Result Value Ref Range    Troponin T 20 (H) 6 - 19 ng/L   SARS-CoV-2, PCR (In-House)    Collection Time: 20 12:31 PM   Result Value Ref Range    SARS-CoV-2 Source NP Swab    EKG (NOW)    Collection Time: 20 12:55 PM   Result Value Ref Range    Report       [facility] Emergency Dept.    Test Date:  2020  Pt Name:    TRISTAN LOVE          Department: ER  MRN:        [id number]                      Room:        02  Gender:     Male                         Technician: [id number]  :        1955                   Requested By:GILLIAN CORTEZ  Order #:    [id number]                    Reading MD: GILLIAN CORTEZ MD    Measurements  Intervals                                Axis  Rate:       84                           P:          48  OR:          160                          QRS:        11  QRSD:       90                           T:          53  QT:         360  QTc:        426    Interpretive Statements  SINUS RHYTHM  ST ELEVATION SUGGESTS PERICARDITIS  Compared to ECG 09/02/2020 09:12:35  ST segment elevation leads I, 2, 3, aVF  Electronically Signed On 9-2-2020 13:12:39 PDT by GILLIAN CORTEZ MD         EKG : personally reviewed by me sinus, diffuse ST elevations possible pericarditis    All pertinent features of laboratory and imaging reviewed including primary images where applicable    PET MPI 05/2019  IMPRESSION:   1. Mild normal variant apical thinning. Otherwise normal myocardial  perfusion in all wall segments at rest and stress.  2. Normal left ventricular wall motion.  3. Ejection fraction of 55% at rest and 61% after stress.  4. Limited noncontrast CT is unremarkable.  5. Normal CFR of 3.23.    TTE 05/2019  SUMMARY:   1. Moderate concentric left ventricular hypertrophy.   2. The left ventricular diastolic function is indeterminate.   3. The left atrium is normal in size and structure.   4. The right atrium is normal in size and structure.    Active Problems:    Essential hypertension (Chronic) POA: Yes    ESRD (end stage renal disease) (HCC) (Chronic) POA: Yes      Overview: Has been on dialysis Mon Wed Fri      Pt awaiting for kidney transplant    Pain in the chest POA: Yes    Elevated brain natriuretic peptide (BNP) level POA: Yes  Resolved Problems:    * No resolved hospital problems. *      Assessment / Plan:  64 year old man with PMH HTN, HLD, ESRD on HD due to solitary kidney presents after waking with chest pain; behaving like pericarditis.    -troponin T flat at 20 despite decreased clearance from esrd  -steroidd, colchicine and high dose aspirin  -increase dialysis frequency temporarily  -check TTE  -continue to trend troponin and EKG. If echo and troponin should suggest alternate etiology will evaluate with C.    I  personally discussed his case with Dr Rosa.    It is my pleasure to participate in the care of Mr. Benites.  Please do not hesitate to contact me with questions or concerns.    Nico Hurtado MD  Cardiologist [facility]

## 2020-09-02 NOTE — PROGRESS NOTES
Needles removed from LUE AVF. Applied dry dressing and held pressure for 10 minutes. Noticed small clot to tip of needles upon removal. Dry gauze applied and taped. Verified with no bleeding prior to leaving patient's room.  +B/T.

## 2020-09-02 NOTE — ASSESSMENT & PLAN NOTE
Cardio consulted.  Probably combination between GI and pericarditis, started on colchicine/steroids per cardio will need taper dose.   Gi consulted esophagogram, egd.   prednisone taper (40mg for two weeks the taper by 10mg weekly until dose 20mg then taper by 5mg weekly), colchicine indefinately as was previously used for gout, and high dose aspirin for two weeks  S/p egd showing Esophagitis consisting of white exudate at the distal one-third of the esophagus, continue ppi and sucralfate, soft diet.

## 2020-09-03 ENCOUNTER — APPOINTMENT (OUTPATIENT)
Dept: RADIOLOGY | Facility: MEDICAL CENTER | Age: 65
End: 2020-09-03
Attending: INTERNAL MEDICINE
Payer: MEDICARE

## 2020-09-03 PROBLEM — E87.5 HYPERKALEMIA: Status: ACTIVE | Noted: 2020-09-03

## 2020-09-03 LAB
ANION GAP SERPL CALC-SCNC: 14 MMOL/L (ref 7–16)
BUN SERPL-MCNC: 47 MG/DL (ref 8–22)
CALCIUM SERPL-MCNC: 9.3 MG/DL (ref 8.5–10.5)
CHLORIDE SERPL-SCNC: 96 MMOL/L (ref 96–112)
CO2 SERPL-SCNC: 26 MMOL/L (ref 20–33)
CREAT SERPL-MCNC: 6.57 MG/DL (ref 0.5–1.4)
EKG IMPRESSION: NORMAL
ERYTHROCYTE [DISTWIDTH] IN BLOOD BY AUTOMATED COUNT: 51.9 FL (ref 35.9–50)
FERRITIN SERPL-MCNC: 698 NG/ML (ref 22–322)
GLUCOSE SERPL-MCNC: 135 MG/DL (ref 65–99)
HAV IGM SERPL QL IA: NORMAL
HBV CORE IGM SER QL: NORMAL
HBV SURFACE AB SERPL IA-ACNC: 8.3 MIU/ML (ref 0–10)
HBV SURFACE AG SER QL: NORMAL
HCT VFR BLD AUTO: 31.8 % (ref 42–52)
HCV AB SER QL: NORMAL
HGB BLD-MCNC: 9.9 G/DL (ref 14–18)
IRON SATN MFR SERPL: 7 % (ref 15–55)
IRON SERPL-MCNC: 15 UG/DL (ref 50–180)
LV EJECT FRACT  99904: 65
LV EJECT FRACT MOD 2C 99903: 55.26
LV EJECT FRACT MOD 4C 99902: 63.46
LV EJECT FRACT MOD BP 99901: 59.77
MCH RBC QN AUTO: 29.6 PG (ref 27–33)
MCHC RBC AUTO-ENTMCNC: 31.1 G/DL (ref 33.7–35.3)
MCV RBC AUTO: 95.2 FL (ref 81.4–97.8)
PLATELET # BLD AUTO: 127 K/UL (ref 164–446)
PMV BLD AUTO: 9.3 FL (ref 9–12.9)
POTASSIUM SERPL-SCNC: 5.8 MMOL/L (ref 3.6–5.5)
RBC # BLD AUTO: 3.34 M/UL (ref 4.7–6.1)
SODIUM SERPL-SCNC: 136 MMOL/L (ref 135–145)
TIBC SERPL-MCNC: 212 UG/DL (ref 250–450)
UIBC SERPL-MCNC: 197 UG/DL (ref 110–370)
WBC # BLD AUTO: 6.8 K/UL (ref 4.8–10.8)

## 2020-09-03 PROCEDURE — 700102 HCHG RX REV CODE 250 W/ 637 OVERRIDE(OP): Performed by: INTERNAL MEDICINE

## 2020-09-03 PROCEDURE — 80048 BASIC METABOLIC PNL TOTAL CA: CPT

## 2020-09-03 PROCEDURE — 93010 ELECTROCARDIOGRAM REPORT: CPT | Performed by: INTERNAL MEDICINE

## 2020-09-03 PROCEDURE — A9270 NON-COVERED ITEM OR SERVICE: HCPCS | Performed by: INTERNAL MEDICINE

## 2020-09-03 PROCEDURE — 99226 PR SUBSEQUENT OBSERVATION CARE,LEVEL III: CPT | Performed by: HOSPITALIST

## 2020-09-03 PROCEDURE — 74220 X-RAY XM ESOPHAGUS 1CNTRST: CPT

## 2020-09-03 PROCEDURE — G0378 HOSPITAL OBSERVATION PER HR: HCPCS

## 2020-09-03 PROCEDURE — 86706 HEP B SURFACE ANTIBODY: CPT

## 2020-09-03 PROCEDURE — 83550 IRON BINDING TEST: CPT

## 2020-09-03 PROCEDURE — 700111 HCHG RX REV CODE 636 W/ 250 OVERRIDE (IP): Performed by: INTERNAL MEDICINE

## 2020-09-03 PROCEDURE — 36415 COLL VENOUS BLD VENIPUNCTURE: CPT

## 2020-09-03 PROCEDURE — 85027 COMPLETE CBC AUTOMATED: CPT

## 2020-09-03 PROCEDURE — 90935 HEMODIALYSIS ONE EVALUATION: CPT

## 2020-09-03 PROCEDURE — 80074 ACUTE HEPATITIS PANEL: CPT

## 2020-09-03 PROCEDURE — 82728 ASSAY OF FERRITIN: CPT

## 2020-09-03 PROCEDURE — 83540 ASSAY OF IRON: CPT

## 2020-09-03 PROCEDURE — 99214 OFFICE O/P EST MOD 30 MIN: CPT | Performed by: INTERNAL MEDICINE

## 2020-09-03 PROCEDURE — 700117 HCHG RX CONTRAST REV CODE 255: Performed by: INTERNAL MEDICINE

## 2020-09-03 RX ADMIN — METOPROLOL SUCCINATE 100 MG: 100 TABLET, EXTENDED RELEASE ORAL at 16:50

## 2020-09-03 RX ADMIN — ACETAMINOPHEN 650 MG: 325 TABLET, FILM COATED ORAL at 10:47

## 2020-09-03 RX ADMIN — SEVELAMER CARBONATE 1600 MG: 800 TABLET, FILM COATED ORAL at 18:41

## 2020-09-03 RX ADMIN — PREDNISONE 40 MG: 20 TABLET ORAL at 05:47

## 2020-09-03 RX ADMIN — ASPIRIN 325 MG: 325 TABLET ORAL at 21:59

## 2020-09-03 RX ADMIN — ASPIRIN 325 MG: 325 TABLET ORAL at 05:47

## 2020-09-03 RX ADMIN — ASPIRIN 325 MG: 325 TABLET ORAL at 16:51

## 2020-09-03 RX ADMIN — BARIUM SULFATE 700 MG: 700 TABLET ORAL at 15:30

## 2020-09-03 ASSESSMENT — ENCOUNTER SYMPTOMS
STRIDOR: 0
ORTHOPNEA: 0
NAUSEA: 0
COUGH: 0
APNEA: 0
DEPRESSION: 0
DIZZINESS: 0
BRUISES/BLEEDS EASILY: 0
HEADACHES: 0
BLURRED VISION: 0
WHEEZING: 0
HEMOPTYSIS: 0
MYALGIAS: 0
PALPITATIONS: 0
BACK PAIN: 0
SHORTNESS OF BREATH: 0
CHOKING: 0
VOMITING: 0
CHILLS: 0
FEVER: 0
CHEST TIGHTNESS: 0
HEARTBURN: 0
DOUBLE VISION: 0

## 2020-09-03 ASSESSMENT — PAIN DESCRIPTION - PAIN TYPE
TYPE: ACUTE PAIN
TYPE: ACUTE PAIN

## 2020-09-03 NOTE — PROGRESS NOTES
Bedside report completed, Safety precautions in place and reinforced with patient. Patient verbalized understanding and questions answered at this time.

## 2020-09-03 NOTE — PROGRESS NOTES
Cardiology Follow Up Progress Note    Date of Service  9/3/2020    Attending Physician  GRICEL Stevens.*    Chief Complaint   Chest pain, dyspnea while getting hemodialysis, low blood pressure    AMANUEL Benites is a 64 y.o. male admitted 9/2/2020 with chest pain and dyspnea while on dialysis.      Past medical history significant for end-stage renal disease on hemodialysis Monday Wednesday Friday (due to solitary kidney), hypertension, hyperlipidemia  Interim Events    Labs reviewed  Potassium 5.8  NT proBNP on admission 1900  Creatinine 6.57  Sodium 136  No overnight cardiac event  Maintaining sinus rhythm  Vitals stable  12 blood pressure 117/60      Review of Systems  Review of Systems   Respiratory: Negative for apnea, cough, choking, chest tightness, shortness of breath, wheezing and stridor.    Cardiovascular: Negative for leg swelling.        Level of chest discomfort 1/10, improved significantly    10/10 on admit       Vital signs in last 24 hours  Temp:  [36.4 °C (97.6 °F)-37.1 °C (98.8 °F)] 36.5 °C (97.7 °F)  Pulse:  [76-87] 76  Resp:  [16-28] 16  BP: ()/(52-76) 117/62  SpO2:  [91 %-100 %] 94 %    Physical Exam  Physical Exam  Cardiovascular:      Rate and Rhythm: Normal rate and regular rhythm.   Pulmonary:      Effort: Pulmonary effort is normal.   Skin:     General: Skin is warm.      Comments: fistula   Neurological:      General: No focal deficit present.      Mental Status: He is alert.   Psychiatric:         Mood and Affect: Mood normal.         Lab Review  Lab Results   Component Value Date/Time    WBC 6.8 09/03/2020 02:40 AM    RBC 3.34 (L) 09/03/2020 02:40 AM    HEMOGLOBIN 9.9 (L) 09/03/2020 02:40 AM    HEMATOCRIT 31.8 (L) 09/03/2020 02:40 AM    MCV 95.2 09/03/2020 02:40 AM    MCH 29.6 09/03/2020 02:40 AM    MCHC 31.1 (L) 09/03/2020 02:40 AM    MPV 9.3 09/03/2020 02:40 AM      Lab Results   Component Value Date/Time    SODIUM 136 09/03/2020 02:40 AM    POTASSIUM 5.8 (H)  09/03/2020 02:40 AM    CHLORIDE 96 09/03/2020 02:40 AM    CO2 26 09/03/2020 02:40 AM    GLUCOSE 135 (H) 09/03/2020 02:40 AM    BUN 47 (H) 09/03/2020 02:40 AM    CREATININE 6.57 (HH) 09/03/2020 02:40 AM      Lab Results   Component Value Date/Time    ASTSGOT 10 (L) 09/02/2020 09:16 AM    ALTSGPT 14 09/02/2020 09:16 AM     Lab Results   Component Value Date/Time    CHOLSTRLTOT 177 05/08/2019 01:30 PM    LDL 78 05/08/2019 01:30 PM    HDL 33 (A) 05/08/2019 01:30 PM    TRIGLYCERIDE 331 (H) 05/08/2019 01:30 PM    TROPONINT 19 09/02/2020 10:00 PM       Recent Labs     09/02/20  0916   NTPROBNP 1920*       Cardiac Imaging and Procedures Review  EKG: Diffuse ST elevation possible pericarditis.    Echocardiogram:    9/2/2020  Normal regional wall motion  LVEF 65%  Small pericardial effusion with no evidence of hemodynamic compromise  RVSP 30 mmHg    Cardiac Catheterization: Not applicable    Imaging  Chest X-Ray: Findings consistent with vascular congestion/edema    Stress Test: Not applicable    Assessment/Plan    Chest pain, improved on the current medical therpay  -Troponin flat at 20 ng/L  -Echo reassuring, no wall motion abnormalities, small pericardial effusion no evidence of hemodynamic compromise.  -pericarditis based on symptoms and EKG  continue with  colchicine, aspirin, steroid    Elevated BNP, 1920 pg/ml  -plan for extra dialysis today    End-stage renal disease  -On hemodialysis Monday Wednesday Friday  -Solitary kidney  -K 5.8  -dialysis today    Hypertension  -On Toprol-XL  -Blood pressure well controlled    Anemia from chronic disease  -H/H = 10/31.8  -Platelet count  127      Reports difficulty with swallowing  -plan for GI consult and likey endoscopy today  -NPO  -Discussed with DR Vidal , cleared from cardiology to undergo endoscopy.        Appointment with our cardiology office in 2 to 3 weeks, will arrange.      Please contact me with any questions.    HARRIS Mo.   Cardiologist, [facility]  [phone]

## 2020-09-03 NOTE — PROGRESS NOTES
Hemodialysis ordered by Dr. Pulido. Treatment started at 1208 and ended at 1438. Pt stable, vss, no c/o post tx. See flow sheets for details. Net UF 2.0 L. Reported to FRITZ Youngblood RN. Lt ua avf dressing clean, dry, intact.

## 2020-09-03 NOTE — PROGRESS NOTES
Bedside report given to oncoming shift. Patient resting comfortably, NAD, safety precautions in place.

## 2020-09-03 NOTE — CARE PLAN
Problem: Knowledge Deficit  Goal: Patient/Significant other demonstrates understanding of disease process, treatment plan, medications and discharge instructions  Outcome: PROGRESSING AS EXPECTED     Problem: Pain  Goal: Alleviation of Pain or a reduction in pain to the patient's comfort goal  Outcome: PROGRESSING AS EXPECTED     Problem: Communication  Goal: The ability to communicate needs accurately and effectively will improve  Outcome: PROGRESSING AS EXPECTED     Problem: Safety  Goal: Will remain free from injury  Outcome: PROGRESSING AS EXPECTED  Goal: Will remain free from falls  Outcome: PROGRESSING AS EXPECTED     Problem: Pain Management  Goal: Pain level will decrease to patient's comfort goal  Outcome: PROGRESSING AS EXPECTED     Problem: Infection  Goal: Will remain free from infection  Outcome: PROGRESSING AS EXPECTED     Problem: Venous Thromboembolism (VTW)/Deep Vein Thrombosis (DVT) Prevention:  Goal: Patient will participate in Venous Thrombosis (VTE)/Deep Vein Thrombosis (DVT)Prevention Measures  Outcome: PROGRESSING AS EXPECTED     Problem: Psychosocial Needs:  Goal: Level of anxiety will decrease  Outcome: PROGRESSING AS EXPECTED     Problem: Fluid Volume:  Goal: Will maintain balanced intake and output  Outcome: PROGRESSING AS EXPECTED     Problem: Respiratory:  Goal: Respiratory status will improve  Outcome: PROGRESSING AS EXPECTED     Problem: Bowel/Gastric:  Goal: Normal bowel function is maintained or improved  Outcome: PROGRESSING AS EXPECTED  Goal: Will not experience complications related to bowel motility  Outcome: PROGRESSING AS EXPECTED     Problem: Skin Integrity  Goal: Risk for impaired skin integrity will decrease  Outcome: PROGRESSING AS EXPECTED     Problem: Mobility  Goal: Risk for activity intolerance will decrease  Outcome: PROGRESSING AS EXPECTED     Problem: Medication  Goal: Compliance with prescribed medication will improve  Outcome: PROGRESSING AS EXPECTED     Problem:  Knowledge Deficit  Goal: Knowledge of disease process/condition, treatment plan, diagnostic tests, and medications will improve  Outcome: PROGRESSING AS EXPECTED  Goal: Knowledge of the prescribed therapeutic regimen will improve  Outcome: PROGRESSING AS EXPECTED     Problem: Discharge Barriers/Planning  Goal: Patient's continuum of care needs will be met  Outcome: PROGRESSING AS EXPECTED

## 2020-09-03 NOTE — CONSULTS
DATE OF SERVICE:  09/03/2020    REASON FOR CONSULTATION:  Suspected noncardiac chest pain, esophageal   dysphagia.    REQUESTING PHYSICIAN:  Howard Nowak MD    HISTORY OF PRESENT ILLNESS:  Thank you very much for asking me to see this   very pleasant 64-year-old man who has a history of end-stage renal disease, on   hemodialysis.  He denies any local GI doctors.  He had a colonoscopy in [place].  He has a history of classic symptoms of GERD with burning chest   pain and chest pressure, especially with spicy foods.  This is usually   relieved by Tums.  Yesterday, he presented to the ER with severe exacerbation   of his chest pain.  He was admitted for a cardiac workup.  He notes that his   pain is worse with any p.o. intake and with deep breathing.  He also reports   that he has had intermittent esophageal dysphagia for several months, but over   the last few days, it has been significantly worse.    REVIEW OF SYSTEMS:  A 10-system review of systems performed by myself,   pertinent positives and negatives stated otherwise noncontributory.    PAST MEDICAL HISTORY:  1.  End-stage renal disease, on hemodialysis.  2.  GERD.  3.  Hypertension.  4.  Hyperlipidemia.  5.  Anxiety, depression.    PAST SURGICAL HISTORY:  1.  Orthopedic surgeries.  2.  AV fistula creation.    ADVERSE DRUG REACTIONS:  PENICILLINS AND CEPHALOSPORINS.    MEDICATIONS:  Reviewed by myself.  Please see the chart for detail.    SOCIAL HISTORY:  Quit smoking 23 years ago.  He does not have any issues with   alcohol.    FAMILY HISTORY:  Noncontributory.    LABORATORY DATA:  White count 6.8, hemoglobin 9.9, hematocrit 31.8, platelets   127.  Sodium 136, potassium 5.8, chloride 96, bicarb 26, BUN 47, creatinine   6.57, creatinine clearance 12.9, AST 10, ALT 14, alkaline phosphatase 84,   total bilirubin 0.4.  C-reactive protein elevated at 18.07 with an ESR of 55.    Troponin is not dramatically elevated, especially given his history of  renal   failure.  COVID test is negative.    DIAGNOSTIC DATA:  EKG shows no acute changes.    PHYSICAL EXAMINATION:  GENERAL:  He is in no distress, alert, oriented x3.  VITAL SIGNS:  Temperature 97.7, pulse of 76, respirations 16, blood pressure   117/62.  HEAD AND NECK:  No adenopathy.  Sclerae nonicteric.  Mucous membranes pink and   moist.  LUNGS:  Clear bilaterally.  HEART:  Sounds regular.  ABDOMEN:  Soft, nontender, nondistended.  NEUROLOGIC:  Grossly symmetric.    ASSESSMENT:  1.  Chest pain, suspect noncardiac.  2.  Esophageal dysphagia.  3.  Gastroesophageal reflux disease.  4.  Hyperkalemia.  5.  End-stage renal disease, on hemodialysis.  6.  Hypertension.  7.  Hyperlipidemia.  8.  Depression.    DISCUSSION:  He has been seen by cardiology who feels that he is low risk for   endoscopy from their standpoint, they will document this.  I suspect that he   likely has severe erosive esophagitis leading to his pain and esophageal   dysphagia; however, malignancy could cause a similar picture as well as other   pathology.    PLAN:  1.  Hemodialysis today to correct his potassium.  Appreciate nephrology   following.  2.  Full liquid diet now.  3.  NPO past midnight.  4.  Barium esophagram with tablet.  5.  Twice daily PPI therapy.  6.  Keep head of the bed elevated greater than 15 degrees.  7.  Cardiac clearance documentation please.  8.  EGD with dilation will be scheduled for 8:00 a.m. tomorrow with anesthesia   given his comorbidities.  9.  Further recommendations to follow.    Thank you very much for allowing me to participate in the care of this very   nice gentleman.  If you have any questions, please do not hesitate to call.       ____________________________________     MD PARTH TORIBIO / BENJAMIN    DD:  09/03/2020 11:02:50  DT:  09/03/2020 11:21:11    D#:  [id number]  Job#:  [id number]

## 2020-09-03 NOTE — CONSULTS
DATE OF SERVICE:  09/03/2020    NEPHROLOGY CONSULTATION    REASON FOR CONSULTATION:  This is nephrology consultation for evaluation and   management of end-stage renal disease.    REQUESTING PHYSICIAN:  From [facility].    HISTORY OF PRESENT ILLNESS:  The patient is a 64-year-old with history of   end-stage renal disease, on hemodialysis Monday, Wednesday, Friday;   hypertension, depression, renal osteodystrophy, anemia of chronic kidney   disease who presented to [facility] yesterday with chest   pain and shortness of breath.  He is admitted for workup for acute coronary   syndrome.  Nephrology was asked to see him for his ESRD and dialysis   management.  Patient's potassium this morning was 5.8.    REVIEW OF SYSTEMS:  CONSTITUTIONAL:  Negative for chills, fever and weight loss.  HENT:  Negative for congestion and nosebleeds.  EYES:  Negative for blurred vision, pain, discharge, or redness.  RESPIRATORY:  Negative for cough, sputum production, shortness of breath and   stridor.  CARDIOVASCULAR:  Positive for chest pain, but it is better this morning.    Negative for palpitations and orthopnea.  GASTROINTESTINAL:  Negative for abdominal pain, diarrhea, heartburn, nausea   and vomiting.  GENITOURINARY:  Negative for dysuria, frequency and urgency.  MUSCULOSKELETAL:  Negative for back pain, myalgias and neck pain.  SKIN:  Negative for itching and rash.  NEUROLOGIC:  Negative for dizziness, focal weakness, seizures, and headaches.  PSYCHIATRIC:  Negative for depression.  The patient is not nervous or anxious   and does not have insomnia.    PAST MEDICAL HISTORY:  1.  As mentioned above, ESRD, on hemodialysis Monday, Wednesday, Friday at   [facility].  2.  Renal osteodystrophy.  3.  Anemia of chronic kidney disease.  4.  Hypertension.  5.  Hyperlipidemia.  6.  Cataract issues.  7.  Depression/anxiety.    PAST SURGICAL HISTORY:  AV fistula creation in the left upper arm and    orthopedic surgery.    FAMILY HISTORY:  Unremarkable for kidney disease.    SOCIAL HISTORY:  He quit smoking 23 years ago.  Does not chew tobacco or use   smokeless tobacco.  Does not drink or use recreational drugs.    ALLERGIES:  CEPHALEXIN AND PENICILLIN.    MEDICATIONS:  His outpatient medications include Renaplex, Sensipar, Colcrys,   Toprol, Renvela, and vitamin D.    PHYSICAL EXAMINATION:  VITAL SIGNS:  Vitals revealing a blood pressure of 117/62 with a pulse of 76,   temperature 36.5.  GENERAL:  He is lying in bed, in no acute distress.  APPEARANCE:  Normal appearance.  HEENT:  Head, normocephalic and atraumatic.  Nose, no congestion or   rhinorrhea.  Eyes, extraocular muscles are intact.  Pupils round, equal, and   reactive to light.  NECK:  Supple.  No JVD, no thyromegaly.  Normal range of motion.  CARDIOVASCULAR:  Rate and rhythm is normal.  Pulses are normal.  PULMONARY:  Effort is normal.  No respiratory distress.  Breath sounds are   normal.  ABDOMEN:  Soft, nontender, nondistended.  Bowel sounds are present.  On   palpation, it is soft.  There is no tenderness as mentioned.  MUSCULOSKELETAL:  No swelling or tenderness.  SKIN:  With no evidence of rash, pruritus, erythema, or wounds.  NEUROLOGIC:  No focal deficits noted.  MENTAL STATUS:  He is alert, awake, oriented x3.    LABORATORY DATA:  His white count is 6.8, hemoglobin 9.9, platelet count 127.    Sodium 136, potassium 5.8, chloride 96, bicarb 26, glucose of 135, BUN and   creatinine of 147/6.5.    ASSESSMENT AND PLAN:  1.  End-stage renal disease, on hemodialysis Monday, Wednesday, Friday.  2.  Hyperkalemia.  3.  Anemia of chronic kidney disease.  4.  Renal osteodystrophy.  5.  Acute coronary syndrome.  Workup in progress.  6.  Hypertension.  7.  Hyperlipidemia.  8.  Depression/anxiety.    PLAN:  We will arrange for dialysis today for a short run 2.5 hours on a 2 K   bath for his hyperkalemia.  Avoid nephrotoxins.  Renal dose medications as    necessary.  Place him on a renal diet.  Check iron panel.    Thank you for allowing us to care for the patient.  We will follow him closely   with you.       ____________________________________     MD LAMONT FIELD / BENJAMIN    DD:  09/03/2020 09:44:30  DT:  09/03/2020 10:23:58    D#:  [id number]  Job#:  [id number]

## 2020-09-03 NOTE — CARE PLAN
Problem: Knowledge Deficit  Goal: Patient/Significant other demonstrates understanding of disease process, treatment plan, medications and discharge instructions  Outcome: PROGRESSING AS EXPECTED     Problem: Pain  Goal: Alleviation of Pain or a reduction in pain to the patient's comfort goal  Outcome: PROGRESSING AS EXPECTED     Problem: Communication  Goal: The ability to communicate needs accurately and effectively will improve  Outcome: PROGRESSING AS EXPECTED     Problem: Safety  Goal: Will remain free from injury  Outcome: PROGRESSING AS EXPECTED  Goal: Will remain free from falls  Outcome: PROGRESSING AS EXPECTED     Problem: Pain Management  Goal: Pain level will decrease to patient's comfort goal  Outcome: PROGRESSING AS EXPECTED     Problem: Infection  Goal: Will remain free from infection  Outcome: PROGRESSING AS EXPECTED     Problem: Venous Thromboembolism (VTW)/Deep Vein Thrombosis (DVT) Prevention:  Goal: Patient will participate in Venous Thrombosis (VTE)/Deep Vein Thrombosis (DVT)Prevention Measures  Outcome: PROGRESSING AS EXPECTED     Problem: Psychosocial Needs:  Goal: Level of anxiety will decrease  Outcome: PROGRESSING AS EXPECTED     Problem: Fluid Volume:  Goal: Will maintain balanced intake and output  Outcome: PROGRESSING AS EXPECTED     Problem: Respiratory:  Goal: Respiratory status will improve  Outcome: PROGRESSING AS EXPECTED     Problem: Bowel/Gastric:  Goal: Normal bowel function is maintained or improved  Outcome: PROGRESSING AS EXPECTED  Goal: Will not experience complications related to bowel motility  Outcome: PROGRESSING AS EXPECTED     Problem: Skin Integrity  Goal: Risk for impaired skin integrity will decrease  Outcome: PROGRESSING AS EXPECTED     Problem: Mobility  Goal: Risk for activity intolerance will decrease  Outcome: PROGRESSING AS EXPECTED     Problem: Medication  Goal: Compliance with prescribed medication will improve  Outcome: PROGRESSING AS EXPECTED     Problem:  Knowledge Deficit  Goal: Knowledge of disease process/condition, treatment plan, diagnostic tests, and medications will improve  Outcome: PROGRESSING AS EXPECTED  Goal: Knowledge of the prescribed therapeutic regimen will improve  Outcome: PROGRESSING AS EXPECTED

## 2020-09-03 NOTE — PROGRESS NOTES
Patient c/o chest pain returning, describes it as feeling like heart burn. Notified on call hospitalist, will draw repeat troponin and notify if higher than previous.    2316 notified hospitalist that troponin was unchanged from previous lab draw at 1900, no further orders at this time and will continue to monitor.

## 2020-09-03 NOTE — PROGRESS NOTES
[facility] Medicine Daily Progress Note    Date of Service  9/3/2020    Chief Complaint  64 y.o. male admitted 9/2/2020 with chest pain      Interval Problem Update  Patient is resting in bed, no fever or chills no n/v/d/c, chest pain is improved today.  Cardio, evaluated patient echo Small pericardial effusion without evidence of hemodynamic compromise, GI consulted due to patient with swallowing and food getting stuck, nephro consulted for HD.     Consultants/Specialty  Cardio  Gi  nephro    Code Status  Full Code    Disposition  Tbd.     Review of Systems  Review of Systems   Constitutional: Negative for chills and fever.   HENT: Negative for congestion and nosebleeds.    Eyes: Negative for blurred vision and double vision.   Respiratory: Negative for cough and hemoptysis.    Cardiovascular: Positive for chest pain. Negative for palpitations and orthopnea.   Gastrointestinal: Negative for heartburn, nausea and vomiting.   Genitourinary: Negative for dysuria and urgency.   Musculoskeletal: Negative for back pain and myalgias.   Skin: Negative for rash.   Neurological: Negative for dizziness and headaches.   Endo/Heme/Allergies: Does not bruise/bleed easily.   Psychiatric/Behavioral: Negative for depression.        Physical Exam  Temp:  [36.4 °C (97.6 °F)-37.1 °C (98.8 °F)] 36.5 °C (97.7 °F)  Pulse:  [76-87] 76  Resp:  [16-20] 16  BP: ()/(52-67) 117/62  SpO2:  [91 %-97 %] 94 %    Physical Exam  Vitals signs and nursing note reviewed.   Constitutional:       Appearance: Normal appearance.   HENT:      Head: Normocephalic and atraumatic.      Nose: Nose normal.      Mouth/Throat:      Mouth: Mucous membranes are moist.      Pharynx: Oropharynx is clear.   Eyes:      General:         Right eye: No discharge.         Left eye: No discharge.      Conjunctiva/sclera: Conjunctivae normal.      Pupils: Pupils are equal, round, and reactive to light.   Neck:      Musculoskeletal: Normal range of motion and neck supple.    Cardiovascular:      Rate and Rhythm: Normal rate and regular rhythm.      Pulses: Normal pulses.      Heart sounds: Normal heart sounds.   Pulmonary:      Effort: Pulmonary effort is normal. No respiratory distress.      Breath sounds: Normal breath sounds.   Abdominal:      General: Bowel sounds are normal. There is no distension.      Palpations: Abdomen is soft.      Tenderness: There is no abdominal tenderness. There is no guarding.   Musculoskeletal: Normal range of motion.   Skin:     General: Skin is warm.      Capillary Refill: Capillary refill takes less than 2 seconds.   Neurological:      General: No focal deficit present.      Mental Status: He is alert and oriented to person, place, and time.      Cranial Nerves: No cranial nerve deficit.   Psychiatric:         Mood and Affect: Mood normal.         Fluids    Intake/Output Summary (Last 24 hours) at 9/3/2020 1135  Last data filed at 9/2/2020 1441  Gross per 24 hour   Intake --   Output 100 ml   Net -100 ml       Laboratory  Recent Labs     09/02/20  0916 09/03/20  0240   WBC 6.2 6.8   RBC 3.81* 3.34*   HEMOGLOBIN 11.3* 9.9*   HEMATOCRIT 35.8* 31.8*   MCV 94.0 95.2   MCH 29.7 29.6   MCHC 31.6* 31.1*   RDW 51.7* 51.9*   PLATELETCT 137* 127*   MPV 9.0 9.3     Recent Labs     09/02/20  0916 09/03/20  0240   SODIUM 137 136   POTASSIUM 3.9 5.8*   CHLORIDE 95* 96   CO2 32 26   GLUCOSE 136* 135*   BUN 30* 47*   CREATININE 4.24* 6.57*   CALCIUM 9.2 9.3                   Imaging  EC-ECHOCARDIOGRAM COMPLETE W/O CONT   Final Result      DX-CHEST-PORTABLE (1 VIEW)   Final Result      1.  There are findings consistent with vascular congestion/edema.      DX-ESOPHAGUS BARIUM SWALLOW SINGLE CONTRAST    (Results Pending)        Assessment/Plan  Hyperkalemia  Assessment & Plan  K 5.8  Will need HD today. nephro consulted.     Elevated brain natriuretic peptide (BNP) level- (present on admission)  Assessment & Plan  Related to ESRD    Pain in the chest- (present on  admission)  Assessment & Plan  Cardio consulted.  Probably combination between GI and pericarditis, started on colchicine/steroids per cardio will need taper dose.   Gi consulted possible esophagogram vs egd.   prednisone taper (40mg for two weeks the taper by 10mg weekly until dose 20mg then taper by 5mg weekly), colchicine indefinately as was previously used for gout, and high dose aspirin for two weeks    ESRD (end stage renal disease) (HCC)- (present on admission)  Assessment & Plan  On HD MWF  nephro consulted, hyperkalemia today.   Will need HD.     Essential hypertension- (present on admission)  Assessment & Plan  Stable, continue metoprolol.          VTE prophylaxis: scd

## 2020-09-04 ENCOUNTER — ANESTHESIA EVENT (OUTPATIENT)
Dept: SURGERY | Facility: MEDICAL CENTER | Age: 65
End: 2020-09-04
Payer: MEDICARE

## 2020-09-04 ENCOUNTER — ANESTHESIA (OUTPATIENT)
Dept: SURGERY | Facility: MEDICAL CENTER | Age: 65
End: 2020-09-04
Payer: MEDICARE

## 2020-09-04 VITALS
SYSTOLIC BLOOD PRESSURE: 120 MMHG | RESPIRATION RATE: 16 BRPM | HEIGHT: 70 IN | BODY MASS INDEX: 28.82 KG/M2 | OXYGEN SATURATION: 97 % | HEART RATE: 80 BPM | TEMPERATURE: 97.1 F | WEIGHT: 201.28 LBS | DIASTOLIC BLOOD PRESSURE: 63 MMHG

## 2020-09-04 PROBLEM — R07.9 PAIN IN THE CHEST: Status: RESOLVED | Noted: 2020-09-02 | Resolved: 2020-09-04

## 2020-09-04 PROBLEM — R79.89 ELEVATED BRAIN NATRIURETIC PEPTIDE (BNP) LEVEL: Status: RESOLVED | Noted: 2020-09-02 | Resolved: 2020-09-04

## 2020-09-04 PROBLEM — E87.5 HYPERKALEMIA: Status: RESOLVED | Noted: 2020-09-03 | Resolved: 2020-09-04

## 2020-09-04 LAB
ANION GAP SERPL CALC-SCNC: 16 MMOL/L (ref 7–16)
BASOPHILS # BLD AUTO: 0.3 % (ref 0–1.8)
BASOPHILS # BLD: 0.03 K/UL (ref 0–0.12)
BUN SERPL-MCNC: 46 MG/DL (ref 8–22)
CALCIUM SERPL-MCNC: 9.7 MG/DL (ref 8.5–10.5)
CHLORIDE SERPL-SCNC: 90 MMOL/L (ref 96–112)
CO2 SERPL-SCNC: 27 MMOL/L (ref 20–33)
CREAT SERPL-MCNC: 6.37 MG/DL (ref 0.5–1.4)
EOSINOPHIL # BLD AUTO: 0.11 K/UL (ref 0–0.51)
EOSINOPHIL NFR BLD: 1.1 % (ref 0–6.9)
ERYTHROCYTE [DISTWIDTH] IN BLOOD BY AUTOMATED COUNT: 50.7 FL (ref 35.9–50)
GLUCOSE SERPL-MCNC: 91 MG/DL (ref 65–99)
HCT VFR BLD AUTO: 36.1 % (ref 42–52)
HGB BLD-MCNC: 11.3 G/DL (ref 14–18)
IMM GRANULOCYTES # BLD AUTO: 0.05 K/UL (ref 0–0.11)
IMM GRANULOCYTES NFR BLD AUTO: 0.5 % (ref 0–0.9)
LYMPHOCYTES # BLD AUTO: 1.5 K/UL (ref 1–4.8)
LYMPHOCYTES NFR BLD: 14.8 % (ref 22–41)
MCH RBC QN AUTO: 29 PG (ref 27–33)
MCHC RBC AUTO-ENTMCNC: 31.3 G/DL (ref 33.7–35.3)
MCV RBC AUTO: 92.8 FL (ref 81.4–97.8)
MONOCYTES # BLD AUTO: 0.94 K/UL (ref 0–0.85)
MONOCYTES NFR BLD AUTO: 9.3 % (ref 0–13.4)
NEUTROPHILS # BLD AUTO: 7.51 K/UL (ref 1.82–7.42)
NEUTROPHILS NFR BLD: 74 % (ref 44–72)
NRBC # BLD AUTO: 0 K/UL
NRBC BLD-RTO: 0 /100 WBC
PATHOLOGY CONSULT NOTE: NORMAL
PLATELET # BLD AUTO: 179 K/UL (ref 164–446)
PMV BLD AUTO: 9.4 FL (ref 9–12.9)
POTASSIUM SERPL-SCNC: 4.5 MMOL/L (ref 3.6–5.5)
RBC # BLD AUTO: 3.89 M/UL (ref 4.7–6.1)
SODIUM SERPL-SCNC: 133 MMOL/L (ref 135–145)
WBC # BLD AUTO: 10.1 K/UL (ref 4.8–10.8)

## 2020-09-04 PROCEDURE — A9270 NON-COVERED ITEM OR SERVICE: HCPCS | Performed by: INTERNAL MEDICINE

## 2020-09-04 PROCEDURE — 160203 HCHG ENDO MINUTES - 1ST 30 MINS LEVEL 4: Performed by: INTERNAL MEDICINE

## 2020-09-04 PROCEDURE — 99225 PR SUBSEQUENT OBSERVATION CARE,LEVEL II: CPT | Performed by: HOSPITALIST

## 2020-09-04 PROCEDURE — 700102 HCHG RX REV CODE 250 W/ 637 OVERRIDE(OP): Performed by: INTERNAL MEDICINE

## 2020-09-04 PROCEDURE — 88305 TISSUE EXAM BY PATHOLOGIST: CPT

## 2020-09-04 PROCEDURE — 99217 PR OBSERVATION CARE DISCHARGE: CPT | Performed by: HOSPITALIST

## 2020-09-04 PROCEDURE — 700102 HCHG RX REV CODE 250 W/ 637 OVERRIDE(OP): Performed by: HOSPITALIST

## 2020-09-04 PROCEDURE — 700111 HCHG RX REV CODE 636 W/ 250 OVERRIDE (IP): Performed by: INTERNAL MEDICINE

## 2020-09-04 PROCEDURE — 160048 HCHG OR STATISTICAL LEVEL 1-5: Performed by: INTERNAL MEDICINE

## 2020-09-04 PROCEDURE — 160035 HCHG PACU - 1ST 60 MINS PHASE I: Performed by: INTERNAL MEDICINE

## 2020-09-04 PROCEDURE — 700101 HCHG RX REV CODE 250: Performed by: ANESTHESIOLOGY

## 2020-09-04 PROCEDURE — 80048 BASIC METABOLIC PNL TOTAL CA: CPT

## 2020-09-04 PROCEDURE — G0378 HOSPITAL OBSERVATION PER HR: HCPCS

## 2020-09-04 PROCEDURE — 90935 HEMODIALYSIS ONE EVALUATION: CPT

## 2020-09-04 PROCEDURE — A9270 NON-COVERED ITEM OR SERVICE: HCPCS | Performed by: HOSPITALIST

## 2020-09-04 PROCEDURE — 700111 HCHG RX REV CODE 636 W/ 250 OVERRIDE (IP): Performed by: ANESTHESIOLOGY

## 2020-09-04 PROCEDURE — 160009 HCHG ANES TIME/MIN: Performed by: INTERNAL MEDICINE

## 2020-09-04 PROCEDURE — 99214 OFFICE O/P EST MOD 30 MIN: CPT | Performed by: INTERNAL MEDICINE

## 2020-09-04 PROCEDURE — 160002 HCHG RECOVERY MINUTES (STAT): Performed by: INTERNAL MEDICINE

## 2020-09-04 PROCEDURE — 160208 HCHG ENDO MINUTES - EA ADDL 1 MIN LEVEL 4: Performed by: INTERNAL MEDICINE

## 2020-09-04 PROCEDURE — 85025 COMPLETE CBC W/AUTO DIFF WBC: CPT

## 2020-09-04 RX ORDER — HYDROMORPHONE HYDROCHLORIDE 1 MG/ML
0.2 INJECTION, SOLUTION INTRAMUSCULAR; INTRAVENOUS; SUBCUTANEOUS
Status: DISCONTINUED | OUTPATIENT
Start: 2020-09-04 | End: 2020-09-04 | Stop reason: HOSPADM

## 2020-09-04 RX ORDER — MEPERIDINE HYDROCHLORIDE 25 MG/ML
12.5 INJECTION INTRAMUSCULAR; INTRAVENOUS; SUBCUTANEOUS
Status: DISCONTINUED | OUTPATIENT
Start: 2020-09-04 | End: 2020-09-04 | Stop reason: HOSPADM

## 2020-09-04 RX ORDER — OXYCODONE HCL 5 MG/5 ML
5 SOLUTION, ORAL ORAL
Status: DISCONTINUED | OUTPATIENT
Start: 2020-09-04 | End: 2020-09-04 | Stop reason: HOSPADM

## 2020-09-04 RX ORDER — COLCHICINE 0.6 MG/1
0.6 TABLET ORAL
Qty: 15 TAB | Refills: 0 | Status: SHIPPED | OUTPATIENT
Start: 2020-09-04 | End: 2020-10-04

## 2020-09-04 RX ORDER — MIDAZOLAM HYDROCHLORIDE 1 MG/ML
INJECTION INTRAMUSCULAR; INTRAVENOUS PRN
Status: DISCONTINUED | OUTPATIENT
Start: 2020-09-04 | End: 2020-09-04 | Stop reason: SURG

## 2020-09-04 RX ORDER — LIDOCAINE HYDROCHLORIDE 20 MG/ML
INJECTION, SOLUTION EPIDURAL; INFILTRATION; INTRACAUDAL; PERINEURAL PRN
Status: DISCONTINUED | OUTPATIENT
Start: 2020-09-04 | End: 2020-09-04 | Stop reason: SURG

## 2020-09-04 RX ORDER — DEXAMETHASONE SODIUM PHOSPHATE 4 MG/ML
INJECTION, SOLUTION INTRA-ARTICULAR; INTRALESIONAL; INTRAMUSCULAR; INTRAVENOUS; SOFT TISSUE PRN
Status: DISCONTINUED | OUTPATIENT
Start: 2020-09-04 | End: 2020-09-04 | Stop reason: SURG

## 2020-09-04 RX ORDER — SUCRALFATE ORAL 1 G/10ML
1 SUSPENSION ORAL EVERY 6 HOURS
Qty: 1200 ML | Refills: 0 | Status: SHIPPED | OUTPATIENT
Start: 2020-09-04 | End: 2020-10-04

## 2020-09-04 RX ORDER — ONDANSETRON 2 MG/ML
INJECTION INTRAMUSCULAR; INTRAVENOUS PRN
Status: DISCONTINUED | OUTPATIENT
Start: 2020-09-04 | End: 2020-09-04 | Stop reason: SURG

## 2020-09-04 RX ORDER — HALOPERIDOL 5 MG/ML
1 INJECTION INTRAMUSCULAR
Status: DISCONTINUED | OUTPATIENT
Start: 2020-09-04 | End: 2020-09-04 | Stop reason: HOSPADM

## 2020-09-04 RX ORDER — HYDROMORPHONE HYDROCHLORIDE 1 MG/ML
0.1 INJECTION, SOLUTION INTRAMUSCULAR; INTRAVENOUS; SUBCUTANEOUS
Status: DISCONTINUED | OUTPATIENT
Start: 2020-09-04 | End: 2020-09-04 | Stop reason: HOSPADM

## 2020-09-04 RX ORDER — METOPROLOL TARTRATE 1 MG/ML
1 INJECTION, SOLUTION INTRAVENOUS
Status: DISCONTINUED | OUTPATIENT
Start: 2020-09-04 | End: 2020-09-04 | Stop reason: HOSPADM

## 2020-09-04 RX ORDER — ASPIRIN 325 MG
325 TABLET ORAL EVERY 8 HOURS
Qty: 42 TAB | Refills: 0 | Status: SHIPPED | OUTPATIENT
Start: 2020-09-04 | End: 2020-09-18

## 2020-09-04 RX ORDER — SUCRALFATE ORAL 1 G/10ML
1 SUSPENSION ORAL EVERY 6 HOURS
Status: DISCONTINUED | OUTPATIENT
Start: 2020-09-04 | End: 2020-09-04 | Stop reason: HOSPADM

## 2020-09-04 RX ORDER — KETAMINE HYDROCHLORIDE 50 MG/ML
INJECTION, SOLUTION INTRAMUSCULAR; INTRAVENOUS PRN
Status: DISCONTINUED | OUTPATIENT
Start: 2020-09-04 | End: 2020-09-04 | Stop reason: SURG

## 2020-09-04 RX ORDER — OXYCODONE HCL 5 MG/5 ML
10 SOLUTION, ORAL ORAL
Status: DISCONTINUED | OUTPATIENT
Start: 2020-09-04 | End: 2020-09-04 | Stop reason: HOSPADM

## 2020-09-04 RX ORDER — MIDAZOLAM HYDROCHLORIDE 1 MG/ML
1 INJECTION INTRAMUSCULAR; INTRAVENOUS
Status: DISCONTINUED | OUTPATIENT
Start: 2020-09-04 | End: 2020-09-04 | Stop reason: HOSPADM

## 2020-09-04 RX ORDER — PREDNISONE 10 MG/1
TABLET ORAL
Qty: 102 TAB | Refills: 0 | Status: SHIPPED | OUTPATIENT
Start: 2020-09-04 | End: 2020-10-15

## 2020-09-04 RX ORDER — OMEPRAZOLE 20 MG/1
20 CAPSULE, DELAYED RELEASE ORAL 2 TIMES DAILY
Status: DISCONTINUED | OUTPATIENT
Start: 2020-09-04 | End: 2020-09-04 | Stop reason: HOSPADM

## 2020-09-04 RX ORDER — GLYCOPYRROLATE 0.2 MG/ML
INJECTION INTRAMUSCULAR; INTRAVENOUS PRN
Status: DISCONTINUED | OUTPATIENT
Start: 2020-09-04 | End: 2020-09-04 | Stop reason: SURG

## 2020-09-04 RX ORDER — SODIUM CHLORIDE, SODIUM LACTATE, POTASSIUM CHLORIDE, CALCIUM CHLORIDE 600; 310; 30; 20 MG/100ML; MG/100ML; MG/100ML; MG/100ML
INJECTION, SOLUTION INTRAVENOUS CONTINUOUS
Status: DISCONTINUED | OUTPATIENT
Start: 2020-09-04 | End: 2020-09-04 | Stop reason: HOSPADM

## 2020-09-04 RX ORDER — ONDANSETRON 2 MG/ML
4 INJECTION INTRAMUSCULAR; INTRAVENOUS
Status: DISCONTINUED | OUTPATIENT
Start: 2020-09-04 | End: 2020-09-04 | Stop reason: HOSPADM

## 2020-09-04 RX ORDER — DIPHENHYDRAMINE HYDROCHLORIDE 50 MG/ML
12.5 INJECTION INTRAMUSCULAR; INTRAVENOUS
Status: DISCONTINUED | OUTPATIENT
Start: 2020-09-04 | End: 2020-09-04 | Stop reason: HOSPADM

## 2020-09-04 RX ORDER — HEPARIN SODIUM 1000 [USP'U]/ML
1500 INJECTION, SOLUTION INTRAVENOUS; SUBCUTANEOUS
Status: DISCONTINUED | OUTPATIENT
Start: 2020-09-04 | End: 2020-09-04 | Stop reason: HOSPADM

## 2020-09-04 RX ORDER — HYDROMORPHONE HYDROCHLORIDE 1 MG/ML
0.4 INJECTION, SOLUTION INTRAMUSCULAR; INTRAVENOUS; SUBCUTANEOUS
Status: DISCONTINUED | OUTPATIENT
Start: 2020-09-04 | End: 2020-09-04 | Stop reason: HOSPADM

## 2020-09-04 RX ORDER — OMEPRAZOLE 20 MG/1
20 CAPSULE, DELAYED RELEASE ORAL 2 TIMES DAILY
Qty: 60 CAP | Refills: 0 | Status: SHIPPED | OUTPATIENT
Start: 2020-09-04 | End: 2020-09-17 | Stop reason: SDUPTHER

## 2020-09-04 RX ADMIN — OMEPRAZOLE 20 MG: 20 CAPSULE, DELAYED RELEASE ORAL at 15:48

## 2020-09-04 RX ADMIN — LIDOCAINE HYDROCHLORIDE 40 MG: 20 INJECTION, SOLUTION EPIDURAL; INFILTRATION; INTRACAUDAL at 08:03

## 2020-09-04 RX ADMIN — DEXAMETHASONE SODIUM PHOSPHATE 4 MG: 4 INJECTION, SOLUTION INTRA-ARTICULAR; INTRALESIONAL; INTRAMUSCULAR; INTRAVENOUS; SOFT TISSUE at 08:03

## 2020-09-04 RX ADMIN — PROPOFOL 200 MCG/KG/MIN: 10 INJECTION, EMULSION INTRAVENOUS at 08:03

## 2020-09-04 RX ADMIN — SEVELAMER CARBONATE 1600 MG: 800 TABLET, FILM COATED ORAL at 15:48

## 2020-09-04 RX ADMIN — SEVELAMER CARBONATE 1600 MG: 800 TABLET, FILM COATED ORAL at 09:58

## 2020-09-04 RX ADMIN — FENTANYL CITRATE 100 MCG: 50 INJECTION INTRAMUSCULAR; INTRAVENOUS at 08:03

## 2020-09-04 RX ADMIN — PROPOFOL 150 MG: 10 INJECTION, EMULSION INTRAVENOUS at 08:03

## 2020-09-04 RX ADMIN — GLYCOPYRROLATE 0.2 MG: 0.2 INJECTION INTRAMUSCULAR; INTRAVENOUS at 08:03

## 2020-09-04 RX ADMIN — ONDANSETRON 4 MG: 2 INJECTION INTRAMUSCULAR; INTRAVENOUS at 08:03

## 2020-09-04 RX ADMIN — PREDNISONE 40 MG: 20 TABLET ORAL at 15:52

## 2020-09-04 RX ADMIN — SUCRALFATE 1 G: 1 SUSPENSION ORAL at 15:49

## 2020-09-04 RX ADMIN — KETAMINE HYDROCHLORIDE 50 MG: 50 INJECTION INTRAMUSCULAR; INTRAVENOUS at 08:03

## 2020-09-04 RX ADMIN — ASPIRIN 325 MG: 325 TABLET ORAL at 15:47

## 2020-09-04 RX ADMIN — MIDAZOLAM HYDROCHLORIDE 2 MG: 1 INJECTION, SOLUTION INTRAMUSCULAR; INTRAVENOUS at 08:03

## 2020-09-04 RX ADMIN — HEPARIN SODIUM 1500 UNITS: 1000 INJECTION, SOLUTION INTRAVENOUS; SUBCUTANEOUS at 14:35

## 2020-09-04 ASSESSMENT — ENCOUNTER SYMPTOMS
HEARTBURN: 0
CHILLS: 0
WHEEZING: 0
MYALGIAS: 0
STRIDOR: 0
DIZZINESS: 0
BACK PAIN: 0
EYES NEGATIVE: 1
DOUBLE VISION: 0
CHEST TIGHTNESS: 0
DEPRESSION: 0
ORTHOPNEA: 0
APNEA: 0
MUSCULOSKELETAL NEGATIVE: 1
NEUROLOGICAL NEGATIVE: 1
VOMITING: 0
RESPIRATORY NEGATIVE: 1
CONSTITUTIONAL NEGATIVE: 1
BRUISES/BLEEDS EASILY: 0
BLURRED VISION: 0
HEMOPTYSIS: 0
SHORTNESS OF BREATH: 0
CARDIOVASCULAR NEGATIVE: 1
CHOKING: 0
PSYCHIATRIC NEGATIVE: 1
GASTROINTESTINAL NEGATIVE: 1
HEADACHES: 0
NAUSEA: 0
COUGH: 0
FEVER: 0

## 2020-09-04 ASSESSMENT — PAIN SCALES - GENERAL: PAIN_LEVEL: 2

## 2020-09-04 ASSESSMENT — PAIN DESCRIPTION - PAIN TYPE: TYPE: ACUTE PAIN

## 2020-09-04 ASSESSMENT — FIBROSIS 4 INDEX: FIB4 SCORE: 0.96

## 2020-09-04 NOTE — OR SURGEON
Immediate Post OP Note    PreOp Diagnosis: Esophageal dysphagia, non-cardiac chest pain    PostOp Diagnosis: Same    Procedure(s):  GASTROSCOPY- WITH DILATION - Wound Class: Clean Contaminated  GASTROSCOPY, WITH BIOPSY - Wound Class: Clean Contaminated    Surgeon(s):  Gabe West M.D.    Anesthesiologist/Type of Anesthesia:  Anesthesiologist: Moi Valencia M.D./General    Surgical Staff:  Endoscopy Technician: Sheila Ferguson  Endoscopy Nurse: Radha Lance R.N.    Specimens removed if any:  ID Type Source Tests Collected by Time Destination   A : distal esophagus  Tissue Esophagus PATHOLOGY SPECIMEN Gabe West M.D. 9/4/2020  8:14 AM        Dr. West  [facility]  [facility]  [phone]    EGD with:  Biopsy     Esophageal dilation    Indication:  Esophageal dysphagia, non-cardiac chest pain    Sedation:  GA (Erik)    Findings:   EGD    Esophagus     Presbyesophagus     Esophagitis      White exudate and mucosal sloughing in distal 1/3      Biopsied     54F Savary dilator passed without mucosal break    Stomach     Hiatal hernia - 40-42cm     Unremarkable mucosa    Duodenum     Unremarkable mucosa    Plan:   Twice daily PPI therapy (PO okay)     Sucralfate suspension qid ac/hs x 1 week     Keep head of bed elevated 3-6 inches     Soft diet     Follow up pathology     Follow up as outpatient 2-12 weeks     **  GI WILL SIGN OFF / STAND BY - PLEASE CALL IF ANY CONCERNS  **      9/4/2020 8:21 AM Gabe West M.D.

## 2020-09-04 NOTE — ANESTHESIA QCDR
2019 [facility] Clinical Data Registry (for Quality Improvement)     Postoperative nausea/vomiting risk protocol (Adult = 18 yrs and Pediatric 3-17 yrs)- (430 and 463)  General inhalation anesthetic (NOT TIVA) with PONV risk factors: No  Provision of anti-emetic therapy with at least 2 different classes of agents: N/A  Patient DID NOT receive anti-emetic therapy and reason is documented in Medical Record: N/A    Multimodal Pain Management- (477)  Non-emergent surgery AND patient age >= 18: Yes  Use of Multimodal Pain Management, two or more drugs and/or interventions, NOT including systemic opioids: No  Exception: Documented allergy to multiple classes of analgesics: No       Smoking Abstinence (404)  Patient is current smoker (cigarette, pipe, e-cig, marijuanna): No  Elective Surgery:   Abstinence instructions provided prior to day of surgery:   Patient abstained from smoking on day of surgery:     Pre-Op Beta-Blocker in Isolated CABG (44)  Isolated CABG AND patient age >= 18: No  Beta-blocker admin within 24 hours of surgical incision:   Exception:of medical reason(s) for not administering beta blocker within 24 hours prior to surgical incision (e.g., not  indicated,other medical reason):     PACU assessment of acute postoperative pain prior to Anesthesia Care End- Applies to Patients Age = 18- (ABG7)  Initial PACU pain score is which of the following: < 7/10  Patient unable to report pain score: N/A    Post-anesthetic transfer of care checklist/protocol to PACU/ICU- (426 and 427)  Upon conclusion of case, patient transferred to which of the following locations: PACU/Non-ICU  Use of transfer checklist/protocol: Yes  Exclusion: Service Performed in Patient Hospital Room (and thus did not require transfer): N/A  Unplanned admission to ICU related to anesthesia service up through end of PACU care- (MD51)  Unplanned admission to ICU (not initially anticipated at anesthesia start time): No

## 2020-09-04 NOTE — PROGRESS NOTES
Cardiology Follow Up Progress Note    Date of Service  9/4/2020    Attending Physician  GRICEL Stevens.*    Chief Complaint   Chest pain, dyspnea while getting hemodialysis, low blood pressure    AMANUEL Benites is a 64 y.o. male admitted 9/2/2020 with chest pain and dyspnea while on dialysis.      Past medical history significant for end-stage renal disease on hemodialysis Monday Wednesday Friday (due to solitary kidney), hypertension, hyperlipidemia  Interim Events    Labs reviewed  Hyperkalemia, resolved  NT proBNP on admission 1900  Creatinine 6.37, HD  Sodium 133  No overnight cardiac event  Maintaining sinus rhythm  Vitals stable  /60  Underwent EGD with dilation and biopsy      Review of Systems  Review of Systems   Respiratory: Negative for apnea, cough, choking, chest tightness, shortness of breath, wheezing and stridor.    Cardiovascular: Negative for leg swelling.        Chest pain free           Vital signs in last 24 hours  Temp:  [36 °C (96.8 °F)-37.5 °C (99.5 °F)] 36 °C (96.8 °F)  Pulse:  [73-99] 87  Resp:  [14-20] 16  BP: ()/(49-70) 107/60  SpO2:  [93 %-100 %] 98 %    Physical Exam  Physical Exam  Cardiovascular:      Rate and Rhythm: Normal rate and regular rhythm.   Pulmonary:      Effort: Pulmonary effort is normal.   Skin:     General: Skin is warm.      Comments: fistula   Neurological:      General: No focal deficit present.      Mental Status: He is alert.   Psychiatric:         Mood and Affect: Mood normal.         Lab Review  Lab Results   Component Value Date/Time    WBC 10.1 09/04/2020 05:13 AM    RBC 3.89 (L) 09/04/2020 05:13 AM    HEMOGLOBIN 11.3 (L) 09/04/2020 05:13 AM    HEMATOCRIT 36.1 (L) 09/04/2020 05:13 AM    MCV 92.8 09/04/2020 05:13 AM    MCH 29.0 09/04/2020 05:13 AM    MCHC 31.3 (L) 09/04/2020 05:13 AM    MPV 9.4 09/04/2020 05:13 AM      Lab Results   Component Value Date/Time    SODIUM 133 (L) 09/04/2020 05:13 AM    POTASSIUM 4.5 09/04/2020 05:13 AM     CHLORIDE 90 (L) 09/04/2020 05:13 AM    CO2 27 09/04/2020 05:13 AM    GLUCOSE 91 09/04/2020 05:13 AM    BUN 46 (H) 09/04/2020 05:13 AM    CREATININE 6.37 (HH) 09/04/2020 05:13 AM      Lab Results   Component Value Date/Time    ASTSGOT 10 (L) 09/02/2020 09:16 AM    ALTSGPT 14 09/02/2020 09:16 AM     Lab Results   Component Value Date/Time    CHOLSTRLTOT 177 05/08/2019 01:30 PM    LDL 78 05/08/2019 01:30 PM    HDL 33 (A) 05/08/2019 01:30 PM    TRIGLYCERIDE 331 (H) 05/08/2019 01:30 PM    TROPONINT 19 09/02/2020 10:00 PM       Recent Labs     09/02/20  0916   NTPROBNP 1920*       Cardiac Imaging and Procedures Review  EKG: Diffuse ST elevation possible pericarditis.    Echocardiogram:    9/2/2020  Normal regional wall motion  LVEF 65%  Small pericardial effusion with no evidence of hemodynamic compromise  RVSP 30 mmHg    Cardiac Catheterization: Not applicable    Imaging  Chest X-Ray: Findings consistent with vascular congestion/edema    Stress Test: Not applicable    Assessment/Plan      Esophageal dysphagia  -Status post biopsy and esophageal dilation 9/4/2020  -Esophagitis, white exudate and mucosal sloughing in distal one third  -Started on PPI and sucralfate    Chest pain, improved on the current medical therpay  -Troponin flat at 20 ng/L  -Echo reassuring, no wall motion abnormalities, small pericardial effusion no evidence of hemodynamic compromise.  -pericarditis based on symptoms and EKG, sed rate mildly elevated, 55  continue with  colchicine (indefinitely), high-dose aspirin x 2 weeks , prednisone  -Taper prednisone (40 mg x 2 weeks)  -30 mg x 1 week  -20 mg x 1 week  -15 mg x 1 week  -10 mg x 1 week  -5 mg x 1 week  -then stop    Elevated BNP, 1920 pg/ml  -Dialysis Monday Wednesday Friday, received extra dialysis on Thursday    End-stage renal disease  -On hemodialysis Monday Wednesday Friday  -Solitary kidney  -Hyperkalemia resolved, potassium 4.5      Hypertension  -On Toprol-XL  -Blood pressure well  controlled    No further cardiac work-up  Cardiology will sign off  Appointment with our cardiology office 9/23/20 at 9:45 am.      Please contact me with any questions.    HARRIS Mo.   Cardiologist, [facility]  [phone] - [phone]

## 2020-09-04 NOTE — PROGRESS NOTES
[facility] Services Progress Note     Hemodialysis treatment ordered today per Dr. ZAKIYA Pulido x 3 hours.   Treatment initiated at 1132 ended at 1432.      Patient tolerated tx; see paper flow sheet for details.      Net UF 2000 mL.      Needles removed from access site. Dressings applied and sites held x 10 minutes; verified no bleeding. Positive bruit/thrill post tx. Staff RN to monitor AVF for breakthrough bleeding. Should breakthrough bleeding occur, staff RN to apply pressure to access sites until bleeding resolved. Notify Dialysis and Nephrologist for follow-up.     Report given to Primary RN.

## 2020-09-04 NOTE — PROGRESS NOTES
Pt back on unit from dialysis. Dressing to SUZETTE ARVIZU.  Pt eager to go home. Physician notified

## 2020-09-04 NOTE — PROGRESS NOTES
GI ATTENDING:    Patient seen and examined.  Chart reviewed.  EGD with dilation explained at length.    Patient requests to proceed with therapeutic/diagnostic EGD.    Consenting person was given an opportunity to ask questions and discuss other options.  Risks including but not limited to perforation, infection, bleeding, missed lesion(s), cardiac and/or pulmonary event, aspiration, stroke, possible need for surgery and/or interventional radiology, hospitalization possibly prolonged, discomfort, unsuccessful and/or incomplete procedure, indefinite diagnosis, ineffective therapy and/or persistent symptoms, possible need for repeat procedures and/or additional testings, damage to adjacent organs and/or vascular structures, medication reaction, disability, death, and other adverse events possibly life-threatening.  Discussion was undertaken with Layman's terms.  Consenting person stated understanding and acceptance of these risks, and wished to proceed.  Informed consent was given in clear state of mind.

## 2020-09-04 NOTE — PROGRESS NOTES
[facility] Nephrology Daily Progress Note    Date of Service  9/4/2020    Chief Complaint   The patient is a 64-year-old with history of   end-stage renal disease, on hemodialysis Monday, Wednesday, Friday;   hypertension, depression, renal osteodystrophy, anemia of chronic kidney   disease who presented to [facility] yesterday with chest   pain and shortness of breath.  He is admitted for workup for acute coronary   syndrome.  Nephrology was asked to see him for his ESRD and dialysis   management.  Patient's potassium this morning was 5.8.    Interval Problem Update  9/04 - S/p HD yesterday with net UF of 2.0L and tolerated well. S/p EGD - 1.  Presbyesophagus. 2.  Esophagitis consisting of white exudate at the distal one-third of the esophagus.  This was biopsied extensively.  A 54 Savary dilator was passed without evidence of mucosal break. 3.  A 2 cm hiatal hernia.4.  Unremarkable gastric mucosa. 5.  Unremarkable duodenal mucosa    Review of Systems  Review of Systems   Constitutional: Negative.    HENT: Negative.    Eyes: Negative.    Respiratory: Negative.    Cardiovascular: Negative.    Gastrointestinal: Negative.    Musculoskeletal: Negative.    Skin: Negative.    Neurological: Negative.    Endo/Heme/Allergies: Negative.    Psychiatric/Behavioral: Negative.         Physical Exam  Temp:  [36.1 °C (97 °F)-37.5 °C (99.5 °F)] 37.5 °C (99.5 °F)  Pulse:  [73-99] 91  Resp:  [14-20] 14  BP: ()/(49-70) 84/50  SpO2:  [93 %-100 %] 99 %    Physical Exam  Vitals signs and nursing note reviewed.   Constitutional:       Appearance: Normal appearance.   HENT:      Head: Normocephalic and atraumatic.      Right Ear: Tympanic membrane normal.      Left Ear: Tympanic membrane normal.      Nose: Nose normal.      Mouth/Throat:      Mouth: Mucous membranes are moist.      Pharynx: Oropharynx is clear.   Eyes:      Extraocular Movements: Extraocular movements intact.      Pupils: Pupils are equal,  round, and reactive to light.   Neck:      Musculoskeletal: Normal range of motion and neck supple.   Cardiovascular:      Rate and Rhythm: Normal rate and regular rhythm.   Pulmonary:      Effort: Pulmonary effort is normal.      Breath sounds: Normal breath sounds.   Abdominal:      General: Abdomen is flat.      Palpations: Abdomen is soft.   Musculoskeletal: Normal range of motion.   Skin:     General: Skin is warm and dry.   Neurological:      General: No focal deficit present.      Mental Status: He is alert and oriented to person, place, and time. Mental status is at baseline.   Psychiatric:         Mood and Affect: Mood normal.         Thought Content: Thought content normal.         Judgment: Judgment normal.         Fluids    Intake/Output Summary (Last 24 hours) at 9/4/2020 0935  Last data filed at 9/4/2020 0842  Gross per 24 hour   Intake 700 ml   Output 2600 ml   Net -1900 ml       Laboratory  Recent Labs     09/02/20  0916 09/03/20  0240 09/04/20  0513   WBC 6.2 6.8 10.1   RBC 3.81* 3.34* 3.89*   HEMOGLOBIN 11.3* 9.9* 11.3*   HEMATOCRIT 35.8* 31.8* 36.1*   MCV 94.0 95.2 92.8   MCH 29.7 29.6 29.0   MCHC 31.6* 31.1* 31.3*   RDW 51.7* 51.9* 50.7*   PLATELETCT 137* 127* 179   MPV 9.0 9.3 9.4     Recent Labs     09/02/20  0916 09/03/20  0240 09/04/20  0513   SODIUM 137 136 133*   POTASSIUM 3.9 5.8* 4.5   CHLORIDE 95* 96 90*   CO2 32 26 27   GLUCOSE 136* 135* 91   BUN 30* 47* 46*   CREATININE 4.24* 6.57* 6.37*   CALCIUM 9.2 9.3 9.7         Recent Labs     09/02/20  0916   NTPROBNP 1920*           ASSESSMENT AND PLAN:  1.  End-stage renal disease, on hemodialysis Monday, Wednesday, Friday.  2.  Hyperkalemia. Resolved   3.  Anemia of chronic kidney disease.  4.  Renal osteodystrophy.  5.   Presbyesophagus and Esophagitis.  6.  Hypertension.  7.  Hyperlipidemia.  8.  Depression/anxiety.     PLAN:    Dialysis today per [facility] schedule

## 2020-09-04 NOTE — DISCHARGE INSTRUCTIONS
Discharge Instructions per Howard Nowak M.D.    Follow up with primary care doctor in 1 week  Follow up with GI in 1 week  Follow up with nephrologist as scheduled  prednisone 40 mg x 2 weeks  -30 mg x 1 week  -20 mg x 1 week  -10 mg x 1 week  -5 mg x 1 week  Then stop    DIET: soft diet for 1 week    ACTIVITY: as tolerated    DIAGNOSIS: chest pain    Return to ER if symptoms return, fever, shortness of breath.               ACTIVITY: Rest and take it easy for the first 24 hours.  A responsible adult is recommended to remain with you during that time.  It is normal to feel sleepy.  We encourage you to not do anything that requires balance, judgment or coordination.    MILD FLU-LIKE SYMPTOMS ARE NORMAL. YOU MAY EXPERIENCE GENERALIZED MUSCLE ACHES, THROAT IRRITATION, HEADACHE AND/OR SOME NAUSEA.    FOR 24 HOURS DO NOT:  Drive, operate machinery or run household appliances.  Drink beer or alcoholic beverages.   Make important decisions or sign legal documents.    SPECIAL INSTRUCTIONS: *SEE BELOW**    DIET: To avoid nausea, slowly advance diet as tolerated, avoiding spicy or greasy foods for the first day.  Add more substantial food to your diet according to your physician's instructions.  Babies can be fed formula or breast milk as soon as they are hungry.  INCREASE FLUIDS AND FIBER TO AVOID CONSTIPATION.    SURGICAL DRESSING/BATHING: *MAY SHOWER TOMORROW*    FOLLOW-UP APPOINTMENT:  A follow-up appointment should be arranged with your doctor in *FOLLOW UP WITH DR. MAZARIEGOS**; call to schedule.    You should CALL YOUR PHYSICIAN if you develop:  Fever greater than 101 degrees F.  Pain not relieved by medication, or persistent nausea or vomiting.  Excessive bleeding (blood soaking through dressing) or unexpected drainage from the wound.  Extreme redness or swelling around the incision site, drainage of pus or foul smelling drainage.  Inability to urinate or empty your bladder within 8 hours.  Problems with  breathing or chest pain.    You should call 911 if you develop problems with breathing or chest pain.  If you are unable to contact your doctor or surgical center, you should go to the nearest emergency room or urgent care center.  Physician's telephone #: *[phone]**    If any questions arise, call your doctor.  If your doctor is not available, please feel free to call the Surgical Center at [phone].  The Center is open Monday through Friday from 7AM to 7PM.  You can also call the HEALTH HOTLINE open 24 hours/day, 7 days/week and speak to a nurse at [phone], or toll free at [phone].    A registered nurse may call you a few days after your surgery to see how you are doing after your procedure.    MEDICATIONS: Resume taking daily medication.  Take prescribed pain medication with food.  If no medication is prescribed, you may take non-aspirin pain medication if needed.  PAIN MEDICATION CAN BE VERY CONSTIPATING.  Take a stool softener or laxative such as senokot, pericolace, or milk of magnesia if needed.    Prescription given for *NONE**.  Last pain medication given at *_____________________________**.    If your physician has prescribed pain medication that includes Acetaminophen (Tylenol), do not take additional Acetaminophen (Tylenol) while taking the prescribed medication.    Depression / Suicide Risk    As you are discharged from this [facility] facility, it is important to learn how to keep safe from harming yourself.    Recognize the warning signs:  · Abrupt changes in personality, positive or negative- including increase in energy   · Giving away possessions  · Change in eating patterns- significant weight changes-  positive or negative  · Change in sleeping patterns- unable to sleep or sleeping all the time   · Unwillingness or inability to communicate  · Depression  · Unusual sadness, discouragement and loneliness  · Talk of wanting to die  · Neglect of personal  appearance   · Rebelliousness- reckless behavior  · Withdrawal from people/activities they love  · Confusion- inability to concentrate     If you or a loved one observes any of these behaviors or has concerns about self-harm, here's what you can do:  · Talk about it- your feelings and reasons for harming yourself  · Remove any means that you might use to hurt yourself (examples: pills, rope, extension cords, firearm)  · Get professional help from the community (Mental Health, Substance Abuse, psychological counseling)  · Do not be alone:Call your Safe Contact- someone whom you trust who will be there for you.  · Call your local CRISIS HOTLINE 741-4160 or 516-438-2130  · Call your local Children's Mobile Crisis Response Team [place] (320) 832-2313 or www."[employer]"  · Call the toll free National Suicide Prevention Hotlines   · National Suicide Prevention Lifeline 725-178-IJGP (8430)  [employer] 800-SUICIDE (339-9047)GASTROSCOPY OR ERCP  Don't eat or drink anything for about an hour after the test. You can then resume your regular diet.   Don't drive or drink alcohol for 24 hours. The medication you received will make you too drowsy.  Don't take any coffee, tea, or aspirin products until after you see your doctor. These can harm the lining of your stomach.  If you begin to vomit bloody material, or develop black or bloody stools, call your doctor as soon as possible.   If you have any neck, chest, abdominal pain or temp over 100 degrees call your doctor.   See your doctor on:   Additional instructions:  Prescriptions:    Discharge time:     You should call 911 if you develop problems with breathing or chest pain.    Nurse's Signature: ___________________________________    · I acknowledge receipt and understanding of these Home Care instructions.          Discharge Instructions    Discharged to home by car with relative. Discharged via wheelchair, hospital escort: Yes.  Special equipment  needed: Not Applicable    Be sure to schedule a follow-up appointment with your primary care doctor or any specialists as instructed.     Discharge Plan:   Diet Plan: Discussed  Activity Level: Discussed  Confirmed Follow up Appointment: Patient to Call and Schedule Appointment  Confirmed Symptoms Management: Discussed  Medication Reconciliation Updated: Yes    I understand that a diet low in cholesterol, fat, and sodium is recommended for good health. Unless I have been given specific instructions below for another diet, I accept this instruction as my diet prescription.   Other diet: Heart healthy     Special Instructions: None    · Is patient discharged on Warfarin / Coumadin?   No     Depression / Suicide Risk    As you are discharged from this [facility] facility, it is important to learn how to keep safe from harming yourself.    Recognize the warning signs:  · Abrupt changes in personality, positive or negative- including increase in energy   · Giving away possessions  · Change in eating patterns- significant weight changes-  positive or negative  · Change in sleeping patterns- unable to sleep or sleeping all the time   · Unwillingness or inability to communicate  · Depression  · Unusual sadness, discouragement and loneliness  · Talk of wanting to die  · Neglect of personal appearance   · Rebelliousness- reckless behavior  · Withdrawal from people/activities they love  · Confusion- inability to concentrate     If you or a loved one observes any of these behaviors or has concerns about self-harm, here's what you can do:  · Talk about it- your feelings and reasons for harming yourself  · Remove any means that you might use to hurt yourself (examples: pills, rope, extension cords, firearm)  · Get professional help from the community (Mental Health, Substance Abuse, psychological counseling)  · Do not be alone:Call your Safe Contact- someone whom you trust who will be there for you.  · Call your local CRISIS  HOTLINE 062-0125 or 781-974-6522  · Call your local Children's Mobile Crisis Response Team [place] (387) 077-1259 or www.[employer]  · Call the toll free National Suicide Prevention Hotlines   · National Suicide Prevention Lifeline 489-704-OQGX (9081)  · National [employer] Network 800-SUICIDE (052-0622)

## 2020-09-04 NOTE — DISCHARGE SUMMARY
Discharge Summary    CHIEF COMPLAINT ON ADMISSION  Chief Complaint   Patient presents with   • Chest Pain     Pt BIBA c/o chest pain and SOB while getting HD and low bp per EMS. On arrival pt still in pain /68 but still having severe dyspnea.    • Shortness of Breath       Reason for Admission  ems     Admission Date  9/2/2020    CODE STATUS  Full Code    HPI & HOSPITAL COURSE  Please see original H&P and consult note for specific information, patient was admitted due to chest pain, cardio was consulted, possible pericarditis patient started on ASA and prednisone per cardio which improved patient's pain, patient was c/o food getting stuck in his esophagus GI was consulted and patient went for EGD with dilatation with biopsy recommending to continue ppi bid and sucralfate and f/u with GI as outpatient for biopsy result, patient had HD during this admission due to hyperkalemia nephro was consulted.   Patient is feeling much better, tolerating diet he is eager to go home patient is alert and oriented follows commands, he expressed understanding of his dc plan and agreed with it, all questions answered.        Therefore, he is discharged in good and stable condition to home with close outpatient follow-up.        Discharge Date  9/4/2020    FOLLOW UP ITEMS POST DISCHARGE  F/u with PCP GI, nephro,     DISCHARGE DIAGNOSES  Active Problems:    ESRD (end stage renal disease) (HCC) (Chronic) POA: Yes      Overview: fb [facility] nephrology      Cristofer Keller      [phone] (Work)      [phone] (Fax)      [address] [place] [address]      NEPHROLOGY        [birth year]. [birth year] - Sep. 04, 2020September 04, 2020              Organization       [facility] [place] [address] [place] [address]. [address]       [place] [address]             Has been on dialysis Mon Wed Fri      Pt awaiting for kidney transplant  Resolved Problems:    Essential hypertension  (Chronic) POA: Yes    Pain in the chest POA: Yes    Elevated brain natriuretic peptide (BNP) level POA: Yes    Hyperkalemia POA: Unknown      FOLLOW UP  Future Appointments   Date Time Provider Department Center   9/23/2020  9:45 AM Mariam Del Valle P.A.-C. [facility] None   12/3/2020  9:40 AM Abel Jones M.D. [facility] [place]     Abel Jones M.D.  [address]  [facility] [address]  [phone]    In 1 week        MEDICATIONS ON DISCHARGE     Medication List      START taking these medications      Instructions   aspirin 325 MG Tabs  Commonly known as: ASA   Take 1 Tab by mouth every 8 hours for 14 days.  Dose: 325 mg     omeprazole 20 MG delayed-release capsule  Commonly known as: PRILOSEC   Take 1 Cap by mouth 2 Times a Day.  Dose: 20 mg     predniSONE 10 MG Tabs  Commonly known as: DELTASONE   prednisone 40 mg x 2 weeks,30 mg x 1 week,20 mg x 1 week,10 mg x 1 week,5 mg x 1 week     sucralfate 1 GM/10ML Susp  Commonly known as: CARAFATE   Doctor's comments: May switch to tablet.  Take 10 mL by mouth every 6 hours for 30 days.  Dose: 1 g        CHANGE how you take these medications      Instructions   colchicine 0.6 MG Tabs  What changed:   · how much to take  · how to take this  · when to take this  · additional instructions  Commonly known as: COLCRYS   Take 1 Tab by mouth every 48 hours for 30 days.  Dose: 0.6 mg        CONTINUE taking these medications      Instructions   LORazepam 0.5 MG Tabs  Commonly known as: ATIVAN   Take 0.5 mg by mouth 1 time daily as needed for Anxiety.  Dose: 0.5 mg     metoprolol  MG Tb24  Commonly known as: TOPROL XL   Doctor's comments: Pt to make appt prior to more refills.  Take 1 tablet by mouth once daily     RenaPlex-D Tabs   Take 1 Tab by mouth every day.  Dose: 1 Tab     Sensipar 30 MG Tabs  Generic drug: cinacalcet   Take 1 Tab by mouth every day.  Dose: 1 Tab     sevelamer carbonate 800 MG Tabs tablet  Commonly known as: RENVELA   TAKE 2 TABLETS BY MOUTH THREE  "TIMES DAILY WITH MEALS AND 1 TABLET TWICE DAILY WITH SNACKS     tramadol 50 MG Tabs  Commonly known as: ULTRAM   Take 50 mg by mouth every 6 hours as needed.  Dose: 50 mg            Allergies  Allergies   Allergen Reactions   • Cephalexin Rash   • Penicillins Unspecified and Rash     Pt states \"It was a childhood allergie\".         DIET  Orders Placed This Encounter   Procedures   • Diet Order Renal     Standing Status:   Standing     Number of Occurrences:   1     Order Specific Question:   Diet:     Answer:   Renal [8]       ACTIVITY  As tolerated.  Weight bearing as tolerated    CONSULTATIONS  GI  Cardio  nephro    PROCEDURES  EGD    LABORATORY  Lab Results   Component Value Date    SODIUM 133 (L) 09/04/2020    POTASSIUM 4.5 09/04/2020    CHLORIDE 90 (L) 09/04/2020    CO2 27 09/04/2020    GLUCOSE 91 09/04/2020    BUN 46 (H) 09/04/2020    CREATININE 6.37 (HH) 09/04/2020        Lab Results   Component Value Date    WBC 10.1 09/04/2020    HEMOGLOBIN 11.3 (L) 09/04/2020    HEMATOCRIT 36.1 (L) 09/04/2020    PLATELETCT 179 09/04/2020        Total time of the discharge process exceeds 40 minutes.  "

## 2020-09-04 NOTE — CARE PLAN
Problem: Pain  Goal: Alleviation of Pain or a reduction in pain to the patient's comfort goal  Outcome: PROGRESSING AS EXPECTED     Problem: Communication  Goal: The ability to communicate needs accurately and effectively will improve  Outcome: PROGRESSING AS EXPECTED     Problem: Safety  Goal: Will remain free from injury  Outcome: PROGRESSING AS EXPECTED

## 2020-09-04 NOTE — ANESTHESIA PROCEDURE NOTES
Airway    Date/Time: 9/4/2020 8:07 AM  Performed by: Moi Valencia M.D.  Authorized by: Moi Valencia M.D.     Location:  OR  Urgency:  Elective  Indications for Airway Management:  Anesthesia      Spontaneous Ventilation: absent    Sedation Level:  Deep  Preoxygenated: Yes    Patient Position:  Sniffing  Final Airway Type:  Endotracheal airway  Final Endotracheal Airway:  ETT  Cuffed: Yes    Technique Used for Successful ETT Placement:  Video laryngoscopy    Insertion Site:  Oral  Blade Type:  Amairani  Laryngoscope Blade/Videolaryngoscope Blade Size:  4  ETT Size (mm):  7.5  Measured from:  Lips  ETT to Lips (cm):  24  Placement Verified by: auscultation and capnometry    Cormack-Lehane Classification:  Grade I - full view of glottis  Number of Attempts at Approach:  2

## 2020-09-04 NOTE — PROCEDURES
DATE OF SERVICE:  09/04/2020    PROCEDURES:  Esophagogastroduodenoscopy with:  1.  Biopsy.  2.  Esophageal dilation with Savary dilator.    INDICATION:  Noncardiac chest pain and esophageal dysphagia.    FINAL IMPRESSION:  1.  Presbyesophagus.  2.  Esophagitis consisting of white exudate at the distal one-third of the   esophagus.  This was biopsied extensively.  A 54 Savary dilator was passed   without evidence of mucosal break.  3.  A 2 cm hiatal hernia.  4.  Unremarkable gastric mucosa.  5.  Unremarkable duodenal mucosa.    RECOMMENDATIONS:  1.  Twice daily PPI therapy.  I am okay to transition to oral.  2.  Sucralfate suspension q.i.d. before meals and at bedtime x2 weeks.  3.  Keep head of bed elevated 3 to 6 inches.  4.  Soft diet.  5.  Follow up pathology.  6.  Follow up as outpatient in 2 to 12 weeks.    GI will sign off/standby, please call if any concerns arise.    PROCEDURE:  Prior to the procedure, physical exam was stable.  During   procedure, vital signs remained within normal limits.  Prior to sedation,   informed consent was obtained.  Risks, benefits, alternatives including but   not limited to risk of bleeding, infection, perforation, adverse reaction to   medication, failure to identify pathology and death explained to the patient,   he accepted all risks.  The patient was intubated, sedated by anesthesia.    Scope tip of the Olympus flexible gastroscope passed in the proximal   esophagus.  Proximal middle and distal thirds of the esophagus were well   visualized.  The esophagus was somewhat tortuous consistent with   presbyesophagus.  At the distal third of the esophagus, there was erythema and   a white exudate with mucosal sloughing with scope passage consistent with   esophagitis.  Biopsies were taken throughout this area and the lumen was   slightly narrowed, but not obstructed.  Stomach was entered.  Gastric pool   suctioned.  There was a hiatal hernia from 40 to 42 cm.  Air was insufflated.     Scope retroflexed to view the body, fundus, and cardia demonstrating the   small hiatal hernia; otherwise, unremarkable mucosa.  Scope straightened to   view the antrum and incisura, which demonstrated unremarkable mucosa.  The   pylorus was patent.  Duodenal bulb sweep second and third portion were   unremarkable.  Biopsies were taken in the distal esophagus.  A wire was passed   into the antrum and the scope removed off of this.  A 54-St Helenian Savary   dilator was selected and passed over the wire, lubricated and passed down the   esophagus with minimal resistance.  This was then removed along with wire and   the gastroscope was repassed into the esophagus.  The esophageal mucosa did   not show any signs of mucosal breaks other than the previously biopsied areas.    Once this was complete, the procedure was deemed complete.  Air and liquid   were suctioned from the stomach.  The scope was withdrawn.  The patient   tolerated the procedure well and was sent to recovery without immediate   complications.       ____________________________________     MD PARTH TORIBIO / BENJAMIN    DD:  09/04/2020 08:32:44  DT:  09/04/2020 08:56:04    D#:  [id number]  Job#:  [id number]

## 2020-09-04 NOTE — OR NURSING
0839  RECEIVED PATIENT FROM OR.  REPORT FROM DR. BORJAS.  NO AIRWAY IN PLACE.  RESPIRATIONS ARE EVEN AND UNLABORED.  NO BLEEDING NOTED.  AV FISTULA LEFT FOREARM GOOD THRILL AND BRUIT.    0922  TRANSFERRED TO [place] [address], VIA [facility], ON MONITOR, ON 02 2 LITERS PER MASK, WITH TRANSPORT AND RN.  REPORT TO JENN TELLEZ.

## 2020-09-04 NOTE — OR NURSING
0705-pt arrived from CDU with RN and transport. Pt denies pain, reviewed the plan of care with the pt, VSS, procedure consent signed,  call light given to pt.

## 2020-09-04 NOTE — ANESTHESIA PREPROCEDURE EVALUATION
Relevant Problems   CARDIAC   (+) Essential hypertension         (+) ESRD (end stage renal disease) (HCC)       Physical Exam    Airway   Mallampati: II  TM distance: >3 FB  Neck ROM: full       Cardiovascular - normal exam  Rhythm: regular  Rate: normal  (-) murmur     Dental - normal exam             Pulmonary - normal exam  (+) decreased breath sounds     Abdominal    Neurological - normal exam                 Anesthesia Plan    ASA 3   ASA physical status 3 criteria: ESRD undergoing regularly scheduled dialysis    Plan - general       Airway plan will be ETT                  Informed Consent:    Anesthetic plan and risks discussed with patient.

## 2020-09-04 NOTE — ANESTHESIA POSTPROCEDURE EVALUATION
Patient: Garry Benites    Procedure Summary     Date: 09/04/20 Room / Location: [facility] ROOM 26 / SURGERY SAME DAY [facility]    Anesthesia Start: 0759 Anesthesia Stop: 0842    Procedures:       GASTROSCOPY- WITH DILATION (N/A Esophagus)      GASTROSCOPY, WITH BIOPSY (N/A Esophagus) Diagnosis: (esophagitis)    Surgeon: Gabe West M.D. Responsible Provider: Moi Valencia M.D.    Anesthesia Type: general ASA Status: 3          Final Anesthesia Type: general  Last vitals  BP   Blood Pressure : 128/69    Temp   36.2 °C (97.1 °F)    Pulse   Pulse: 99   Resp   20    SpO2   93 %      Anesthesia Post Evaluation    Patient location during evaluation: PACU  Patient participation: complete - patient participated  Level of consciousness: awake and alert  Pain score: 2    Airway patency: patent  Anesthetic complications: no  Cardiovascular status: hemodynamically stable  Respiratory status: acceptable  Hydration status: euvolemic    PONV: none           Nurse Pain Score: 0 (NPRS)

## 2020-09-04 NOTE — PROGRESS NOTES
[facility] Medicine Daily Progress Note    Date of Service  9/4/2020    Chief Complaint  64 y.o. male admitted 9/2/2020 with chest pain      Interval Problem Update  Patient in bed, no new complains, not in distress, will monitor diet today if stable will dc home in am      Consultants/Specialty  Cardio  Gi  nephro    Code Status  Full Code    Disposition  Home in am if able to tolerate diet.     Review of Systems  Review of Systems   Constitutional: Negative for chills and fever.   HENT: Negative for congestion and nosebleeds.    Eyes: Negative for blurred vision and double vision.   Respiratory: Negative for cough and hemoptysis.    Cardiovascular: Positive for chest pain. Negative for orthopnea.   Gastrointestinal: Negative for heartburn, nausea and vomiting.   Genitourinary: Negative for dysuria and frequency.   Musculoskeletal: Negative for back pain and myalgias.   Skin: Negative for rash.   Neurological: Negative for dizziness and headaches.   Endo/Heme/Allergies: Does not bruise/bleed easily.   Psychiatric/Behavioral: Negative for depression.        Physical Exam  Temp:  [36 °C (96.8 °F)-37.5 °C (99.5 °F)] 36 °C (96.8 °F)  Pulse:  [73-99] 87  Resp:  [14-20] 16  BP: ()/(50-69) 107/60  SpO2:  [93 %-100 %] 98 %    Physical Exam  Vitals signs and nursing note reviewed.   Constitutional:       Appearance: Normal appearance.   HENT:      Head: Normocephalic and atraumatic.      Nose: Nose normal.      Mouth/Throat:      Mouth: Mucous membranes are moist.      Pharynx: Oropharynx is clear.   Eyes:      General: No scleral icterus.     Conjunctiva/sclera: Conjunctivae normal.      Pupils: Pupils are equal, round, and reactive to light.   Neck:      Musculoskeletal: Normal range of motion and neck supple.   Cardiovascular:      Rate and Rhythm: Normal rate and regular rhythm.      Pulses: Normal pulses.      Heart sounds: Normal heart sounds.   Pulmonary:      Effort: Pulmonary effort is normal. No respiratory  distress.      Breath sounds: Normal breath sounds.   Abdominal:      General: Bowel sounds are normal. There is no distension.      Palpations: Abdomen is soft.      Tenderness: There is no guarding.   Musculoskeletal: Normal range of motion.   Skin:     General: Skin is warm.      Capillary Refill: Capillary refill takes less than 2 seconds.   Neurological:      General: No focal deficit present.      Mental Status: He is alert and oriented to person, place, and time.      Cranial Nerves: No cranial nerve deficit.   Psychiatric:         Mood and Affect: Mood normal.         Fluids    Intake/Output Summary (Last 24 hours) at 9/4/2020 1442  Last data filed at 9/4/2020 0922  Gross per 24 hour   Intake 300 ml   Output 100 ml   Net 200 ml       Laboratory  Recent Labs     09/02/20 0916 09/03/20 0240 09/04/20  0513   WBC 6.2 6.8 10.1   RBC 3.81* 3.34* 3.89*   HEMOGLOBIN 11.3* 9.9* 11.3*   HEMATOCRIT 35.8* 31.8* 36.1*   MCV 94.0 95.2 92.8   MCH 29.7 29.6 29.0   MCHC 31.6* 31.1* 31.3*   RDW 51.7* 51.9* 50.7*   PLATELETCT 137* 127* 179   MPV 9.0 9.3 9.4     Recent Labs     09/02/20 0916 09/03/20 0240 09/04/20  0513   SODIUM 137 136 133*   POTASSIUM 3.9 5.8* 4.5   CHLORIDE 95* 96 90*   CO2 32 26 27   GLUCOSE 136* 135* 91   BUN 30* 47* 46*   CREATININE 4.24* 6.57* 6.37*   CALCIUM 9.2 9.3 9.7                   Imaging  EC-ECHOCARDIOGRAM COMPLETE W/O CONT   Final Result      DX-CHEST-PORTABLE (1 VIEW)   Final Result      1.  There are findings consistent with vascular congestion/edema.      DX-ESOPHAGUS BARIUM SWALLOW SINGLE CONTRAST    (Results Pending)        Assessment/Plan  Hyperkalemia  Assessment & Plan  Resolved with HD    Elevated brain natriuretic peptide (BNP) level- (present on admission)  Assessment & Plan  Related to ESRD    Pain in the chest- (present on admission)  Assessment & Plan  Cardio consulted.  Probably combination between GI and pericarditis, started on colchicine/steroids per cardio will need taper  dose.   Gi consulted esophagogram, egd.   prednisone taper (40mg for two weeks the taper by 10mg weekly until dose 20mg then taper by 5mg weekly), colchicine indefinately as was previously used for gout, and high dose aspirin for two weeks  S/p egd showing Esophagitis consisting of white exudate at the distal one-third of the esophagus, continue ppi and sucralfate, soft diet.       ESRD (end stage renal disease) (HCC)- (present on admission)  Assessment & Plan  On HD MWF  nephro consulted  Hd today.     Essential hypertension- (present on admission)  Assessment & Plan  Stable, continue metoprolol.        VTE prophylaxis: scd

## 2020-09-04 NOTE — ANESTHESIA TIME REPORT
Anesthesia Start and Stop Event Times     Date Time Event    9/4/2020 0748 Ready for Procedure     0759 Anesthesia Start        Responsible Staff  09/04/20    Name Role Begin End    Moi Valencia M.D. Anesth 0759         Preop Diagnosis (Free Text):  Pre-op Diagnosis     dysphagia        Preop Diagnosis (Codes):    Post op Diagnosis  Esophagitis      Premium Reason  Non-Premium    Comments:

## 2020-09-04 NOTE — PROGRESS NOTES
IV dc'd.  Discharge instructions given to patient; patient verbalizes understanding, all questions answered.  Copy of DC summary provided, signed copy in chart.  5 prescriptions electronically sent to pt's pharmacy. Pt states personal belongings are in possession.  Pt escorted off unit by tech without incident.

## 2020-09-16 PROBLEM — D64.9 ANEMIA: Status: ACTIVE | Noted: 2020-09-16

## 2020-09-16 PROBLEM — D64.9 ANEMIA: Chronic | Status: ACTIVE | Noted: 2020-09-16

## 2020-09-17 ENCOUNTER — OFFICE VISIT (OUTPATIENT)
Dept: MEDICAL GROUP | Facility: PHYSICIAN GROUP | Age: 65
End: 2020-09-17
Payer: MEDICARE

## 2020-09-17 VITALS
SYSTOLIC BLOOD PRESSURE: 134 MMHG | RESPIRATION RATE: 16 BRPM | OXYGEN SATURATION: 96 % | HEIGHT: 70 IN | HEART RATE: 74 BPM | BODY MASS INDEX: 28.2 KG/M2 | TEMPERATURE: 98.9 F | WEIGHT: 197 LBS | DIASTOLIC BLOOD PRESSURE: 84 MMHG

## 2020-09-17 DIAGNOSIS — D63.1 ANEMIA DUE TO CHRONIC KIDNEY DISEASE, ON CHRONIC DIALYSIS (HCC): ICD-10-CM

## 2020-09-17 DIAGNOSIS — F41.9 ANXIETY: Chronic | ICD-10-CM

## 2020-09-17 DIAGNOSIS — N18.6 ANEMIA DUE TO CHRONIC KIDNEY DISEASE, ON CHRONIC DIALYSIS (HCC): ICD-10-CM

## 2020-09-17 DIAGNOSIS — N18.6 ESRD (END STAGE RENAL DISEASE) (HCC): Chronic | ICD-10-CM

## 2020-09-17 DIAGNOSIS — I10 ESSENTIAL HYPERTENSION: ICD-10-CM

## 2020-09-17 DIAGNOSIS — K22.2 ESOPHAGEAL STRICTURE: ICD-10-CM

## 2020-09-17 DIAGNOSIS — Z99.2 ANEMIA DUE TO CHRONIC KIDNEY DISEASE, ON CHRONIC DIALYSIS (HCC): ICD-10-CM

## 2020-09-17 PROCEDURE — 99214 OFFICE O/P EST MOD 30 MIN: CPT | Performed by: INTERNAL MEDICINE

## 2020-09-17 RX ORDER — OMEPRAZOLE 20 MG/1
20 CAPSULE, DELAYED RELEASE ORAL 2 TIMES DAILY
Qty: 60 CAP | Refills: 2 | Status: SHIPPED | OUTPATIENT
Start: 2020-09-17 | End: 2020-10-15

## 2020-09-17 ASSESSMENT — PATIENT HEALTH QUESTIONNAIRE - PHQ9: CLINICAL INTERPRETATION OF PHQ2 SCORE: 0

## 2020-09-17 ASSESSMENT — FIBROSIS 4 INDEX: FIB4 SCORE: 0.97

## 2020-09-17 NOTE — PROGRESS NOTES
CC: Hospital follow-up  Refill omeprazole      HPI: 65 y.o. Patient presents to discuss the following:     Esophageal stricture  Patient was hospitalized beginning of this month.  The patient underwent EGD with dilation  He was discharged on omeprazole and sucralfate  Patient has pending appointment to see GI specialist.  Patient stated that he is feeling so much better after the dilation.  He is able to eat and swallow better now    ESRD (end stage renal disease) (HCC)  Chronic condition patient is on hemodialysis Monday Wednesday Friday.  He is being followed by nephrologist.  Per patient report he is currently on the transplant list at [facility]    Hypertension  Chronic condition patient currently on metoprolol.  No side effect reported.    Anxiety  Chronic condition.  The patient take Lorazepam prior to dialysis.  He denies significant side effects.    Anemia  This is a chronic condition.  His previous blood test showed  Results for KATE CROW (MRN [id number]) as of 9/17/2020 16:45   Ref. Range 5/8/2019 13:30 9/2/2020 09:16 9/2/2020 22:00 9/3/2020 02:40 9/4/2020 05:13   Hemoglobin Latest Ref Range: 14.0 - 18.0 g/dL 13.5 (L) 11.3 (L)  9.9 (L) 11.3 (L)   Patient denies any history of bleeding.          REVIEW OF SYSTEMS:     Constitutional:  no fever / chills   Neurologic: no headaches, no numbness/tingling  Eyes: no changes in vision  ENT: no sore throat, no hearing loss  CV:  no chest pain, no palpitations  Pulmonary: no SOB, no cough    GI: no nausea / vomiting, no diarrhea, no constipation  :  no dysuria, no hematuria         Allergies: Cephalexin and Penicillins    Current Outpatient Medications Ordered in Epic   Medication Sig Dispense Refill   • omeprazole (PRILOSEC) 20 MG delayed-release capsule Take 1 Cap by mouth 2 Times a Day. 60 Cap 2   • metoprolol SR (TOPROL XL) 100 MG TABLET SR 24 HR Take 1 tablet by mouth once daily 90 Tab 0   • sevelamer carbonate (RENVELA) 800 MG Tab tablet TAKE 2 TABLETS BY  MOUTH THREE TIMES DAILY WITH MEALS AND 1 TABLET TWICE DAILY WITH SNACKS  5   • SENSIPAR 30 MG Tab Take 1 Tab by mouth every day.  11   • colchicine (COLCRYS) 0.6 MG Tab Take 1 Tab by mouth every 48 hours for 30 days. 15 Tab 0   • aspirin (ASA) 325 MG Tab Take 1 Tab by mouth every 8 hours for 14 days. 42 Tab 0   • predniSONE (DELTASONE) 10 MG Tab prednisone 40 mg x 2 weeks,30 mg x 1 week,20 mg x 1 week,10 mg x 1 week,5 mg x 1 week 102 Tab 0   • sucralfate (CARAFATE) 1 GM/10ML Suspension Take 10 mL by mouth every 6 hours for 30 days. 1200 mL 0   • tramadol (ULTRAM) 50 MG Tab Take 50 mg by mouth every 6 hours as needed.     • LORazepam (ATIVAN) 0.5 MG Tab Take 0.5 mg by mouth 1 time daily as needed for Anxiety.     • Multiple Vitamins-Minerals (RENAPLEX-D) Tab Take 1 Tab by mouth every day.       No current [facility]-ordered facility-administered medications on file.        Past Medical History:   Diagnosis Date   • Allergy    • Anxiety    • Cataract    • Depression    • Gout due to renal impairment    • Hyperlipidemia    • Hypertension    • Kidney disease     Congenital solitary kidney on dialysis 3 times a week.   • Localized osteoarthritis of right shoulder 6/19/2019        Past Surgical History:   Procedure Laterality Date   • PB UPPER GI ENDOSCOPY,BIOPSY N/A 9/4/2020    Procedure: GASTROSCOPY, WITH BIOPSY;  Surgeon: Gabe West M.D.;  Location: SURGERY SAME DAY [facility];  Service: Gastroenterology   • GASTROSCOPY N/A 9/4/2020    Procedure: GASTROSCOPY- WITH DILATION;  Surgeon: Gabe West M.D.;  Location: SURGERY SAME DAY [facility];  Service: Gastroenterology   • AV FISTULA CREATION     • OTHER ORTHOPEDIC SURGERY          Family History   Problem Relation Age of Onset   • Cancer Mother         lymphoma   • Heart Attack Father    • Stroke Father    • Heart Disease Father         Social History     Tobacco Use   Smoking Status Former Smoker   • Packs/day: 0.50   • Years: 25.00   • Pack years: 12.50   •  Types: Cigarettes   • Quit date: 10/21/1996   • Years since quittin.9   Smokeless Tobacco Never Used          Social History     Substance and Sexual Activity   Alcohol Use No   • Alcohol/week: 0.0 oz        ---------------------------------------------------------------------     PHYSICAL EXAM:   Vitals:    20 1610   BP: 134/84   Pulse: 74   Resp: 16   Temp: 37.2 °C (98.9 °F)   SpO2: 96%      Body mass index is 28.27 kg/m².        Constitutional: no acute distress  Eyes: PERRL, EOMI  Ears/nose/mouth: OP no exudates  Neck: supple, no JVD  CV: heart RRR  Resp: normal effort, no wheezing or rales.  GI: abdomen soft, no obvious mass, no tenderness          ---------------------------------------------------------------------     ASSESSMENT and PLAN:  1. ESRD (end stage renal disease) (HCC)  Chronic condition.  Continue with dialysis.  As above the patient is not currently on transplant list at [facility].  He will continue to follow-up with nephrologist    2. Anemia due to chronic kidney disease, on chronic dialysis (HCC)  Chronic condition.  Stable.  His blood test being monitored by nephrologist on a regular basis    3. Anxiety  Chronic stable condition.  Patient takes lorazepam as needed.  No side effect reported.  - Pain Management Screen; Future    4. Esophageal stricture  Patient is status post EGD with dilation.  Patient is feeling better after the procedure.  GI specialist recommend for the patient continue with omeprazole refill given.  Advised the patient keep the appointment to see the GI specialist.    5. Essential hypertension  Chronic stable condition.  Continue with metoprolol.            Return in about 4 months (around 2021).       PATIENT EDUCATION:  -If any problems should arise, patient was advised to contact our office or go to ER to be evaluated.  -Advised pt to follow a healthy diet and regular aerobic exercise regimen. Advised pt to avoid alcohol and tobacco use.    Please note  that this dictation was created using voice recognition software. I have made every reasonable attempt to correct obvious errors, but it is possible there are errors of grammar and possibly content that I did not discover before finalizing the note.

## 2020-09-17 NOTE — ASSESSMENT & PLAN NOTE
Chronic condition.  The patient take Lorazepam prior to dialysis.  He denies significant side effects.

## 2020-09-17 NOTE — ASSESSMENT & PLAN NOTE
Chronic condition patient is on hemodialysis Monday Wednesday Friday.  He is being followed by nephrologist.  Per patient report he is currently on the transplant list at [facility]

## 2020-09-17 NOTE — ASSESSMENT & PLAN NOTE
This is a chronic condition.  His previous blood test showed  Results for CROW LOVE (MRN [id number]) as of 9/17/2020 16:45   Ref. Range 5/8/2019 13:30 9/2/2020 09:16 9/2/2020 22:00 9/3/2020 02:40 9/4/2020 05:13   Hemoglobin Latest Ref Range: 14.0 - 18.0 g/dL 13.5 (L) 11.3 (L)  9.9 (L) 11.3 (L)   Patient denies any history of bleeding.

## 2020-09-17 NOTE — ASSESSMENT & PLAN NOTE
Patient was hospitalized beginning of this month.  The patient underwent EGD with dilation  He was discharged on omeprazole and sucralfate  Patient has pending appointment to see GI specialist.  Patient stated that he is feeling so much better after the dilation.  He is able to eat and swallow better now

## 2020-09-22 NOTE — PROGRESS NOTES
Chief Complaint   Patient presents with   • HTN (Controlled)     &  First degree heart block by electrocardiogram           Subjective:   Garry Benites is a 65 y.o. male who presents today for hospital follow-up.    Initially seen by Dr. Hurtado in the hospital 9/2/2020. Hospitalized for chest pain, evaluated and diagnosed with pericarditis, elevated sed rate and CRP, small pericardial effusion on echo. Treated with colchicine, prednisone taper, high dose aspirin with improvement in symptoms.  Current medical problems include HTN, ESRD on dialysis.    Had not had any recurrence of the chest pain since he left the hospital.  He never had pain like that before, described it as sharp pain, worse with inhalation.  He has been tolerating the high-dose aspirin without any bleeding complications, no abdominal pain has good understanding of the prednisone taper.  Has been on colchicine every other day consistently for gout as well.      Additionally during his hospitalization he had difficulty swallowing and underwent an EGD with esophageal dilation.  He denies any problems with swallowing.  He is taking Carafate and PPI.    Otherwise he denies any history of cardiac problems, his blood pressures have been well controlled, cholesterol well controlled.  He is currently in consultation with [facility] for kidney transplant, apparently there is been a donor identified.      Past Medical History:   Diagnosis Date   • Allergy    • Anxiety    • Cataract    • Depression    • Gout due to renal impairment    • Hyperlipidemia    • Hypertension    • Kidney disease     Congenital solitary kidney on dialysis 3 times a week.   • Localized osteoarthritis of right shoulder 6/19/2019         Past Surgical History:   Procedure Laterality Date   • PB UPPER GI ENDOSCOPY,BIOPSY N/A 9/4/2020    Procedure: GASTROSCOPY, WITH BIOPSY;  Surgeon: Gabe West M.D.;  Location: SURGERY SAME DAY [facility];  Service: Gastroenterology   •  "GASTROSCOPY N/A 2020    Procedure: GASTROSCOPY- WITH DILATION;  Surgeon: Gabe West M.D.;  Location: SURGERY SAME DAY [facility];  Service: Gastroenterology   • AV FISTULA CREATION     • OTHER ORTHOPEDIC SURGERY           Family History   Problem Relation Age of Onset   • Cancer Mother         lymphoma   • Heart Attack Father    • Stroke Father    • Heart Disease Father          Social History     Tobacco Use   Smoking Status Former Smoker   • Packs/day: 0.50   • Years: 25.00   • Pack years: 12.50   • Types: Cigarettes   • Quit date: 10/21/1996   • Years since quittin.9   Smokeless Tobacco Never Used          Social History     Substance and Sexual Activity   Alcohol Use No   • Alcohol/week: 0.0 oz         Allergies   Allergen Reactions   • Cephalexin Rash   • Penicillins Unspecified and Rash     Pt states \"It was a childhood allergie\".           Outpatient Encounter Medications as of 2020   Medication Sig Dispense Refill   • omeprazole (PRILOSEC) 20 MG delayed-release capsule Take 1 Cap by mouth 2 Times a Day. 60 Cap 2   • colchicine (COLCRYS) 0.6 MG Tab Take 1 Tab by mouth every 48 hours for 30 days. 15 Tab 0   • predniSONE (DELTASONE) 10 MG Tab prednisone 40 mg x 2 weeks,30 mg x 1 week,20 mg x 1 week,10 mg x 1 week,5 mg x 1 week 102 Tab 0   • sucralfate (CARAFATE) 1 GM/10ML Suspension Take 10 mL by mouth every 6 hours for 30 days. 1200 mL 0   • tramadol (ULTRAM) 50 MG Tab Take 50 mg by mouth every 6 hours as needed.     • LORazepam (ATIVAN) 0.5 MG Tab Take 0.5 mg by mouth 1 time daily as needed for Anxiety.     • Multiple Vitamins-Minerals (RENAPLEX-D) Tab Take 1 Tab by mouth every day.     • metoprolol SR (TOPROL XL) 100 MG TABLET SR 24 HR Take 1 tablet by mouth once daily 90 Tab 0   • sevelamer carbonate (RENVELA) 800 MG Tab tablet TAKE 2 TABLETS BY MOUTH THREE TIMES DAILY WITH MEALS AND 1 TABLET TWICE DAILY WITH SNACKS  5   • SENSIPAR 30 MG Tab Take 1 Tab by mouth every day.  11   • " "[DISCONTINUED] aspirin (ASA) 325 MG Tab Take 325 mg by mouth 3 times a day.       No facility-administered encounter medications on file as of 9/23/2020.          Review of Systems   Constitutional: Negative for malaise/fatigue and weight loss.   Respiratory: Negative for cough and shortness of breath.    Cardiovascular: Negative for chest pain, palpitations and leg swelling.   Gastrointestinal: Negative for blood in stool and melena.        No problems swallowing   Neurological: Negative for dizziness.          Objective:   /60 (BP Location: Left arm, Patient Position: Sitting, BP Cuff Size: Adult)   Pulse 76   Resp 20   Ht 1.778 m (5' 10\")   Wt 89.8 kg (198 lb)   SpO2 96%   BMI 28.41 kg/m²        Physical Exam  Vitals signs reviewed.   Constitutional:       General: He is not in acute distress.     Appearance: Normal appearance.   HENT:      Head: Normocephalic and atraumatic.   Eyes:      Conjunctiva/sclera: Conjunctivae normal.   Neck:      Comments: No JVP  Cardiovascular:      Rate and Rhythm: Normal rate and regular rhythm.      Pulses: Normal pulses.      Heart sounds: No murmur. No friction rub.      Comments: Fistula LUE   Pulmonary:      Effort: Pulmonary effort is normal. No respiratory distress.      Breath sounds: No rales.   Abdominal:      General: There is no distension.      Palpations: Abdomen is soft.      Tenderness: There is no abdominal tenderness.   Musculoskeletal:         General: No swelling.   Skin:     General: Skin is warm and dry.   Neurological:      General: No focal deficit present.      Mental Status: He is alert and oriented to person, place, and time.      Gait: Gait normal.   Psychiatric:         Judgment: Judgment normal.         LE PRISCILA 8/13/19  Findings   Right.    Doppler waveform of the common femoral, popliteal, dorsalis pedis and    posterior tibial arteries are of high amplitude and triphasic.   Ankle-brachial index is normal.       Left.    Doppler waveforms " of the common femoral, popliteal, dorsalis pedis and    posterior tibial arteries are of high amplitude and triphasic.   Ankle-brachial index is normal.        Additional testing was not performed in accordance with lower extremity    arterial evaluation protocol.       TTE 9/22/20  Normal left ventricular systolic function.  Left ventricular ejection fraction is visually estimated to be 65%.  Normal regional wall motion.  Small pericardial effusion without evidence of hemodynamic compromise.  Mild aortic insufficiency.  Estimated right ventricular systolic pressure  is 30 mmHg.    Assessment:     1. Acute idiopathic pericarditis  Sed Rate    CRP HIGH SENSITIVE (CARDIAC)      Medical Decision Making:  Today's Assessment / Plan:   Idiopathic Pericarditis  -No flair of symptoms since hospitalization, OK to stop high dose aspirin at this point  - continue prednisone taper (currently on 30mg this week, reduce by 10mg weekly until dose 10mg then taper by 5mg weekly). -  colchicine 0.6mg q48hrs (previously used for gout)  - Check ESR, CRP next month, if remains elevated would continue slower pred taper    Instructions to call office with has a flair of chest pain. Otherwise follow up as needed

## 2020-09-23 ENCOUNTER — OFFICE VISIT (OUTPATIENT)
Dept: CARDIOLOGY | Facility: MEDICAL CENTER | Age: 65
End: 2020-09-23
Payer: MEDICARE

## 2020-09-23 VITALS
WEIGHT: 198 LBS | DIASTOLIC BLOOD PRESSURE: 60 MMHG | BODY MASS INDEX: 28.35 KG/M2 | OXYGEN SATURATION: 96 % | HEIGHT: 70 IN | HEART RATE: 76 BPM | RESPIRATION RATE: 20 BRPM | SYSTOLIC BLOOD PRESSURE: 124 MMHG

## 2020-09-23 DIAGNOSIS — I30.0 ACUTE IDIOPATHIC PERICARDITIS: ICD-10-CM

## 2020-09-23 PROCEDURE — 99214 OFFICE O/P EST MOD 30 MIN: CPT | Performed by: PHYSICIAN ASSISTANT

## 2020-09-23 RX ORDER — ASPIRIN 325 MG
325 TABLET ORAL 3 TIMES DAILY
COMMUNITY
End: 2020-09-23

## 2020-09-23 ASSESSMENT — ENCOUNTER SYMPTOMS
COUGH: 0
BLOOD IN STOOL: 0
WEIGHT LOSS: 0
PALPITATIONS: 0
SHORTNESS OF BREATH: 0
DIZZINESS: 0

## 2020-09-23 ASSESSMENT — FIBROSIS 4 INDEX: FIB4 SCORE: 0.97

## 2020-09-23 NOTE — PATIENT INSTRUCTIONS
As you reduce your Prednisone dose, if your chest pain comes back please call our office right away.     You may stop the Aspirin

## 2020-10-05 ENCOUNTER — OFFICE VISIT (OUTPATIENT)
Dept: URGENT CARE | Facility: PHYSICIAN GROUP | Age: 65
End: 2020-10-05
Payer: MEDICARE

## 2020-10-05 VITALS
RESPIRATION RATE: 14 BRPM | DIASTOLIC BLOOD PRESSURE: 68 MMHG | TEMPERATURE: 97.8 F | BODY MASS INDEX: 27.12 KG/M2 | HEART RATE: 105 BPM | OXYGEN SATURATION: 96 % | SYSTOLIC BLOOD PRESSURE: 100 MMHG | WEIGHT: 189 LBS

## 2020-10-05 DIAGNOSIS — N18.6 CKD (CHRONIC KIDNEY DISEASE) REQUIRING CHRONIC DIALYSIS (HCC): ICD-10-CM

## 2020-10-05 DIAGNOSIS — L03.317 CELLULITIS OF BUTTOCK, LEFT: ICD-10-CM

## 2020-10-05 DIAGNOSIS — Z99.2 CKD (CHRONIC KIDNEY DISEASE) REQUIRING CHRONIC DIALYSIS (HCC): ICD-10-CM

## 2020-10-05 PROCEDURE — 99214 OFFICE O/P EST MOD 30 MIN: CPT | Performed by: PHYSICIAN ASSISTANT

## 2020-10-05 RX ORDER — CLINDAMYCIN HYDROCHLORIDE 300 MG/1
300 CAPSULE ORAL 3 TIMES DAILY
Qty: 30 CAP | Refills: 0 | Status: ON HOLD | OUTPATIENT
Start: 2020-10-05 | End: 2020-10-10

## 2020-10-05 RX ORDER — TRAMADOL HYDROCHLORIDE 50 MG/1
50 TABLET ORAL EVERY 12 HOURS PRN
Qty: 10 TAB | Refills: 0 | Status: SHIPPED | OUTPATIENT
Start: 2020-10-05 | End: 2020-10-07

## 2020-10-05 ASSESSMENT — ENCOUNTER SYMPTOMS
EYE DISCHARGE: 0
MUSCULOSKELETAL NEGATIVE: 1
NAUSEA: 0
ABDOMINAL PAIN: 0
DIZZINESS: 0
SHORTNESS OF BREATH: 0
MYALGIAS: 0
CHILLS: 1
EYE REDNESS: 0
VOMITING: 0
DIARRHEA: 0
FEVER: 0

## 2020-10-05 ASSESSMENT — FIBROSIS 4 INDEX: FIB4 SCORE: 0.97

## 2020-10-05 NOTE — PROGRESS NOTES
"Subjective:      Josafat Benites is a 65 y.o. male who presents with Abscess (left side of buttocks)            Wound Infection  Associated symptoms include chills and a rash. Pertinent negatives include no abdominal pain, chest pain, congestion, fever, myalgias, nausea or vomiting.      Patient presents to urgent care reporting an infection on his left buttock. He states there was a small \"bump\" in the area that he first noticed about 3 weeks ago after getting discharged home from a stay in the hospital. Over the past 2 days the area has become red, swollen, and painful with associated chills. No fevers, body aches, nausea, or vomiting. PMH is significant for end stage renal disease, currently on dialysis.       Review of Systems   Constitutional: Positive for chills. Negative for fever.   HENT: Negative for congestion.    Eyes: Negative for discharge and redness.   Respiratory: Negative for shortness of breath.    Cardiovascular: Negative for chest pain.   Gastrointestinal: Negative for abdominal pain, diarrhea, nausea and vomiting.   Genitourinary: Negative.    Musculoskeletal: Negative.  Negative for myalgias.   Skin: Positive for rash. Negative for itching.   Neurological: Negative for dizziness.        Objective:     /68 (BP Location: Right arm, Patient Position: Sitting, BP Cuff Size: Adult)   Pulse (!) 105   Temp 36.6 °C (97.8 °F) (Temporal)   Resp 14   Wt 85.7 kg (189 lb)   SpO2 96%   BMI 27.12 kg/m²        Physical Exam  Vitals signs and nursing note reviewed.   Constitutional:       Appearance: Normal appearance. He is well-developed.   HENT:      Head: Normocephalic and atraumatic.      Right Ear: External ear normal.      Left Ear: External ear normal.      Nose: Nose normal.   Eyes:      Pupils: Pupils are equal, round, and reactive to light.   Neck:      Musculoskeletal: Normal range of motion.   Cardiovascular:      Rate and Rhythm: Normal rate and regular rhythm.   Pulmonary:      Effort: " Pulmonary effort is normal.   Musculoskeletal: Normal range of motion.   Skin:     General: Skin is warm and dry.      Findings: Erythema and rash present.             Comments: Large area of erythema, warmth to touch, and induration noted on left buttock. No area of fluctuance. No discharge present.    Neurological:      Mental Status: He is alert and oriented to person, place, and time.   Psychiatric:         Behavior: Behavior normal.          PMH:  has a past medical history of Allergy, Anxiety, Cataract, Depression, Gout due to renal impairment, Hyperlipidemia, Hypertension, Kidney disease, and Localized osteoarthritis of right shoulder (6/19/2019). He also has no past medical history of Blood transfusion without reported diagnosis, Cancer ([facility]), CHF (congestive heart failure) ([facility]), Clotting disorder ([facility]), COPD (chronic obstructive pulmonary disease) ([facility]), Diabetes ([facility]), GERD (gastroesophageal reflux disease), Heart attack ([facility]), Heart murmur, IBD (inflammatory bowel disease), Migraine, Seizure ([facility]), Stroke ([facility]), Substance abuse ([facility]), or Thyroid disease.  MEDS:   Current Outpatient Medications:   •  tramadol (ULTRAM) 50 MG Tab, Take 1 Tab by mouth every 12 hours as needed for up to 5 days., Disp: 10 Tab, Rfl: 0  •  clindamycin (CLEOCIN) 300 MG Cap, Take 1 Cap by mouth 3 times a day for 10 days., Disp: 30 Cap, Rfl: 0  •  omeprazole (PRILOSEC) 20 MG delayed-release capsule, Take 1 Cap by mouth 2 Times a Day., Disp: 60 Cap, Rfl: 2  •  tramadol (ULTRAM) 50 MG Tab, Take 50 mg by mouth every 6 hours as needed., Disp: , Rfl:   •  LORazepam (ATIVAN) 0.5 MG Tab, Take 0.5 mg by mouth 1 time daily as needed for Anxiety., Disp: , Rfl:   •  Multiple Vitamins-Minerals (RENAPLEX-D) Tab, Take 1 Tab by mouth every day., Disp: , Rfl:   •  metoprolol SR (TOPROL XL) 100 MG TABLET SR 24 HR, Take 1 tablet by mouth once daily, Disp: 90 Tab, Rfl: 0  •  sevelamer carbonate (RENVELA) 800 MG Tab tablet, TAKE 2 TABLETS BY MOUTH  "THREE TIMES DAILY WITH MEALS AND 1 TABLET TWICE DAILY WITH SNACKS, Disp: , Rfl: 5  •  SENSIPAR 30 MG Tab, Take 1 Tab by mouth every day., Disp: , Rfl: 11  •  predniSONE (DELTASONE) 10 MG Tab, prednisone 40 mg x 2 weeks,30 mg x 1 week,20 mg x 1 week,10 mg x 1 week,5 mg x 1 week, Disp: 102 Tab, Rfl: 0  ALLERGIES:   Allergies   Allergen Reactions   • Cephalexin Rash   • Penicillins Unspecified and Rash     Pt states \"It was a childhood allergie\".       SURGHX:   Past Surgical History:   Procedure Laterality Date   • PB UPPER GI ENDOSCOPY,BIOPSY N/A 9/4/2020    Procedure: GASTROSCOPY, WITH BIOPSY;  Surgeon: Gabe West M.D.;  Location: SURGERY SAME DAY [facility];  Service: Gastroenterology   • GASTROSCOPY N/A 9/4/2020    Procedure: GASTROSCOPY- WITH DILATION;  Surgeon: Gabe West M.D.;  Location: SURGERY SAME DAY [facility];  Service: Gastroenterology   • AV FISTULA CREATION     • OTHER ORTHOPEDIC SURGERY       SOCHX:  reports that he quit smoking about 23 years ago. His smoking use included cigarettes. He has a 12.50 pack-year smoking history. He has never used smokeless tobacco. He reports that he does not drink alcohol or use drugs.  FH: family history includes Cancer in his mother; Heart Attack in his father; Heart Disease in his father; Stroke in his father.       Assessment/Plan:        1. Cellulitis of buttock, left      - clindamycin (CLEOCIN) 300 MG Cap; Take 1 Cap by mouth 3 times a day for 10 days.  Dispense: 30 Cap; Refill: 0   - Complete full course of antibiotics as prescribed     - tramadol (ULTRAM) 50 MG Tab; Take 1 Tab by mouth every 12 hours as needed for up to 5 days.  Dispense: 10 Tab; Refill: 0  - Consent for Opiate Prescription      2. CKD (chronic kidney disease) requiring chronic dialysis (HCC)    No abscess formation present on exam at today's visit. I&D not indicated at this time, although discussed with patient he may require drainage in the next few days. Recommend he RTC in " 2 days for wound check and possible I&D procedure. Encouraged increased fluids and rest. Apply warm compresses to area 2-3 times daily. STRICT ED precautions provided for any persistent/worsening symptoms. The patient demonstrated a good understanding and agreed with the treatment plan.    [facility] Aware web site evaluation: I have obtained and reviewed patient utilization report from [facility] [place] pharmacy database prior to writing prescription for controlled substance II, III or IV per [place] bill . Based on the report and my clinical assessment the prescription is medically necessary.

## 2020-10-07 ENCOUNTER — OFFICE VISIT (OUTPATIENT)
Dept: URGENT CARE | Facility: PHYSICIAN GROUP | Age: 65
End: 2020-10-07

## 2020-10-07 ENCOUNTER — HOSPITAL ENCOUNTER (INPATIENT)
Facility: MEDICAL CENTER | Age: 65
LOS: 2 days | DRG: 602 | End: 2020-10-10
Attending: EMERGENCY MEDICINE | Admitting: INTERNAL MEDICINE
Payer: MEDICARE

## 2020-10-07 VITALS
HEART RATE: 94 BPM | SYSTOLIC BLOOD PRESSURE: 126 MMHG | RESPIRATION RATE: 16 BRPM | TEMPERATURE: 98 F | OXYGEN SATURATION: 93 % | WEIGHT: 189 LBS | HEIGHT: 70 IN | BODY MASS INDEX: 27.06 KG/M2 | DIASTOLIC BLOOD PRESSURE: 78 MMHG

## 2020-10-07 DIAGNOSIS — L02.91 ABSCESS: ICD-10-CM

## 2020-10-07 DIAGNOSIS — L03.317 CELLULITIS OF BUTTOCK: ICD-10-CM

## 2020-10-07 PROCEDURE — 96374 THER/PROPH/DIAG INJ IV PUSH: CPT

## 2020-10-07 PROCEDURE — 85025 COMPLETE CBC W/AUTO DIFF WBC: CPT

## 2020-10-07 PROCEDURE — 303977 HCHG I & D

## 2020-10-07 PROCEDURE — 81001 URINALYSIS AUTO W/SCOPE: CPT

## 2020-10-07 PROCEDURE — 99214 OFFICE O/P EST MOD 30 MIN: CPT | Performed by: NURSE PRACTITIONER

## 2020-10-07 PROCEDURE — 87040 BLOOD CULTURE FOR BACTERIA: CPT

## 2020-10-07 PROCEDURE — 80053 COMPREHEN METABOLIC PANEL: CPT

## 2020-10-07 PROCEDURE — 99285 EMERGENCY DEPT VISIT HI MDM: CPT

## 2020-10-07 PROCEDURE — 0H98XZZ DRAINAGE OF BUTTOCK SKIN, EXTERNAL APPROACH: ICD-10-PCS | Performed by: EMERGENCY MEDICINE

## 2020-10-07 RX ORDER — SODIUM CHLORIDE 9 MG/ML
1000 INJECTION, SOLUTION INTRAVENOUS ONCE
Status: COMPLETED | OUTPATIENT
Start: 2020-10-08 | End: 2020-10-08

## 2020-10-07 ASSESSMENT — FIBROSIS 4 INDEX
FIB4 SCORE: 0.97
FIB4 SCORE: 0.97

## 2020-10-08 ENCOUNTER — PATIENT OUTREACH (OUTPATIENT)
Dept: HEALTH INFORMATION MANAGEMENT | Facility: OTHER | Age: 65
End: 2020-10-08

## 2020-10-08 ENCOUNTER — APPOINTMENT (OUTPATIENT)
Dept: RADIOLOGY | Facility: MEDICAL CENTER | Age: 65
DRG: 602 | End: 2020-10-08
Attending: EMERGENCY MEDICINE
Payer: MEDICARE

## 2020-10-08 PROBLEM — L02.91 ABSCESS: Status: ACTIVE | Noted: 2020-10-08

## 2020-10-08 PROBLEM — Z99.2 END STAGE RENAL FAILURE ON DIALYSIS (HCC): Status: ACTIVE | Noted: 2020-07-23

## 2020-10-08 LAB
ALBUMIN SERPL BCP-MCNC: 3.4 G/DL (ref 3.2–4.9)
ALBUMIN/GLOB SERPL: 0.9 G/DL
ALP SERPL-CCNC: 70 U/L (ref 30–99)
ALT SERPL-CCNC: 15 U/L (ref 2–50)
ANION GAP SERPL CALC-SCNC: 13 MMOL/L (ref 7–16)
APPEARANCE UR: CLEAR
AST SERPL-CCNC: 11 U/L (ref 12–45)
BACTERIA #/AREA URNS HPF: NEGATIVE /HPF
BASOPHILS # BLD AUTO: 0.1 % (ref 0–1.8)
BASOPHILS # BLD: 0.01 K/UL (ref 0–0.12)
BILIRUB SERPL-MCNC: 0.2 MG/DL (ref 0.1–1.5)
BILIRUB UR QL STRIP.AUTO: NEGATIVE
BUN SERPL-MCNC: 51 MG/DL (ref 8–22)
CALCIUM SERPL-MCNC: 10.1 MG/DL (ref 8.5–10.5)
CHLORIDE SERPL-SCNC: 95 MMOL/L (ref 96–112)
CO2 SERPL-SCNC: 29 MMOL/L (ref 20–33)
COLOR UR: YELLOW
COVID ORDER STATUS COVID19: NORMAL
CREAT SERPL-MCNC: 5.56 MG/DL (ref 0.5–1.4)
EOSINOPHIL # BLD AUTO: 0.13 K/UL (ref 0–0.51)
EOSINOPHIL NFR BLD: 1.7 % (ref 0–6.9)
EPI CELLS #/AREA URNS HPF: NEGATIVE /HPF
ERYTHROCYTE [DISTWIDTH] IN BLOOD BY AUTOMATED COUNT: 63 FL (ref 35.9–50)
GLOBULIN SER CALC-MCNC: 3.7 G/DL (ref 1.9–3.5)
GLUCOSE SERPL-MCNC: 93 MG/DL (ref 65–99)
GLUCOSE UR STRIP.AUTO-MCNC: 100 MG/DL
HCT VFR BLD AUTO: 37.7 % (ref 42–52)
HGB BLD-MCNC: 11.5 G/DL (ref 14–18)
HYALINE CASTS #/AREA URNS LPF: ABNORMAL /LPF
IMM GRANULOCYTES # BLD AUTO: 0.06 K/UL (ref 0–0.11)
IMM GRANULOCYTES NFR BLD AUTO: 0.8 % (ref 0–0.9)
KETONES UR STRIP.AUTO-MCNC: NEGATIVE MG/DL
LACTATE BLD-SCNC: 1.3 MMOL/L (ref 0.5–2)
LEUKOCYTE ESTERASE UR QL STRIP.AUTO: NEGATIVE
LYMPHOCYTES # BLD AUTO: 0.48 K/UL (ref 1–4.8)
LYMPHOCYTES NFR BLD: 6.1 % (ref 22–41)
MCH RBC QN AUTO: 29.6 PG (ref 27–33)
MCHC RBC AUTO-ENTMCNC: 30.5 G/DL (ref 33.7–35.3)
MCV RBC AUTO: 97.2 FL (ref 81.4–97.8)
MICRO URNS: ABNORMAL
MONOCYTES # BLD AUTO: 0.52 K/UL (ref 0–0.85)
MONOCYTES NFR BLD AUTO: 6.6 % (ref 0–13.4)
NEUTROPHILS # BLD AUTO: 6.63 K/UL (ref 1.82–7.42)
NEUTROPHILS NFR BLD: 84.7 % (ref 44–72)
NITRITE UR QL STRIP.AUTO: NEGATIVE
NRBC # BLD AUTO: 0 K/UL
NRBC BLD-RTO: 0 /100 WBC
PH UR STRIP.AUTO: >=9 [PH] (ref 5–8)
PLATELET # BLD AUTO: 136 K/UL (ref 164–446)
PMV BLD AUTO: 10.4 FL (ref 9–12.9)
POTASSIUM SERPL-SCNC: 4.5 MMOL/L (ref 3.6–5.5)
PROT SERPL-MCNC: 7.1 G/DL (ref 6–8.2)
PROT UR QL SSA: 30 MG/DL
RBC # BLD AUTO: 3.88 M/UL (ref 4.7–6.1)
RBC # URNS HPF: ABNORMAL /HPF
RBC UR QL AUTO: NEGATIVE
SARS-COV-2 RNA RESP QL NAA+PROBE: NOTDETECTED
SODIUM SERPL-SCNC: 137 MMOL/L (ref 135–145)
SP GR UR STRIP.AUTO: 1.01
SPECIMEN SOURCE: NORMAL
UROBILINOGEN UR STRIP.AUTO-MCNC: 0.2 MG/DL
WBC # BLD AUTO: 7.8 K/UL (ref 4.8–10.8)
WBC #/AREA URNS HPF: ABNORMAL /HPF

## 2020-10-08 PROCEDURE — 83605 ASSAY OF LACTIC ACID: CPT

## 2020-10-08 PROCEDURE — 700102 HCHG RX REV CODE 250 W/ 637 OVERRIDE(OP): Performed by: INTERNAL MEDICINE

## 2020-10-08 PROCEDURE — A9270 NON-COVERED ITEM OR SERVICE: HCPCS | Performed by: EMERGENCY MEDICINE

## 2020-10-08 PROCEDURE — 770001 HCHG ROOM/CARE - MED/SURG/GYN PRIV*

## 2020-10-08 PROCEDURE — 99223 1ST HOSP IP/OBS HIGH 75: CPT | Performed by: INTERNAL MEDICINE

## 2020-10-08 PROCEDURE — 87070 CULTURE OTHR SPECIMN AEROBIC: CPT

## 2020-10-08 PROCEDURE — 87040 BLOOD CULTURE FOR BACTERIA: CPT

## 2020-10-08 PROCEDURE — 700105 HCHG RX REV CODE 258: Performed by: EMERGENCY MEDICINE

## 2020-10-08 PROCEDURE — 96372 THER/PROPH/DIAG INJ SC/IM: CPT

## 2020-10-08 PROCEDURE — U0003 INFECTIOUS AGENT DETECTION BY NUCLEIC ACID (DNA OR RNA); SEVERE ACUTE RESPIRATORY SYNDROME CORONAVIRUS 2 (SARS-COV-2) (CORONAVIRUS DISEASE [COVID-19]), AMPLIFIED PROBE TECHNIQUE, MAKING USE OF HIGH THROUGHPUT TECHNOLOGIES AS DESCRIBED BY CMS-2020-01-R: HCPCS

## 2020-10-08 PROCEDURE — A9270 NON-COVERED ITEM OR SERVICE: HCPCS | Performed by: INTERNAL MEDICINE

## 2020-10-08 PROCEDURE — 700117 HCHG RX CONTRAST REV CODE 255: Performed by: EMERGENCY MEDICINE

## 2020-10-08 PROCEDURE — C9803 HOPD COVID-19 SPEC COLLECT: HCPCS | Performed by: INTERNAL MEDICINE

## 2020-10-08 PROCEDURE — 87186 SC STD MICRODIL/AGAR DIL: CPT

## 2020-10-08 PROCEDURE — 700111 HCHG RX REV CODE 636 W/ 250 OVERRIDE (IP): Performed by: EMERGENCY MEDICINE

## 2020-10-08 PROCEDURE — 72193 CT PELVIS W/DYE: CPT

## 2020-10-08 PROCEDURE — 87147 CULTURE TYPE IMMUNOLOGIC: CPT

## 2020-10-08 PROCEDURE — 700102 HCHG RX REV CODE 250 W/ 637 OVERRIDE(OP): Performed by: EMERGENCY MEDICINE

## 2020-10-08 PROCEDURE — 700101 HCHG RX REV CODE 250

## 2020-10-08 PROCEDURE — 87205 SMEAR GRAM STAIN: CPT

## 2020-10-08 PROCEDURE — 700111 HCHG RX REV CODE 636 W/ 250 OVERRIDE (IP): Performed by: INTERNAL MEDICINE

## 2020-10-08 PROCEDURE — 87077 CULTURE AEROBIC IDENTIFY: CPT

## 2020-10-08 RX ORDER — TRAMADOL HYDROCHLORIDE 50 MG/1
50 TABLET ORAL EVERY 6 HOURS PRN
Status: DISCONTINUED | OUTPATIENT
Start: 2020-10-08 | End: 2020-10-10 | Stop reason: HOSPADM

## 2020-10-08 RX ORDER — HEPARIN SODIUM 5000 [USP'U]/ML
5000 INJECTION, SOLUTION INTRAVENOUS; SUBCUTANEOUS EVERY 8 HOURS
Status: DISCONTINUED | OUTPATIENT
Start: 2020-10-08 | End: 2020-10-10 | Stop reason: HOSPADM

## 2020-10-08 RX ORDER — CINACALCET 30 MG/1
30 TABLET, FILM COATED ORAL DAILY
Status: DISCONTINUED | OUTPATIENT
Start: 2020-10-08 | End: 2020-10-10 | Stop reason: HOSPADM

## 2020-10-08 RX ORDER — LIDOCAINE HYDROCHLORIDE 20 MG/ML
1 JELLY TOPICAL PRN
Status: DISCONTINUED | OUTPATIENT
Start: 2020-10-08 | End: 2020-10-10 | Stop reason: HOSPADM

## 2020-10-08 RX ORDER — LORAZEPAM 1 MG/1
1 TABLET ORAL
Status: DISCONTINUED | OUTPATIENT
Start: 2020-10-09 | End: 2020-10-10 | Stop reason: HOSPADM

## 2020-10-08 RX ORDER — POLYETHYLENE GLYCOL 3350 17 G/17G
1 POWDER, FOR SOLUTION ORAL
Status: DISCONTINUED | OUTPATIENT
Start: 2020-10-08 | End: 2020-10-10 | Stop reason: HOSPADM

## 2020-10-08 RX ORDER — LIDOCAINE HYDROCHLORIDE AND EPINEPHRINE 10; 10 MG/ML; UG/ML
20 INJECTION, SOLUTION INFILTRATION; PERINEURAL ONCE
Status: ACTIVE | OUTPATIENT
Start: 2020-10-08 | End: 2020-10-09

## 2020-10-08 RX ORDER — MORPHINE SULFATE 4 MG/ML
4 INJECTION, SOLUTION INTRAMUSCULAR; INTRAVENOUS ONCE
Status: COMPLETED | OUTPATIENT
Start: 2020-10-08 | End: 2020-10-08

## 2020-10-08 RX ORDER — METOPROLOL SUCCINATE 100 MG/1
100 TABLET, EXTENDED RELEASE ORAL
Status: DISCONTINUED | OUTPATIENT
Start: 2020-10-08 | End: 2020-10-10 | Stop reason: HOSPADM

## 2020-10-08 RX ORDER — LEVOFLOXACIN 500 MG/1
500 TABLET, FILM COATED ORAL ONCE
Status: COMPLETED | OUTPATIENT
Start: 2020-10-08 | End: 2020-10-08

## 2020-10-08 RX ORDER — AMOXICILLIN 250 MG
2 CAPSULE ORAL 2 TIMES DAILY
Status: DISCONTINUED | OUTPATIENT
Start: 2020-10-08 | End: 2020-10-10 | Stop reason: HOSPADM

## 2020-10-08 RX ORDER — LORAZEPAM 1 MG/1
0.5 TABLET ORAL EVERY 8 HOURS PRN
Status: DISCONTINUED | OUTPATIENT
Start: 2020-10-08 | End: 2020-10-08

## 2020-10-08 RX ORDER — OMEPRAZOLE 20 MG/1
20 CAPSULE, DELAYED RELEASE ORAL 2 TIMES DAILY
Status: DISCONTINUED | OUTPATIENT
Start: 2020-10-08 | End: 2020-10-10 | Stop reason: HOSPADM

## 2020-10-08 RX ORDER — LIDOCAINE HYDROCHLORIDE AND EPINEPHRINE BITARTRATE 20; .01 MG/ML; MG/ML
INJECTION, SOLUTION SUBCUTANEOUS
Status: COMPLETED
Start: 2020-10-08 | End: 2020-10-08

## 2020-10-08 RX ORDER — OXYCODONE HYDROCHLORIDE 5 MG/1
5 TABLET ORAL ONCE
Status: COMPLETED | OUTPATIENT
Start: 2020-10-08 | End: 2020-10-08

## 2020-10-08 RX ORDER — M-VIT,TX,IRON,MINS/CALC/FOLIC 27MG-0.4MG
1 TABLET ORAL DAILY
Status: DISCONTINUED | OUTPATIENT
Start: 2020-10-08 | End: 2020-10-10 | Stop reason: HOSPADM

## 2020-10-08 RX ORDER — SEVELAMER CARBONATE 800 MG/1
800 TABLET, FILM COATED ORAL
Status: DISCONTINUED | OUTPATIENT
Start: 2020-10-08 | End: 2020-10-09

## 2020-10-08 RX ORDER — BISACODYL 10 MG
10 SUPPOSITORY, RECTAL RECTAL
Status: DISCONTINUED | OUTPATIENT
Start: 2020-10-08 | End: 2020-10-10 | Stop reason: HOSPADM

## 2020-10-08 RX ADMIN — HEPARIN SODIUM 5000 UNITS: 5000 INJECTION, SOLUTION INTRAVENOUS; SUBCUTANEOUS at 20:26

## 2020-10-08 RX ADMIN — IOHEXOL 100 ML: 350 INJECTION, SOLUTION INTRAVENOUS at 01:30

## 2020-10-08 RX ADMIN — LEVOFLOXACIN 500 MG: 500 TABLET, FILM COATED ORAL at 02:11

## 2020-10-08 RX ADMIN — SEVELAMER CARBONATE 800 MG: 800 TABLET, FILM COATED ORAL at 09:02

## 2020-10-08 RX ADMIN — SEVELAMER CARBONATE 800 MG: 800 TABLET, FILM COATED ORAL at 12:33

## 2020-10-08 RX ADMIN — SODIUM CHLORIDE 1000 ML: 9 INJECTION, SOLUTION INTRAVENOUS at 00:16

## 2020-10-08 RX ADMIN — TRAMADOL HYDROCHLORIDE 50 MG: 50 TABLET ORAL at 09:02

## 2020-10-08 RX ADMIN — OMEPRAZOLE 20 MG: 20 CAPSULE, DELAYED RELEASE ORAL at 05:33

## 2020-10-08 RX ADMIN — METOPROLOL SUCCINATE 100 MG: 100 TABLET, EXTENDED RELEASE ORAL at 05:32

## 2020-10-08 RX ADMIN — HEPARIN SODIUM 5000 UNITS: 5000 INJECTION, SOLUTION INTRAVENOUS; SUBCUTANEOUS at 14:20

## 2020-10-08 RX ADMIN — HEPARIN SODIUM 5000 UNITS: 5000 INJECTION, SOLUTION INTRAVENOUS; SUBCUTANEOUS at 05:33

## 2020-10-08 RX ADMIN — OMEPRAZOLE 20 MG: 20 CAPSULE, DELAYED RELEASE ORAL at 16:56

## 2020-10-08 RX ADMIN — TRAMADOL HYDROCHLORIDE 50 MG: 50 TABLET ORAL at 20:26

## 2020-10-08 RX ADMIN — CINACALCET HYDROCHLORIDE 30 MG: 30 TABLET, FILM COATED ORAL at 05:33

## 2020-10-08 RX ADMIN — LIDOCAINE HYDROCHLORIDE AND EPINEPHRINE: 20; 10 INJECTION, SOLUTION INFILTRATION; PERINEURAL at 01:48

## 2020-10-08 RX ADMIN — OXYCODONE 5 MG: 5 TABLET ORAL at 15:24

## 2020-10-08 RX ADMIN — MORPHINE SULFATE 4 MG: 4 INJECTION INTRAVENOUS at 01:47

## 2020-10-08 RX ADMIN — MULTIPLE VITAMINS W/ MINERALS TAB 1 TABLET: TAB at 05:33

## 2020-10-08 RX ADMIN — SEVELAMER CARBONATE 800 MG: 800 TABLET, FILM COATED ORAL at 16:56

## 2020-10-08 SDOH — ECONOMIC STABILITY: FOOD INSECURITY: WITHIN THE PAST 12 MONTHS, YOU WORRIED THAT YOUR FOOD WOULD RUN OUT BEFORE YOU GOT MONEY TO BUY MORE.: SOMETIMES TRUE

## 2020-10-08 SDOH — ECONOMIC STABILITY: INCOME INSECURITY: HOW HARD IS IT FOR YOU TO PAY FOR THE VERY BASICS LIKE FOOD, HOUSING, MEDICAL CARE, AND HEATING?: SOMEWHAT HARD

## 2020-10-08 SDOH — ECONOMIC STABILITY: TRANSPORTATION INSECURITY
IN THE PAST 12 MONTHS, HAS THE LACK OF TRANSPORTATION KEPT YOU FROM MEDICAL APPOINTMENTS OR FROM GETTING MEDICATIONS?: NO

## 2020-10-08 SDOH — ECONOMIC STABILITY: TRANSPORTATION INSECURITY
IN THE PAST 12 MONTHS, HAS LACK OF TRANSPORTATION KEPT YOU FROM MEETINGS, WORK, OR FROM GETTING THINGS NEEDED FOR DAILY LIVING?: NO

## 2020-10-08 SDOH — ECONOMIC STABILITY: FOOD INSECURITY: WITHIN THE PAST 12 MONTHS, THE FOOD YOU BOUGHT JUST DIDN'T LAST AND YOU DIDN'T HAVE MONEY TO GET MORE.: SOMETIMES TRUE

## 2020-10-08 ASSESSMENT — LIFESTYLE VARIABLES
HAVE PEOPLE ANNOYED YOU BY CRITICIZING YOUR DRINKING: NO
ON A TYPICAL DAY WHEN YOU DRINK ALCOHOL HOW MANY DRINKS DO YOU HAVE: 0
AVERAGE NUMBER OF DAYS PER WEEK YOU HAVE A DRINK CONTAINING ALCOHOL: 0
EVER HAD A DRINK FIRST THING IN THE MORNING TO STEADY YOUR NERVES TO GET RID OF A HANGOVER: NO
TOTAL SCORE: 0
CONSUMPTION TOTAL: NEGATIVE
HAVE YOU EVER FELT YOU SHOULD CUT DOWN ON YOUR DRINKING: NO
HOW MANY TIMES IN THE PAST YEAR HAVE YOU HAD 5 OR MORE DRINKS IN A DAY: 0
TOTAL SCORE: 0
EVER FELT BAD OR GUILTY ABOUT YOUR DRINKING: NO
DOES PATIENT WANT TO STOP DRINKING: NO
TOTAL SCORE: 0
ALCOHOL_USE: NO

## 2020-10-08 ASSESSMENT — ENCOUNTER SYMPTOMS
SEIZURES: 0
VOMITING: 0
HEMOPTYSIS: 0
INSOMNIA: 0
BRUISES/BLEEDS EASILY: 0
NAUSEA: 0
BLURRED VISION: 0
NECK PAIN: 0
DOUBLE VISION: 0
DIZZINESS: 0
FEVER: 0
CHILLS: 0
MYALGIAS: 0
PALPITATIONS: 0
COUGH: 0
HEADACHES: 0
SHORTNESS OF BREATH: 0
STRIDOR: 0
WEIGHT LOSS: 0
DEPRESSION: 0
LOSS OF CONSCIOUSNESS: 0
SORE THROAT: 0

## 2020-10-08 ASSESSMENT — PAIN DESCRIPTION - PAIN TYPE
TYPE: ACUTE PAIN

## 2020-10-08 ASSESSMENT — PATIENT HEALTH QUESTIONNAIRE - PHQ9
SUM OF ALL RESPONSES TO PHQ9 QUESTIONS 1 AND 2: 0
1. LITTLE INTEREST OR PLEASURE IN DOING THINGS: NOT AT ALL
2. FEELING DOWN, DEPRESSED, IRRITABLE, OR HOPELESS: NOT AT ALL

## 2020-10-08 ASSESSMENT — FIBROSIS 4 INDEX: FIB4 SCORE: 1.36

## 2020-10-08 NOTE — ED TRIAGE NOTES
PT TO TRIAGE .  Chief Complaint   Patient presents with   • Buttocks Pain   • Wound Check     LEFT BUTTOCK PAIN/WOUND    • Sent from Urgent Care

## 2020-10-08 NOTE — PROGRESS NOTES
Report received. Assumed care. Pt in bed awake. A/O x4. VSS. Responds appropriately. C/O pain, medicated per MAR, no SOB. Assessment complete. Dressing to the left buttock in place, reinforced. Left arm AV fistula, positive for bruit and thrill. Discussed POC, pain control, wound care, IV antbx, mobility, safety, pt verbalizes understanding. Explained importance of calling before getting OOB. Call light and belongings within reach.  Bed in the lowest position. Treaded socks in place. Hourly rounding in progress. Will continue to monitor .

## 2020-10-08 NOTE — ASSESSMENT & PLAN NOTE
-Continue vanc with HD.   -No fevers or leukocytosis.   -BC NGTD.   -Wound culture in process. Will need referral to outpatient wound care clinic at discharge.

## 2020-10-08 NOTE — CONSULTS
"[facility] -  CONSULTATION NOTE               Author: Pearl Bassett M.D. Date & Time: 10/8/2020  2:33 PM       REASON FOR CONSULTATION:   - Inpatient hemodialysis management.    CHIEF COMPLAINT:   -  \"I'm having pain in my butt\"    HISTORY OF PRESENT ILLNESS:    64 yo M PMH ESRD MWF iHD, HTN, anemia of CKD, and CKD-MBD here with CC as above.  He was having progressively worsening pain in left buttock region and so presented to his OP MD who gave him abx course of clindamycin.  He was on the abx for about three days and was not getting any relief so went to local urgent care and there they redirected him to ED.  Upon arrival his wound was noted to a fluid collection in the left buttock with leukocytosis on labs.  I&D was performed and 10 mL purulent material was expressed.  He was admitted to hospitalist service for further evaluation.  Compliant with OP dialysis and no missed treatments recently.  Otherwise denies any F/C/N/V/CP/SOB.  No melena, hematochezia, hematemesis.  No HA, visual changes, or abdominal pain.    REVIEW OF SYSTEMS:    Review of Systems   Constitutional: Negative for chills and fever.   Respiratory: Negative for cough and shortness of breath.    Cardiovascular: Negative for chest pain and leg swelling.   Gastrointestinal: Negative for nausea and vomiting.   Neurological: Negative for seizures and loss of consciousness.   All other systems reviewed and are negative.    PAST MEDICAL HISTORY:   - ESRD MWF iHD  - HTN  - Anemia of CKD  - CKD-MBD  - HLD  - Bilateral cataracts  - Anxiety/depression    PAST SURGICAL HISTORY:   -  LAVF  - Orthopedic surgery  - Left buttock I&D    FAMILY HISTORY:   - Reviewed and non contributory to current illness    SOCIAL HISTORY:   - No tobacco, quit 23 yrs ago  - No EtOH  - No illicits    HOME MEDICATIONS:   - Reviewed and documented in chart    LABORATORY STUDIES:   - Reviewed and documented in chart    ALLERGIES:  Cephalexin and " "Penicillins    VS:  /72   Pulse 73   Temp 36.3 °C (97.3 °F) (Temporal)   Resp 16   Ht 1.778 m (5' 10\")   Wt 85.9 kg (189 lb 6 oz)   SpO2 93%   BMI 27.17 kg/m²   Physical Exam  Vitals signs and nursing note reviewed.   Constitutional:       Appearance: Normal appearance.   HENT:      Head: Normocephalic and atraumatic.   Eyes:      General: No scleral icterus.        Right eye: No discharge.         Left eye: No discharge.   Neck:      Musculoskeletal: Neck supple. No muscular tenderness.   Cardiovascular:      Rate and Rhythm: Normal rate and regular rhythm.   Pulmonary:      Effort: Pulmonary effort is normal.      Breath sounds: Normal breath sounds.   Abdominal:      General: Bowel sounds are normal.      Palpations: Abdomen is soft.   Musculoskeletal:         General: No tenderness.      Right lower leg: No edema.      Left lower leg: No edema.   Skin:     Comments: Left gluteal region with dressing and packing intact   Neurological:      Mental Status: He is alert. Mental status is at baseline.     LABS:  Recent Results (from the past 24 hour(s))   CBC WITH DIFFERENTIAL    Collection Time: 10/07/20 11:45 PM   Result Value Ref Range    WBC 7.8 4.8 - 10.8 K/uL    RBC 3.88 (L) 4.70 - 6.10 M/uL    Hemoglobin 11.5 (L) 14.0 - 18.0 g/dL    Hematocrit 37.7 (L) 42.0 - 52.0 %    MCV 97.2 81.4 - 97.8 fL    MCH 29.6 27.0 - 33.0 pg    MCHC 30.5 (L) 33.7 - 35.3 g/dL    RDW 63.0 (H) 35.9 - 50.0 fL    Platelet Count 136 (L) 164 - 446 K/uL    MPV 10.4 9.0 - 12.9 fL    Neutrophils-Polys 84.70 (H) 44.00 - 72.00 %    Lymphocytes 6.10 (L) 22.00 - 41.00 %    Monocytes 6.60 0.00 - 13.40 %    Eosinophils 1.70 0.00 - 6.90 %    Basophils 0.10 0.00 - 1.80 %    Immature Granulocytes 0.80 0.00 - 0.90 %    Nucleated RBC 0.00 /100 WBC    Neutrophils (Absolute) 6.63 1.82 - 7.42 K/uL    Lymphs (Absolute) 0.48 (L) 1.00 - 4.80 K/uL    Monos (Absolute) 0.52 0.00 - 0.85 K/uL    Eos (Absolute) 0.13 0.00 - 0.51 K/uL    Baso (Absolute) " 0.01 0.00 - 0.12 K/uL    Immature Granulocytes (abs) 0.06 0.00 - 0.11 K/uL    NRBC (Absolute) 0.00 K/uL   COMP METABOLIC PANEL    Collection Time: 10/07/20 11:45 PM   Result Value Ref Range    Sodium 137 135 - 145 mmol/L    Potassium 4.5 3.6 - 5.5 mmol/L    Chloride 95 (L) 96 - 112 mmol/L    Co2 29 20 - 33 mmol/L    Anion Gap 13.0 7.0 - 16.0    Glucose 93 65 - 99 mg/dL    Bun 51 (H) 8 - 22 mg/dL    Creatinine 5.56 (HH) 0.50 - 1.40 mg/dL    Calcium 10.1 8.5 - 10.5 mg/dL    AST(SGOT) 11 (L) 12 - 45 U/L    ALT(SGPT) 15 2 - 50 U/L    Alkaline Phosphatase 70 30 - 99 U/L    Total Bilirubin 0.2 0.1 - 1.5 mg/dL    Albumin 3.4 3.2 - 4.9 g/dL    Total Protein 7.1 6.0 - 8.2 g/dL    Globulin 3.7 (H) 1.9 - 3.5 g/dL    A-G Ratio 0.9 g/dL   URINALYSIS    Collection Time: 10/07/20 11:45 PM    Specimen: Blood   Result Value Ref Range    Color Yellow     Character Clear     Specific Gravity 1.009 <1.035    Ph >=9.0 (A) 5.0 - 8.0    Glucose 100 (A) Negative mg/dL    Ketones Negative Negative mg/dL    Bilirubin Negative Negative    Urobilinogen, Urine 0.2 Negative    Nitrite Negative Negative    Leukocyte Esterase Negative Negative    Occult Blood Negative Negative    Micro Urine Req Microscopic    URINE MICROSCOPIC (W/UA)    Collection Time: 10/07/20 11:45 PM   Result Value Ref Range    WBC 0-2 (A) /hpf    RBC 0-2 (A) /hpf    Bacteria Negative None /hpf    Epithelial Cells Negative /hpf    Hyaline Cast 0-2 /lpf   UR PROTEIN SSA    Collection Time: 10/07/20 11:45 PM   Result Value Ref Range    Protein 30 (A) Negative mg/dL   ESTIMATED GFR    Collection Time: 10/07/20 11:45 PM   Result Value Ref Range    GFR If  13 (A) >60 mL/min/1.73 m 2    GFR If Non African American 10 (A) >60 mL/min/1.73 m 2   Routine (COVID/SARS COV-2 In-House PCR up to 24 hours)    Collection Time: 10/08/20  2:34 AM    Specimen: Nasopharyngeal; Respirate   Result Value Ref Range    COVID Order Status Received    SARS-CoV-2, PCR (In-House)     Collection Time: 10/08/20  2:34 AM   Result Value Ref Range    SARS-CoV-2 Source NP Swab     SARS-CoV-2 by PCR NotDetected    LACTIC ACID    Collection Time: 10/08/20  3:50 AM   Result Value Ref Range    Lactic Acid 1.3 0.5 - 2.0 mmol/L       (click the triangle to expand results)    IMAGING:  CT-PELVIS WITH   Final Result      Findings consistent with nonspecific inflammatory change involving subcutaneous fat of the left gluteal region. See additional comments above.          IMPRESSION:  - ESRD    * Etiology likely 2/2 HTN    * [place]; MWF iHD; LAVF (+thrill/bruit)    * Living XPL surgery pending final work up at [facility]   - Left gluteal abscess and cellulitis    * I&D done, wound care in progress  - HTN    * Goal BP < 140/90    * Stable  - Anemia of CKD    * Goal Hgb 10-11    * Stable  - CKD-MBD    * Managed at HD unit    PLAN:  - MWF iHD  - UF as tolerated  - No dietary protein restrictions  - Dose all meds per ESRD  - He takes ativan 1 mg prior to each HD, please continue that while inpatient  - Low sodium diet  - Abx per primary team    Thank you for the consultation!

## 2020-10-08 NOTE — PROGRESS NOTES
Chief Complaint   Patient presents with   • Wound Check     on lft buttock       HISTORY OF PRESENT ILLNESS: Patient is a 65 y.o. male with a history of ESRD, dialysis, and HTN who presents to urgent care today with a follow up wound check. He was diagnosed with cellulitis two days ago. Placed on Clindamycin. He has been taking as directed. Today he reports worsening with pain. Denies fever, chills. He denies drainage from the site.     Patient Active Problem List    Diagnosis Date Noted   • Anxiety 07/23/2020     Priority: High   • Esophageal stricture 09/17/2020   • Hypertension 09/17/2020   • Anemia 09/16/2020   • ESRD (end stage renal disease) (HCC) 07/23/2020   • High triglycerides 06/19/2019   • Low HDL (under 40) 06/19/2019   • Localized osteoarthritis of right shoulder 06/19/2019   • Cataract of both eyes 05/08/2019   • Ingrown toenail 05/08/2019   • Chronic pain of both shoulders 05/08/2019   • Medication monitoring encounter 05/08/2019   • Vitamin D deficiency 05/08/2019   • Solitary kidney 10/21/2015   • Family history of stroke 10/21/2015   • Gout due to renal impairment 10/21/2015   • First degree heart block by electrocardiogram 10/21/2015       Allergies:Cephalexin and Penicillins    Current Outpatient Medications Ordered in Epic   Medication Sig Dispense Refill   • clindamycin (CLEOCIN) 300 MG Cap Take 1 Cap by mouth 3 times a day for 10 days. 30 Cap 0   • omeprazole (PRILOSEC) 20 MG delayed-release capsule Take 1 Cap by mouth 2 Times a Day. 60 Cap 2   • predniSONE (DELTASONE) 10 MG Tab prednisone 40 mg x 2 weeks,30 mg x 1 week,20 mg x 1 week,10 mg x 1 week,5 mg x 1 week 102 Tab 0   • tramadol (ULTRAM) 50 MG Tab Take 50 mg by mouth every 6 hours as needed.     • LORazepam (ATIVAN) 0.5 MG Tab Take 0.5 mg by mouth 1 time daily as needed for Anxiety.     • Multiple Vitamins-Minerals (RENAPLEX-D) Tab Take 1 Tab by mouth every day.     • metoprolol SR (TOPROL XL) 100 MG TABLET SR 24 HR Take 1 tablet by  mouth once daily 90 Tab 0   • sevelamer carbonate (RENVELA) 800 MG Tab tablet TAKE 2 TABLETS BY MOUTH THREE TIMES DAILY WITH MEALS AND 1 TABLET TWICE DAILY WITH SNACKS  5   • SENSIPAR 30 MG Tab Take 1 Tab by mouth every day.  11     No current [facility]-ordered facility-administered medications on file.        Past Medical History:   Diagnosis Date   • Allergy    • Anxiety    • Cataract    • Depression    • Gout due to renal impairment    • Hyperlipidemia    • Hypertension    • Kidney disease     Congenital solitary kidney on dialysis 3 times a week.   • Localized osteoarthritis of right shoulder 2019       Social History     Tobacco Use   • Smoking status: Former Smoker     Packs/day: 0.50     Years: 25.00     Pack years: 12.50     Types: Cigarettes     Quit date: 10/21/1996     Years since quittin.9   • Smokeless tobacco: Never Used   Substance Use Topics   • Alcohol use: No     Alcohol/week: 0.0 oz   • Drug use: No       Family Status   Relation Name Status   • Mo     • Fa       Family History   Problem Relation Age of Onset   • Cancer Mother         lymphoma   • Heart Attack Father    • Stroke Father    • Heart Disease Father        ROS:  Review of Systems   Constitutional: Negative for fever, chills, weight loss, malaise, and fatigue.   HENT: Negative for ear pain, nosebleeds, congestion, sore throat and neck pain.    Eyes: Negative for vision changes.   Neuro: Negative for headache, sensory changes, weakness, seizure, LOC.   Cardiovascular: Negative for chest pain, palpitations, orthopnea and leg swelling.   Respiratory: Negative for cough, sputum production, shortness of breath and wheezing.   Gastrointestinal: Negative for abdominal pain, nausea, vomiting or diarrhea.   Genitourinary: Dialysis patient.   Musculoskeletal: Negative for falls, neck pain, back pain, joint pain, myalgias.   Skin: Positive for painful region to right buttock. Negative for rash, diaphoresis.     Exam:  BP  "126/78   Pulse 94   Temp 36.7 °C (98 °F)   Resp 16   Ht 1.778 m (5' 10\")   Wt 85.7 kg (189 lb)   SpO2 93%   General: well-nourished, well-developed male in mild distress  Head: normocephalic, atraumatic  Eyes: PERRLA, no conjunctival injection, acuity grossly intact, lids normal.  Ears: normal shape and symmetry, no tenderness, no discharge. External canals are without any significant edema or erythema.  Gross auditory acuity is intact.  Nose: symmetrical without tenderness, no discharge.  Mouth/Throat: reasonable hygiene, no erythema, exudates or tonsillar enlargement.  Neck: no masses, range of motion within normal limits, no tracheal deviation. No obvious thyroid enlargement.   Lymph: no cervical adenopathy. No supraclavicular adenopathy.   Neuro: alert and oriented. Cranial nerves 1-12 grossly intact. No sensory deficit.   Cardiovascular: regular rate and rhythm. No edema.  Pulmonary: no distress. Chest is symmetrical with respiration, no wheezes, crackles, or rhonchi.   Musculoskeletal: no clubbing, appropriate muscle tone, gait is stable.  Skin: warm, no clubbing, no cyanosis, no rashes. There is a large area of erythema to left buttock with tenderness and induration. No discharge of fluctuance noted.   Psych: appropriate mood, affect, judgement.         Assessment/Plan:  1. Cellulitis of buttock         Previous clinic visit encounter reviewed and considered in medical decision making today. Due to extensiveness of infection, history, and lack of improvement, I feel the patient requires a higher level of care in the ED for closer monitoring, stat lab work and/or imaging for further evaluation. This has been discussed with the patient and he states agreement and understanding. The patient is stable to leave POV at this time and will go directly to ED without delay.             Please note that this dictation was created using voice recognition software. I have made every reasonable attempt to correct " obvious errors, but I expect that there are errors of grammar and possibly content that I did not discover before finalizing the note.      LETICIA Larsen.

## 2020-10-08 NOTE — ED PROVIDER NOTES
ED Provider Note    Scribed for Jermaine Fowler M.D. by Merry Montgomery. 10/7/2020  11:37 PM    Primary care provider: Aebl Jones M.D.  Means of arrival: Walk-in  History obtained from: Patient  History limited by: None    CHIEF COMPLAINT  Chief Complaint   Patient presents with   • Buttocks Pain   • Wound Check     LEFT BUTTOCK PAIN/WOUND    • Sent from Urgent Care     HPI  Garry Benites is a 65 y.o. male who presents to the Emergency Department for evaluation of left buttock pain. The patient went to Urgent Care today for worsening pain of a wound on his left buttock and was instructed to come to the ED. He denies any puss draining from the site. He denies any vomiting or diarrhea. The patient was diagnosed with cellulitis 2 days ago and is prescribed clindamycin. The patient is being dialyzed. His last appointment was the morning of 10/7 and the next is 10/9. The patient denies any IV drug use.     REVIEW OF SYSTEMS  Pertinent positives include: left buttock pain. Pertinent negatives include: no vomiting or diarrhea. See history of present illness. All other systems are negative.     PAST MEDICAL HISTORY   has a past medical history of Allergy, Anxiety, Cataract, Depression, Gout due to renal impairment, Hyperlipidemia, Hypertension, Kidney disease, and Localized osteoarthritis of right shoulder (2019).    SURGICAL HISTORY   has a past surgical history that includes av fistula creation; other orthopedic surgery; upper gi endoscopy,biopsy (N/A, 2020); and gastroscopy (N/A, 2020).    SOCIAL HISTORY  Social History     Tobacco Use   • Smoking status: Former Smoker     Packs/day: 0.50     Years: 25.00     Pack years: 12.50     Types: Cigarettes     Quit date: 10/21/1996     Years since quittin.9   • Smokeless tobacco: Never Used   Substance Use Topics   • Alcohol use: No     Alcohol/week: 0.0 oz   • Drug use: No      Social History     Substance and Sexual Activity   Drug Use No  "    FAMILY HISTORY  Family History   Problem Relation Age of Onset   • Cancer Mother         lymphoma   • Heart Attack Father    • Stroke Father    • Heart Disease Father      CURRENT MEDICATIONS  Home Medications     Reviewed by Leonard Mcgraw R.N. (Registered Nurse) on 10/08/20 at 0401  Med List Status: Complete   Medication Last Dose Status   clindamycin (CLEOCIN) 300 MG Cap 10/7/2020 Active   LORazepam (ATIVAN) 0.5 MG Tab PRN Active   metoprolol SR (TOPROL XL) 100 MG TABLET SR 24 HR 10/7/2020 Active   Multiple Vitamins-Minerals (RENAPLEX-D) Tab 10/7/2020 Active   omeprazole (PRILOSEC) 20 MG delayed-release capsule 10/7/2020 Active   predniSONE (DELTASONE) 10 MG Tab 10/7/2020 Active   SENSIPAR 30 MG Tab 10/7/2020 Active   sevelamer carbonate (RENVELA) 800 MG Tab tablet 10/7/2020 Active   tramadol (ULTRAM) 50 MG Tab PRN Active              ALLERGIES  Allergies   Allergen Reactions   • Cephalexin Rash   • Penicillins Unspecified and Rash     Pt states \"It was a childhood allergie\".       PHYSICAL EXAM  VITAL SIGNS: /84   Pulse (!) 104   Temp 36 °C (96.8 °F) (Temporal)   Resp 18   Wt 87 kg (191 lb 12.8 oz)   SpO2 91%   BMI 27.52 kg/m²     Constitutional: Alert in no apparent distress.  HENT: No signs of trauma, Bilateral external ears normal, Nose normal. Uvula midline.   Eyes: Pupils are equal and reactive, Conjunctiva normal, Non-icteric.   Neck: Normal range of motion, No tenderness, Supple, No stridor.   Lymphatic: No lymphadenopathy noted.   Cardiovascular: Regular rate and rhythm, no murmurs.   Thorax & Lungs: Normal breath sounds, No respiratory distress, No wheezing, No chest tenderness.   Abdomen:  Soft, No tenderness, No peritoneal signs, No masses, No pulsatile masses.   Skin: Warm, Dry, palm sized area of erythema of left buttock, No rash.   Back: No bony tenderness, No CVA tenderness.   Extremities: Intact distal pulses, No edema, No tenderness, No cyanosis.  Musculoskeletal: Good range " "of motion in all major joints. No tenderness to palpation or major deformities noted.   Neurologic: Alert , Normal motor function, Normal sensory function, No focal deficits noted.   Psychiatric: Affect normal, Judgment normal, Mood normal.     DIAGNOSTIC STUDIES / PROCEDURES    LABS  Labs Reviewed   CBC WITH DIFFERENTIAL - Abnormal; Notable for the following components:       Result Value    RBC 3.88 (*)     Hemoglobin 11.5 (*)     Hematocrit 37.7 (*)     MCHC 30.5 (*)     RDW 63.0 (*)     Platelet Count 136 (*)     Neutrophils-Polys 84.70 (*)     Lymphocytes 6.10 (*)     Lymphs (Absolute) 0.48 (*)     All other components within normal limits   COMP METABOLIC PANEL - Abnormal; Notable for the following components:    Chloride 95 (*)     Bun 51 (*)     Creatinine 5.56 (*)     AST(SGOT) 11 (*)     Globulin 3.7 (*)     All other components within normal limits   URINALYSIS - Abnormal; Notable for the following components:    Ph >=9.0 (*)     Glucose 100 (*)     All other components within normal limits   URINE MICROSCOPIC (W/UA) - Abnormal; Notable for the following components:    WBC 0-2 (*)     RBC 0-2 (*)     All other components within normal limits   UR PROTEIN SSA - Abnormal; Notable for the following components:    Protein 30 (*)     All other components within normal limits   ESTIMATED GFR - Abnormal; Notable for the following components:    GFR If  13 (*)     GFR If Non  10 (*)     All other components within normal limits   LACTIC ACID   BLOOD CULTURE    Narrative:     1 of 2 for Blood Culture x 2 sites order. Per Hospital  Policy: Only change Specimen Src: to \"Line\" if specified by  physician order.   BLOOD CULTURE    Narrative:     2 of 2 blood culture x2  Sites order. Per Hospital Policy:  Only change Specimen Src: to \"Line\" if specified by physician  order.   COVID/SARS COV-2    Narrative:     Is patient being admitted?->Yes  Does this patient meet criteria for " Rush/Cepheid per [facility]  Inpatient Workflow? (See workflow link below)->No  Expected turn around time?->Routine (In-House PCR up to 24  hours)  Is this the patients First SARS CoV-2 test?->No  Is this patient employed in healthcare?->No  Is the patient symptomatic as defined by the CDC?->No  Is the patient hospitalized?->No  Is the patient a resident in a congregate care setting?->No  Is the patient pregnant?->No   SARS-COV-2, PCR (IN-HOUSE)    Narrative:     Is patient being admitted?->Yes  Does this patient meet criteria for Rush/Cepheid per [facility]  Inpatient Workflow? (See workflow link below)->No  Expected turn around time?->Routine (In-House PCR up to 24  hours)  Is this the patients First SARS CoV-2 test?->No  Is this patient employed in healthcare?->No  Is the patient symptomatic as defined by the CDC?->No  Is the patient hospitalized?->No  Is the patient a resident in a congregate care setting?->No  Is the patient pregnant?->No   LACTIC ACID   LACTIC ACID      All labs reviewed by me.    RADIOLOGY  CT-PELVIS WITH   Final Result      Findings consistent with nonspecific inflammatory change involving subcutaneous fat of the left gluteal region. See additional comments above.        The radiologist's interpretation of all radiological studies have been reviewed by me.    INCISION AND DRAINAGE PROCEDURE NOTE:  Patient identification was confirmed and consent was obtained.  This procedure was performed by Dr. Fowler at 1:46.  Site: Palm sized area of erythema on the left gluteal fold. No crepitus.   Sterile procedures observed - yes  Needle size: 27  Anesthetic used (type and amt): 4 cc 2% lido w/ epi  Blade size: 11  Drainage: Copious purulent drainage  Packing used - 1 cm iodoform  Site anesthetized, incision made over site, wound drained and explored loculations, rinsed with copious amounts of normal saline, wound packed with sterile gauze, covered with dry, sterile dressing. Pt tolerated procedure well without  complications. Instructions for care discussed verbally and pt provided with additional written instructions for homecare and f/u.     COURSE & MEDICAL DECISION MAKING  Nursing notes, VS, PMSFHx reviewed in chart.    65 y.o. male p/w chief complaint of left buttock pain.    11:37 PM - Patient seen and examined at bedside.      I verified that the patient was wearing a mask and I was wearing appropriate PPE every time I entered the room. The patient's mask was on the patient at all times during my encounter except for a brief view of the oropharynx.     The differential diagnoses include but are not limited to:  Hx and PE c/w cellulitis  Exam with abscess at this time, see I&D note  Plan to admit for IV antibx and wound care.     12:01 AM - Discussed optimal antibiotic strategies with Pharmacy at this time.     12:05 AM - Patient will be treated with Levaquin, alternatives explored at this time however given cephalexin allergy and concern for gram-negative coverage, I believe Levaquin preferable over Zosyn at this time    12:26 AM - Performed bedside ultrasound at this time.     1:46 AM - There is an area of posterior acoustic enhancement present on ultrasound. Therefore incision and drainage procedure will be attempted by me at bedside.     2:02 AM - Paged Hospitalist.     2:15 AM - I discussed the patient's case and the above findings with Dr. Mathis (Hospitalist) who agrees to evaluate the patient for hospitalization.     DISPOSITION:  Patient will be hospitalized by Dr. Mathis in guarded condition.    FINAL IMPRESSION  1. Abscess    2. Cellulitis of buttock          IMerry (Jennifer), am scribing for, and in the presence of, Jermaine Fowler M.D..    Electronically signed by: Merry Montgomery (Scribe), 10/7/2020    IJermaine M.D. personally performed the services described in this documentation, as scribed by Merry Montgomery in my presence, and it is both accurate and  complete.    C.     The note accurately reflects work and decisions made by me.  Jermaine Fowler M.D.  10/8/2020  4:53 AM

## 2020-10-08 NOTE — PROGRESS NOTES
"JULIUS Galvan met pt at bedside to introduce CCM services. Pt has a PCP, Abel Jones M.D. which he is schedule to see on 12/3. Pt declined an appt for a HF. Pt stated his main concern is getting the kidney transplant that he needs. Pt lives at a house with his brother. Pt has his brother take him to appts or uses Uber. Pt states that he has trouble paying for food from time to time but overall believes he is able to care for himself and pay for housing and medical care. Pt states he has his brother and \"some other family members\" as his support system.     Community Health Worker Intake  • Social determinates of health intake complete.  • Identified barriers.  • Contact information provided to Garry Benites  • Declined Meds-To-Beds.  • Inpatient assessment completed.    Plan:  Complete Food Rx with pt    Make follow up call to pt after DC    "

## 2020-10-08 NOTE — PROGRESS NOTES
After midnight admission by Dr. Arredondo.    The patient is a 65-year-old male who presented to the emergency department on the evening of 10/7/2020 with buttock pain and wound check.  He was sent from urgent care and had a recent diagnosis of cellulitis for which she was placed on outpatient clindamycin.  He did have a wound on the left butt cheek that was incised and drained in the emergency department by the ED physician.  There was approximately 100 cc out of purulent material per report.  Wound culture is currently in process.  He is an end-stage renal disease patient and is maintained on hemodialysis Monday/Wednesday/Friday.  His last hemodialysis session was on 10/7/2020.  He was placed on vancomycin to be given with nephrology.  His nephrologist is Dr. Keller who has been consulted for inpatient hemodialysis management.  He was initially admitted to the clinical decision unit where it was decided to admit him to the hospital for further evaluation and treatment.    Assessment and plan:  -Consult nephrology for inpatient HD management.   -Continue IV abx.   -Follow wound culture.   -Continue wound care. Wound care service is following him. The patient will need continued wound care through the outpatient wound care clinic after discharge, as he has no one to help him with dressing changes at home.   -Labs in a.m.   -Admit to medical.         Willa Huston, MSN, RN, APRN, ACNPC-AG, CCRN  Nurse Practitioner, [facility]  [phone]    10/8/2020    12:14 PM

## 2020-10-08 NOTE — WOUND TEAM
[facility]  Inpatient Services  Initial Wound and Skin Care Evaluation    Admission Date: 10/7/2020     Last order of IP CONSULT TO WOUND CARE was found on 10/8/2020 from Hospital Encounter on 10/7/2020       HPI, PMH, SH: Reviewed    Unit where seen by Wound Team: T201/00     WOUND CONSULT/FOLLOW UP RELATED TO:  L buttocks    Self Report / Pain Level:  Pre-med with Toradol    OBJECTIVE:  Pt on pressure redistribution mattress, iodoform packing in place. Able to turn self    WOUND TYPE, LOCATION, CHARACTERISTICS (Pressure Injuries: location, stage, POA or date identified)    Wound 10/08/20 Full Thickness Wound Buttocks Left s/p ID (Active)   Wound Image   10/08/20 1000   Site Assessment Clean;Red    Periwound Assessment Pink;Induration    Margins Attached edges    Closure None    Drainage Amount Small    Drainage Description Sanguineous    Treatments Cleansed;Site care    Wound Cleansing Approved Wound Cleanser    Periwound Protectant Skin Protectant Wipes to Periwound    Dressing Cleansing/Solutions Not Applicable    Dressing Options Silver Strip Packing;Silicone Adhesive Foam;Hypafix Tape    Dressing Changed Changed    Dressing Status Intact    Dressing Change/Treatment Frequency Daily, and As Needed    NEXT Dressing Change/Treatment Date 10/09/20    NEXT Weekly Photo (Inpatient Only) 10/15/20    Non-staged Wound Description Full thickness    Wound Length (cm) 0.3 cm    Wound Width (cm) 0.8 cm    Wound Depth (cm) 1.3 cm    Wound Surface Area (cm^2) 0.24 cm^2    Wound Volume (cm^3) 0.31 cm^3    Tunneling (cm) 0 cm    Shape linear    Wound Odor None    Pulses N/A    Exposed Structures None        Vascular:    PRISCILA:   No results found.      Lab Values:    Lab Results   Component Value Date/Time    WBC 7.8 10/07/2020 11:45 PM    RBC 3.88 (L) 10/07/2020 11:45 PM    HEMOGLOBIN 11.5 (L) 10/07/2020 11:45 PM    HEMATOCRIT 37.7 (L) 10/07/2020 11:45 PM    CREACTPROT 18.07 (H) 09/02/2020 10:00 PM    SEDRATERAMA  55 (H) 09/02/2020 10:00 PM    HBA1C 5.5 06/08/2018 11:23 AM          Culture:   10/7/20  Culture Results show:  No results found for this or any previous visit (from the past 720 hour(s)).      INTERVENTIONS BY WOUND TEAM:  Packing removed. Wound and periwound cleansed with cleanser and gauze. No sting barrier to periwound. Quarter inch silver packing inserted into wound bed using sterile swab. One inch wick left outside the wound and secured with adhesive foam. Border of foam reinforced with hypafix tape.    Interdisciplinary consultation: Patient, Angie RN, Willa APRN    EVALUATION: Pt underwent I&D for a left gluteal abscess in the ED. Wound is now clean with only sanguinous drainage. Switched to silver packing as it is more appropriate for discharge. APRN at bedside, discussed need for OP f/u as pt has no one at home to assist with dressing changes. Wound team will continue to follow inpatient. Primary RN updated.    Goals: Steady decrease in wound area and depth weekly.    NURSING PLAN OF CARE ORDERS (X):    Dressing changes: See Dressing Care orders: x  Skin care: See Skin Care orders:   Rectal tube care: See Rectal Tube Care orders:   Other orders:    RSKIN:   CURRENTLY IN PLACE (X), APPLIED THIS VISIT (A), ORDERED (O):   Q shift Tod:  x  Q shift pressure point assessments:  x  Pressure redistribution mattress       x     Low Airloss          Bariatric RICA         Bariatric foam           Heel float boots     Heel Silicone dressing        Float Heels off Bed with Pillows               Barrier wipes         Barrier Cream         Barrier paste          Sacral silicone dressing         Silicone O2 tubing         Anchorfast         Cannula fixation Device (Tender )          Gray Foam Ear protectors           Trach with Optifoam split foam                 Waffle cushion        Waffle Overlay         Rectal tube or BMS    Purwick/Condom Cath          Antifungal tx      Interdry          Reposition q 2  hours     X pt turning self   Up to chair        Ambulate      PT/OT        Dietician        Diabetes Education      PO  x   TF     TPN     NPO   # days   Other        WOUND TEAM PLAN OF CARE   Dressing changes by wound team:          Follow up 1-2 times weekly:  X nursing to perform dsg care; WT following         Follow up 3 times weekly:                NPWT change 3 times weekly:     Follow up as needed:       Other (explain):     Anticipated discharge plans:  LTACH:        SNF/Rehab:                  Home Care:           Outpatient Wound Center:    X will benefit from ongoing wound care.        Self Care:

## 2020-10-08 NOTE — CARE PLAN
Problem: Safety  Goal: Will remain free from injury  Outcome: PROGRESSING AS EXPECTED  Note: Treaded socks in place, bed in the lowest position,  call light and belongings within reach, pt call for assistance appropriately      Problem: Pain Management  Goal: Pain level will decrease to patient's comfort goal  Outcome: PROGRESSING AS EXPECTED  Note: Medicated with tramadol 50 mg per MAR with adequate pain control, hourly rounding in progress

## 2020-10-08 NOTE — H&P
Hospital Medicine History & Physical Note    Date of Service  10/8/2020    Primary Care Physician  Abel Jones M.D.    Consultants  None    Code Status  Full Code    Chief Complaint  Chief Complaint   Patient presents with   • Buttocks Pain   • Wound Check     LEFT BUTTOCK PAIN/WOUND    • Sent from Urgent Care       History of Presenting Illness  65 y.o. male who presented 10/7/2020 with presents to the Emergency Department for evaluation of left buttock pain worsening over the last 3 days despite trial of clindamycin.   He went to Urgent Care today for worsening pain of a wound on his left buttock and was instructed to come to the ED. he is found to have leukocytosis and a large fluid collection that is I&D resulting in approximately 100 mL of pus.  It is packed with iodoform gauze.  He is started on vancomycin and Levaquin and then referred to the hospitalist for admission  The patient is being dialyzed for oliguric end-stage renal failure Monday Wednesday Friday. His last appointment was the morning of 10/7 and the next is 10/9. The patient denies any IV drug use.       Review of Systems  Review of Systems   Constitutional: Negative for fever, malaise/fatigue and weight loss.   HENT: Negative for sore throat and tinnitus.    Eyes: Negative for blurred vision and double vision.   Respiratory: Negative for cough, hemoptysis and stridor.    Cardiovascular: Negative for chest pain and palpitations.   Gastrointestinal: Negative for nausea and vomiting.   Genitourinary: Negative for dysuria and urgency.   Musculoskeletal: Negative for myalgias and neck pain.   Skin: Negative for itching and rash.   Neurological: Negative for dizziness and headaches.   Endo/Heme/Allergies: Does not bruise/bleed easily.   Psychiatric/Behavioral: Negative for depression. The patient does not have insomnia.        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    Surgical History   has a past surgical history that includes av fistula creation; other orthopedic surgery; pr upper gi endoscopy,biopsy (N/A, 9/4/2020); and gastroscopy (N/A, 9/4/2020).     Family History  family history includes Cancer in his mother; Heart Attack in his father; Heart Disease in his father; Stroke in his father.     Social History   reports that he quit smoking about 23 years ago. His smoking use included cigarettes. He has a 12.50 pack-year smoking history. He has never used smokeless tobacco. He reports that he does not drink alcohol or use drugs.    Allergies  Allergies   Allergen Reactions   • Cephalexin Rash   • Penicillins Unspecified and Rash     Pt states \"It was a childhood allergie\".         Medications  Prior to Admission Medications   Prescriptions Last Dose Informant Patient Reported? Taking?   LORazepam (ATIVAN) 0.5 MG Tab PRN Patient's Home Pharmacy Yes No   Sig: Take 0.5 mg by mouth 1 time daily as needed for Anxiety.   Multiple Vitamins-Minerals (RENAPLEX-D) Tab 10/7/2020 at Unknown time Patient Yes No   Sig: Take 1 Tab by mouth every day.   SENSIPAR 30 MG Tab 10/7/2020 at Unknown time Patient Yes No   Sig: Take 1 Tab by mouth every day.   clindamycin (CLEOCIN) 300 MG Cap 10/7/2020 at Unknown time  No No   Sig: Take 1 Cap by mouth 3 times a day for 10 days.   metoprolol SR (TOPROL XL) 100 MG TABLET SR 24 HR 10/7/2020 at Unknown time Patient's Home Pharmacy No No   Sig: Take 1 tablet by mouth once daily   omeprazole (PRILOSEC) 20 MG delayed-release capsule 10/7/2020 at Unknown time  No No   Sig: Take 1 Cap by mouth 2 Times a Day.   predniSONE (DELTASONE) 10 MG Tab 10/7/2020 at Unknown time  No No   Sig: prednisone 40 mg x 2 weeks,30 mg x 1 week,20 mg x 1 week,10 mg x 1 week,5 mg x 1 week   sevelamer carbonate (RENVELA) 800 MG Tab tablet 10/7/2020 at Unknown time Patient Yes " No   Sig: TAKE 2 TABLETS BY MOUTH THREE TIMES DAILY WITH MEALS AND 1 TABLET TWICE DAILY WITH SNACKS   tramadol (ULTRAM) 50 MG Tab PRN Patient's Home Pharmacy Yes No   Sig: Take 50 mg by mouth every 6 hours as needed.      Facility-Administered Medications: None       Physical Exam  Temp:  [36 °C (96.8 °F)] 36 °C (96.8 °F)  Pulse:  [] 92  Resp:  [18] 18  BP: (121-146)/(66-84) 146/80  SpO2:  [91 %-96 %] 96 %    Physical Exam  Vitals signs and nursing note reviewed.   Constitutional:       General: He is not in acute distress.     Appearance: Normal appearance. He is normal weight. He is ill-appearing. He is not toxic-appearing.      Comments: Chronically ill-appearing and older than stated age   HENT:      Head: Normocephalic and atraumatic.      Nose: Nose normal. No congestion or rhinorrhea.      Mouth/Throat:      Mouth: Mucous membranes are moist.      Pharynx: Oropharynx is clear.   Eyes:      Extraocular Movements: Extraocular movements intact.      Conjunctiva/sclera: Conjunctivae normal.      Pupils: Pupils are equal, round, and reactive to light.   Neck:      Musculoskeletal: Normal range of motion and neck supple. No muscular tenderness.      Vascular: No carotid bruit.   Cardiovascular:      Rate and Rhythm: Normal rate and regular rhythm.      Pulses: Normal pulses.      Heart sounds: Normal heart sounds. No murmur. No gallop.    Pulmonary:      Effort: No respiratory distress.      Breath sounds: Normal breath sounds. No wheezing or rales.   Abdominal:      General: Abdomen is flat. Bowel sounds are normal. There is no distension.      Palpations: Abdomen is soft. There is no mass.      Tenderness: There is no abdominal tenderness.      Hernia: No hernia is present.   Musculoskeletal:         General: No tenderness or signs of injury.      Comments: left gluteus Indurated and erythemic by 10 cm area about a 1 cm incision with iodoform gauze    Lymphadenopathy:      Cervical: No cervical adenopathy.    Skin:     Capillary Refill: Capillary refill takes less than 2 seconds.      Coloration: Skin is not jaundiced or pale.      Findings: No bruising.      Comments: left gluteus Indurated and erythemic by 10 cm area about a 1 cm incision with iodoform gauze    Neurological:      General: No focal deficit present.      Mental Status: He is alert and oriented to person, place, and time. Mental status is at baseline.      Cranial Nerves: No cranial nerve deficit.      Motor: No weakness.      Coordination: Coordination normal.   Psychiatric:         Mood and Affect: Mood normal.         Thought Content: Thought content normal.         Judgment: Judgment normal.         Laboratory:  Recent Labs     10/07/20  2345   WBC 7.8   RBC 3.88*   HEMOGLOBIN 11.5*   HEMATOCRIT 37.7*   MCV 97.2   MCH 29.6   MCHC 30.5*   RDW 63.0*   PLATELETCT 136*   MPV 10.4     Recent Labs     10/07/20  2345   SODIUM 137   POTASSIUM 4.5   CHLORIDE 95*   CO2 29   GLUCOSE 93   BUN 51*   CREATININE 5.56*   CALCIUM 10.1     Recent Labs     10/07/20  2345   ALTSGPT 15   ASTSGOT 11*   ALKPHOSPHAT 70   TBILIRUBIN 0.2   GLUCOSE 93         No results for input(s): NTPROBNP in the last 72 hours.      No results for input(s): TROPONINT in the last 72 hours.    Imaging:  CT-PELVIS WITH   Final Result      Findings consistent with nonspecific inflammatory change involving subcutaneous fat of the left gluteal region. See additional comments above.            Assessment/Plan:  I anticipate this patient is appropriate for observation status at this time.       Abscess  Assessment & Plan  Wound care, follow-up CBC, blood and wound culture, empiric vancomycin received in the ED.  Next dose at dialysis    End stage renal failure on dialysis (HCC)  Assessment & Plan  No evidence of metabolic acidosis or volume overload.  Follow-up scheduled dialysis Friday, October 9

## 2020-10-08 NOTE — CARE PLAN
Problem: Pain Management  Goal: Pain level will decrease to patient's comfort goal  Outcome: PROGRESSING AS EXPECTED  Note: Pt currently denies having any pain. Pt with call bell at bedside. Pt knows he has PRN pain medication available to him. Pt is currently resting comfortably. Will continue to monitor for changes.      Problem: Infection  Goal: Will remain free from infection  Outcome: PROGRESSING AS EXPECTED  Note: Pt here with large abscess to his left buttocks. Pt has been given x1 dose of PO abx. Pt has been afebrile. I&D was successfully performed. Pt with stable vitals. Will continue to monitor for changes.

## 2020-10-09 LAB
ANION GAP SERPL CALC-SCNC: 13 MMOL/L (ref 7–16)
BASOPHILS # BLD AUTO: 0.4 % (ref 0–1.8)
BASOPHILS # BLD: 0.03 K/UL (ref 0–0.12)
BUN SERPL-MCNC: 70 MG/DL (ref 8–22)
CALCIUM SERPL-MCNC: 9.3 MG/DL (ref 8.5–10.5)
CHLORIDE SERPL-SCNC: 102 MMOL/L (ref 96–112)
CO2 SERPL-SCNC: 22 MMOL/L (ref 20–33)
CREAT SERPL-MCNC: 7.07 MG/DL (ref 0.5–1.4)
EOSINOPHIL # BLD AUTO: 0.2 K/UL (ref 0–0.51)
EOSINOPHIL NFR BLD: 2.9 % (ref 0–6.9)
ERYTHROCYTE [DISTWIDTH] IN BLOOD BY AUTOMATED COUNT: 63.9 FL (ref 35.9–50)
GLUCOSE SERPL-MCNC: 69 MG/DL (ref 65–99)
GRAM STN SPEC: NORMAL
HAV IGM SERPL QL IA: NORMAL
HBV CORE IGM SER QL: NORMAL
HBV SURFACE AB SERPL IA-ACNC: <3.5 MIU/ML (ref 0–10)
HBV SURFACE AG SER QL: NORMAL
HCT VFR BLD AUTO: 38.3 % (ref 42–52)
HCV AB SER QL: NORMAL
HGB BLD-MCNC: 11.9 G/DL (ref 14–18)
IMM GRANULOCYTES # BLD AUTO: 0.12 K/UL (ref 0–0.11)
IMM GRANULOCYTES NFR BLD AUTO: 1.8 % (ref 0–0.9)
LYMPHOCYTES # BLD AUTO: 1.13 K/UL (ref 1–4.8)
LYMPHOCYTES NFR BLD: 16.6 % (ref 22–41)
MCH RBC QN AUTO: 30 PG (ref 27–33)
MCHC RBC AUTO-ENTMCNC: 31.1 G/DL (ref 33.7–35.3)
MCV RBC AUTO: 96.5 FL (ref 81.4–97.8)
MONOCYTES # BLD AUTO: 0.51 K/UL (ref 0–0.85)
MONOCYTES NFR BLD AUTO: 7.5 % (ref 0–13.4)
NEUTROPHILS # BLD AUTO: 4.8 K/UL (ref 1.82–7.42)
NEUTROPHILS NFR BLD: 70.8 % (ref 44–72)
NRBC # BLD AUTO: 0 K/UL
NRBC BLD-RTO: 0 /100 WBC
PHOSPHATE SERPL-MCNC: 2.3 MG/DL (ref 2.5–4.5)
PLATELET # BLD AUTO: 118 K/UL (ref 164–446)
PMV BLD AUTO: 9.5 FL (ref 9–12.9)
POTASSIUM SERPL-SCNC: 5.2 MMOL/L (ref 3.6–5.5)
RBC # BLD AUTO: 3.97 M/UL (ref 4.7–6.1)
SIGNIFICANT IND 70042: NORMAL
SITE SITE: NORMAL
SODIUM SERPL-SCNC: 137 MMOL/L (ref 135–145)
SOURCE SOURCE: NORMAL
WBC # BLD AUTO: 6.8 K/UL (ref 4.8–10.8)

## 2020-10-09 PROCEDURE — 84100 ASSAY OF PHOSPHORUS: CPT

## 2020-10-09 PROCEDURE — 99232 SBSQ HOSP IP/OBS MODERATE 35: CPT | Performed by: INTERNAL MEDICINE

## 2020-10-09 PROCEDURE — 700111 HCHG RX REV CODE 636 W/ 250 OVERRIDE (IP): Performed by: INTERNAL MEDICINE

## 2020-10-09 PROCEDURE — 5A1D70Z PERFORMANCE OF URINARY FILTRATION, INTERMITTENT, LESS THAN 6 HOURS PER DAY: ICD-10-PCS | Performed by: INTERNAL MEDICINE

## 2020-10-09 PROCEDURE — 80048 BASIC METABOLIC PNL TOTAL CA: CPT

## 2020-10-09 PROCEDURE — 700102 HCHG RX REV CODE 250 W/ 637 OVERRIDE(OP): Performed by: NURSE PRACTITIONER

## 2020-10-09 PROCEDURE — 86706 HEP B SURFACE ANTIBODY: CPT

## 2020-10-09 PROCEDURE — 770001 HCHG ROOM/CARE - MED/SURG/GYN PRIV*

## 2020-10-09 PROCEDURE — 700102 HCHG RX REV CODE 250 W/ 637 OVERRIDE(OP): Performed by: INTERNAL MEDICINE

## 2020-10-09 PROCEDURE — A9270 NON-COVERED ITEM OR SERVICE: HCPCS | Performed by: NURSE PRACTITIONER

## 2020-10-09 PROCEDURE — 85025 COMPLETE CBC W/AUTO DIFF WBC: CPT

## 2020-10-09 PROCEDURE — 90935 HEMODIALYSIS ONE EVALUATION: CPT

## 2020-10-09 PROCEDURE — A9270 NON-COVERED ITEM OR SERVICE: HCPCS | Performed by: INTERNAL MEDICINE

## 2020-10-09 PROCEDURE — 80074 ACUTE HEPATITIS PANEL: CPT

## 2020-10-09 RX ORDER — SEVELAMER CARBONATE 800 MG/1
800 TABLET, FILM COATED ORAL
Status: DISCONTINUED | OUTPATIENT
Start: 2020-10-09 | End: 2020-10-10 | Stop reason: HOSPADM

## 2020-10-09 RX ORDER — SEVELAMER CARBONATE 800 MG/1
1600 TABLET, FILM COATED ORAL
Status: DISCONTINUED | OUTPATIENT
Start: 2020-10-09 | End: 2020-10-10 | Stop reason: HOSPADM

## 2020-10-09 RX ORDER — SEVELAMER CARBONATE 800 MG/1
800 TABLET, FILM COATED ORAL
Status: DISCONTINUED | OUTPATIENT
Start: 2020-10-09 | End: 2020-10-09

## 2020-10-09 RX ORDER — OXYCODONE HYDROCHLORIDE 5 MG/1
5 TABLET ORAL EVERY 4 HOURS PRN
Status: DISCONTINUED | OUTPATIENT
Start: 2020-10-09 | End: 2020-10-10 | Stop reason: HOSPADM

## 2020-10-09 RX ADMIN — OXYCODONE HYDROCHLORIDE 5 MG: 5 TABLET ORAL at 20:20

## 2020-10-09 RX ADMIN — LORAZEPAM 1 MG: 1 TABLET ORAL at 07:34

## 2020-10-09 RX ADMIN — SEVELAMER CARBONATE 1600 MG: 800 TABLET, FILM COATED ORAL at 17:13

## 2020-10-09 RX ADMIN — MULTIPLE VITAMINS W/ MINERALS TAB 1 TABLET: TAB at 05:06

## 2020-10-09 RX ADMIN — TRAMADOL HYDROCHLORIDE 50 MG: 50 TABLET ORAL at 12:38

## 2020-10-09 RX ADMIN — HEPARIN SODIUM 5000 UNITS: 5000 INJECTION, SOLUTION INTRAVENOUS; SUBCUTANEOUS at 05:06

## 2020-10-09 RX ADMIN — OMEPRAZOLE 20 MG: 20 CAPSULE, DELAYED RELEASE ORAL at 17:13

## 2020-10-09 RX ADMIN — TRAMADOL HYDROCHLORIDE 50 MG: 50 TABLET ORAL at 18:38

## 2020-10-09 RX ADMIN — TRAMADOL HYDROCHLORIDE 50 MG: 50 TABLET ORAL at 04:09

## 2020-10-09 RX ADMIN — SEVELAMER CARBONATE 800 MG: 800 TABLET, FILM COATED ORAL at 12:39

## 2020-10-09 RX ADMIN — CINACALCET HYDROCHLORIDE 30 MG: 30 TABLET, FILM COATED ORAL at 05:06

## 2020-10-09 RX ADMIN — OMEPRAZOLE 20 MG: 20 CAPSULE, DELAYED RELEASE ORAL at 05:06

## 2020-10-09 RX ADMIN — METOPROLOL SUCCINATE 100 MG: 100 TABLET, EXTENDED RELEASE ORAL at 12:38

## 2020-10-09 ASSESSMENT — ENCOUNTER SYMPTOMS
NERVOUS/ANXIOUS: 0
PSYCHIATRIC NEGATIVE: 1
WHEEZING: 0
DIZZINESS: 0
CONSTIPATION: 0
PND: 0
RESPIRATORY NEGATIVE: 1
NAUSEA: 0
COUGH: 0
CHILLS: 0
NEUROLOGICAL NEGATIVE: 1
SHORTNESS OF BREATH: 0
HEADACHES: 0
WEAKNESS: 0
ORTHOPNEA: 0
INSOMNIA: 0
FEVER: 0
SENSORY CHANGE: 0
VOMITING: 0
GASTROINTESTINAL NEGATIVE: 1
ABDOMINAL PAIN: 0
WEIGHT LOSS: 0
SPEECH CHANGE: 0
DEPRESSION: 0
PALPITATIONS: 0
MUSCULOSKELETAL NEGATIVE: 1
DIARRHEA: 0
CLAUDICATION: 0
FOCAL WEAKNESS: 0
EYES NEGATIVE: 1

## 2020-10-09 ASSESSMENT — PAIN DESCRIPTION - PAIN TYPE
TYPE: ACUTE PAIN

## 2020-10-09 NOTE — DISCHARGE PLANNING
Outpatient Dialysis Note    Confirmed patient is active at:    [facility]  [address] [place] [address]    Schedule: Monday, Wednesday, Friday   Time: 04:45am    Patient is seen by JASPREET Paige in HD clinic.    Spoke with Gissell at facility who confirmed.    Forwarded records for review.    Jennifer Nelson- Dialysis Coordinator  [facility] # [phone]

## 2020-10-09 NOTE — CARE PLAN
Problem: Pain Management  Goal: Pain level will decrease to patient's comfort goal  Outcome: PROGRESSING AS EXPECTED  Intervention: Follow pain managment plan developed in collaboration with patient and Interdisciplinary Team  Note: Patient educated on pain medication frequency and non pharmacological pain relief methods.      Problem: Infection  Goal: Will remain free from infection  Outcome: PROGRESSING AS EXPECTED  Intervention: Assess signs and symptoms of infection  Note: Assessing patient for s/sx of new or worsening infection. VS are stable. RN will continue to monitor.

## 2020-10-09 NOTE — PROGRESS NOTES
Nephrology Daily Progress Note    Date of Service  10/9/2020    Chief Complaint  64 yo M PMH ESRD MWF iHD, HTN, anemia of CKD, and CKD-MBD here with CC as above.  He was having progressively worsening pain in left buttock region and so presented to his OP MD who gave him abx course of clindamycin.  He was on the abx for about three days and was not getting any relief so went to local urgent care and there they redirected him to ED.  Upon arrival his wound was noted to a fluid collection in the left buttock with leukocytosis on labs.  I&D was performed and 10 mL purulent material was expressed.  He was admitted to hospitalist service for further evaluation.  Compliant with OP dialysis and no missed treatments recently.  Otherwise denies any F/C/N/V/CP/SOB.  No melena, hematochezia, hematemesis.  No HA, visual changes, or abdominal pain      Interval Problem Update  10/9: HD today ([facility]).      Review of Systems  Review of Systems   Constitutional: Positive for malaise/fatigue. Negative for chills, fever and weight loss.   HENT: Negative.    Eyes: Negative.    Respiratory: Negative.    Cardiovascular: Negative for chest pain, orthopnea, claudication, leg swelling and PND.   Gastrointestinal: Negative.    Genitourinary: Negative.    Musculoskeletal: Negative.    Neurological: Negative.    Endo/Heme/Allergies: Negative.    Psychiatric/Behavioral: Negative.         Physical Exam  Temp:  [36.3 °C (97.3 °F)-37 °C (98.6 °F)] 37 °C (98.6 °F)  Pulse:  [68-77] 77  Resp:  [16-20] 20  BP: (126-166)/(60-81) 147/77  SpO2:  [91 %-94 %] 92 %    Physical Exam  Constitutional:       Appearance: He is ill-appearing.      Comments: Lethargic.    HENT:      Head: Normocephalic and atraumatic.   Neck:      Musculoskeletal: Normal range of motion and neck supple.   Cardiovascular:      Rate and Rhythm: Normal rate and regular rhythm.   Pulmonary:      Effort: No respiratory distress.      Breath sounds: No stridor. No wheezing or rhonchi.    Abdominal:      General: There is no distension.      Palpations: There is no mass.      Tenderness: There is no abdominal tenderness.   Skin:     General: Skin is warm and dry.      Findings: No lesion or rash.      Comments: Left Gluteal Wound    Neurological:      General: No focal deficit present.         Fluids  No intake or output data in the 24 hours ending 10/09/20 0945    Laboratory  Recent Labs     10/07/20  2345 10/09/20  0630   WBC 7.8 6.8   RBC 3.88* 3.97*   HEMOGLOBIN 11.5* 11.9*   HEMATOCRIT 37.7* 38.3*   MCV 97.2 96.5   MCH 29.6 30.0   MCHC 30.5* 31.1*   RDW 63.0* 63.9*   PLATELETCT 136* 118*   MPV 10.4 9.5     Recent Labs     10/07/20  2345 10/09/20  0630   SODIUM 137 137   POTASSIUM 4.5 5.2   CHLORIDE 95* 102   CO2 29 22   GLUCOSE 93 69   BUN 51* 70*   CREATININE 5.56* 7.07*   CALCIUM 10.1 9.3         No results for input(s): NTPROBNP in the last 72 hours.        Imaging      Assessment/Plan  - ESRD    * Etiology likely 2/2 HTN    * [facility]; MWF iHD; LAVF (+thrill/bruit)    * Living XPL surgery pending final work up at [facility]   - Left gluteal abscess and cellulitis    * I&D done 10/8, wound care in progress. Cx pending.     * CT Pelvis w/contrast r/o osteo completed 10/8  - HTN    * Goal BP < 140/90    * Stable  - Anemia of CKD    * Goal Hgb 10-11    * Stable  - CKD-MBD    * Managed at HD unit  - Thrombocytopenia    * Monitor Platelets      PLAN:  - MWF iHD  - UF as tolerated  - No dietary protein restrictions  - Dose all meds per ESRD  - He takes ativan 1 mg prior to each HD, please continue that while inpatient  - Low sodium diet  - Abx per primary team  - Renal diet

## 2020-10-09 NOTE — PROGRESS NOTES
[facility] Medicine Daily Progress Note    Date of Service  10/9/2020    Chief Complaint  Buttock pain    Hospital Course   Mr. Benites is a 65-year-old male who presented to the emergency department on the evening of 10/7/2020 with buttock pain and wound check.  He was sent from urgent care and had a recent diagnosis of cellulitis for which she was placed on outpatient clindamycin.  He did have a wound on the left butt cheek that was incised and drained in the emergency department by the ED physician.  There was approximately 100 cc out of purulent material per report.  Wound culture is currently in process.  He is an end-stage renal disease patient and is maintained on hemodialysis Monday/Wednesday/Friday.  His last hemodialysis session was on 10/7/2020.  He was placed on vancomycin to be given with nephrology.  His nephrologist is Dr. Keller who has been consulted for inpatient hemodialysis management.  He was initially admitted to the clinical decision unit where it was decided to admit him to the hospital for further evaluation and treatment.        Interval Problem Update  HD today, tolerated well but is somnolent.     Labs stable today.     Afebrile overnight, HR 60s-70s, SBP 130s-160s, O2 sats WNL on room air.     Consultants/Specialty  Nephrology    Code Status  Full Code    Disposition  Transfer to medical floor.     Review of Systems  Review of Systems   Constitutional: Negative for chills, fever and malaise/fatigue.   Respiratory: Negative for cough, shortness of breath and wheezing.    Cardiovascular: Negative for chest pain, palpitations and leg swelling.   Gastrointestinal: Negative for abdominal pain, constipation, diarrhea, nausea and vomiting.   Genitourinary: Negative for dysuria, frequency and urgency.   Neurological: Negative for dizziness, sensory change, speech change, focal weakness, weakness and headaches.   Psychiatric/Behavioral: Negative for depression. The patient is not nervous/anxious and  does not have insomnia.    All other systems reviewed and are negative.     Physical Exam  Temp:  [36.4 °C (97.6 °F)-37 °C (98.6 °F)] 36.6 °C (97.8 °F)  Pulse:  [68-82] 82  Resp:  [16-20] 18  BP: (126-166)/(60-81) 154/74  SpO2:  [91 %-94 %] 92 %    Physical Exam  Vitals signs and nursing note reviewed.   Constitutional:       General: He is awake.      Appearance: Normal appearance. He is well-developed. He is not ill-appearing.   HENT:      Head: Normocephalic and atraumatic.      Mouth/Throat:      Lips: Pink.      Mouth: Mucous membranes are moist.   Eyes:      Conjunctiva/sclera: Conjunctivae normal.      Pupils: Pupils are equal, round, and reactive to light.   Neck:      Musculoskeletal: Normal range of motion and neck supple.   Cardiovascular:      Rate and Rhythm: Normal rate and regular rhythm.      Pulses: Normal pulses.      Heart sounds: Normal heart sounds.   Pulmonary:      Effort: Pulmonary effort is normal.      Breath sounds: Normal breath sounds.   Abdominal:      General: Bowel sounds are normal. There is no distension or abdominal bruit.      Palpations: Abdomen is soft.      Tenderness: There is no abdominal tenderness.   Musculoskeletal:      Right lower leg: No edema.      Left lower leg: No edema.   Skin:     General: Skin is warm and dry.   Neurological:      General: No focal deficit present.      Mental Status: He is alert and oriented to person, place, and time.      GCS: GCS eye subscore is 4. GCS verbal subscore is 5. GCS motor subscore is 6.   Psychiatric:         Attention and Perception: Attention and perception normal.         Mood and Affect: Mood and affect normal.         Speech: Speech normal.         Behavior: Behavior normal. Behavior is cooperative.         Thought Content: Thought content normal.         Cognition and Memory: Cognition and memory normal.         Judgment: Judgment normal.     Fluids  No intake or output data in the 24 hours ending 10/09/20  1333    Laboratory  Recent Labs     10/07/20  2345 10/09/20  0630   WBC 7.8 6.8   RBC 3.88* 3.97*   HEMOGLOBIN 11.5* 11.9*   HEMATOCRIT 37.7* 38.3*   MCV 97.2 96.5   MCH 29.6 30.0   MCHC 30.5* 31.1*   RDW 63.0* 63.9*   PLATELETCT 136* 118*   MPV 10.4 9.5     Recent Labs     10/07/20  2345 10/09/20  0630   SODIUM 137 137   POTASSIUM 4.5 5.2   CHLORIDE 95* 102   CO2 29 22   GLUCOSE 93 69   BUN 51* 70*   CREATININE 5.56* 7.07*   CALCIUM 10.1 9.3     Imaging  CT-PELVIS WITH   Final Result      Findings consistent with nonspecific inflammatory change involving subcutaneous fat of the left gluteal region. See additional comments above.         Assessment/Plan  Abscess  Assessment & Plan  -Continue vanc with HD.   -No fevers or leukocytosis.   -BC NGTD.   -Wound culture in process. Will need referral to outpatient wound care clinic at discharge.     End stage renal failure on dialysis (HCC)- (present on admission)  Assessment & Plan  -Nephrology following.   -HD today.   -Follow labs.      VTE prophylaxis: Subcutaneous heparin      Willa Huston, MSN, RN, APRN, ACNPC-AG, CCRN  Nurse Practitioner, [facility]  [phone]    10/9/2020    1:28 PM

## 2020-10-09 NOTE — PROGRESS NOTES
Assumed care of pt this morning. Pt is A&O x4. Pt is on room air. Going down to dialysis at this time. Pt upset this morning requesting pain medication, when RN educated him that is wasn't available yet pt verbalized understanding. Pt updated on plan of care. Hourly rounding when patient comes back from dialysis.

## 2020-10-09 NOTE — CARE PLAN
Problem: Safety  Goal: Will remain free from injury  Outcome: PROGRESSING AS EXPECTED  Note: Pt is Aox4. Pt uses call bell as needed. Pt is OOB with a steady gait. Rounding being done. Call bell is in reach. Pt has been free from falls and injury. Will continue to monitor for chanhes.       Problem: Infection  Goal: Will remain free from infection  Outcome: PROGRESSING AS EXPECTED  Note: Pt free from fever. Vitals stable. Pt will be started on iv abx. This may be given during his dialysis in the AM. Pt with wound care consulted. Pt with dressing in place by wound care. No new drainage noted. Drainage that was in place was sanguinous. No foul odor smelt. Will monitor for changes.

## 2020-10-10 VITALS
RESPIRATION RATE: 16 BRPM | BODY MASS INDEX: 27.11 KG/M2 | HEIGHT: 70 IN | WEIGHT: 189.38 LBS | DIASTOLIC BLOOD PRESSURE: 72 MMHG | HEART RATE: 65 BPM | TEMPERATURE: 97.5 F | OXYGEN SATURATION: 93 % | SYSTOLIC BLOOD PRESSURE: 121 MMHG

## 2020-10-10 LAB
ANION GAP SERPL CALC-SCNC: 11 MMOL/L (ref 7–16)
ANISOCYTOSIS BLD QL SMEAR: ABNORMAL
BACTERIA WND AEROBE CULT: ABNORMAL
BACTERIA WND AEROBE CULT: ABNORMAL
BASOPHILS # BLD AUTO: 0 % (ref 0–1.8)
BASOPHILS # BLD: 0 K/UL (ref 0–0.12)
BUN SERPL-MCNC: 48 MG/DL (ref 8–22)
CALCIUM SERPL-MCNC: 8.8 MG/DL (ref 8.5–10.5)
CHLORIDE SERPL-SCNC: 97 MMOL/L (ref 96–112)
CO2 SERPL-SCNC: 24 MMOL/L (ref 20–33)
CREAT SERPL-MCNC: 6.14 MG/DL (ref 0.5–1.4)
EOSINOPHIL # BLD AUTO: 0.41 K/UL (ref 0–0.51)
EOSINOPHIL NFR BLD: 6 % (ref 0–6.9)
ERYTHROCYTE [DISTWIDTH] IN BLOOD BY AUTOMATED COUNT: 61.2 FL (ref 35.9–50)
GLUCOSE SERPL-MCNC: 80 MG/DL (ref 65–99)
GRAM STN SPEC: ABNORMAL
HCT VFR BLD AUTO: 36.9 % (ref 42–52)
HGB BLD-MCNC: 11.7 G/DL (ref 14–18)
LYMPHOCYTES # BLD AUTO: 0.82 K/UL (ref 1–4.8)
LYMPHOCYTES NFR BLD: 12 % (ref 22–41)
MACROCYTES BLD QL SMEAR: ABNORMAL
MANUAL DIFF BLD: NORMAL
MCH RBC QN AUTO: 29.8 PG (ref 27–33)
MCHC RBC AUTO-ENTMCNC: 31.7 G/DL (ref 33.7–35.3)
MCV RBC AUTO: 93.9 FL (ref 81.4–97.8)
METAMYELOCYTES NFR BLD MANUAL: 1 %
MONOCYTES # BLD AUTO: 0.41 K/UL (ref 0–0.85)
MONOCYTES NFR BLD AUTO: 6 % (ref 0–13.4)
MORPHOLOGY BLD-IMP: NORMAL
MYELOCYTES NFR BLD MANUAL: 1 %
NEUTROPHILS # BLD AUTO: 5.03 K/UL (ref 1.82–7.42)
NEUTROPHILS NFR BLD: 74 % (ref 44–72)
NRBC # BLD AUTO: 0 K/UL
NRBC BLD-RTO: 0 /100 WBC
OVALOCYTES BLD QL SMEAR: NORMAL
PLATELET # BLD AUTO: 153 K/UL (ref 164–446)
PMV BLD AUTO: 9.5 FL (ref 9–12.9)
POIKILOCYTOSIS BLD QL SMEAR: NORMAL
POTASSIUM SERPL-SCNC: 5 MMOL/L (ref 3.6–5.5)
RBC # BLD AUTO: 3.93 M/UL (ref 4.7–6.1)
RBC BLD AUTO: PRESENT
SIGNIFICANT IND 70042: ABNORMAL
SITE SITE: ABNORMAL
SODIUM SERPL-SCNC: 132 MMOL/L (ref 135–145)
SOURCE SOURCE: ABNORMAL
WBC # BLD AUTO: 6.8 K/UL (ref 4.8–10.8)

## 2020-10-10 PROCEDURE — 80048 BASIC METABOLIC PNL TOTAL CA: CPT

## 2020-10-10 PROCEDURE — 99239 HOSP IP/OBS DSCHRG MGMT >30: CPT | Performed by: INTERNAL MEDICINE

## 2020-10-10 PROCEDURE — 85027 COMPLETE CBC AUTOMATED: CPT

## 2020-10-10 PROCEDURE — A9270 NON-COVERED ITEM OR SERVICE: HCPCS | Performed by: INTERNAL MEDICINE

## 2020-10-10 PROCEDURE — 700102 HCHG RX REV CODE 250 W/ 637 OVERRIDE(OP): Performed by: INTERNAL MEDICINE

## 2020-10-10 PROCEDURE — 700102 HCHG RX REV CODE 250 W/ 637 OVERRIDE(OP): Performed by: NURSE PRACTITIONER

## 2020-10-10 PROCEDURE — 85007 BL SMEAR W/DIFF WBC COUNT: CPT

## 2020-10-10 PROCEDURE — A9270 NON-COVERED ITEM OR SERVICE: HCPCS | Performed by: NURSE PRACTITIONER

## 2020-10-10 PROCEDURE — 700111 HCHG RX REV CODE 636 W/ 250 OVERRIDE (IP): Performed by: INTERNAL MEDICINE

## 2020-10-10 RX ORDER — SULFAMETHOXAZOLE AND TRIMETHOPRIM 400; 80 MG/1; MG/1
1 TABLET ORAL DAILY
Status: DISCONTINUED | OUTPATIENT
Start: 2020-10-10 | End: 2020-10-10 | Stop reason: HOSPADM

## 2020-10-10 RX ORDER — SULFAMETHOXAZOLE AND TRIMETHOPRIM 400; 80 MG/1; MG/1
1 TABLET ORAL DAILY
Qty: 6 TAB | Refills: 0 | Status: SHIPPED | OUTPATIENT
Start: 2020-10-12 | End: 2020-10-15

## 2020-10-10 RX ADMIN — CINACALCET HYDROCHLORIDE 30 MG: 30 TABLET, FILM COATED ORAL at 05:04

## 2020-10-10 RX ADMIN — METOPROLOL SUCCINATE 100 MG: 100 TABLET, EXTENDED RELEASE ORAL at 05:04

## 2020-10-10 RX ADMIN — OXYCODONE HYDROCHLORIDE 5 MG: 5 TABLET ORAL at 08:56

## 2020-10-10 RX ADMIN — TRAMADOL HYDROCHLORIDE 50 MG: 50 TABLET ORAL at 05:03

## 2020-10-10 RX ADMIN — MULTIPLE VITAMINS W/ MINERALS TAB 1 TABLET: TAB at 05:03

## 2020-10-10 RX ADMIN — SEVELAMER CARBONATE 1600 MG: 800 TABLET, FILM COATED ORAL at 07:16

## 2020-10-10 RX ADMIN — OMEPRAZOLE 20 MG: 20 CAPSULE, DELAYED RELEASE ORAL at 05:03

## 2020-10-10 RX ADMIN — SULFAMETHOXAZOLE AND TRIMETHOPRIM 1 TABLET: 400; 80 TABLET ORAL at 07:16

## 2020-10-10 RX ADMIN — HEPARIN SODIUM 5000 UNITS: 5000 INJECTION, SOLUTION INTRAVENOUS; SUBCUTANEOUS at 05:03

## 2020-10-10 RX ADMIN — OXYCODONE HYDROCHLORIDE 5 MG: 5 TABLET ORAL at 02:38

## 2020-10-10 ASSESSMENT — ENCOUNTER SYMPTOMS
ORTHOPNEA: 0
RESPIRATORY NEGATIVE: 1
CHILLS: 0
NEUROLOGICAL NEGATIVE: 1
CLAUDICATION: 0
WEIGHT LOSS: 0
GASTROINTESTINAL NEGATIVE: 1
PSYCHIATRIC NEGATIVE: 1
PND: 0
FEVER: 0
MUSCULOSKELETAL NEGATIVE: 1
EYES NEGATIVE: 1

## 2020-10-10 ASSESSMENT — PAIN DESCRIPTION - PAIN TYPE
TYPE: ACUTE PAIN
TYPE: ACUTE PAIN

## 2020-10-10 NOTE — DISCHARGE INSTRUCTIONS
Discharge Instructions    Required specialty appointments include: Resume home nephrology schedule; Follow up with wound care clinic    Discharged to home by car with relative. Discharged via wheelchair, hospital escort: Yes.  Special equipment needed: Not Applicable    Be sure to schedule a follow-up appointment with your primary care doctor or any specialists as instructed.     Discharge Plan:   Influenza Vaccine Indication: Patient Refuses    I understand that a diet low in cholesterol, fat, and sodium is recommended for good health. Unless I have been given specific instructions below for another diet, I accept this instruction as my diet prescription.   Other diet: Renal diet    Special Instructions: None    · Is patient discharged on Warfarin / Coumadin?   No       Wound Care, Adult  Taking care of your wound properly can help to prevent pain, infection, and scarring. It can also help your wound to heal more quickly.  How to care for your wound  Wound care         · Follow instructions from your health care provider about how to take care of your wound. Make sure you:  ? Wash your hands with soap and water before you change the bandage (dressing). If soap and water are not available, use hand .  ? Change your dressing as told by your health care provider.  · Check your wound area every day for signs of infection. Check for:  ? Redness, swelling, or pain.  ? Fluid or blood.  ? Warmth.  ? Pus or a bad smell.  · Ask your health care provider if you should clean the wound with mild soap and water. Doing this may include:  ? Using a clean towel to pat the wound dry after cleaning it. Do not rub or scrub the wound.  ? Applying a cream or ointment. Do this only as told by your health care provider.  ? Covering the incision with a clean dressing.  · Ask your health care provider when you can leave the wound uncovered.  · Keep the dressing dry until your health care provider says it can be removed. Do not take  baths, swim, use a hot tub, or do anything that would put the wound underwater until your health care provider approves. Ask your health care provider if you can take showers. You may only be allowed to take sponge baths.  Medicines    · If you were prescribed an antibiotic medicine, cream, or ointment, take or use the antibiotic as told by your health care provider. Do not stop taking or using the antibiotic even if your condition improves.  · Take over-the-counter and prescription medicines only as told by your health care provider. If you were prescribed pain medicine, take it 30 or more minutes before you do any wound care or as told by your health care provider.  General instructions  · Return to your normal activities as told by your health care provider. Ask your health care provider what activities are safe.  · Do not scratch or pick at the wound.  · Do not use any products that contain nicotine or tobacco, such as cigarettes and e-cigarettes. These may delay wound healing. If you need help quitting, ask your health care provider.  · Keep all follow-up visits as told by your health care provider. This is important.  · Eat a diet that includes protein, vitamin A, vitamin C, and other nutrient-rich foods to help the wound heal.  ? Foods rich in protein include meat, dairy, beans, nuts, and other sources.  ? Foods rich in vitamin A include carrots and dark green, leafy vegetables.  ? Foods rich in vitamin C include citrus, tomatoes, and other fruits and vegetables.  ? Nutrient-rich foods have protein, carbohydrates, fat, vitamins, or minerals. Eat a variety of healthy foods including vegetables, fruits, and whole grains.  Contact a health care provider if:  · You received a tetanus shot and you have swelling, severe pain, redness, or bleeding at the injection site.  · Your pain is not controlled with medicine.  · You have redness, swelling, or pain around the wound.  · You have fluid or blood coming from the  wound.  · Your wound feels warm to the touch.  · You have pus or a bad smell coming from the wound.  · You have a fever or chills.  · You are nauseous or you vomit.  · You are dizzy.  Get help right away if:  · You have a red streak going away from your wound.  · The edges of the wound open up and separate.  · Your wound is bleeding, and the bleeding does not stop with gentle pressure.  · You have a rash.  · You faint.  · You have trouble breathing.  Summary  · Always wash your hands with soap and water before changing your bandage (dressing).  · To help with healing, eat foods that are rich in protein, vitamin A, vitamin C, and other nutrients.  · Check your wound every day for signs of infection. Contact your health care provider if you suspect that your wound is infected.  This information is not intended to replace advice given to you by your health care provider. Make sure you discuss any questions you have with your health care provider.  Document Released: 09/26/2009 Document Revised: 04/06/2020 Document Reviewed: 07/04/2017  Elsevier Patient Education © 2020 [employer]      Depression / Suicide Risk    As you are discharged from this [facility] facility, it is important to learn how to keep safe from harming yourself.    Recognize the warning signs:  · Abrupt changes in personality, positive or negative- including increase in energy   · Giving away possessions  · Change in eating patterns- significant weight changes-  positive or negative  · Change in sleeping patterns- unable to sleep or sleeping all the time   · Unwillingness or inability to communicate  · Depression  · Unusual sadness, discouragement and loneliness  · Talk of wanting to die  · Neglect of personal appearance   · Rebelliousness- reckless behavior  · Withdrawal from people/activities they love  · Confusion- inability to concentrate     If you or a loved one observes any of these behaviors or has concerns about self-harm, here's what  you can do:  · Talk about it- your feelings and reasons for harming yourself  · Remove any means that you might use to hurt yourself (examples: pills, rope, extension cords, firearm)  · Get professional help from the community (Mental Health, Substance Abuse, psychological counseling)  · Do not be alone:Call your Safe Contact- someone whom you trust who will be there for you.  · Call your local CRISIS HOTLINE 338-1955 or 628-195-0754  · Call your local Children's Mobile Crisis Response Team [place] (951) 199-8753 or www.HomeSpace  · Call the toll free National Suicide Prevention Hotlines   · National Suicide Prevention Lifeline 956-846-QNBF (3546)  · National Hope Line Network 800-SUICIDE (384-4152)

## 2020-10-10 NOTE — PROGRESS NOTES
Premedicated patient with oxycodone 5mg, dressing changed per wound care instructions. Old drainage, serosanguinous. Wound pink around opening, wound tender and firm to touch.  Patient tolerated well.

## 2020-10-10 NOTE — PROGRESS NOTES
Report received. Assumed care. Pt in bed awake. A/O x4. VSS. Responds appropriately. C/O pain, medicated per MAR, no SOB. Assessment complete. Dressing to the left buttock in place, cdi.  Discussed POC, pain control, monitor VS/labs, antbx, wound care, safety, DC planning , pt verbalizes understanding. Call light and belongings within reach.  Bed in the lowest position. Treaded socks in place. Hourly rounding in progress. Will continue to monitor .

## 2020-10-10 NOTE — CARE PLAN
Problem: Safety  Goal: Will remain free from injury  Outcome: PROGRESSING AS EXPECTED     Problem: Pain Management  Goal: Pain level will decrease to patient's comfort goal  Outcome: PROGRESSING AS EXPECTED     Problem: Infection  Goal: Will remain free from infection  Outcome: PROGRESSING AS EXPECTED     Problem: Discharge Barriers/Planning  Goal: Patient's continuum of care needs will be met  Outcome: PROGRESSING AS EXPECTED

## 2020-10-10 NOTE — PROGRESS NOTES
[facility] Nephrology Daily Progress Note    Date of Service  10/10/2020     Author: Alee BATISTA, CNN-NP  Collaborating Physician: Dr. Kenyon     Chief Complaint  64 yo M PMH ESRD MWF iHD, HTN, anemia of CKD, and CKD-MBD here with CC as above.  He was having progressively worsening pain in left buttock region and so presented to his OP MD who gave him abx course of clindamycin.  He was on the abx for about three days and was not getting any relief so went to local urgent care and there they redirected him to ED.  Upon arrival his wound was noted to a fluid collection in the left buttock with leukocytosis on labs.  I&D was performed and 10 mL purulent material was expressed.  He was admitted to hospitalist service for further evaluation.  Compliant with OP dialysis and no missed treatments recently.  Otherwise denies any F/C/N/V/CP/SOB.  No melena, hematochezia, hematemesis.  No HA, visual changes, or abdominal pain      Interval Problem Update  10/9: HD today (FRI)  10/10: sitting up in bed, feeling improved, got ABx and is going to follow up with wound care, awaiting DC     Review of Systems  Review of Systems   Constitutional: Positive for malaise/fatigue. Negative for chills, fever and weight loss.   HENT: Negative.    Eyes: Negative.    Respiratory: Negative.    Cardiovascular: Negative for chest pain, orthopnea, claudication, leg swelling and PND.   Gastrointestinal: Negative.    Genitourinary: Negative.    Musculoskeletal: Negative.    Neurological: Negative.    Endo/Heme/Allergies: Negative.    Psychiatric/Behavioral: Negative.         Physical Exam  Temp:  [36.1 °C (96.9 °F)-37 °C (98.6 °F)] 36.4 °C (97.5 °F)  Pulse:  [65-87] 65  Resp:  [14-18] 16  BP: (121-154)/(71-79) 121/72  SpO2:  [90 %-96 %] 93 %    Physical Exam  Constitutional:       Appearance: He is ill-appearing.      Comments: Lethargic.    HENT:      Head: Normocephalic and atraumatic.   Neck:      Musculoskeletal: Normal range of motion  and neck supple.   Cardiovascular:      Rate and Rhythm: Normal rate and regular rhythm.   Pulmonary:      Effort: No respiratory distress.      Breath sounds: No stridor. No wheezing or rhonchi.   Abdominal:      General: There is no distension.      Palpations: There is no mass.      Tenderness: There is no abdominal tenderness.   Skin:     General: Skin is warm and dry.      Findings: No lesion or rash.      Comments: Left Gluteal Wound    Neurological:      General: No focal deficit present.         Fluids    Intake/Output Summary (Last 24 hours) at 10/10/2020 0925  Last data filed at 10/9/2020 1128  Gross per 24 hour   Intake 500 ml   Output 2500 ml   Net -2000 ml       Laboratory  Recent Labs     10/07/20  2345 10/09/20  0630 10/10/20  0246   WBC 7.8 6.8 6.8   RBC 3.88* 3.97* 3.93*   HEMOGLOBIN 11.5* 11.9* 11.7*   HEMATOCRIT 37.7* 38.3* 36.9*   MCV 97.2 96.5 93.9   MCH 29.6 30.0 29.8   MCHC 30.5* 31.1* 31.7*   RDW 63.0* 63.9* 61.2*   PLATELETCT 136* 118* 153*   MPV 10.4 9.5 9.5     Recent Labs     10/07/20  2345 10/09/20  0630 10/10/20  0246   SODIUM 137 137 132*   POTASSIUM 4.5 5.2 5.0   CHLORIDE 95* 102 97   CO2 29 22 24   GLUCOSE 93 69 80   BUN 51* 70* 48*   CREATININE 5.56* 7.07* 6.14*   CALCIUM 10.1 9.3 8.8         No results for input(s): NTPROBNP in the last 72 hours.        Imaging      Assessment/Plan  - ESRD    * Etiology likely 2/2 HTN    * Kenyatta Swain; MWF iHD; LAVF (+thrill/bruit)    * Living XPL surgery pending final work up at [facility]   - Left gluteal abscess and cellulitis    * I&D done 10/8, wound care in progress. Cx pending.     * CT Pelvis w/contrast r/o osteo completed 10/8  - HTN    * Goal BP < 140/90    * Stable  - Anemia of CKD    * Goal Hgb 10-11    * Stable  - CKD-MBD    * Managed at HD unit  - Thrombocytopenia    * Monitor Platelets      PLAN:  - MWF iHD  - UF as tolerated  - No dietary protein restrictions  - Dose all meds per ESRD  - He takes ativan 1 mg prior to each HD,  please continue that while inpatient  - Low sodium diet  - Abx per primary team  - Renal diet  - Okay to transition to outpt hemodialysis when medically stable

## 2020-10-10 NOTE — PROGRESS NOTES
D/C'd.  Discharge instructions provided to pt.  Pt states understanding.  Pt states all questions have been answered.  Copy of discharge provided to pt.  Signed copy in chart.   Pt states that all personal belongings are in possession.   Pt escorted off unit with assistance from this RN via walking without incident.

## 2020-10-10 NOTE — PROGRESS NOTES
Hemodialysis ordered by JASPREET Paige. Treatment started at 0758 and ended at 1128. Pt stable, vss, no c/o post tx. See flow sheets for details. Net UF 2.0 L. Reported to ZKAIYA Hampton RN. Lt ua avf dressing clean, dry, intact.

## 2020-10-10 NOTE — DISCHARGE SUMMARY
Discharge Summary    CHIEF COMPLAINT ON ADMISSION  Chief Complaint   Patient presents with   • Buttocks Pain   • Wound Check     LEFT BUTTOCK PAIN/WOUND    • Sent from Urgent Care     Reason for Admission  Buttock pain and wound check    Admission Date  10/7/2020    CODE STATUS  Full Code    HPI & HOSPITAL COURSE  Mr. Benites is a 65-year-old male who presented to the emergency department on the evening of 10/7/2020 with buttock pain.  He was sent from urgent care and had a recent diagnosis of cellulitis in that area for which he was placed on outpatient clindamycin.  He did have an abscess on the left butt cheek that was incised and drained in the emergency department by the ED physician.  There was approximately 100 cc out of purulent material per report. He is an end-stage renal disease patient and is maintained on hemodialysis Monday/Wednesday/Friday. He was placed on vancomycin to be given with dialysis.  His nephrologist is Dr. Keller and his service was consulted for continued inpatient hemodialysis management.  His wound culture is preliminarily positive for MSSA so he was transitioned to a 7-day course of renally dosed Bactrim on 10/10/2020.  His blood cultures remain negative to date.  On the day of discharge his vital signs and lab work are stable and he is medically clear for discharge today with outpatient follow-up in the wound care clinic.  His dressing was last changed on 10/10/2020 and extra supplies were sent home with him to allow for dressing changes until he can get established with the wound care clinic.  That referral was placed prior to discharge and per the wound care service they will contact him on Monday to set up his appointment.    Therefore, he is discharged in good and stable condition to home with close outpatient follow-up.    The patient met 2-midnight criteria for an inpatient stay at the time of discharge.    Discharge Date  10/10/2020    FOLLOW UP ITEMS POST DISCHARGE  Wound care  clinic at first available.  PCP in 5-7 days.  Nephrology as previously scheduled.  Resume home dialysis schedule Monday/Wednesday/Friday.    DISCHARGE DIAGNOSES  Active Problems:    Abscess, left buttock, MSSA POA: Yes    End stage renal failure on dialysis (HCC) POA: Yes      Overview: fb [facility] nephrology // ON [facility] Transplant list today is: ACTIVE                         Cristofer Keller      [phone] (Work)      [phone] (Fax)      [address] [place] [address]      NEPHROLOGY        [birth year] - Sep. 04, 2020September 04, 2020              Organization       [facility] [address] [place] [address]. [address]       [place] [address]             Has been on dialysis Mon Wed Fri      Pt awaiting for kidney transplant  Resolved Problems:    * No resolved hospital problems. *    FOLLOW UP  Future Appointments   Date Time Provider Department Center   12/3/2020  9:40 AM Abel Jones M.D. [facility] [place]     Abel Jones M.D.  [address] [place] [address] Pky  [facility] [address]  [phone]        MEDICATIONS ON DISCHARGE     Medication List      Start taking these medications      Instructions   sulfamethoxazole-trimethoprim 400-80 MG Tabs  Start taking on: October 12, 2020  Commonly known as: BACTRIM   Doctor's comments: TAKE AFTER DIALYSIS  Take 1 Tab by mouth every day for 6 days.  Dose: 1 Tab        Continue taking these medications      Instructions   LORazepam 0.5 MG Tabs  Commonly known as: ATIVAN   Take 0.5 mg by mouth 1 time daily as needed for Anxiety.  Dose: 0.5 mg     metoprolol  MG Tb24  Commonly known as: TOPROL XL   Doctor's comments: Pt to make appt prior to more refills.  Take 1 tablet by mouth once daily     omeprazole 20 MG delayed-release capsule  Commonly known as: PRILOSEC   Take 1 Cap by mouth 2 Times a Day.  Dose: 20 mg     predniSONE 10 MG Tabs  Commonly known as: DELTASONE   prednisone 40 mg x 2 weeks,30  "mg x 1 week,20 mg x 1 week,10 mg x 1 week,5 mg x 1 week     RenaPlex-D Tabs   Take 1 Tab by mouth every day.  Dose: 1 Tab     Sensipar 30 MG Tabs  Generic drug: cinacalcet   Take 1 Tab by mouth every day.  Dose: 1 Tab     sevelamer carbonate 800 MG Tabs tablet  Commonly known as: RENVELA   TAKE 2 TABLETS BY MOUTH THREE TIMES DAILY WITH MEALS AND 1 TABLET TWICE DAILY WITH SNACKS     tramadol 50 MG Tabs  Commonly known as: ULTRAM   Take 50 mg by mouth every 6 hours as needed.  Dose: 50 mg        Stop taking these medications    clindamycin 300 MG Caps  Commonly known as: CLEOCIN          Allergies  Allergies   Allergen Reactions   • Cephalexin Rash   • Penicillins Unspecified and Rash     Pt states \"It was a childhood allergie\".       DIET  Orders Placed This Encounter   Procedures   • Diet Order Cardiac, Renal     Standing Status:   Standing     Number of Occurrences:   1     Order Specific Question:   Diet:     Answer:   Cardiac [6]     Order Specific Question:   Diet:     Answer:   Renal [8]     ACTIVITY  As tolerated.  Exercise encouraged.  Weight bearing as tolerated    CONSULTATIONS  Nephrology    PROCEDURES  As above    LABORATORY  Lab Results   Component Value Date    SODIUM 132 (L) 10/10/2020    POTASSIUM 5.0 10/10/2020    CHLORIDE 97 10/10/2020    CO2 24 10/10/2020    GLUCOSE 80 10/10/2020    BUN 48 (H) 10/10/2020    CREATININE 6.14 (HH) 10/10/2020      Lab Results   Component Value Date    WBC 6.8 10/10/2020    HEMOGLOBIN 11.7 (L) 10/10/2020    HEMATOCRIT 36.9 (L) 10/10/2020    PLATELETCT 153 (L) 10/10/2020      Total time of the discharge process exceeds 32 minutes.      Willa Huston, MSN, RN, APRN, ACNPC-AG, CCRN  Nurse Practitioner, [facility]  [phone]    10/10/2020    8:57 AM    "

## 2020-10-10 NOTE — CARE PLAN
Problem: Safety  Goal: Will remain free from injury  Outcome: PROGRESSING AS EXPECTED  Note: Treaded socks in place, bed in the lowest position, call light and belongings within reach, pt call for assistance appropriately      Problem: Pain Management  Goal: Pain level will decrease to patient's comfort goal  Outcome: PROGRESSING AS EXPECTED  Note: Medicated with oxycodone 5 per MAR with adequate pain control, hourly rounding in progress

## 2020-10-13 ENCOUNTER — PATIENT OUTREACH (OUTPATIENT)
Dept: HEALTH INFORMATION MANAGEMENT | Facility: OTHER | Age: 65
End: 2020-10-13

## 2020-10-13 ENCOUNTER — OFFICE VISIT (OUTPATIENT)
Dept: MEDICAL GROUP | Facility: PHYSICIAN GROUP | Age: 65
End: 2020-10-13
Payer: MEDICARE

## 2020-10-13 VITALS
OXYGEN SATURATION: 100 % | WEIGHT: 185.4 LBS | BODY MASS INDEX: 26.54 KG/M2 | RESPIRATION RATE: 16 BRPM | HEIGHT: 70 IN | DIASTOLIC BLOOD PRESSURE: 76 MMHG | HEART RATE: 78 BPM | TEMPERATURE: 98 F | SYSTOLIC BLOOD PRESSURE: 122 MMHG

## 2020-10-13 DIAGNOSIS — I10 ESSENTIAL HYPERTENSION: Chronic | ICD-10-CM

## 2020-10-13 DIAGNOSIS — L02.91 ABSCESS: ICD-10-CM

## 2020-10-13 LAB
BACTERIA BLD CULT: NORMAL
BACTERIA BLD CULT: NORMAL
SIGNIFICANT IND 70042: NORMAL
SIGNIFICANT IND 70042: NORMAL
SITE SITE: NORMAL
SITE SITE: NORMAL
SOURCE SOURCE: NORMAL
SOURCE SOURCE: NORMAL

## 2020-10-13 PROCEDURE — 99214 OFFICE O/P EST MOD 30 MIN: CPT | Performed by: INTERNAL MEDICINE

## 2020-10-13 RX ORDER — TRAMADOL HYDROCHLORIDE 50 MG/1
50 TABLET ORAL EVERY 8 HOURS PRN
Qty: 60 TAB | Refills: 0 | Status: SHIPPED | OUTPATIENT
Start: 2020-10-13 | End: 2020-11-02

## 2020-10-13 ASSESSMENT — FIBROSIS 4 INDEX: FIB4 SCORE: 1.21

## 2020-10-13 NOTE — ASSESSMENT & PLAN NOTE
Chronic stable condition with the patient presently taking metoprolol daily.  Blood pressure today is 122/76.

## 2020-10-13 NOTE — ASSESSMENT & PLAN NOTE
Patient present today for hospital follow-up.  The patient was diagnosed with abscess of the left buttocks region  Wound culture show staph infection.  Patient presently taking Bactrim.  Patient denies any fever or chills.  Patient also complaining of moderate amount of pain he is requesting refill for tramadol.

## 2020-10-13 NOTE — PROGRESS NOTES
JULIUS Galvan spoke to pt via mobile number to discuss post DC plan. Pt was able to get all his meds. Pt attended PCP appt today 10/13. Pt has a home health nurse coming to see him tomorrow. Pt has wound care appt on Thursday 10/15. Pt states he does not have a ride to this appt yet.     Plan:  JULIUS Galvan to call pt tomorrow 10/14 and make sure that pt has organized a ride to his wound care appt.

## 2020-10-13 NOTE — PROGRESS NOTES
CC: Hospital follow-up  Abscess in gluteal region      HPI: This is a 65 y.o. pt.  Pt's medical history is notable for:     Abscess, left buttock, MSSA  Patient present today for hospital follow-up.  The patient was diagnosed with abscess of the left buttocks region  Wound culture show staph infection.  Patient presently taking Bactrim.  Patient denies any fever or chills.  Patient also complaining of moderate amount of pain he is requesting refill for tramadol.    Hypertension  Chronic stable condition with the patient presently taking metoprolol daily.  Blood pressure today is 122/76.          REVIEW OF SYSTEMS:     Constitutional:  no fever / chills   Neurologic: no headaches, no numbness/tingling  Eyes: no changes in vision  ENT: no sore throat, no hearing loss  CV:  no chest pain, no palpitations  Pulmonary: no SOB, no cough    GI: no nausea / vomiting, no diarrhea, no constipation  :  no dysuria, no hematuria         Allergies: Cephalexin and Penicillins    Current Outpatient Medications Ordered in Epic   Medication Sig Dispense Refill   • tramadol (ULTRAM) 50 MG Tab Take 1 Tab by mouth every 8 hours as needed for up to 20 days. 60 Tab 0   • sulfamethoxazole-trimethoprim (BACTRIM) 400-80 MG Tab Take 1 Tab by mouth every day for 6 days. 6 Tab 0   • omeprazole (PRILOSEC) 20 MG delayed-release capsule Take 1 Cap by mouth 2 Times a Day. 60 Cap 2   • predniSONE (DELTASONE) 10 MG Tab prednisone 40 mg x 2 weeks,30 mg x 1 week,20 mg x 1 week,10 mg x 1 week,5 mg x 1 week 102 Tab 0   • LORazepam (ATIVAN) 0.5 MG Tab Take 0.5 mg by mouth 1 time daily as needed for Anxiety.     • Multiple Vitamins-Minerals (RENAPLEX-D) Tab Take 1 Tab by mouth every day.     • metoprolol SR (TOPROL XL) 100 MG TABLET SR 24 HR Take 1 tablet by mouth once daily 90 Tab 0   • sevelamer carbonate (RENVELA) 800 MG Tab tablet TAKE 2 TABLETS BY MOUTH THREE TIMES DAILY WITH MEALS AND 1 TABLET TWICE DAILY WITH SNACKS  5   • SENSIPAR 30 MG Tab Take 1  Tab by mouth every day.  11     No current [facility]-ordered facility-administered medications on file.        Past Medical History:   Diagnosis Date   • Allergy    • Anxiety    • Cataract    • Depression    • Gout due to renal impairment    • Hyperlipidemia    • Hypertension    • Kidney disease     Congenital solitary kidney on dialysis 3 times a week.   • Localized osteoarthritis of right shoulder 2019        Past Surgical History:   Procedure Laterality Date   • PB UPPER GI ENDOSCOPY,BIOPSY N/A 2020    Procedure: GASTROSCOPY, WITH BIOPSY;  Surgeon: Gabe West M.D.;  Location: SURGERY SAME DAY [facility];  Service: Gastroenterology   • GASTROSCOPY N/A 2020    Procedure: GASTROSCOPY- WITH DILATION;  Surgeon: Gabe West M.D.;  Location: SURGERY SAME DAY [facility];  Service: Gastroenterology   • AV FISTULA CREATION     • OTHER ORTHOPEDIC SURGERY          Family History   Problem Relation Age of Onset   • Cancer Mother         lymphoma   • Heart Attack Father    • Stroke Father    • Heart Disease Father         Social History     Tobacco Use   Smoking Status Former Smoker   • Packs/day: 0.50   • Years: 25.00   • Pack years: 12.50   • Types: Cigarettes   • Quit date: 10/21/1996   • Years since quittin.9   Smokeless Tobacco Never Used          Social History     Substance and Sexual Activity   Alcohol Use No   • Alcohol/week: 0.0 oz        ---------------------------------------------------------------------     PHYSICAL EXAM:   Vitals:    10/13/20 0800   BP: 122/76   Pulse: 78   Resp: 16   Temp: 36.7 °C (98 °F)   SpO2: 100%      Body mass index is 26.6 kg/m².        Constitutional: no acute distress  Neck: supple, no JVD  CV: heart RRR  Resp: normal effort, no wheezing or rales.  GI: abdomen soft, no obvious mass, no tenderness.  There is a small erythematous area noted in the lower abdominal region.  No fluctuance noted.  No discharge.  Neuro: CN 2-12 grossly intact  Gluteal region  there is a small packing material that was removed.  There is some erythema surrounding the wound opening there is minimal drainage noted..  The wound was cleansed in a sterile fashion using Betadine swab x3.  After that a sterile packing material was placed into the cavity.  Patient tolerated procedures well.  No complication noted.        ---------------------------------------------------------------------     ASSESSMENT and PLAN:       Abscess, left buttock, MSSA  The wound was check and sterile packing material placed into the wound cavity.  We will arrange for home health nurse to come out to assist the patient with wound packing and dressing change.  Patient has pending appointment to see the wound clinic in 48 hours.  I also submitted referral to infectious disease specialist.  He is advised to continue with the antibiotic for now.  Refill for tramadol given.  Potential side effect of medication discussed with the patient.  Patient advised not to drive.  Consent form signed by the patient today.    - I have reviewed the medical records and have determined that a controlled substance treatment is medically indicated:  Alternatives considered and discussed with patient including non-opioid medications, PT, interventional treatments, psychological treatments.   In addition, the pt was informed and educated regarding how to obtain and use naloxone in case of overdose.    - Controlled substance agreement/informed consent on file.      -NV  checked, appropriate.     -UDS ordered   Verbal informed consent was provided. Counseled pt on risks, benefits, and potential side effects of controlled substance treatment including but not limited to sedation, alcohol, driving, urine drug screens, tolerance, addiction, impaired judgement, and the risk of fatal overdose if not taken as prescribed; increased risk of overdose/death when combining with benzodiazepines and/or alcohol; and proper storage and disposal of  opioids/controlled substances.   This prescription is part of a comprehensive, individualized pain management plan.   Advised pt to take pain med only as needed as prescribed for severe pain /and not to exceed the prescribed amount.  -Explained to pt that goals of treatment plan are to 1)improve function 2)reduce pain level if possible 3)develop self management skills for controlling pain.    -Pt advised to go to nearest ER if any of the following symptoms develop: motor weakness below baseline, respiratory depression, or mental status changes, intolerable side effects, bowel/bladder incontinence, or severe exacerbation of pain.    I have assessed the patient’s risk for abuse, dependency, and addiction using the Opioid Risk Tool available at https://www.[employer].com/roikqb-xnsz-jsyr-ort-narcotic-abuse.   - REFERRAL TO INFECTIOUS DISEASE  - tramadol (ULTRAM) 50 MG Tab; Take 1 Tab by mouth every 8 hours as needed for up to 20 days.  Dispense: 60 Tab; Refill: 0     Essential hypertension  Chronic stable condition.  Patient will continue with metoprolol.            Return in about 4 months (around 2/13/2021).       PATIENT EDUCATION:  -If any problems should arise, patient was advised to contact our office or go to ER to be evaluated.  -Advised pt to follow a healthy diet and regular aerobic exercise regimen. Advised pt to avoid alcohol and tobacco use.    Please note that this dictation was created using voice recognition software. I have made every reasonable attempt to correct obvious errors, but it is possible there are errors of grammar and possibly content that I did not discover before finalizing the note.

## 2020-10-14 ENCOUNTER — PATIENT OUTREACH (OUTPATIENT)
Dept: HEALTH INFORMATION MANAGEMENT | Facility: OTHER | Age: 65
End: 2020-10-14

## 2020-10-15 ENCOUNTER — NON-PROVIDER VISIT (OUTPATIENT)
Dept: WOUND CARE | Facility: MEDICAL CENTER | Age: 65
End: 2020-10-15
Attending: NURSE PRACTITIONER
Payer: MEDICARE

## 2020-10-15 PROCEDURE — 99211 OFF/OP EST MAY X REQ PHY/QHP: CPT

## 2020-10-15 RX ORDER — COLCHICINE 0.6 MG/1
0.6 TABLET ORAL
Status: ON HOLD | COMMUNITY
End: 2021-01-16 | Stop reason: SDUPTHER

## 2020-10-15 NOTE — CERTIFICATION
Non Provider Encounter- Full Thickness wound    HISTORY OF PRESENT ILLNESS  Wound History:    START OF CARE IN CLINIC: 10/15/2020    REFERRING PROVIDER: MADELEINE Larson   WOUND- Full Thickness Wound   LOCATION: Left buttock   HISTORY: Patient states that he developed what appeared to be blister on his left buttocks. The area had become more painful, so patient presented to the Urgent Care to seek care and was sent to the ED as they felt it was grossly infected. In the ED the area was lanced and he was admitted for antibiotics. Patient was discharged on PO Bactrim which he has since completed. Patient states that in the ED it was mentioned that this may have originated from a bug bite. Pt then relayed to this RN that he has bed bugs in his home. Suggested that patient get an  to rid the home of bedbugs, patient states that he cannot afford this.    Pertinent Labs and Diagnostics:    Labs:     A1c:   Lab Results   Component Value Date/Time    HBA1C 5.5 06/08/2018 11:23 AM          IMAGING: NA    VASCULAR STUDIES: NA    LAST  WOUND CULTURE:  DATE : 10/8/2020 - treated while patient was admitted         FALL RISK ASSESSMENT:   X 65 years or older     Fall within the last 2 years   Uses ambulatory devices  Loss of protective sensation in feet   Use of prostethic/orthotic    Presence of lower extremity/foot/toe amputation   Taking medication that increases risk (per facility policy)    Interventions Recommended (if any of the above are selected):   Use of Assistive Device:   Supervision with ambulation: Independent   Assistance with ambulation: Independent   Home safety education: Educational material provided      PAST MEDICAL HISTORY:   Past Medical History:   Diagnosis Date   • Allergy    • Anxiety    • Cataract    • Depression    • Gout due to renal impairment    • Hyperlipidemia    • Hypertension    • Kidney disease     Congenital solitary kidney on dialysis 3 times a week.   • Localized  "osteoarthritis of right shoulder 2019       PAST SURGICAL HISTORY:   Past Surgical History:   Procedure Laterality Date   • PB UPPER GI ENDOSCOPY,BIOPSY N/A 2020    Procedure: GASTROSCOPY, WITH BIOPSY;  Surgeon: Gabe West M.D.;  Location: SURGERY SAME DAY [facility];  Service: Gastroenterology   • GASTROSCOPY N/A 2020    Procedure: GASTROSCOPY- WITH DILATION;  Surgeon: Gabe West M.D.;  Location: SURGERY SAME DAY [facility];  Service: Gastroenterology   • AV FISTULA CREATION     • OTHER ORTHOPEDIC SURGERY          MEDICATIONS:   Current Outpatient Medications   Medication   • colchicine (COLCRYS) 0.6 MG Tab   • tramadol (ULTRAM) 50 MG Tab   • LORazepam (ATIVAN) 0.5 MG Tab   • Multiple Vitamins-Minerals (RENAPLEX-D) Tab   • metoprolol SR (TOPROL XL) 100 MG TABLET SR 24 HR   • sevelamer carbonate (RENVELA) 800 MG Tab tablet   • SENSIPAR 30 MG Tab     No current facility-administered medications for this visit.        ALLERGIES:    Allergies   Allergen Reactions   • Cephalexin Rash   • Penicillins Unspecified and Rash     Pt states \"It was a childhood allergie\".           SOCIAL HISTORY:   Social History     Socioeconomic History   • Marital status:      Spouse name: Not on file   • Number of children: Not on file   • Years of education: Not on file   • Highest education level: Not on file   Occupational History   • Not on file   Social Needs   • Financial resource strain: Somewhat hard   • Food insecurity     Worry: Sometimes true     Inability: Sometimes true   • Transportation needs     Medical: No     Non-medical: No   Tobacco Use   • Smoking status: Former Smoker     Packs/day: 0.50     Years: 25.00     Pack years: 12.50     Types: Cigarettes     Quit date: 10/21/1996     Years since quittin.0   • Smokeless tobacco: Never Used   Substance and Sexual Activity   • Alcohol use: No     Alcohol/week: 0.0 oz   • Drug use: No   • Sexual activity: Yes     Partners: Female     " Comment: Lives with his brother. Work as . Social Security Disability for [place] and OA and Dialysis. No social or domestic concerns.   Lifestyle   • Physical activity     Days per week: Not on file     Minutes per session: Not on file   • Stress: Not on file   Relationships   • Social connections     Talks on phone: Not on file     Gets together: Not on file     Attends Yarsanism service: Not on file     Active member of club or organization: Not on file     Attends meetings of clubs or organizations: Not on file     Relationship status: Not on file   • Intimate partner violence     Fear of current or ex partner: Not on file     Emotionally abused: Not on file     Physically abused: Not on file     Forced sexual activity: Not on file   Other Topics Concern   • Not on file   Social History Narrative   • Not on file       FAMILY HISTORY:   Family History   Problem Relation Age of Onset   • Cancer Mother         lymphoma   • Heart Attack Father    • Stroke Father    • Heart Disease Father        Wound Assessment:     Wound 10/08/20 Full Thickness Wound Buttocks Left (Active)   Wound Image   10/15/20 1330   Site Assessment Red 10/15/20 1330   Periwound Assessment Blanchable erythema;Induration 10/15/20 1330   Margins Attached edges 10/15/20 1330   Drainage Amount Moderate 10/15/20 1330   Drainage Description Serosanguineous 10/15/20 1330   Treatments Cleansed;Site care 10/15/20 1330   Wound Cleansing Normal Saline Irrigation 10/15/20 1330   Periwound Protectant Skin Protectant Wipes to Periwound 10/15/20 1330   Dressing Options Hydrofiber Silver Strip;Silicone Adhesive Foam 10/15/20 1330   Non-staged Wound Description Full thickness 10/15/20 1330   Wound Length (cm) 0.5 cm 10/15/20 1330   Wound Width (cm) 0.3 cm 10/15/20 1330   Wound Depth (cm) 0.9 cm 10/15/20 1330   Wound Surface Area (cm^2) 0.15 cm^2 10/15/20 1330   Wound Volume (cm^3) 0.14 cm^3 10/15/20 1330   Wound Healing % 55 10/15/20 1330   Tunneling (cm)  0 cm 10/15/20 1330   Undermining (cm) 0 cm 10/15/20 1330   Wound Odor None 10/15/20 1330   Exposed Structures None 10/15/20 1330         Procedures:  -Wound cleansed with saline, no debridement this visit.     -Refer to flowsheet for wound care details.       Patient referred to Home Health by PCP - Home Health to establish care 10/16/2020.    PATIENT EDUCATION  -Advised to go to ER for any increased redness, swelling, drainage or odor, or if patient develops fever, chills, nausea or vomiting.  -Importance of adequate nutrition for wound healing  -Increase protein intake (unless contraindicated by renal status)

## 2020-10-22 ENCOUNTER — OFFICE VISIT (OUTPATIENT)
Dept: WOUND CARE | Facility: MEDICAL CENTER | Age: 65
End: 2020-10-22
Attending: NURSE PRACTITIONER
Payer: MEDICARE

## 2020-10-22 VITALS
RESPIRATION RATE: 20 BRPM | OXYGEN SATURATION: 99 % | HEART RATE: 100 BPM | DIASTOLIC BLOOD PRESSURE: 90 MMHG | SYSTOLIC BLOOD PRESSURE: 135 MMHG | TEMPERATURE: 97.9 F

## 2020-10-22 DIAGNOSIS — N18.6 ESRD ON DIALYSIS (HCC): ICD-10-CM

## 2020-10-22 DIAGNOSIS — S31.829A OPEN WOUND OF LEFT BUTTOCK, INITIAL ENCOUNTER: Primary | ICD-10-CM

## 2020-10-22 DIAGNOSIS — R52 PAIN ASSOCIATED WITH WOUND: ICD-10-CM

## 2020-10-22 DIAGNOSIS — Z99.2 ESRD ON DIALYSIS (HCC): ICD-10-CM

## 2020-10-22 DIAGNOSIS — T14.8XXA PAIN ASSOCIATED WITH WOUND: ICD-10-CM

## 2020-10-22 PROCEDURE — 99213 OFFICE O/P EST LOW 20 MIN: CPT | Mod: 25 | Performed by: NURSE PRACTITIONER

## 2020-10-22 PROCEDURE — 11042 DBRDMT SUBQ TIS 1ST 20SQCM/<: CPT | Performed by: NURSE PRACTITIONER

## 2020-10-22 PROCEDURE — 99213 OFFICE O/P EST LOW 20 MIN: CPT | Mod: 25

## 2020-10-22 PROCEDURE — 11042 DBRDMT SUBQ TIS 1ST 20SQCM/<: CPT

## 2020-10-22 RX ORDER — DOXYCYCLINE HYCLATE 100 MG/1
100 CAPSULE ORAL 2 TIMES DAILY
Qty: 20 EACH | Refills: 0 | Status: SHIPPED | OUTPATIENT
Start: 2020-10-22 | End: 2020-11-01

## 2020-10-22 ASSESSMENT — ENCOUNTER SYMPTOMS
PALPITATIONS: 0
WEAKNESS: 0
DIARRHEA: 0
SHORTNESS OF BREATH: 0
BLURRED VISION: 0
NAUSEA: 0
HEADACHES: 0
DIZZINESS: 0
COUGH: 0
CHILLS: 0
FEVER: 0
CONSTIPATION: 0
DOUBLE VISION: 0
HEARTBURN: 0
SPUTUM PRODUCTION: 0

## 2020-10-22 NOTE — LETTER
October 22, 2020    To: [facility]    Patient Name: Josafat Benites  YOB: 1955    Wound Care Instructions:    -Remove previous dressing from left buttock  -Cleanse area with Normal saline, wound cleanser or soap and water and pat dry  -Apply skin prep to dipti wound  -Cover wound bed with hydrofiber silver.   -Cover this dressing with non adhesive foam    -Patient to present to [facility] weekly for debridement and assessment, home health to change dressings once weekly.              _________________________  Nick Hartley, APRN

## 2020-10-22 NOTE — PROGRESS NOTES
Provider Encounter- Full Thickness wound    HISTORY OF PRESENT ILLNESS  Wound History:    START OF CARE IN CLINIC: 10/15/2020    REFERRING PROVIDER: Willa BATISTA     WOUND- Full Thickness Wound   LOCATION: Left buttocks   HISTORY: Patient reports that he does not know how the induration and swelling formed to his left buttocks.  He was seen in urgent care and was given a prescription for clindamycin which she took for approximately 3 days with no resolution he presented to the emergency room with left buttocks abscess they performed an I&D which removed purulent drainage patient was started on antibiotics.  Wound culture came back positive for MSSA patient was renal dosed with Bactrim for recommended 7 days.  Patient was discharged with follow-up care at [facility] for maintenance of I&D incision site.    Pertinent Medical History: Gout, hypertriglyceridemia, hypertension, end-stage renal failure, first-degree heart block    TOBACCO USE: Former smoker quit 1996 0.5 packs/day for 25 years.    Patient's problem list, allergies, and current medications reviewed and updated in Epic    Interval History:  10/22/2020: Clinic visit with LYNNE Johns. Patient states that they are feeling well today.  Patient denies fever, chills, nausea, vomiting, lightheadedness, dizziness, shortness of breath and chest pain.  Patient presents with significant induration to left buttocks small I&D open wound.  Patient has completed his course of antibiotics.  However, patient still has significant induration and some erythema to periwound area.  We will place the patient on another course of antibiotics and have the patient return in 1 week for evaluation.        REVIEW OF SYSTEMS:   Review of Systems   Constitutional: Negative for chills and fever.   Eyes: Negative for blurred vision and double vision.   Respiratory: Negative for cough, sputum production and shortness of breath.    Cardiovascular: Negative for chest pain,  palpitations and leg swelling.   Gastrointestinal: Negative for constipation, diarrhea, heartburn and nausea.   Skin: Negative for itching.        Redness incision site   Neurological: Negative for dizziness, weakness and headaches.       PHYSICAL EXAMINATION:   /90 (BP Location: Left arm, Patient Position: Sitting)   Pulse 100   Temp 36.6 °C (97.9 °F) (Temporal)   Resp 20   SpO2 99%     Physical Exam   Constitutional: He is oriented to person, place, and time. No distress.   Slightly unkept   HENT:   Head: Normocephalic and atraumatic.   Eyes: Pupils are equal, round, and reactive to light. EOM are normal.   Neck: Normal range of motion.   Pulmonary/Chest: Effort normal. No respiratory distress. He has no wheezes.   Musculoskeletal: Normal range of motion.   Neurological: He is alert and oriented to person, place, and time.   Skin: No rash noted. He is not diaphoretic. There is erythema.   Induration with erythema no fluctuance in the left buttocks  I&D incision with open wound       WOUND ASSESSMENT        Wound 07/31/19 Venous Ulcer Leg L medial LE CVI ulcer (Active)        Wound 10/08/20 Full Thickness Wound Buttocks Left (Active)   Wound Image    10/22/20 1030   Site Assessment Red;Other (Comment) 10/22/20 1030   Periwound Assessment Blanchable erythema;Induration 10/22/20 1030   Margins Attached edges 10/22/20 1030   Drainage Amount Scant 10/22/20 1030   Drainage Description Serosanguineous 10/22/20 1030   Treatments Cleansed;Topical Lidocaine;Provider debridement 10/22/20 1030   Wound Cleansing Normal Saline Irrigation 10/22/20 1030   Periwound Protectant Skin Protectant Wipes to Periwound;Barrier Paste 10/22/20 1030   Dressing Options Hydrofiber Silver Strip;Silicone Adhesive Foam 10/22/20 1030   Non-staged Wound Description Full thickness 10/22/20 1030   Wound Length (cm) 0.5 cm 10/15/20 1330   Wound Width (cm) 0.3 cm 10/15/20 1330   Wound Depth (cm) 0.9 cm 10/15/20 1330   Wound Surface Area  (cm^2) 0.15 cm^2 10/15/20 1330   Wound Volume (cm^3) 0.14 cm^3 10/15/20 1330   Post-Procedure Length (cm) 0.6 cm 10/22/20 1030   Post-Procedure Width (cm) 0.2 cm 10/22/20 1030   Post-Procedure Depth (cm) 0.3 cm 10/22/20 1030   Post-Procedure Surface Area (cm^2) 0.12 cm^2 10/22/20 1030   Post-Procedure Volume (cm^3) 0.04 cm^3 10/22/20 1030   Wound Healing % 55 10/15/20 1330   Tunneling (cm) 0 cm 10/22/20 1030   Undermining (cm) 0 cm 10/22/20 1030   Wound Odor None 10/22/20 1030   Exposed Structures None 10/22/20 1030            PROCEDURE:   -2% viscous lidocaine applied topically to wound bed for approximately 5 minutes prior to debridement  -Curette used to debride wound bed.  Excisional debridement was performed to remove devitalized tissue until healthy, bleeding tissue was visualized.   Entire surface of wound, 0.12cm2 debrided.  Tissue debrided into the subcutaneous layer.    -Bleeding controlled with manual pressure.    -Wound care completed by wound RN, refer to flowsheet  -Patient tolerated the procedure well, without c/o pain or discomfort.       Pertinent Labs and Diagnostics:    Labs:     A1c:   Lab Results   Component Value Date/Time    HBA1C 5.5 06/08/2018 11:23 AM          IMAGING: CT-pelvis dated 10/8/2020  FINDINGS:  There is marked soft tissue opacity within the subcutaneous fat of the left gluteal region, extending to the skin. It is in close proximity to but does not appear to involve the gluteal musculature. There is no subcutaneous gas or discrete fluid   collection. The visualized bony structures are intact. The intraperitoneal pelvic soft tissues are unremarkable except to note colonic diverticulosis. There is no pelvic ascites.        IMPRESSION:     Findings consistent with nonspecific inflammatory change involving subcutaneous fat of the left gluteal region. See additional comments above.    VASCULAR STUDIES: N/A    LAST  WOUND CULTURE:  DATE :         Component 2wk ago   Significant  Indicator POSPositive (POS)    Source WND    Site Left Buttock    Culture Result -Abnormal     Gram Stain Result Rare WBCs.   No organisms seen.    Culture Result Abnormal   Staphylococcus aureus   Light growth                     ASSESSMENT AND PLAN:     1. ESRD on dialysis (HCC)  Patient was on a course of Bactrim which was renal dosed.  Patient still has induration and erythema.  Will place the patient on cefdinir for 7 days for its Staphylococcus coverage and anti-inflammatory effects hopefully this will help with induration.    2. Open wound of left buttock, initial encounter  Patient reports that he thinks he got a spider bite to his left buttocks.  -Excisional debridement of wound in clinic today, medically necessary to promote wound healing.  -Patient to return to clinic weekly for assessment and debridement  -Patient to change dressing 1-2 times per week in between clinic visits  -Patient with erythema to periwound area with induration we will place the patient on doxycycline does not need to be renally dosed.  Per up-to-date Hemodialysis, intermittent (thrice weekly): Poorly dialyzed (0% to 5%); no supplemental dose or dosage adjustment necessary (Roc 1983; Natalio 1972; Keithi 1973).     Wound care: Hydrofiber silver strip, silicone adhesive foam    3. Pain associated with wound  -2% viscous lidocaine applied topically to wound bed for approximately 5 minutes prior to debridement  -Patient tolerated procedure today with no complaints of discomfort.              PATIENT EDUCATION  - Importance of adequate nutrition for wound healing  -Advised to go to ER for any increased redness, swelling, drainage, or odor, or if patient develops fever, chills, nausea or vomiting.     15 min spent face to face with patient, >50% of time spent counseling, coordinating care, reviewing records, discussing POC, educating patient regarding wound healing and progression.  This time was spent in excess to procedure time.        Please note that this note may have been created using voice recognition software. I have worked with technical experts from [employer] [facility] at the [facility] to optimize the interface.  I have made every reasonable attempt to correct obvious errors, but there may be errors of grammar and possibly content that I did not discover before finalizing the note.    N

## 2020-10-26 ENCOUNTER — TELEPHONE (OUTPATIENT)
Dept: CARDIOLOGY | Facility: MEDICAL CENTER | Age: 65
End: 2020-10-26

## 2020-10-26 NOTE — TELEPHONE ENCOUNTER
"JA    Received Answer West report on 10/23 @ 2:32pm stating:    \"Needs a new order to complete his blood work the previous order .\"   Please call pt back at [phone]    Thank you   "

## 2020-10-29 ENCOUNTER — OFFICE VISIT (OUTPATIENT)
Dept: WOUND CARE | Facility: MEDICAL CENTER | Age: 65
End: 2020-10-29
Attending: NURSE PRACTITIONER
Payer: MEDICARE

## 2020-10-29 VITALS
TEMPERATURE: 98 F | RESPIRATION RATE: 20 BRPM | SYSTOLIC BLOOD PRESSURE: 144 MMHG | DIASTOLIC BLOOD PRESSURE: 87 MMHG | HEART RATE: 79 BPM | OXYGEN SATURATION: 96 %

## 2020-10-29 DIAGNOSIS — S31.829D OPEN WOUND OF LEFT BUTTOCK, SUBSEQUENT ENCOUNTER: Primary | ICD-10-CM

## 2020-10-29 DIAGNOSIS — T14.8XXA PAIN ASSOCIATED WITH WOUND: ICD-10-CM

## 2020-10-29 DIAGNOSIS — R52 PAIN ASSOCIATED WITH WOUND: ICD-10-CM

## 2020-10-29 DIAGNOSIS — Z99.2 ESRD ON DIALYSIS (HCC): ICD-10-CM

## 2020-10-29 DIAGNOSIS — N18.6 ESRD ON DIALYSIS (HCC): ICD-10-CM

## 2020-10-29 PROCEDURE — 99212 OFFICE O/P EST SF 10 MIN: CPT

## 2020-10-29 PROCEDURE — 99213 OFFICE O/P EST LOW 20 MIN: CPT | Performed by: NURSE PRACTITIONER

## 2020-10-29 ASSESSMENT — ENCOUNTER SYMPTOMS
BLURRED VISION: 0
HEARTBURN: 0
WEAKNESS: 0
DIZZINESS: 0
SPUTUM PRODUCTION: 0
HEADACHES: 0
NAUSEA: 0
COUGH: 0
PALPITATIONS: 0
DOUBLE VISION: 0
CHILLS: 0
CONSTIPATION: 0
DIARRHEA: 0
SHORTNESS OF BREATH: 0
FEVER: 0

## 2020-10-29 NOTE — PROGRESS NOTES
Provider Encounter- Full Thickness wound    HISTORY OF PRESENT ILLNESS  Wound History:    START OF CARE IN CLINIC: 10/15/2020    REFERRING PROVIDER: Willa BATISTA     WOUND- Full Thickness Wound   LOCATION: Left buttocks   HISTORY: Patient reports that he does not know how the induration and swelling formed to his left buttocks.  He was seen in urgent care and was given a prescription for clindamycin which she took for approximately 3 days with no resolution he presented to the emergency room with left buttocks abscess they performed an I&D which removed purulent drainage patient was started on antibiotics.  Wound culture came back positive for MSSA patient was renal dosed with Bactrim for recommended 7 days.  Patient was discharged with follow-up care at [facility] for maintenance of I&D incision site.    Pertinent Medical History: Gout, hypertriglyceridemia, hypertension, end-stage renal failure, first-degree heart block    TOBACCO USE: Former smoker quit 1996 0.5 packs/day for 25 years.    Patient's problem list, allergies, and current medications reviewed and updated in Epic    Interval History:  10/22/2020: Clinic visit with LYNNE Johns. Patient states that they are feeling well today.  Patient denies fever, chills, nausea, vomiting, lightheadedness, dizziness, shortness of breath and chest pain.  Patient presents with significant induration to left buttocks small I&D open wound.  Patient has completed his course of antibiotics.  However, patient still has significant induration and some erythema to periwound area.  We will place the patient on another course of antibiotics and have the patient return in 1 week for evaluation.    10/29/2020 : Clinic visit with LYNNE Lopez.  Which states he is feeling well, denies fevers, chills, nausea, vomiting, cough or shortness of breath.  He has not had any drainage from his wound, nor has he had any pain.        REVIEW OF SYSTEMS:   Review of Systems    Constitutional: Negative for chills and fever.   Eyes: Negative for blurred vision and double vision.   Respiratory: Negative for cough, sputum production and shortness of breath.    Cardiovascular: Negative for chest pain, palpitations and leg swelling.   Gastrointestinal: Negative for constipation, diarrhea, heartburn and nausea.   Skin: Negative for itching.        Redness incision site   Neurological: Negative for dizziness, weakness and headaches.       PHYSICAL EXAMINATION:   /87 (BP Location: Right arm, Patient Position: Standing)   Pulse 79   Temp 36.7 °C (98 °F) (Temporal)   Resp 20   SpO2 96%     Physical Exam   Constitutional: He is oriented to person, place, and time. No distress.   Slightly unkept   HENT:   Head: Normocephalic and atraumatic.   Eyes: Pupils are equal, round, and reactive to light. EOM are normal.   Neck: Normal range of motion.   Pulmonary/Chest: Effort normal. No respiratory distress. He has no wheezes.   Musculoskeletal: Normal range of motion.   Neurological: He is alert and oriented to person, place, and time.   Skin: Skin is warm. He is not diaphoretic.   Left buttock 100% reepithelialized, refer to wound photo   Psychiatric: Mood, memory, affect and judgment normal.       WOUND ASSESSMENT  Left buttock wound-resolved                       PROCEDURE:   - no need for debridement, wound is healed  -Wound care completed by wound RN, zinc barrier paste applied.  refer to flowsheet  -Patient tolerated the procedure well, without c/o pain or discomfort.       Pertinent Labs and Diagnostics:    Labs:     A1c:   Lab Results   Component Value Date/Time    HBA1C 5.5 06/08/2018 11:23 AM          IMAGING: CT-pelvis dated 10/8/2020  FINDINGS:  There is marked soft tissue opacity within the subcutaneous fat of the left gluteal region, extending to the skin. It is in close proximity to but does not appear to involve the gluteal musculature. There is no subcutaneous gas or discrete  fluid   collection. The visualized bony structures are intact. The intraperitoneal pelvic soft tissues are unremarkable except to note colonic diverticulosis. There is no pelvic ascites.        IMPRESSION:     Findings consistent with nonspecific inflammatory change involving subcutaneous fat of the left gluteal region. See additional comments above.    VASCULAR STUDIES: N/A    LAST  WOUND CULTURE:  DATE :         Component 2wk ago   Significant Indicator POSPositive (POS)    Source WND    Site Left Buttock    Culture Result -Abnormal     Gram Stain Result Rare WBCs.   No organisms seen.    Culture Result Abnormal   Staphylococcus aureus   Light growth                     ASSESSMENT AND PLAN:     1.Open wound of left buttock, subsequent encounter  Patient reports that he thinks he got a spider bite to his left buttocks.      10/29/2020: Wound resolved  -Discharge from [facility]  -Patient to return to clinic ASAP if wound recurs, or if he/she develops new wounds   Wound care: Zinc barrier paste                2.  ESRD on dialysis (HCC)  Comments: Complicating factor, impaired wound healing potential    3. Pain associated with wound    10/29/2020: resolved              PATIENT EDUCATION  - Importance of adequate nutrition for wound healing  -Advised to go to ER for any increased redness, swelling, drainage, or odor, or if patient develops fever, chills, nausea or vomiting.     15 min spent face to face with patient, >50% of time spent counseling, coordinating care, reviewing records, discussing POC, educating patient regarding wound healing and progression.  This time was spent in excess to procedure time.       Please note that this note may have been created using voice recognition software. I have worked with technical experts from [facility] to optimize the interface.  I have made every reasonable attempt to correct obvious errors, but there may be errors of grammar and possibly content that I did not discover before  finalizing the note.    N

## 2020-11-03 ENCOUNTER — TELEPHONE (OUTPATIENT)
Dept: MEDICAL GROUP | Facility: PHYSICIAN GROUP | Age: 65
End: 2020-11-03

## 2020-11-03 DIAGNOSIS — F41.9 ANXIETY: Chronic | ICD-10-CM

## 2020-11-03 RX ORDER — LORAZEPAM 0.5 MG/1
TABLET ORAL
Qty: 30 TAB | Refills: 0 | Status: SHIPPED | OUTPATIENT
Start: 2020-11-03 | End: 2020-12-11 | Stop reason: SDUPTHER

## 2020-11-03 NOTE — TELEPHONE ENCOUNTER
Future Appointments       Provider Department [place]    11/10/2020 2:00 PM Abel Jones M.D.; [facility]  [facility]    12/3/2020 9:40 AM Abel Jones M.D. [facility]        ANNUAL WELLNESS VISIT PRE-VISIT PLANNING WITHOUT OUTREACH    1.  Reviewed note from last office visit with PCP: YES    2.  If any orders were placed at last visit, do we have Results/Consult Notes?        •  Labs - Labs were not ordered at last office visit.       •  Imaging - Imaging was not ordered at last office visit.       •  Referrals - No referrals were ordered at last office visit.    3.  Immunizations were updated in [employer] using Web[employer]?: Yes       •  WebIZ Recommendations: FLU, HEPATITIS A , HEPATITIS B, TDAP, SHINGRIX (Shingles) and CPOX        •  Is patient due for Tdap? YES. Patient was not notified of copay/out of pocket cost.       •  Is patient due for Shingrix? YES. Patient was not notified of copay/out of pocket cost.     4.  Patient is due for the following Health Maintenance Topics:   Health Maintenance Due   Topic Date Due   • Annual Wellness Visit  1955   • IMM DTaP/Tdap/Td Vaccine (1 - Tdap) 09/12/1974   • IMM ZOSTER VACCINES (1 of 2) 09/12/2005   • IMM HEP B VACCINE (3 of 4 - Risk Engerix-B 4-dose series) 03/13/2019     5.  Reviewed/Updated the following with patient:       •   Preferred Pharmacy? YES       •   Preferred Lab? YES       •   Preferred Communication? YES       •   Allergies? YES       •   Medications? YES. Was Abstract Encounter opened and chart updated? YES       •   Social History? YES. Was Abstract Encounter opened and chart updated? YES       •   Family History (document living status of immediate family members and if + hx of  cancer, diabetes, hypertension, hyperlipidemia, heart attack, stroke) YES. Was Abstract Encounter opened and chart updated? YES    6.  Care Team Updated:       •   DME Company (gait device, O2, CPAP, etc.): NO       •   Other  Specialists (eye doctor, derm, GYN, cardiology, endo, etc): YES    7. Orders for overdue Health Maintenance topics pended in Pre-Charting? NO    8.  Patient has the following Care Path diagnoses on Problem List:  NONE    9.  Patient was advised: “This is a free wellness visit. The provider will screen for medical conditions to help you stay healthy. If you have other concerns to address you may be asked to discuss these at a separate visit or there may be an additional fee.”     10.  Patient was informed to arrive 15 min prior to their scheduled appointment and bring in their medication bottles.

## 2020-11-05 ENCOUNTER — APPOINTMENT (OUTPATIENT)
Dept: WOUND CARE | Facility: MEDICAL CENTER | Age: 65
End: 2020-11-05
Attending: NURSE PRACTITIONER
Payer: MEDICARE

## 2020-11-05 SDOH — HEALTH STABILITY: MENTAL HEALTH: HOW OFTEN DO YOU HAVE A DRINK CONTAINING ALCOHOL?: NEVER

## 2020-11-05 SDOH — HEALTH STABILITY: MENTAL HEALTH: HOW OFTEN DO YOU HAVE 6 OR MORE DRINKS ON ONE OCCASION?: NEVER

## 2020-11-06 DIAGNOSIS — F41.9 ANXIETY: Chronic | ICD-10-CM

## 2020-11-06 DIAGNOSIS — L02.91 ABSCESS: ICD-10-CM

## 2020-11-06 DIAGNOSIS — I10 ESSENTIAL HYPERTENSION: ICD-10-CM

## 2020-11-06 RX ORDER — LORAZEPAM 0.5 MG/1
0.5 TABLET ORAL
Qty: 30 TAB | Refills: 0 | Status: CANCELLED | OUTPATIENT
Start: 2020-11-06 | End: 2020-12-06

## 2020-11-06 RX ORDER — TRAMADOL HYDROCHLORIDE 50 MG/1
50 TABLET ORAL EVERY 8 HOURS PRN
Qty: 60 TAB | Refills: 0 | Status: CANCELLED | OUTPATIENT
Start: 2020-11-06 | End: 2020-11-26

## 2020-11-06 RX ORDER — METOPROLOL SUCCINATE 100 MG/1
TABLET, EXTENDED RELEASE ORAL
Qty: 90 TAB | Refills: 0 | Status: CANCELLED | OUTPATIENT
Start: 2020-11-06

## 2020-11-09 DIAGNOSIS — I10 ESSENTIAL HYPERTENSION: ICD-10-CM

## 2020-11-09 DIAGNOSIS — L02.91 ABSCESS: ICD-10-CM

## 2020-11-09 RX ORDER — METOPROLOL SUCCINATE 100 MG/1
TABLET, EXTENDED RELEASE ORAL
Qty: 90 TAB | Refills: 2 | Status: ON HOLD | OUTPATIENT
Start: 2020-11-09 | End: 2021-01-16 | Stop reason: SDUPTHER

## 2020-11-09 RX ORDER — TRAMADOL HYDROCHLORIDE 50 MG/1
50 TABLET ORAL EVERY 8 HOURS PRN
Qty: 60 TAB | Refills: 0 | OUTPATIENT
Start: 2020-11-09 | End: 2020-11-29

## 2020-11-09 NOTE — TELEPHONE ENCOUNTER
----- Message from Garry Benites sent at 11/6/2020  9:05 PM PST -----  Regarding: Medication Renewal Request  Contact: [phone]  Refills have been requested for the following medications:        metoprolol SR (TOPROL XL) 100 MG TABLET SR 24 HR [Abel Jones M.D.]        tramadol (ULTRAM) 50 MG Tab [Abel Jones M.D.]        LORazepam (ATIVAN) 0.5 MG Tab [Abel Jones M.D.]    Preferred pharmacy: [facility] [address] [place] [address] [facility]  Delivery method: Pickup

## 2020-11-10 ENCOUNTER — OFFICE VISIT (OUTPATIENT)
Dept: MEDICAL GROUP | Facility: PHYSICIAN GROUP | Age: 65
End: 2020-11-10
Payer: MEDICARE

## 2020-11-10 VITALS
DIASTOLIC BLOOD PRESSURE: 72 MMHG | HEIGHT: 70 IN | WEIGHT: 192.4 LBS | RESPIRATION RATE: 18 BRPM | SYSTOLIC BLOOD PRESSURE: 130 MMHG | HEART RATE: 82 BPM | BODY MASS INDEX: 27.54 KG/M2 | OXYGEN SATURATION: 94 % | TEMPERATURE: 98.1 F

## 2020-11-10 DIAGNOSIS — Z99.2 END STAGE RENAL FAILURE ON DIALYSIS (HCC): Chronic | ICD-10-CM

## 2020-11-10 DIAGNOSIS — Z99.2 ANEMIA DUE TO CHRONIC KIDNEY DISEASE, ON CHRONIC DIALYSIS (HCC): Chronic | ICD-10-CM

## 2020-11-10 DIAGNOSIS — I10 ESSENTIAL HYPERTENSION: Chronic | ICD-10-CM

## 2020-11-10 DIAGNOSIS — Z00.00 MEDICARE ANNUAL WELLNESS VISIT, SUBSEQUENT: Primary | ICD-10-CM

## 2020-11-10 DIAGNOSIS — M10.00 IDIOPATHIC GOUT, UNSPECIFIED CHRONICITY, UNSPECIFIED SITE: Chronic | ICD-10-CM

## 2020-11-10 DIAGNOSIS — D63.1 ANEMIA DUE TO CHRONIC KIDNEY DISEASE, ON CHRONIC DIALYSIS (HCC): Chronic | ICD-10-CM

## 2020-11-10 DIAGNOSIS — N18.6 END STAGE RENAL FAILURE ON DIALYSIS (HCC): Chronic | ICD-10-CM

## 2020-11-10 DIAGNOSIS — N18.6 ANEMIA DUE TO CHRONIC KIDNEY DISEASE, ON CHRONIC DIALYSIS (HCC): Chronic | ICD-10-CM

## 2020-11-10 DIAGNOSIS — Z23 NEED FOR VACCINATION: ICD-10-CM

## 2020-11-10 DIAGNOSIS — F41.9 ANXIETY: Chronic | ICD-10-CM

## 2020-11-10 PROBLEM — G89.29 CHRONIC PAIN OF BOTH SHOULDERS: Status: RESOLVED | Noted: 2019-05-08 | Resolved: 2020-11-10

## 2020-11-10 PROBLEM — M25.511 CHRONIC PAIN OF BOTH SHOULDERS: Status: RESOLVED | Noted: 2019-05-08 | Resolved: 2020-11-10

## 2020-11-10 PROBLEM — L02.91 ABSCESS: Status: RESOLVED | Noted: 2020-10-08 | Resolved: 2020-11-10

## 2020-11-10 PROBLEM — Z51.81 ENCOUNTER FOR THERAPEUTIC DRUG MONITORING: Status: RESOLVED | Noted: 2019-05-08 | Resolved: 2020-11-10

## 2020-11-10 PROBLEM — L60.0 INGROWN TOENAIL: Status: RESOLVED | Noted: 2019-05-08 | Resolved: 2020-11-10

## 2020-11-10 PROBLEM — K22.2 ESOPHAGEAL STRICTURE: Status: RESOLVED | Noted: 2020-09-17 | Resolved: 2020-11-10

## 2020-11-10 PROBLEM — M25.512 CHRONIC PAIN OF BOTH SHOULDERS: Status: RESOLVED | Noted: 2019-05-08 | Resolved: 2020-11-10

## 2020-11-10 PROCEDURE — 90471 IMMUNIZATION ADMIN: CPT | Performed by: INTERNAL MEDICINE

## 2020-11-10 PROCEDURE — 90715 TDAP VACCINE 7 YRS/> IM: CPT | Performed by: INTERNAL MEDICINE

## 2020-11-10 PROCEDURE — G0439 PPPS, SUBSEQ VISIT: HCPCS | Mod: 25 | Performed by: INTERNAL MEDICINE

## 2020-11-10 ASSESSMENT — ENCOUNTER SYMPTOMS: GENERAL WELL-BEING: FAIR

## 2020-11-10 ASSESSMENT — ACTIVITIES OF DAILY LIVING (ADL): BATHING_REQUIRES_ASSISTANCE: 0

## 2020-11-10 ASSESSMENT — PATIENT HEALTH QUESTIONNAIRE - PHQ9: CLINICAL INTERPRETATION OF PHQ2 SCORE: 0

## 2020-11-10 ASSESSMENT — FIBROSIS 4 INDEX: FIB4 SCORE: 1.21

## 2020-11-10 NOTE — ASSESSMENT & PLAN NOTE
This is a chronic condition.  The patient takes colchicine as needed.  Currently patient asymptomatic.

## 2020-11-10 NOTE — ASSESSMENT & PLAN NOTE
This is a chronic and stable condition.  His blood pressure is well controlled.  Today it is 130/72.  Patient take metoprolol

## 2020-11-10 NOTE — ASSESSMENT & PLAN NOTE
This is a chronic condition.  Stable.  He takes lorazepam typically prior to dialysis.  Patient denies significant side effect.

## 2020-11-10 NOTE — PROGRESS NOTES
Chief Complaint   Patient presents with   • Annual Wellness Visit     HPI:  Josafat is a 65 y.o. here for Medicare Annual Wellness Visit      Patient Active Problem List    Diagnosis Date Noted   • Anxiety 07/23/2020     Priority: High   • Esophageal stricture 09/17/2020   • Hypertension 09/17/2020   • Anemia 09/16/2020   • End stage renal failure on dialysis (HCC) 07/23/2020   • High triglycerides 06/19/2019   • Low HDL (under 40) 06/19/2019   • Localized osteoarthritis of right shoulder 06/19/2019   • Cataract of both eyes 05/08/2019   • Ingrown toenail 05/08/2019   • Chronic pain of both shoulders 05/08/2019   • Medication monitoring encounter 05/08/2019   • Vitamin D deficiency 05/08/2019   • Solitary kidney 10/21/2015   • Family history of stroke 10/21/2015   • Gout 10/21/2015   • First degree heart block by electrocardiogram 10/21/2015       Current Outpatient Medications   Medication Sig Dispense Refill   • metoprolol SR (TOPROL XL) 100 MG TABLET SR 24 HR Take 1 tablet by mouth once daily 90 Tab 2   • Iron Sucrose (VENOFER IV) 100 mg.     • LORazepam (ATIVAN) 0.5 MG Tab TAKE 1 TABLET BY MOUTH ONCE DAILY AS NEEDED FOR ANXIETY 30 Tab 0   • colchicine (COLCRYS) 0.6 MG Tab Take 0.6 mg by mouth every day.     • Multiple Vitamins-Minerals (RENAPLEX-D) Tab Take 1 Tab by mouth every day.     • sevelamer carbonate (RENVELA) 800 MG Tab tablet TAKE 2 TABLETS BY MOUTH THREE TIMES DAILY WITH MEALS AND 1 TABLET TWICE DAILY WITH SNACKS  5   • SENSIPAR 30 MG Tab Take 1 Tab by mouth every day.  11     No current facility-administered medications for this visit.         Patient is taking medications as noted in medication list.  Current supplements as per medication list.     Allergies: Cephalexin and Penicillins    Current social contact/activities: Visiting with friends and family, motorcycle club.    Is patient current with immunizations? No, due for TDAP. Patient is interested in receiving NONE today.    He  reports that he  quit smoking about 24 years ago. His smoking use included cigarettes. He has a 12.50 pack-year smoking history. He has never used smokeless tobacco. He reports that he does not drink alcohol or use drugs.  Counseling given: Not Answered        DPA/Advanced directive: Patient does not have an Advanced Directive.  A packet and workshop information was given on Advanced Directives.    ROS:    Gait: Uses no assistive device   Ostomy: No   Other tubes: No   Amputations: No   Chronic oxygen use No   Last eye exam 1 year ago  Wears hearing aids: No   : Denies any urinary leakage during the last 6 months  Annual Health Assessment Questions:    1.  Are you currently engaging in any exercise or physical activity? Yes    2.  How would you describe your mood or emotional well-being today? good    3.  Have you had any falls in the last year? No    4.  Have you noticed any problems with your balance or had difficulty walking? No    5.  In the last six months have you experienced any leakage of urine? No    6. DPA/Advanced Directive: Patient does not have an Advanced Directive.  A packet and workshop information was given on Advanced Directives.    Screening:        Depression Screening    Little interest or pleasure in doing things?  0 - not at all  Feeling down, depressed, or hopeless? 0 - not at all  Patient Health Questionnaire Score: 0    If depressive symptoms identified deferred to follow up visit unless specifically addressed in assessment and plan.    Interpretation of PHQ-9 Total Score   Score Severity   1-4 No Depression   5-9 Mild Depression   10-14 Moderate Depression   15-19 Moderately Severe Depression   20-27 Severe Depression    Screening for Cognitive Impairment    Three Minute Recall (river, nation, finger)  3/3 River, nation, finger  Elías clock face with all 12 numbers and set the hands to show 10 past 11.  Yes 11:10 5/5  If cognitive concerns identified, deferred for follow up unless specifically addressed  in assessment and plan.    Fall Risk Assessment    Has the patient had two or more falls in the last year or any fall with injury in the last year?  No  If fall risk identified, deferred for follow up unless specifically addressed in assessment and plan.    Safety Assessment    Throw rugs on floor.  Yes  Handrails on all stairs.  Yes  Good lighting in all hallways.  Yes  Difficulty hearing.  No  Patient counseled about all safety risks that were identified.    Functional Assessment ADLs    Are there any barriers preventing you from cooking for yourself or meeting nutritional needs?  No.    Are there any barriers preventing you from driving safely or obtaining transportation?  No.    Are there any barriers preventing you from using a telephone or calling for help?  No.    Are there any barriers preventing you from shopping?  No.    Are there any barriers preventing you from taking care of your own finances?  No.    Are there any barriers preventing you from managing your medications?  No.    Are there any barriers preventing you from showering, bathing or dressing yourself?  No.    Are you currently engaging in any exercise or physical activity?  Yes.  2x q week  What is your perception of your health?  Fair.    Health Maintenance Summary                Annual Wellness Visit Overdue 1955     IMM DTaP/Tdap/Td Vaccine Overdue 9/12/1974     IMM ZOSTER VACCINES Overdue 9/12/2005     IMM HEP B VACCINE Overdue 3/13/2019      Done 2/13/2019 Imm Admin: Hepatitis B Vaccine (Adol/Adult)     Patient has more history with this topic...    COLONOSCOPY Next Due 12/9/2023      Done 12/9/2013 REFERRAL TO GI FOR COLONOSCOPY     Patient has more history with this topic...    IMM PNEUMOCOCCAL VACCINE: 65+ Years Next Due 6/10/2025      Done 6/10/2020 Imm Admin: Pneumococcal polysaccharide vaccine (PPSV-23)     Patient has more history with this topic...          Patient Care Team:  Abel Jones M.D. as PCP - General (Internal  Medicine)    Social History     Tobacco Use   • Smoking status: Former Smoker     Packs/day: 0.50     Years: 25.00     Pack years: 12.50     Types: Cigarettes     Quit date: 10/21/1996     Years since quittin.0   • Smokeless tobacco: Never Used   Substance Use Topics   • Alcohol use: No     Alcohol/week: 0.0 oz     Frequency: Never     Binge frequency: Never   • Drug use: No     Family History   Problem Relation Age of Onset   • Cancer Mother         lymphoma   • Stroke Father    • Heart Disease Father    • No Known Problems Sister    • No Known Problems Brother    • No Known Problems Brother    • Cancer Brother         BRAIN TUMOR   • No Known Problems Daughter    • No Known Problems Daughter    • No Known Problems Son      He  has a past medical history of Allergy, Anxiety, Cataract, Depression, Gout due to renal impairment, Hyperlipidemia, Hypertension, Kidney disease, and Localized osteoarthritis of right shoulder (2019). He also has no past medical history of Blood transfusion without reported diagnosis, Cancer (HCC), CHF (congestive heart failure) (HCC), Clotting disorder (HCC), COPD (chronic obstructive pulmonary disease) (HCC), Diabetes (HCC), GERD (gastroesophageal reflux disease), Heart attack (HCC), Heart murmur, IBD (inflammatory bowel disease), Migraine, Seizure (HCC), Stroke (HCC), Substance abuse (HCC), or Thyroid disease.   Past Surgical History:   Procedure Laterality Date   • PB UPPER GI ENDOSCOPY,BIOPSY N/A 2020    Procedure: GASTROSCOPY, WITH BIOPSY;  Surgeon: Gabe West M.D.;  Location: SURGERY SAME DAY [facility];  Service: Gastroenterology   • GASTROSCOPY N/A 2020    Procedure: GASTROSCOPY- WITH DILATION;  Surgeon: Gabe West M.D.;  Location: SURGERY SAME DAY [facility];  Service: Gastroenterology   • AV FISTULA CREATION     • OTHER ORTHOPEDIC SURGERY         Exam:     /72 (BP Location: Right arm, Patient Position: Sitting, BP Cuff Size: Adult)    "Pulse 82   Temp 36.7 °C (98.1 °F) (Temporal)   Resp 18   Ht 1.778 m (5' 10\")   Wt 87.3 kg (192 lb 6.4 oz)   SpO2 94%  Body mass index is 27.61 kg/m².      Alert, oriented in no acute distress.  Eye contact is good, speech goal directed, affect calm  =    Assessment and Plan. The following treatment and monitoring plan is recommended:      Anxiety  This is a chronic condition.  Stable.  He takes lorazepam typically prior to dialysis.  Patient denies significant side effect.    Gout  This is a chronic condition.  The patient takes colchicine as needed.  Currently patient asymptomatic.    Anemia  This is a chronic and stable condition.  Baseline hemoglobin 9-11's.  This is anemia of chronic disease.  Patient currently followed by renal specialist.  Patient denies any history of bleeding.    Hypertension  This is a chronic and stable condition.  His blood pressure is well controlled.  Today it is 130/72.  Patient take metoprolol    End stage renal failure on dialysis (HCC)  Patient is followed by nephrology service.    This is a chronic condition for the patient gets dialysis 3 times a week.  He is currently on renal transplant list  continue hemodialysis       Health Care Screening recommendations as per orders if indicated.  Referrals offered: PT/OT/Nutrition counseling/Behavioral Health/Smoking cessation as per orders if indicated.    Discussion today about general wellness and lifestyle habits:    · Prevent falls and reduce trip hazards; Cautioned about securing or removing rugs.  · Have a working fire alarm and carbon monoxide detector;   · Engage in regular physical activity and social activities       Follow-up: Return in about 6 months (around 5/10/2021).    "

## 2020-11-10 NOTE — ASSESSMENT & PLAN NOTE
Patient is followed by nephrology service.    This is a chronic condition for the patient gets dialysis 3 times a week.  He is currently on renal transplant list

## 2020-11-10 NOTE — ASSESSMENT & PLAN NOTE
This is a chronic and stable condition.  Baseline hemoglobin 9-11's.  This is anemia of chronic disease.  Patient currently followed by renal specialist.  Patient denies any history of bleeding.

## 2020-11-11 DIAGNOSIS — F41.9 ANXIETY: Chronic | ICD-10-CM

## 2020-11-11 DIAGNOSIS — L02.91 ABSCESS: ICD-10-CM

## 2020-11-12 ENCOUNTER — APPOINTMENT (OUTPATIENT)
Dept: WOUND CARE | Facility: MEDICAL CENTER | Age: 65
End: 2020-11-12
Attending: NURSE PRACTITIONER
Payer: MEDICARE

## 2020-11-12 RX ORDER — LORAZEPAM 0.5 MG/1
0.5 TABLET ORAL
Qty: 30 TAB | Refills: 0 | OUTPATIENT
Start: 2020-11-12 | End: 2020-12-12

## 2020-11-12 RX ORDER — TRAMADOL HYDROCHLORIDE 50 MG/1
50 TABLET ORAL EVERY 8 HOURS PRN
Qty: 60 TAB | Refills: 0 | OUTPATIENT
Start: 2020-11-12 | End: 2020-12-02

## 2020-11-13 NOTE — TELEPHONE ENCOUNTER
Refused Prescriptions     traMADol (ULTRAM) 50 MG Tab         Sig: Take 1 Tab by mouth every 8 hours as needed for up to 20 days.    Disp:  60 Tab    Refills:  0    Start: 11/12/2020 - 12/2/2020    Class: Normal    Refused by: Abel Jones M.D.    Refusal reason: Refill not appropriate.    For: Abscess, left buttock, MSSA    Patient comment: Been out for a week only gave me 21 pills last time and this cold weather is really hard on me  all my joints hurt please refill          LORazepam (ATIVAN) 0.5 MG Tab         Sig: Take 1 Tab by mouth. NEEDED FOR ANXIETY    Disp:  30 Tab    Refills:  0    Start: 11/12/2020 - 12/12/2020    Class: Normal    Refused by: Abel Jones M.D.    Refusal reason: Refill not appropriate.    For: Anxiety    Patient comment: Been out for a week I really need this refilled        Fill requested from: [facility] [address] [place] [address], [place] [facility] [id number] [facility]         Refill Request    Abel Jones M.D.  [facility] 16 hours ago (2:54 PM)     Pls call pt:   Lorazepam: Just refilled on 11.3.20.   Tramadol refill: denied as  Abscess has healed

## 2020-11-16 ENCOUNTER — TELEPHONE (OUTPATIENT)
Dept: MEDICAL GROUP | Facility: PHYSICIAN GROUP | Age: 65
End: 2020-11-16

## 2020-11-17 NOTE — TELEPHONE ENCOUNTER
Called the pharmacy, pharmacy needed day supply.   I gave the pharmacy the day supply.  Rx will be ready for pt.  Notified pt.

## 2020-11-17 NOTE — TELEPHONE ENCOUNTER
Regarding: FW: earlier message  Pls call pt : lorazepam has been refilled and sent to [facility]  on 11.3.20  ----- Message -----  From: Rakel Lombardi Med [employer]'t  Sent: 11/16/2020   1:53 PM PST  To: Abel Jones M.D.  Subject: earlier message                                  ----- Message from Rakel Lombardi Med Ass't sent at 11/16/2020  1:53 PM PST -----       ----- Message sent from Rakel Lombardi, Med Ass't to Josafat Benites at 11/16/2020  8:22 AM -----   Please disregard earlier message from me. They are unable to refill medication.   Please call [phone]    Rakel Lombardi, Med Ass't

## 2020-11-19 ENCOUNTER — APPOINTMENT (OUTPATIENT)
Dept: WOUND CARE | Facility: MEDICAL CENTER | Age: 65
End: 2020-11-19
Attending: NURSE PRACTITIONER
Payer: MEDICARE

## 2020-12-02 ENCOUNTER — APPOINTMENT (OUTPATIENT)
Dept: RADIOLOGY | Facility: MEDICAL CENTER | Age: 65
End: 2020-12-02
Payer: MEDICARE

## 2020-12-02 ENCOUNTER — HOSPITAL ENCOUNTER (EMERGENCY)
Facility: MEDICAL CENTER | Age: 65
End: 2020-12-02
Attending: EMERGENCY MEDICINE
Payer: MEDICARE

## 2020-12-02 VITALS
SYSTOLIC BLOOD PRESSURE: 110 MMHG | BODY MASS INDEX: 28.25 KG/M2 | WEIGHT: 197.31 LBS | DIASTOLIC BLOOD PRESSURE: 63 MMHG | RESPIRATION RATE: 20 BRPM | TEMPERATURE: 98.6 F | HEIGHT: 70 IN | OXYGEN SATURATION: 94 % | HEART RATE: 95 BPM

## 2020-12-02 DIAGNOSIS — R07.9 CHEST PAIN, UNSPECIFIED TYPE: ICD-10-CM

## 2020-12-02 DIAGNOSIS — K20.90 ESOPHAGITIS: ICD-10-CM

## 2020-12-02 DIAGNOSIS — K29.00 ACUTE GASTRITIS WITHOUT HEMORRHAGE, UNSPECIFIED GASTRITIS TYPE: ICD-10-CM

## 2020-12-02 LAB
ALBUMIN SERPL BCP-MCNC: 4.2 G/DL (ref 3.2–4.9)
ALBUMIN/GLOB SERPL: 1.3 G/DL
ALP SERPL-CCNC: 70 U/L (ref 30–99)
ALT SERPL-CCNC: 17 U/L (ref 2–50)
ANION GAP SERPL CALC-SCNC: 14 MMOL/L (ref 7–16)
AST SERPL-CCNC: 19 U/L (ref 12–45)
BASOPHILS # BLD AUTO: 0.4 % (ref 0–1.8)
BASOPHILS # BLD: 0.03 K/UL (ref 0–0.12)
BILIRUB SERPL-MCNC: 0.4 MG/DL (ref 0.1–1.5)
BUN SERPL-MCNC: 47 MG/DL (ref 8–22)
CALCIUM SERPL-MCNC: 9.7 MG/DL (ref 8.4–10.2)
CHLORIDE SERPL-SCNC: 97 MMOL/L (ref 96–112)
CO2 SERPL-SCNC: 30 MMOL/L (ref 20–33)
CREAT SERPL-MCNC: 5.55 MG/DL (ref 0.5–1.4)
EKG IMPRESSION: NORMAL
EOSINOPHIL # BLD AUTO: 0.03 K/UL (ref 0–0.51)
EOSINOPHIL NFR BLD: 0.4 % (ref 0–6.9)
ERYTHROCYTE [DISTWIDTH] IN BLOOD BY AUTOMATED COUNT: 62.5 FL (ref 35.9–50)
GLOBULIN SER CALC-MCNC: 3.3 G/DL (ref 1.9–3.5)
GLUCOSE SERPL-MCNC: 122 MG/DL (ref 65–99)
HCT VFR BLD AUTO: 37 % (ref 42–52)
HGB BLD-MCNC: 11.7 G/DL (ref 14–18)
IMM GRANULOCYTES # BLD AUTO: 0.02 K/UL (ref 0–0.11)
IMM GRANULOCYTES NFR BLD AUTO: 0.3 % (ref 0–0.9)
LYMPHOCYTES # BLD AUTO: 1.09 K/UL (ref 1–4.8)
LYMPHOCYTES NFR BLD: 15.7 % (ref 22–41)
MCH RBC QN AUTO: 31.7 PG (ref 27–33)
MCHC RBC AUTO-ENTMCNC: 31.6 G/DL (ref 33.7–35.3)
MCV RBC AUTO: 100.3 FL (ref 81.4–97.8)
MONOCYTES # BLD AUTO: 0.8 K/UL (ref 0–0.85)
MONOCYTES NFR BLD AUTO: 11.5 % (ref 0–13.4)
NEUTROPHILS # BLD AUTO: 4.99 K/UL (ref 1.82–7.42)
NEUTROPHILS NFR BLD: 71.7 % (ref 44–72)
NRBC # BLD AUTO: 0 K/UL
NRBC BLD-RTO: 0 /100 WBC
PLATELET # BLD AUTO: 152 K/UL (ref 164–446)
PMV BLD AUTO: 9.5 FL (ref 9–12.9)
POTASSIUM SERPL-SCNC: 4.9 MMOL/L (ref 3.6–5.5)
PROT SERPL-MCNC: 7.5 G/DL (ref 6–8.2)
RBC # BLD AUTO: 3.69 M/UL (ref 4.7–6.1)
SODIUM SERPL-SCNC: 141 MMOL/L (ref 135–145)
TROPONIN T SERPL-MCNC: 21 NG/L (ref 6–19)
WBC # BLD AUTO: 7 K/UL (ref 4.8–10.8)

## 2020-12-02 PROCEDURE — 80053 COMPREHEN METABOLIC PANEL: CPT

## 2020-12-02 PROCEDURE — 85025 COMPLETE CBC W/AUTO DIFF WBC: CPT

## 2020-12-02 PROCEDURE — 99283 EMERGENCY DEPT VISIT LOW MDM: CPT

## 2020-12-02 PROCEDURE — 700102 HCHG RX REV CODE 250 W/ 637 OVERRIDE(OP): Performed by: EMERGENCY MEDICINE

## 2020-12-02 PROCEDURE — 84484 ASSAY OF TROPONIN QUANT: CPT

## 2020-12-02 PROCEDURE — 93005 ELECTROCARDIOGRAM TRACING: CPT | Performed by: EMERGENCY MEDICINE

## 2020-12-02 PROCEDURE — A9270 NON-COVERED ITEM OR SERVICE: HCPCS | Performed by: EMERGENCY MEDICINE

## 2020-12-02 PROCEDURE — 71045 X-RAY EXAM CHEST 1 VIEW: CPT

## 2020-12-02 RX ORDER — PANTOPRAZOLE SODIUM 20 MG/1
20 TABLET, DELAYED RELEASE ORAL DAILY
Qty: 30 TAB | Refills: 1 | Status: SHIPPED | OUTPATIENT
Start: 2020-12-02 | End: 2020-12-11

## 2020-12-02 RX ADMIN — LIDOCAINE HYDROCHLORIDE 30 ML: 20 SOLUTION OROPHARYNGEAL at 21:55

## 2020-12-02 ASSESSMENT — FIBROSIS 4 INDEX: FIB4 SCORE: 1.21

## 2020-12-03 NOTE — ED PROVIDER NOTES
ED Provider Note    CHIEF COMPLAINT  Chief Complaint   Patient presents with   • Chest Pain       HPI  Garry Beintes is a 65 y.o. male who presents with epigastric and lower chest discomfort that he describes as burning and constant since about 3 hours prior to arrival.  He states that he is not had any relief with Tums which he took at home prior to coming in.  He has had a history of indigestion and GERD and occasionally does take antiacids with relief.  He does have history of kidney disease getting dialysis 3 times a week.  Denies any fever, chills, sweats     REVIEW OF SYSTEMS  See HPI for further details. All other systems are negative.     PAST MEDICAL HISTORY  Past Medical History:   Diagnosis Date   • Allergy    • Anxiety    • Cataract    • Depression    • Gout due to renal impairment    • Hyperlipidemia    • Hypertension    • Kidney disease     Congenital solitary kidney on dialysis 3 times a week.   • Localized osteoarthritis of right shoulder 6/19/2019       FAMILY HISTORY  Family History   Problem Relation Age of Onset   • Cancer Mother         lymphoma   • Stroke Father    • Heart Disease Father    • No Known Problems Sister    • No Known Problems Brother    • No Known Problems Brother    • Cancer Brother         BRAIN TUMOR   • No Known Problems Daughter    • No Known Problems Daughter    • No Known Problems Son        SOCIAL HISTORY   reports that he quit smoking about 24 years ago. His smoking use included cigarettes. He has a 12.50 pack-year smoking history. He has never used smokeless tobacco. He reports that he does not drink alcohol or use drugs.    SURGICAL HISTORY  Past Surgical History:   Procedure Laterality Date   • PB UPPER GI ENDOSCOPY,BIOPSY N/A 9/4/2020    Procedure: GASTROSCOPY, WITH BIOPSY;  Surgeon: Gabe West M.D.;  Location: SURGERY SAME DAY [facility];  Service: Gastroenterology   • GASTROSCOPY N/A 9/4/2020    Procedure: GASTROSCOPY- WITH DILATION;  Surgeon: Gabe LY  "AMOL West;  Location: SURGERY SAME DAY [facility];  Service: Gastroenterology   • AV FISTULA CREATION     • OTHER ORTHOPEDIC SURGERY         CURRENT MEDICATIONS  Home Medications     Reviewed by Alee Martins R.N. (Registered Nurse) on 12/02/20 at 2018  Med List Status: Partial   Medication Last Dose Status   colchicine (COLCRYS) 0.6 MG Tab  Active   LORazepam (ATIVAN) 0.5 MG Tab  Active   metoprolol SR (TOPROL XL) 100 MG TABLET SR 24 HR  Active   Multiple Vitamins-Minerals (RENAPLEX-D) Tab  Active   SENSIPAR 30 MG Tab  Active   sevelamer carbonate (RENVELA) 800 MG Tab tablet  Active                ALLERGIES  Allergies   Allergen Reactions   • Cephalexin Rash   • Penicillins Unspecified and Rash     Pt states \"It was a childhood allergie\".         PHYSICAL EXAM  VITAL SIGNS: /72   Pulse (!) 106   Temp 37 °C (98.6 °F) (Temporal)   Resp 20   Ht 1.778 m (5' 10\")   Wt 89.5 kg (197 lb 5 oz)   SpO2 93%   BMI 28.31 kg/m²    Constitutional: Well developed, Well nourished, No acute distress, Non-toxic appearance.   HENT: Normocephalic, Atraumatic, Bilateral external ears normal, Oropharynx is clear mucous membranes are moist. No oral exudates or nasal discharge.   Eyes: Pupils are equal round and reactive, EOMI, Conjunctiva normal, No discharge.   Neck: Normal range of motion, No tenderness, Supple, No stridor. No meningismus.  Lymphatic: No lymphadenopathy noted.   Cardiovascular: Regular rate and rhythm without murmur rub or gallop.  Thorax & Lungs: Clear breath sounds bilaterally without wheezes, rhonchi or rales. There is no chest wall tenderness.   Abdomen: Soft non-tender non-distended. There is no rebound or guarding. No organomegaly is appreciated. Bowel sounds are normal.  Skin: Normal without rash.   Back: No CVA or spinal tenderness.   Extremities: Intact distal pulses, No edema, No tenderness, No cyanosis, No clubbing. Capillary refill is less than 2 seconds.  Musculoskeletal: Good range of " motion in all major joints. No tenderness to palpation or major deformities noted.   Neurologic: Alert & oriented x 3, Normal motor function, Normal sensory function, No focal deficits noted. Reflexes are normal.  Psychiatric: Affect normal, Judgment normal, Mood normal. There is no suicidal ideation or patient reported hallucinations.     EKG  Results for orders placed or performed during the hospital encounter of 20   EKG   Result Value Ref Range    Report       [facility] Emergency Dept.    Test Date:  2020  Pt Name:    TRISTAN LOVE          Department: ER  MRN:        [id number]                      Room:        02  Gender:     Male                         Technician: [id number]  :        1955                   Requested By:ER TRIAGE PROTOCOL  Order #:    [id number]                    Reading MD: GILLIAN CORTEZ MD    Measurements  Intervals                                Axis  Rate:       80                           P:          52  AL:         164                          QRS:        8  QRSD:       96                           T:          58  QT:         380  QTc:        439    Interpretive Statements  SINUS RHYTHM  Baseline artifact ST segment elevation versus baseline artifact inferior  leads  Compared to ECG 2017 10:15:22  ST (T wave) deviation now present  Baseline artifact  No arrhythmia  No STEMI  Electronically Signed On 2020 13:11:45 PDT by GILLIAN CORTEZ MD     EKG (NOW)   Result Value Ref Range    Report       [facility] Emergency Dept.    Test Date:  2020  Pt Name:    TRISTAN LOVE          Department: ER  MRN:        [id number]                      Room:       RD 02  Gender:     Male                         Technician: [id number]  :        1955                   Requested By:GILLIAN CORTEZ  Order #:    [id number]                    Reading MD: GILLIAN CORTEZ MD    Measurements  Intervals                                 Axis  Rate:       84                           P:          48  NJ:         160                          QRS:        11  QRSD:       90                           T:          53  QT:         360  QTc:        426    Interpretive Statements  SINUS RHYTHM  ST ELEVATION SUGGESTS PERICARDITIS  Compared to ECG 2020 09:12:35  ST segment elevation leads I, 2, 3, aVF  Electronically Signed On 2020 13:12:39 PDT by GILLIAN CORTEZ MD     EKG - STAT Once   Result Value Ref Range    Report       [facility]    Test Date:  2020  Pt Name:    TRISTAN LOVE          Department: ER  MRN:        [id number]                      Room:       T201  Gender:     Male                         Technician: [id number]  :        1955                   Requested By:ROSALINA DLE CID  Order #:    [id number]                    Reading MD: Lius M Bolanos MD    Measurements  Intervals                                Axis  Rate:       84                           P:          24  NJ:         152                          QRS:        -1  QRSD:       90                           T:          41  QT:         360  QTc:        426    Interpretive Statements  SINUS RHYTHM  Diffuse ST segment elevation, consider early repolarization, pericarditis and    injury  Electronically Signed On 2020 16:52:58 PDT by Luis M Bolanos MD     EKG in four (4) hours   Result Value Ref Range    Report       [facility]    Test Date:  2020  Pt Name:    TRISTAN LOVE          Department: ER  MRN:        [id number]                      Room:       T201  Gender:     Male                         Technician: [id number]  :        1955                   Requested By:ROSALINA DEL CID  Order #:    [id number]                    Reading MD: Graham Pandya MD    Measurements  Intervals                                Axis  Rate:       81                           P:          42  NJ:         164                          QRS:         10  QRSD:       92                           T:          53  QT:         364  QTc:        423    Interpretive Statements  SINUS RHYTHM  ST ELEVATION SUGGESTS PERICARDITIS  Electronically Signed On 9-3-2020 6:55:28 PDT by Graham Pandya MD           RADIOLOGY/PROCEDURES  DX-CHEST-PORTABLE (1 VIEW)   Final Result         1.  Left basilar atelectasis versus early infiltrate.   2.  Cardiomegaly            COURSE & MEDICAL DECISION MAKING  Pertinent Labs & Imaging studies reviewed. (See chart for details)  The patient's differential diagnosis includes gastritis and esophagitis as well as coronary disease/acute coronary syndrome.  Patient does have a history of pericarditis as well    Chest x-ray shows cardiomegaly with some left basilar atelectasis.  There is on EKG no evidence of pericarditis and he does not have any ischemic changes or dysrhythmia.    Laboratory evaluation reveals no leukocytosis, significant anemia, electrolyte derangement but does have evidence of chronic renal failure    The patient had substantial improvement with GI cocktail.  Chest x-ray shows cardiomegaly but no evidence of infiltrate.  This is likely GERD/esophagitis and I am placing him on pantoprazole daily with instructions on diet and medication avoidance of medications that would exacerbate gastritis    Patient is comfortable this plan of care discharged in stable condition will return if any significant change in symptoms    FINAL IMPRESSION  1. Chest pain, unspecified type    2. Esophagitis    3. Acute gastritis without hemorrhage, unspecified gastritis type             Electronically signed by: James Sharp M.D., 12/2/2020 10:03 PM

## 2020-12-03 NOTE — DISCHARGE INSTRUCTIONS
Please take pantoprazole daily and avoid medications and foods that may exacerbate esophagitis and gastritis such as ibuprofen, Motrin, Advil as well as spicy foods, fatty foods and alcohol as well as caffeinated products and carbonated beverages

## 2020-12-11 ENCOUNTER — TELEPHONE (OUTPATIENT)
Dept: MEDICAL GROUP | Facility: PHYSICIAN GROUP | Age: 65
End: 2020-12-11

## 2020-12-11 DIAGNOSIS — F41.9 ANXIETY: Chronic | ICD-10-CM

## 2020-12-11 DIAGNOSIS — G89.4 CHRONIC PAIN SYNDROME: ICD-10-CM

## 2020-12-11 RX ORDER — LORAZEPAM 0.5 MG/1
TABLET ORAL
Qty: 30 TAB | Refills: 1 | Status: ON HOLD | OUTPATIENT
Start: 2020-12-11 | End: 2020-12-22

## 2020-12-11 RX ORDER — PANTOPRAZOLE SODIUM 20 MG/1
20 TABLET, DELAYED RELEASE ORAL DAILY
Qty: 90 TAB | Refills: 1 | Status: SHIPPED | OUTPATIENT
Start: 2020-12-11 | End: 2020-12-11

## 2020-12-11 RX ORDER — PANTOPRAZOLE SODIUM 20 MG/1
20 TABLET, DELAYED RELEASE ORAL DAILY
Qty: 90 TAB | Refills: 1 | Status: ON HOLD | OUTPATIENT
Start: 2020-12-11 | End: 2021-01-16 | Stop reason: SDUPTHER

## 2020-12-11 NOTE — TELEPHONE ENCOUNTER
Spoke to pt on the phone, pt is requesting his pantoprazole to be sent to the [facility] on [address]. Advised pt that Dr. Jones will not prescribe tramadol or lorazepam. Advised pt that if is pain is uncontrollable he should go to Urgent Care. Also advised pt that if he has severe shortness of breath or chest pain he should also go to ER or UC. Pt has appointment with Abel Jones M.D. on Monday.    Makenzie MONSALVE RN, BSN

## 2020-12-11 NOTE — TELEPHONE ENCOUNTER
"pls call pt. Received a hand written note from pt today:    The intial tramadol I prescribed for pt was to treat the pain related to the sacral wound. According to the wound clinic note: the wound has healed and no further treatment is planned. Therefore I will not be able to renew the tramadol  Today I have also referred pt to PAIN clinic for further eval  Pt wrote in his note that he is \" in a lot of pain , having trouble breathing, sob, chest pain ....>> Pt needs to go to ER to be evaluated now.    Pt also c.o severe anxiety not better with Lorazepam>> A referral has been submitted to Psychiatry  for further evaluation.    Per pts request I have also sent pantoprazole to [facility]    If pt is not ok with the above plans: Pt certain has the right to change to another pcp.    "

## 2020-12-14 ENCOUNTER — OFFICE VISIT (OUTPATIENT)
Dept: MEDICAL GROUP | Facility: PHYSICIAN GROUP | Age: 65
End: 2020-12-14
Payer: MEDICARE

## 2020-12-14 VITALS
DIASTOLIC BLOOD PRESSURE: 60 MMHG | BODY MASS INDEX: 27.72 KG/M2 | SYSTOLIC BLOOD PRESSURE: 124 MMHG | HEART RATE: 100 BPM | OXYGEN SATURATION: 97 % | RESPIRATION RATE: 16 BRPM | HEIGHT: 70 IN | TEMPERATURE: 99.7 F | WEIGHT: 193.6 LBS

## 2020-12-14 DIAGNOSIS — Z99.2 END STAGE RENAL FAILURE ON DIALYSIS (HCC): Chronic | ICD-10-CM

## 2020-12-14 DIAGNOSIS — F41.9 ANXIETY: Chronic | ICD-10-CM

## 2020-12-14 DIAGNOSIS — G89.4 CHRONIC PAIN SYNDROME: ICD-10-CM

## 2020-12-14 DIAGNOSIS — I10 ESSENTIAL HYPERTENSION: Chronic | ICD-10-CM

## 2020-12-14 DIAGNOSIS — N18.6 END STAGE RENAL FAILURE ON DIALYSIS (HCC): Chronic | ICD-10-CM

## 2020-12-14 PROBLEM — G89.29 CHRONIC PAIN: Status: ACTIVE | Noted: 2020-12-14

## 2020-12-14 PROCEDURE — 99213 OFFICE O/P EST LOW 20 MIN: CPT | Performed by: INTERNAL MEDICINE

## 2020-12-14 ASSESSMENT — FIBROSIS 4 INDEX: FIB4 SCORE: 1.97

## 2020-12-14 NOTE — ASSESSMENT & PLAN NOTE
This is a chronic condition but the patient required hemodialysis 3 times a week.  He is currently on the waiting list for kidney transplant.

## 2020-12-14 NOTE — ASSESSMENT & PLAN NOTE
This is a chronic condition.  The patient is currently taking lorazepam daily as needed.  He denies significant side effects.  Patient reported that his condition is very severe and at times he had to take 3 lorazepam's on his own.  Stressed the importance that he should not take the prescription different than what is being prescribed.  I previously submitted referral for this patient to see his psychiatrist however the patient refused to see any psychiatrist.

## 2020-12-14 NOTE — PROGRESS NOTES
CC: Chronic pain patient requests pain medication      HPI: This is a 65 y.o. pt.  Pt's medical history is notable for:     Anxiety  This is a chronic condition.  The patient is currently taking lorazepam daily as needed.  He denies significant side effects.  Patient reported that his condition is very severe and at times he had to take 3 lorazepam's on his own.  Stressed the importance that he should not take the prescription different than what is being prescribed.  I previously submitted referral for this patient to see his psychiatrist however the patient refused to see any psychiatrist.    Chronic pain  Patient complains of chronic pain all over the body including his knees hands shoulders hips and back.  The patient is here today and demanded a prescription for narcotic pain pills..  He has tried over-the-counter Tylenol without improvement.  Since the patient currently takes lorazepam I have informed the patient that I am not comfortable prescribing narcotic med due to potential severe drug interactions between opiates and benzodiazepine.    I previously submitted a referral for the patient to see pain specialist again the patient has not schedule an appointment.    Patient stated that he is not happy under my service.  The patient requested to change to another primary care provider    End stage renal failure on dialysis (HCC)  This is a chronic condition but the patient required hemodialysis 3 times a week.  He is currently on the waiting list for kidney transplant.    Hypertension  This is a chronic and stable condition for the patient currently taking metoprolol daily.  No significant side effects reported.          REVIEW OF SYSTEMS:     Constitutional:  no fever / chills   Neurologic: no headaches, no numbness/tingling  Eyes: no changes in vision  ENT: no sore throat, no hearing loss  CV:  no chest pain, no palpitations        Allergies: Cephalexin and Penicillins    Current Outpatient Medications  Ordered in [facility]   Medication Sig Dispense Refill   • LORazepam (ATIVAN) 0.5 MG Tab TAKE 1 TABLET BY MOUTH ONCE DAILY AS NEEDED FOR ANXIETY 30 Tab 1   • pantoprazole (PROTONIX) 20 MG tablet Take 1 Tab by mouth every day. 90 Tab 1   • metoprolol SR (TOPROL XL) 100 MG TABLET SR 24 HR Take 1 tablet by mouth once daily 90 Tab 2   • colchicine (COLCRYS) 0.6 MG Tab Take 0.6 mg by mouth every day.     • Multiple Vitamins-Minerals (RENAPLEX-D) Tab Take 1 Tab by mouth every day.     • sevelamer carbonate (RENVELA) 800 MG Tab tablet TAKE 2 TABLETS BY MOUTH THREE TIMES DAILY WITH MEALS AND 1 TABLET TWICE DAILY WITH SNACKS  5   • SENSIPAR 30 MG Tab Take 1 Tab by mouth every day.  11     No current [facility]-ordered facility-administered medications on file.        Past Medical History:   Diagnosis Date   • Allergy    • Anxiety    • Cataract    • Depression    • Gout due to renal impairment    • Hyperlipidemia    • Hypertension    • Kidney disease     Congenital solitary kidney on dialysis 3 times a week.   • Localized osteoarthritis of right shoulder 6/19/2019        Past Surgical History:   Procedure Laterality Date   • PB UPPER GI ENDOSCOPY,BIOPSY N/A 9/4/2020    Procedure: GASTROSCOPY, WITH BIOPSY;  Surgeon: Gabe West M.D.;  Location: SURGERY SAME DAY [facility];  Service: Gastroenterology   • GASTROSCOPY N/A 9/4/2020    Procedure: GASTROSCOPY- WITH DILATION;  Surgeon: Gabe West M.D.;  Location: SURGERY SAME DAY [facility];  Service: Gastroenterology   • AV FISTULA CREATION     • OTHER ORTHOPEDIC SURGERY          Family History   Problem Relation Age of Onset   • Cancer Mother         lymphoma   • Stroke Father    • Heart Disease Father    • No Known Problems Sister    • No Known Problems Brother    • No Known Problems Brother    • Cancer Brother         BRAIN TUMOR   • No Known Problems Daughter    • No Known Problems Daughter    • No Known Problems Son         Social History     Tobacco Use   Smoking Status  Former Smoker   • Packs/day: 0.50   • Years: 25.00   • Pack years: 12.50   • Types: Cigarettes   • Quit date: 10/21/1996   • Years since quittin.1   Smokeless Tobacco Never Used          Social History     Substance and Sexual Activity   Alcohol Use No   • Alcohol/week: 0.0 oz   • Frequency: Never   • Binge frequency: Never         ------------------------------------------------------------------------------     PHYSICAL EXAM:   Vitals:    20 0916   BP: 124/60   Pulse: 100   Resp: 16   Temp: 37.6 °C (99.7 °F)   SpO2: 97%      Body mass index is 27.78 kg/m².         Constitutional: no acute distress  Neck: supple, no JVD  CV: heart RRR  Resp: normal effort, no wheezing or rales.  GI: abdomen soft, no obvious mass, no tenderness  Neuro: CN 2-12 grossly intact        -----------------------------------------------------------------------------    ASSESSMENT:   1. Chronic pain syndrome     2. Anxiety     3. End stage renal failure on dialysis (HCC)     4. Essential hypertension             MEDICAL DECISION MAKING: DISCUSSION / STATUS / PLAN:        Chronic pain.  As above since the patient is presently taking lorazepam I am not comfortable prescribing opiates to the patient due to potential severe drug interactions.  The patient stated that he is not happy under my service and that he request to change to another primary care provider..  The patient will schedule an appointment to establish with a different primary care provider.  As above I have submitted a referral for the patient to see pain specialist but the patient stated that he would not schedule an appointment  Recommend the patient continue with dialysis 3 times a week and to continue with his blood pressure medication at this time.      PATIENT EDUCATION:  -If any problems should arise, patient was advised to contact our office or go to ER to be evaluated.      Please note that this dictation was created using voice recognition software. I have  made every reasonable attempt to correct obvious errors, but it is possible there are errors of grammar and possibly content that I did not discover before finalizing the note.

## 2020-12-14 NOTE — ASSESSMENT & PLAN NOTE
This is a chronic and stable condition for the patient currently taking metoprolol daily.  No significant side effects reported.

## 2020-12-14 NOTE — ASSESSMENT & PLAN NOTE
Patient complains of chronic pain all over the body including his knees hands shoulders hips and back.  The patient demanded a prescription for narcotic pain pill.  Since the patient currently takes lorazepam I have informed the patient that I am not comfortable prescribing narcotic med due to potential severe drug interactions between opiates and benzodiazepine.    I previously submitted a referral for the patient to see pain specialist again the patient has not schedule an appointment.    Patient stated that he is not happy under my service.  I have recommended for the patient to establish with another primary care provider.

## 2020-12-18 ENCOUNTER — APPOINTMENT (OUTPATIENT)
Dept: RADIOLOGY | Facility: MEDICAL CENTER | Age: 65
DRG: 314 | End: 2020-12-18
Attending: EMERGENCY MEDICINE
Payer: MEDICARE

## 2020-12-18 ENCOUNTER — HOSPITAL ENCOUNTER (INPATIENT)
Facility: MEDICAL CENTER | Age: 65
LOS: 3 days | DRG: 314 | End: 2020-12-22
Attending: EMERGENCY MEDICINE | Admitting: INTERNAL MEDICINE
Payer: MEDICARE

## 2020-12-18 ENCOUNTER — APPOINTMENT (OUTPATIENT)
Dept: CARDIOLOGY | Facility: MEDICAL CENTER | Age: 65
DRG: 314 | End: 2020-12-18
Attending: EMERGENCY MEDICINE
Payer: MEDICARE

## 2020-12-18 PROBLEM — I31.39 PERICARDIAL EFFUSION: Status: ACTIVE | Noted: 2020-12-18

## 2020-12-18 LAB
ALBUMIN SERPL BCP-MCNC: 3.3 G/DL (ref 3.2–4.9)
ALBUMIN/GLOB SERPL: 0.8 G/DL
ALP SERPL-CCNC: 73 U/L (ref 30–99)
ALT SERPL-CCNC: 9 U/L (ref 2–50)
ANION GAP SERPL CALC-SCNC: 16 MMOL/L (ref 7–16)
AST SERPL-CCNC: 18 U/L (ref 12–45)
BASOPHILS # BLD AUTO: 0.5 % (ref 0–1.8)
BASOPHILS # BLD: 0.07 K/UL (ref 0–0.12)
BILIRUB SERPL-MCNC: 0.5 MG/DL (ref 0.1–1.5)
BUN SERPL-MCNC: 27 MG/DL (ref 8–22)
CALCIUM SERPL-MCNC: 9.5 MG/DL (ref 8.4–10.2)
CHLORIDE SERPL-SCNC: 91 MMOL/L (ref 96–112)
CO2 SERPL-SCNC: 28 MMOL/L (ref 20–33)
COVID ORDER STATUS COVID19: NORMAL
CREAT SERPL-MCNC: 5.01 MG/DL (ref 0.5–1.4)
D DIMER PPP IA.FEU-MCNC: 12.13 UG/ML (FEU) (ref 0–0.5)
EKG IMPRESSION: NORMAL
EKG IMPRESSION: NORMAL
EOSINOPHIL # BLD AUTO: 0.08 K/UL (ref 0–0.51)
EOSINOPHIL NFR BLD: 0.5 % (ref 0–6.9)
ERYTHROCYTE [DISTWIDTH] IN BLOOD BY AUTOMATED COUNT: 56.5 FL (ref 35.9–50)
FLUAV RNA SPEC QL NAA+PROBE: NEGATIVE
FLUBV RNA SPEC QL NAA+PROBE: NEGATIVE
GLOBULIN SER CALC-MCNC: 4.4 G/DL (ref 1.9–3.5)
GLUCOSE SERPL-MCNC: 84 MG/DL (ref 65–99)
HCT VFR BLD AUTO: 34.6 % (ref 42–52)
HGB BLD-MCNC: 10.8 G/DL (ref 14–18)
IMM GRANULOCYTES # BLD AUTO: 0.13 K/UL (ref 0–0.11)
IMM GRANULOCYTES NFR BLD AUTO: 0.9 % (ref 0–0.9)
LV EJECT FRACT  99904: 65
LV EJECT FRACT MOD 2C 99903: 69.12
LV EJECT FRACT MOD 4C 99902: 34.69
LV EJECT FRACT MOD BP 99901: 52.48
LYMPHOCYTES # BLD AUTO: 1.61 K/UL (ref 1–4.8)
LYMPHOCYTES NFR BLD: 11 % (ref 22–41)
MCH RBC QN AUTO: 30.4 PG (ref 27–33)
MCHC RBC AUTO-ENTMCNC: 31.2 G/DL (ref 33.7–35.3)
MCV RBC AUTO: 97.5 FL (ref 81.4–97.8)
MONOCYTES # BLD AUTO: 1.59 K/UL (ref 0–0.85)
MONOCYTES NFR BLD AUTO: 10.9 % (ref 0–13.4)
NEUTROPHILS # BLD AUTO: 11.15 K/UL (ref 1.82–7.42)
NEUTROPHILS NFR BLD: 76.2 % (ref 44–72)
NRBC # BLD AUTO: 0 K/UL
NRBC BLD-RTO: 0 /100 WBC
PLATELET # BLD AUTO: 361 K/UL (ref 164–446)
PMV BLD AUTO: 9 FL (ref 9–12.9)
POTASSIUM SERPL-SCNC: 5 MMOL/L (ref 3.6–5.5)
PROT SERPL-MCNC: 7.7 G/DL (ref 6–8.2)
RBC # BLD AUTO: 3.55 M/UL (ref 4.7–6.1)
RSV RNA SPEC QL NAA+PROBE: NEGATIVE
SARS-COV-2 RNA RESP QL NAA+PROBE: NOTDETECTED
SODIUM SERPL-SCNC: 135 MMOL/L (ref 135–145)
SPECIMEN SOURCE: NORMAL
TROPONIN T SERPL-MCNC: 31 NG/L (ref 6–19)
WBC # BLD AUTO: 14.6 K/UL (ref 4.8–10.8)

## 2020-12-18 PROCEDURE — 700117 HCHG RX CONTRAST REV CODE 255: Performed by: EMERGENCY MEDICINE

## 2020-12-18 PROCEDURE — G0378 HOSPITAL OBSERVATION PER HR: HCPCS

## 2020-12-18 PROCEDURE — 93306 TTE W/DOPPLER COMPLETE: CPT | Mod: 26 | Performed by: INTERNAL MEDICINE

## 2020-12-18 PROCEDURE — 99285 EMERGENCY DEPT VISIT HI MDM: CPT

## 2020-12-18 PROCEDURE — 99220 PR INITIAL OBSERVATION CARE,LEVL III: CPT | Mod: AI | Performed by: INTERNAL MEDICINE

## 2020-12-18 PROCEDURE — C9803 HOPD COVID-19 SPEC COLLECT: HCPCS | Performed by: INTERNAL MEDICINE

## 2020-12-18 PROCEDURE — 93306 TTE W/DOPPLER COMPLETE: CPT

## 2020-12-18 PROCEDURE — 700111 HCHG RX REV CODE 636 W/ 250 OVERRIDE (IP): Performed by: INTERNAL MEDICINE

## 2020-12-18 PROCEDURE — 84484 ASSAY OF TROPONIN QUANT: CPT

## 2020-12-18 PROCEDURE — 85025 COMPLETE CBC W/AUTO DIFF WBC: CPT

## 2020-12-18 PROCEDURE — 85379 FIBRIN DEGRADATION QUANT: CPT

## 2020-12-18 PROCEDURE — 71045 X-RAY EXAM CHEST 1 VIEW: CPT

## 2020-12-18 PROCEDURE — 93005 ELECTROCARDIOGRAM TRACING: CPT | Performed by: EMERGENCY MEDICINE

## 2020-12-18 PROCEDURE — 0241U HCHG SARS-COV-2 COVID-19 NFCT DS RESP RNA 4 TRGT MIC: CPT

## 2020-12-18 PROCEDURE — 36415 COLL VENOUS BLD VENIPUNCTURE: CPT

## 2020-12-18 PROCEDURE — 96372 THER/PROPH/DIAG INJ SC/IM: CPT

## 2020-12-18 PROCEDURE — 71275 CT ANGIOGRAPHY CHEST: CPT

## 2020-12-18 PROCEDURE — 80053 COMPREHEN METABOLIC PANEL: CPT

## 2020-12-18 RX ORDER — ONDANSETRON 4 MG/1
4 TABLET, ORALLY DISINTEGRATING ORAL EVERY 4 HOURS PRN
Status: DISCONTINUED | OUTPATIENT
Start: 2020-12-18 | End: 2020-12-22 | Stop reason: HOSPADM

## 2020-12-18 RX ORDER — CINACALCET 30 MG/1
30 TABLET, FILM COATED ORAL DAILY
Status: DISCONTINUED | OUTPATIENT
Start: 2020-12-19 | End: 2020-12-22 | Stop reason: HOSPADM

## 2020-12-18 RX ORDER — COLCHICINE 0.6 MG/1
0.6 TABLET ORAL
Status: DISCONTINUED | OUTPATIENT
Start: 2020-12-20 | End: 2020-12-22 | Stop reason: HOSPADM

## 2020-12-18 RX ORDER — HYDROMORPHONE HYDROCHLORIDE 1 MG/ML
0.5 INJECTION, SOLUTION INTRAMUSCULAR; INTRAVENOUS; SUBCUTANEOUS
Status: DISCONTINUED | OUTPATIENT
Start: 2020-12-18 | End: 2020-12-22 | Stop reason: HOSPADM

## 2020-12-18 RX ORDER — POLYETHYLENE GLYCOL 3350 17 G/17G
1 POWDER, FOR SOLUTION ORAL
Status: DISCONTINUED | OUTPATIENT
Start: 2020-12-18 | End: 2020-12-22 | Stop reason: HOSPADM

## 2020-12-18 RX ORDER — LABETALOL HYDROCHLORIDE 5 MG/ML
10 INJECTION, SOLUTION INTRAVENOUS EVERY 4 HOURS PRN
Status: DISCONTINUED | OUTPATIENT
Start: 2020-12-18 | End: 2020-12-22 | Stop reason: HOSPADM

## 2020-12-18 RX ORDER — SEVELAMER CARBONATE 800 MG/1
1600 TABLET, FILM COATED ORAL
Status: DISCONTINUED | OUTPATIENT
Start: 2020-12-18 | End: 2020-12-22 | Stop reason: HOSPADM

## 2020-12-18 RX ORDER — AMOXICILLIN 250 MG
2 CAPSULE ORAL 2 TIMES DAILY
Status: DISCONTINUED | OUTPATIENT
Start: 2020-12-19 | End: 2020-12-22 | Stop reason: HOSPADM

## 2020-12-18 RX ORDER — OMEPRAZOLE 20 MG/1
20 CAPSULE, DELAYED RELEASE ORAL DAILY
Status: DISCONTINUED | OUTPATIENT
Start: 2020-12-19 | End: 2020-12-19

## 2020-12-18 RX ORDER — OXYCODONE HYDROCHLORIDE 5 MG/1
5 TABLET ORAL
Status: DISCONTINUED | OUTPATIENT
Start: 2020-12-18 | End: 2020-12-22 | Stop reason: HOSPADM

## 2020-12-18 RX ORDER — ONDANSETRON 2 MG/ML
4 INJECTION INTRAMUSCULAR; INTRAVENOUS EVERY 4 HOURS PRN
Status: DISCONTINUED | OUTPATIENT
Start: 2020-12-18 | End: 2020-12-22 | Stop reason: HOSPADM

## 2020-12-18 RX ORDER — PANTOPRAZOLE SODIUM 20 MG/1
20 TABLET, DELAYED RELEASE ORAL DAILY
Status: DISCONTINUED | OUTPATIENT
Start: 2020-12-19 | End: 2020-12-18

## 2020-12-18 RX ORDER — OXYCODONE HYDROCHLORIDE 10 MG/1
10 TABLET ORAL
Status: DISCONTINUED | OUTPATIENT
Start: 2020-12-18 | End: 2020-12-22 | Stop reason: HOSPADM

## 2020-12-18 RX ORDER — ACETAMINOPHEN 325 MG/1
650 TABLET ORAL EVERY 6 HOURS PRN
Status: DISCONTINUED | OUTPATIENT
Start: 2020-12-18 | End: 2020-12-22 | Stop reason: HOSPADM

## 2020-12-18 RX ORDER — SEVELAMER CARBONATE 800 MG/1
2400 TABLET, FILM COATED ORAL
Status: DISCONTINUED | OUTPATIENT
Start: 2020-12-19 | End: 2020-12-22 | Stop reason: HOSPADM

## 2020-12-18 RX ORDER — BISACODYL 10 MG
10 SUPPOSITORY, RECTAL RECTAL
Status: DISCONTINUED | OUTPATIENT
Start: 2020-12-18 | End: 2020-12-22 | Stop reason: HOSPADM

## 2020-12-18 RX ORDER — HEPARIN SODIUM 5000 [USP'U]/ML
5000 INJECTION, SOLUTION INTRAVENOUS; SUBCUTANEOUS EVERY 8 HOURS
Status: DISCONTINUED | OUTPATIENT
Start: 2020-12-18 | End: 2020-12-22 | Stop reason: HOSPADM

## 2020-12-18 RX ORDER — METOPROLOL SUCCINATE 100 MG/1
100 TABLET, EXTENDED RELEASE ORAL
Status: DISCONTINUED | OUTPATIENT
Start: 2020-12-19 | End: 2020-12-22 | Stop reason: HOSPADM

## 2020-12-18 RX ORDER — LORAZEPAM 1 MG/1
0.5 TABLET ORAL EVERY 4 HOURS PRN
Status: DISCONTINUED | OUTPATIENT
Start: 2020-12-18 | End: 2020-12-18

## 2020-12-18 RX ADMIN — HEPARIN SODIUM 5000 UNITS: 5000 INJECTION, SOLUTION INTRAVENOUS; SUBCUTANEOUS at 23:06

## 2020-12-18 RX ADMIN — IOHEXOL 100 ML: 350 INJECTION, SOLUTION INTRAVENOUS at 18:19

## 2020-12-18 ASSESSMENT — LIFESTYLE VARIABLES: SUBSTANCE_ABUSE: 0

## 2020-12-18 ASSESSMENT — ENCOUNTER SYMPTOMS
HEADACHES: 0
VOMITING: 0
FOCAL WEAKNESS: 0
FEVER: 0
COUGH: 0
PHOTOPHOBIA: 0
TREMORS: 0
HEMOPTYSIS: 0
HEARTBURN: 0
DOUBLE VISION: 0
ORTHOPNEA: 0
NAUSEA: 0
FLANK PAIN: 0
BLURRED VISION: 0
PALPITATIONS: 0
SPEECH CHANGE: 0
SHORTNESS OF BREATH: 1
POLYDIPSIA: 0
BACK PAIN: 0
SPUTUM PRODUCTION: 0
WEIGHT LOSS: 0
HALLUCINATIONS: 0
CHILLS: 0
NERVOUS/ANXIOUS: 0
BRUISES/BLEEDS EASILY: 0
NECK PAIN: 0

## 2020-12-18 ASSESSMENT — FIBROSIS 4 INDEX: FIB4 SCORE: 1.97

## 2020-12-18 NOTE — ED NOTES
Med Rec completed per patient   Allergies reviewed  No ORAL antibiotics in last 14 days    Patient takes Metoprolol 100 mg  Takes on Sunday, Tuesday, Thursday, and Saturday   Holds on Monday, Wednesday, and Friday (Dialysis days)

## 2020-12-18 NOTE — ED TRIAGE NOTES
"Presents complaining of central chest pain (initially) now referred to his right chest with associated dyspnea  at rest recurring for the past 3 days.    Chief Complaint   Patient presents with   • Chest Pain   • Shortness of Breath     /66   Pulse 100   Temp 36.4 °C (97.5 °F) (Temporal)   Resp 18   Ht 1.778 m (5' 10\")   Wt 87 kg (191 lb 12.8 oz)   SpO2 95%   BMI 27.52 kg/m²      "

## 2020-12-19 PROBLEM — D72.829 LEUKOCYTOSIS: Status: ACTIVE | Noted: 2020-12-19

## 2020-12-19 LAB
ALBUMIN SERPL BCP-MCNC: 3 G/DL (ref 3.2–4.9)
ALBUMIN/GLOB SERPL: 0.8 G/DL
ALP SERPL-CCNC: 71 U/L (ref 30–99)
ALT SERPL-CCNC: 9 U/L (ref 2–50)
ANION GAP SERPL CALC-SCNC: 16 MMOL/L (ref 7–16)
AST SERPL-CCNC: 14 U/L (ref 12–45)
BASOPHILS # BLD AUTO: 0.3 % (ref 0–1.8)
BASOPHILS # BLD: 0.04 K/UL (ref 0–0.12)
BILIRUB SERPL-MCNC: 0.4 MG/DL (ref 0.1–1.5)
BUN SERPL-MCNC: 40 MG/DL (ref 8–22)
CALCIUM SERPL-MCNC: 9.7 MG/DL (ref 8.4–10.2)
CHLORIDE SERPL-SCNC: 91 MMOL/L (ref 96–112)
CO2 SERPL-SCNC: 28 MMOL/L (ref 20–33)
CREAT SERPL-MCNC: 6.63 MG/DL (ref 0.5–1.4)
EOSINOPHIL # BLD AUTO: 0.21 K/UL (ref 0–0.51)
EOSINOPHIL NFR BLD: 1.8 % (ref 0–6.9)
ERYTHROCYTE [DISTWIDTH] IN BLOOD BY AUTOMATED COUNT: 56.8 FL (ref 35.9–50)
GLOBULIN SER CALC-MCNC: 3.9 G/DL (ref 1.9–3.5)
GLUCOSE SERPL-MCNC: 80 MG/DL (ref 65–99)
HCT VFR BLD AUTO: 32.9 % (ref 42–52)
HGB BLD-MCNC: 10.3 G/DL (ref 14–18)
IMM GRANULOCYTES # BLD AUTO: 0.06 K/UL (ref 0–0.11)
IMM GRANULOCYTES NFR BLD AUTO: 0.5 % (ref 0–0.9)
LYMPHOCYTES # BLD AUTO: 1.21 K/UL (ref 1–4.8)
LYMPHOCYTES NFR BLD: 10.1 % (ref 22–41)
MCH RBC QN AUTO: 30.7 PG (ref 27–33)
MCHC RBC AUTO-ENTMCNC: 31.3 G/DL (ref 33.7–35.3)
MCV RBC AUTO: 98.2 FL (ref 81.4–97.8)
MONOCYTES # BLD AUTO: 1 K/UL (ref 0–0.85)
MONOCYTES NFR BLD AUTO: 8.4 % (ref 0–13.4)
NEUTROPHILS # BLD AUTO: 9.45 K/UL (ref 1.82–7.42)
NEUTROPHILS NFR BLD: 78.9 % (ref 44–72)
NRBC # BLD AUTO: 0 K/UL
NRBC BLD-RTO: 0 /100 WBC
PLATELET # BLD AUTO: 302 K/UL (ref 164–446)
PMV BLD AUTO: 9 FL (ref 9–12.9)
POTASSIUM SERPL-SCNC: 5 MMOL/L (ref 3.6–5.5)
PROT SERPL-MCNC: 6.9 G/DL (ref 6–8.2)
RBC # BLD AUTO: 3.35 M/UL (ref 4.7–6.1)
SODIUM SERPL-SCNC: 135 MMOL/L (ref 135–145)
WBC # BLD AUTO: 12 K/UL (ref 4.8–10.8)

## 2020-12-19 PROCEDURE — 90935 HEMODIALYSIS ONE EVALUATION: CPT

## 2020-12-19 PROCEDURE — 96372 THER/PROPH/DIAG INJ SC/IM: CPT

## 2020-12-19 PROCEDURE — 94760 N-INVAS EAR/PLS OXIMETRY 1: CPT

## 2020-12-19 PROCEDURE — 700102 HCHG RX REV CODE 250 W/ 637 OVERRIDE(OP): Performed by: INTERNAL MEDICINE

## 2020-12-19 PROCEDURE — 700111 HCHG RX REV CODE 636 W/ 250 OVERRIDE (IP): Performed by: INTERNAL MEDICINE

## 2020-12-19 PROCEDURE — 85025 COMPLETE CBC W/AUTO DIFF WBC: CPT

## 2020-12-19 PROCEDURE — A9270 NON-COVERED ITEM OR SERVICE: HCPCS | Performed by: INTERNAL MEDICINE

## 2020-12-19 PROCEDURE — 5A1D70Z PERFORMANCE OF URINARY FILTRATION, INTERMITTENT, LESS THAN 6 HOURS PER DAY: ICD-10-PCS | Performed by: STUDENT IN AN ORGANIZED HEALTH CARE EDUCATION/TRAINING PROGRAM

## 2020-12-19 PROCEDURE — 99233 SBSQ HOSP IP/OBS HIGH 50: CPT | Performed by: INTERNAL MEDICINE

## 2020-12-19 PROCEDURE — 80053 COMPREHEN METABOLIC PANEL: CPT

## 2020-12-19 PROCEDURE — 770020 HCHG ROOM/CARE - TELE (206)

## 2020-12-19 RX ORDER — OMEPRAZOLE 20 MG/1
40 CAPSULE, DELAYED RELEASE ORAL DAILY
Status: DISCONTINUED | OUTPATIENT
Start: 2020-12-20 | End: 2020-12-22 | Stop reason: HOSPADM

## 2020-12-19 RX ORDER — LORAZEPAM 1 MG/1
1 TABLET ORAL EVERY 6 HOURS PRN
Status: DISCONTINUED | OUTPATIENT
Start: 2020-12-19 | End: 2020-12-22 | Stop reason: HOSPADM

## 2020-12-19 RX ORDER — IBUPROFEN 600 MG/1
600 TABLET ORAL EVERY 8 HOURS
Status: DISCONTINUED | OUTPATIENT
Start: 2020-12-19 | End: 2020-12-22 | Stop reason: HOSPADM

## 2020-12-19 RX ADMIN — OMEPRAZOLE 20 MG: 20 CAPSULE, DELAYED RELEASE ORAL at 05:24

## 2020-12-19 RX ADMIN — HEPARIN SODIUM 5000 UNITS: 5000 INJECTION, SOLUTION INTRAVENOUS; SUBCUTANEOUS at 05:23

## 2020-12-19 RX ADMIN — HEPARIN SODIUM 5000 UNITS: 5000 INJECTION, SOLUTION INTRAVENOUS; SUBCUTANEOUS at 21:54

## 2020-12-19 RX ADMIN — OXYCODONE HYDROCHLORIDE 5 MG: 5 TABLET ORAL at 03:12

## 2020-12-19 RX ADMIN — CINACALCET HYDROCHLORIDE 30 MG: 30 TABLET, FILM COATED ORAL at 05:24

## 2020-12-19 RX ADMIN — LORAZEPAM 1 MG: 1 TABLET ORAL at 14:29

## 2020-12-19 RX ADMIN — IBUPROFEN 600 MG: 600 TABLET ORAL at 09:36

## 2020-12-19 RX ADMIN — SEVELAMER CARBONATE 2400 MG: 800 TABLET, FILM COATED ORAL at 08:11

## 2020-12-19 RX ADMIN — SEVELAMER CARBONATE 2400 MG: 800 TABLET, FILM COATED ORAL at 12:14

## 2020-12-19 RX ADMIN — METOPROLOL SUCCINATE 100 MG: 100 TABLET, EXTENDED RELEASE ORAL at 05:24

## 2020-12-19 RX ADMIN — OXYCODONE HYDROCHLORIDE 5 MG: 5 TABLET ORAL at 06:59

## 2020-12-19 RX ADMIN — SEVELAMER CARBONATE 2400 MG: 800 TABLET, FILM COATED ORAL at 18:04

## 2020-12-19 RX ADMIN — IBUPROFEN 600 MG: 600 TABLET ORAL at 21:54

## 2020-12-19 RX ADMIN — SENNOSIDES-DOCUSATE SODIUM TAB 8.6-50 MG 2 TABLET: 8.6-5 TAB at 18:04

## 2020-12-19 RX ADMIN — HEPARIN SODIUM 5000 UNITS: 5000 INJECTION, SOLUTION INTRAVENOUS; SUBCUTANEOUS at 13:19

## 2020-12-19 ASSESSMENT — ENCOUNTER SYMPTOMS
NERVOUS/ANXIOUS: 0
ABDOMINAL PAIN: 0
HEADACHES: 0
FEVER: 0
DIZZINESS: 0
COUGH: 0
PHOTOPHOBIA: 0
SPEECH CHANGE: 0
VOMITING: 0
CLAUDICATION: 0
CHILLS: 0
DEPRESSION: 0
SHORTNESS OF BREATH: 1
INSOMNIA: 0
SENSORY CHANGE: 0
HEARTBURN: 0
CONSTIPATION: 0
DIARRHEA: 0
WEAKNESS: 0
MYALGIAS: 0
SORE THROAT: 0
BLURRED VISION: 0

## 2020-12-19 ASSESSMENT — LIFESTYLE VARIABLES: EVER_SMOKED: YES

## 2020-12-19 ASSESSMENT — COPD QUESTIONNAIRES
HAVE YOU SMOKED AT LEAST 100 CIGARETTES IN YOUR ENTIRE LIFE: YES
DURING THE PAST 4 WEEKS HOW MUCH DID YOU FEEL SHORT OF BREATH: NONE/LITTLE OF THE TIME
COPD SCREENING SCORE: 5
DO YOU EVER COUGH UP ANY MUCUS OR PHLEGM?: NO/ONLY WITH OCCASIONAL COLDS OR INFECTIONS

## 2020-12-19 NOTE — ED PROVIDER NOTES
ED Provider Note    CHIEF COMPLAINT  Chief Complaint   Patient presents with   • Chest Pain   • Shortness of Breath       HPI  Garry Benites is a 65 y.o. male who presents to the emergency department complaining of chest pain, shortness of breath that is increasing in severity for last 3 days.  He states the pain is in the center and to the right side of his chest, increases with movement decreased with rest, has inspiratory pain as well.  No radiation to his back, to his jaw, to his abdomen or groin.  He does see Dr. Keller for dialysis and had dialysis today is as Monday Wednesday Friday dialysis.    Cardiac Risk Factors:  No Age > 65  No Aspirin use within 7 days  No prior history of coronary artery disease  No diabetes  No hyperlipidemia   Yes hypertension  No obesity  No family history of coronary artery disease at a young age <56 yo  No tobacco use   No drugs (methamphetamine or cocaine)  No history of aortic aneurysm   No history of aortic dissection   No history of deep vein thrombosis or pulmonary embolism     REVIEW OF SYSTEMS  Positives as above. Pertinent negatives include fever, cough, nausea, vomiting, abdominal pain, dysuria, [place], melena, hematemesis  All other 10 review of systems are negative    PAST MEDICAL HISTORY  Past Medical History:   Diagnosis Date   • Allergy    • Anxiety    • Cataract    • Depression    • Gout due to renal impairment    • Hyperlipidemia    • Hypertension    • Kidney disease     Congenital solitary kidney on dialysis 3 times a week.   • Localized osteoarthritis of right shoulder 6/19/2019       FAMILY HISTORY  Noncontributory    SOCIAL HISTORY  Social History     Socioeconomic History   • Marital status:      Spouse name: Not on file   • Number of children: Not on file   • Years of education: Not on file   • Highest education level: Not on file   Occupational History   • Not on file   Social Needs   • Financial resource strain: Somewhat hard   • Food insecurity      Worry: Sometimes true     Inability: Sometimes true   • Transportation needs     Medical: No     Non-medical: No   Tobacco Use   • Smoking status: Former Smoker     Packs/day: 0.50     Years: 25.00     Pack years: 12.50     Types: Cigarettes     Quit date: 10/21/1996     Years since quittin.1   • Smokeless tobacco: Never Used   Substance and Sexual Activity   • Alcohol use: No     Alcohol/week: 0.0 oz     Frequency: Never     Binge frequency: Never   • Drug use: No   • Sexual activity: Yes     Partners: Female     Comment: Lives with his brother. Work as . Social Security Disability for [place] and OA and Dialysis. No social or domestic concerns.   Lifestyle   • Physical activity     Days per week: Not on file     Minutes per session: Not on file   • Stress: Not on file   Relationships   • Social connections     Talks on phone: Not on file     Gets together: Not on file     Attends Zoroastrianism service: Not on file     Active member of club or organization: Not on file     Attends meetings of clubs or organizations: Not on file     Relationship status: Not on file   • Intimate partner violence     Fear of current or ex partner: Not on file     Emotionally abused: Not on file     Physically abused: Not on file     Forced sexual activity: Not on file   Other Topics Concern   • Not on file   Social History Narrative   • Not on file       SURGICAL HISTORY  Past Surgical History:   Procedure Laterality Date   • PB UPPER GI ENDOSCOPY,BIOPSY N/A 2020    Procedure: GASTROSCOPY, WITH BIOPSY;  Surgeon: Gabe West M.D.;  Location: SURGERY SAME DAY [facility] [place];  Service: Gastroenterology   • GASTROSCOPY N/A 2020    Procedure: GASTROSCOPY- WITH DILATION;  Surgeon: Gabe West M.D.;  Location: SURGERY SAME DAY [facility] [place];  Service: Gastroenterology   • AV FISTULA CREATION     • OTHER ORTHOPEDIC SURGERY         CURRENT MEDICATIONS  Home Medications     Reviewed by Misha Shah (Pharmacy  "Tech) on 12/18/20 at 1547  Med List Status: Complete   Medication Last Dose Status   colchicine (COLCRYS) 0.6 MG Tab 12/18/2020 Active   LORazepam (ATIVAN) 0.5 MG Tab NOT TAKING Active   metoprolol SR (TOPROL XL) 100 MG TABLET SR 24 HR 12/17/2020 Active   Multiple Vitamins-Minerals (RENAPLEX-D) Tab 12/18/2020 Active   pantoprazole (PROTONIX) 20 MG tablet 12/18/2020 Active   SENSIPAR 30 MG Tab 12/18/2020 Active   sevelamer carbonate (RENVELA) 800 MG Tab tablet 12/17/2020 Active                ALLERGIES  Allergies   Allergen Reactions   • Cephalexin Rash   • Penicillins Unspecified and Rash     Pt states \"It was a childhood allergie\".         PHYSICAL EXAM  VITAL SIGNS: /72   Pulse 86   Temp 36.4 °C (97.5 °F) (Temporal)   Resp 18   Ht 1.778 m (5' 10\")   Wt 87 kg (191 lb 12.8 oz)   SpO2 100%   BMI 27.52 kg/m²      Constitutional: Well developed, Well nourished, No acute distress, Non-toxic appearance.   Eyes: PERRLA, EOMI, Conjunctiva normal, No discharge.   Cardiovascular: Normal heart rate, Normal rhythm, No murmurs, No rubs, No gallops, and intact distal pulses.   Thorax & Lungs:  No respiratory distress, no rales, no rhonchi, No wheezing, No chest wall tenderness.   Abdomen: Bowel sounds normal, Soft, No tenderness, No guarding, No rebound, No pulsatile masses.   Skin: Warm, Dry, No erythema, No rash.   Extremities: Full range of motion, fistula left upper extremity positive thrill, no deformity, no edema.  Neurologic: Alert & oriented x 3, No focal deficits noted, acting appropriately on exam.  Psychiatric: Affect normal for clinical presentation.      RADIOLOGY/PROCEDURES  EC-ECHOCARDIOGRAM COMPLETE W/O CONT   Final Result      CT-CTA CHEST PULMONARY ARTERY W/ RECONS   Final Result      1.  No CT evidence for pulmonary emboli.   2.  Large pericardial effusion.   3.  Small bilateral pleural effusions with associated atelectasis, worse on the LEFT.            DX-CHEST-PORTABLE (1 VIEW)   Final Result "      1.  No acute cardiopulmonary abnormality identified.      2.  Marked enlargement of the cardiac silhouette        Results for orders placed or performed during the hospital encounter of 12/18/20   EC-ECHOCARDIOGRAM COMPLETE W/O CONT   Result Value Ref Range    Eject.Frac. MOD BP 52.48     Eject.Frac. MOD 4C 34.69     Eject.Frac. MOD 2C 69.12     Left Ventrical Ejection Fraction 65    CBC with Differential   Result Value Ref Range    WBC 14.6 (H) 4.8 - 10.8 K/uL    RBC 3.55 (L) 4.70 - 6.10 M/uL    Hemoglobin 10.8 (L) 14.0 - 18.0 g/dL    Hematocrit 34.6 (L) 42.0 - 52.0 %    MCV 97.5 81.4 - 97.8 fL    MCH 30.4 27.0 - 33.0 pg    MCHC 31.2 (L) 33.7 - 35.3 g/dL    RDW 56.5 (H) 35.9 - 50.0 fL    Platelet Count 361 164 - 446 K/uL    MPV 9.0 9.0 - 12.9 fL    Neutrophils-Polys 76.20 (H) 44.00 - 72.00 %    Lymphocytes 11.00 (L) 22.00 - 41.00 %    Monocytes 10.90 0.00 - 13.40 %    Eosinophils 0.50 0.00 - 6.90 %    Basophils 0.50 0.00 - 1.80 %    Immature Granulocytes 0.90 0.00 - 0.90 %    Nucleated RBC 0.00 /100 WBC    Neutrophils (Absolute) 11.15 (H) 1.82 - 7.42 K/uL    Lymphs (Absolute) 1.61 1.00 - 4.80 K/uL    Monos (Absolute) 1.59 (H) 0.00 - 0.85 K/uL    Eos (Absolute) 0.08 0.00 - 0.51 K/uL    Baso (Absolute) 0.07 0.00 - 0.12 K/uL    Immature Granulocytes (abs) 0.13 (H) 0.00 - 0.11 K/uL    NRBC (Absolute) 0.00 K/uL   Complete Metabolic Panel (CMP)   Result Value Ref Range    Sodium 135 135 - 145 mmol/L    Potassium 5.0 3.6 - 5.5 mmol/L    Chloride 91 (L) 96 - 112 mmol/L    Co2 28 20 - 33 mmol/L    Anion Gap 16.0 7.0 - 16.0    Glucose 84 65 - 99 mg/dL    Bun 27 (H) 8 - 22 mg/dL    Creatinine 5.01 (H) 0.50 - 1.40 mg/dL    Calcium 9.5 8.4 - 10.2 mg/dL    AST(SGOT) 18 12 - 45 U/L    ALT(SGPT) 9 2 - 50 U/L    Alkaline Phosphatase 73 30 - 99 U/L    Total Bilirubin 0.5 0.1 - 1.5 mg/dL    Albumin 3.3 3.2 - 4.9 g/dL    Total Protein 7.7 6.0 - 8.2 g/dL    Globulin 4.4 (H) 1.9 - 3.5 g/dL    A-G Ratio 0.8 g/dL   Troponin   Result  Value Ref Range    Troponin T 31 (H) 6 - 19 ng/L   D-DIMER   Result Value Ref Range    D-Dimer Screen 12.13 (H) 0.00 - 0.50 ug/mL (FEU)   ESTIMATED GFR   Result Value Ref Range    GFR If  14 (A) >60 mL/min/1.73 m 2    GFR If Non  12 (A) >60 mL/min/1.73 m 2   Routine (COVID/SARS COV-2 In-House PCR up to 24 hours)    Specimen: Nasopharyngeal; Respirate   Result Value Ref Range    COVID Order Status Received    CoV-2, Flu A/B, And RSV by PCR   Result Value Ref Range    Influenza virus A RNA Negative Negative    Influenza virus B, PCR Negative Negative    RSV, PCR Negative Negative    SARS-CoV-2 by PCR NotDetected     SARS-CoV-2 Source NP Swab    EKG   Result Value Ref Range    Report       [facility] Emergency Dept.    Test Date:  2020  Pt Name:    TRISTAN LOVE          Department: [facility]  MRN:        [id number]                      Room:  Gender:     Male                         Technician: GT  :        1955                   Requested By:ER TRIAGE PROTOCOL  Order #:    [id number]                    Reading MD:    Measurements  Intervals                                Axis  Rate:       103                          P:          38  TX:         117                          QRS:        20  QRSD:       97                           T:          71  QT:         329  QTc:        431    Interpretive Statements  Sinus tachycardia  Probable left atrial enlargement  Baseline wander in lead(s) III,V2  Compared to ECG 2020 20:11:10  Sinus rhythm no longer present  Myocardial infarct finding no longer present       I did see the EKG and agree with the findings, sinus tachycardia 103, normal QRS, normal axis, no ST elevation myocardial infarction    COURSE & MEDICAL DECISION MAKING  Pertinent Labs & Imaging studies reviewed. (See chart for details)  This is a pleasant 65-year-old gentleman presents with chest pain.  The patient does have a heart score of  5.  The patient has no evidence of ST elevation myocardial infarction although he does have a slight elevation troponin at 31 I do believe secondary to his chronic renal failure.  The patient did have a D-dimer of 12.13 and is complaining of chest pain and shortness of breath and slightly tachycardic therefore CT pulmonary Atul is completed.  Prior to CT being completed, discussed the need for possible dialysis with Dr. Keller and he will evaluate the patient after the CT scan to see if he needs emergent dialysis or not.  This was discussed with radiology.  The patient CT scan was negative for pulmonary embolism although he has a very large pericardial effusion.  For this reason, echocardiogram was completed to see if there is any compromise and fortunately there is not any evidence of tamponade or hemodynamic instability or compromise with the effusion I do believe the effusion secondary to his chronic renal failure.  The patient discussed with Dr. Pitt secondary to his elevated heart score, pericardial effusion and current symptoms.      FINAL IMPRESSION  Chest pain  Paracardial effusion  Chronic kidney disease      Electronically signed by: Zach Ortega D.O., 12/18/2020 10:43 PM

## 2020-12-19 NOTE — PROGRESS NOTES
Brief Cardiology Note:    I was called to discuss this patients care with Dr. Lara. We discussed he has recurrent pericarditis. Initial diagnosis in September, treated with prednisone taper, aspirin 325mg PO tid, and colchicine. Saw Mariam Del Valle in our clinic later in September and symptoms had resolved. CT PE this visit showed no PE. Moderate pericardial effusion, and minimal coronary artery calcification. Echo shows moderate pericardial effusion without clear evidence of tamponade physiology.     I suspect this recurrent effusion is secondary to prednisone withdrawal vs recurrent viral pericarditis. He does not appear uremic. He is covid and flu negative.    I agree with continuing colchicine, adding back NSAIDs, I Think the current dose is reasonable and increasing omeprazole 40mg PO Daily for GI prophylaxis.    In discussed with Dr. Lara, his symptoms are not worrisome for ischemia at this time. His elevated troponin is likely due to his ESRD. His EKG is not concerning for ischemia.  I do not believe he needs stress testing at this time, but this can be considered as an outpatient.     I also discussed his case with Dr. Bassett from nephrology. Based on his echo he is euvolemic, and I do believe part of his pleural and pericardial effusion is from his low albumin level and inflammatory state (leukocytosis on admission) and not due to volume overload at this time. I would not try to pull off extra fluid with dialysis. She Does not feel we need pericardial fluid for diagnostic purposes at this time for his renal transplatn status.     Once his chest pain symptoms are controlled with NSAIDS, he should be appropriate for DC from a cardiac standpoint. Bacterial infection should certainly be ruled out given his leukocytosis. I would then schedule f/u with his cardiologist, Dr. Hurtado, and depending on his symptoms and outpatient f/u of his effusion, he may or may not need pericardiocentesis for therapeutic and  diagnostic purposes. It does not appear he needs this at this time.     At this time it was deemed no formal in person cardiology consultation was necessary as we are minimizing patient interactions and travel secondary to COVID. However if this changes due to changes in patient condition or abnormal test results, please re-consult cardiology.     Electronically Signed by:  David Miller MD  12/19/2020  11:35 AM

## 2020-12-19 NOTE — PROGRESS NOTES
Hospital Medicine Daily Progress Note    Date of Service  12/19/2020    Chief Complaint  65 y.o. male admitted 12/18/2020 with chest pain and shortness of breath.    Hospital Course  65 y.o. male with past medical history of stage renal disease, on hemodialysis Monday Wednesday Friday, and recurrent pericarditis, who presented 12/18/2020 with complaints of central chest pain, dull, worsening with deep inspiration, at rest, on and off, in the last 3 days, associated with subjective difficulties breathing.  Evaluation with EKG showed sinus tachycardia with heart rate 103, no findings of MI..  Troponin is elevated at 31, D-dimer is elevated 12/13.  Subsequent CTA of pulmonary artery was negative for PE, but did show a large pericardial effusion.  ERP/Dr. Park discussed case with Dr. Will; ordered stat echo which confirmed pericardial effusion without evidence of hemodynamic compromise.   Nephrology consulted also secondary to ESRD on HD and assistance in addressing fluid balance regarding his effusion and anuric state.  I spoke with Dr Miller with cardiology and no current need for diagnostic pericardiocentesis and recommending medical management.        Interval Problem Update  Patient with continued chest pain with rest and increased with any mobility.  I spoke with cardiology and nephrology and NSAIDs recommended and okayed per nephrology.  Cardiology is concerned his pericarditis and effusion is secondary to completion of the steroids from his last bout of pericarditis in September 2020.     Consultants/Specialty  Nephrology - Dr Bassett  Cardiology - Paul - recommendations made    Code Status  Full Code    Disposition  tbd    Review of Systems  Review of Systems   Constitutional: Negative for chills and fever.   HENT: Negative for congestion and sore throat.    Eyes: Negative for blurred vision and photophobia.   Respiratory: Positive for shortness of breath. Negative for cough.    Cardiovascular: Positive  for chest pain. Negative for claudication and leg swelling.   Gastrointestinal: Negative for abdominal pain, constipation, diarrhea, heartburn and vomiting.   Genitourinary: Negative for dysuria and hematuria.   Musculoskeletal: Negative for joint pain and myalgias.   Skin: Negative for itching and rash.   Neurological: Negative for dizziness, sensory change, speech change, weakness and headaches.   Psychiatric/Behavioral: Negative for depression. The patient is not nervous/anxious and does not have insomnia.         Physical Exam  Temp:  [36.4 °C (97.5 °F)-37.1 °C (98.7 °F)] 36.6 °C (97.9 °F)  Pulse:  [] 87  Resp:  [16-24] 16  BP: ()/(50-78) 91/50  SpO2:  [90 %-100 %] 92 %    Physical Exam  Vitals signs and nursing note reviewed.   Constitutional:       General: He is not in acute distress.     Appearance: Normal appearance.   HENT:      Head: Normocephalic and atraumatic.   Eyes:      General: No scleral icterus.     Extraocular Movements: Extraocular movements intact.   Neck:      Musculoskeletal: Normal range of motion and neck supple.   Cardiovascular:      Rate and Rhythm: Normal rate and regular rhythm.      Pulses: Normal pulses.      Heart sounds: Normal heart sounds. No murmur.   Pulmonary:      Effort: Pulmonary effort is normal. No respiratory distress.      Breath sounds: Normal breath sounds. No wheezing, rhonchi or rales.   Abdominal:      General: Abdomen is flat. Bowel sounds are normal. There is no distension.      Palpations: Abdomen is soft.      Tenderness: There is no rebound.   Musculoskeletal:         General: No swelling or tenderness.   Lymphadenopathy:      Cervical: No cervical adenopathy.   Skin:     Coloration: Skin is not jaundiced.      Findings: No erythema.   Neurological:      General: No focal deficit present.      Mental Status: He is alert and oriented to person, place, and time. Mental status is at baseline.      Cranial Nerves: No cranial nerve deficit.    Psychiatric:         Mood and Affect: Mood normal.         Behavior: Behavior normal.         Fluids    Intake/Output Summary (Last 24 hours) at 12/19/2020 1337  Last data filed at 12/19/2020 1200  Gross per 24 hour   Intake 800 ml   Output --   Net 800 ml       Laboratory  Recent Labs     12/18/20  1506 12/19/20  0626   WBC 14.6* 12.0*   RBC 3.55* 3.35*   HEMOGLOBIN 10.8* 10.3*   HEMATOCRIT 34.6* 32.9*   MCV 97.5 98.2*   MCH 30.4 30.7   MCHC 31.2* 31.3*   RDW 56.5* 56.8*   PLATELETCT 361 302   MPV 9.0 9.0     Recent Labs     12/18/20  1506 12/19/20  0626   SODIUM 135 135   POTASSIUM 5.0 5.0   CHLORIDE 91* 91*   CO2 28 28   GLUCOSE 84 80   BUN 27* 40*   CREATININE 5.01* 6.63*   CALCIUM 9.5 9.7                   Imaging  EC-ECHOCARDIOGRAM COMPLETE W/O CONT   Final Result      CT-CTA CHEST PULMONARY ARTERY W/ RECONS   Final Result      1.  No CT evidence for pulmonary emboli.   2.  Large pericardial effusion.   3.  Small bilateral pleural effusions with associated atelectasis, worse on the LEFT.            DX-CHEST-PORTABLE (1 VIEW)   Final Result      1.  No acute cardiopulmonary abnormality identified.      2.  Marked enlargement of the cardiac silhouette           Assessment/Plan  * Pericardial effusion  Assessment & Plan  History of IgA positive pericarditis, had been taking colchicine at home  Uremic pericarditis is unlikely as patient has been compliant with hemodialysis, his uremia is under control  Large pericardial effusion on CT noted.  Transthoracic echo confirms effusion but no hemodynamic instability  No need for diagnostic pericardiocentesis per cardiology   Discussed with Dr Miller.  Continue colchicine, initiate motrin scheduled until the pain subsides from pericarditis - okayed this with nephrology- Dr Keller.    Anxiety- (present on admission)  Assessment & Plan  PRN ativan prior to HD.      Leukocytosis  Assessment & Plan  No evidence of acute bacterial infection  Suspect this is due to acute  stress de margination in setting of pericardial effusion/pericarditis.      Hypertension- (present on admission)  Assessment & Plan  toprol      Anemia- (present on admission)  Assessment & Plan  Chronic  Likely secondary to renal disease      End stage renal failure on dialysis (HCC)- (present on admission)  Assessment & Plan  Dr Bassett consulted for hemodialysis in the hospital.  Daily RRT with close monitoring of blood pressure.          VTE prophylaxis: heparin

## 2020-12-19 NOTE — ASSESSMENT & PLAN NOTE
Dr Bassett consulted for hemodialysis in the hospital.  Daily RRT with close monitoring of blood pressure.

## 2020-12-19 NOTE — HOSPITAL COURSE
65 y.o. male with past medical history of stage renal disease, on hemodialysis Monday Wednesday Friday, and recurrent pericarditis, who presented 12/18/2020 with complaints of central chest pain, dull, worsening with deep inspiration, at rest, on and off, in the last 3 days, associated with subjective difficulties breathing.  Evaluation with EKG showed sinus tachycardia with heart rate 103, no findings of MI..  Troponin is elevated at 31, D-dimer is elevated 12/13.  Subsequent CTA of pulmonary artery was negative for PE, but did show a large pericardial effusion.  ERP/Dr. Park discussed case with Dr. Will; ordered stat echo which confirmed pericardial effusion without evidence of hemodynamic compromise.   Nephrology consulted also secondary to ESRD on HD and assistance in addressing fluid balance regarding his effusion and anuric state.  I spoke with Dr Miller with cardiology and no current need for diagnostic pericardiocentesis and recommending medical management.

## 2020-12-19 NOTE — H&P
Hospital Medicine History & Physical Note    Date of Service  12/18/2020    Primary Care Physician  Geo Espino III, M.D.    Consultants  Cardiology Dr. Will    Code Status  Full Code    Chief Complaint  Chief Complaint   Patient presents with   • Chest Pain   • Shortness of Breath       History of Presenting Illness  65 y.o. male with past medical history of stage renal disease, on hemodialysis Monday Wednesday Friday, and hepatic pericarditis,, who presented 12/18/2020 with complaints of central chest pain, dull, worsening with deep inspiration, at rest, on and off, in the last 3 days, associated with subjective difficulties breathing.  Evaluation with EKG showed sinus tachycardia with heart rate 103, no findings of MI..  Troponin is elevated at 31, D-dimer is elevated 12/13.  Subsequent CTA of pulmonary artery was negative for PE, but did show a large pericardial effusion.  ERP/Dr. Park discussed case with Dr. Will; ordered stat echo which is pending.  I personally discussed case with Dr. Will and he recommended to wait for results of transthoracic echo to decide if patient needs to be transferred to [facility] hospital for possible pericardiocentesis.  Per my evaluation, patient does not have evidence of cardiac tamponade and hemodynamically stable.  Nephrology was consulted/Dr. Keller  Review of Systems  Review of Systems   Constitutional: Negative for chills, fever and weight loss.   HENT: Negative for ear pain, hearing loss and tinnitus.    Eyes: Negative for blurred vision, double vision and photophobia.   Respiratory: Positive for shortness of breath. Negative for cough, hemoptysis and sputum production.    Cardiovascular: Positive for chest pain. Negative for palpitations and orthopnea.   Gastrointestinal: Negative for heartburn, nausea and vomiting.   Genitourinary: Negative for dysuria, flank pain, frequency and hematuria.   Musculoskeletal: Negative for back pain, joint pain and neck pain.   Skin:  "Negative for itching and rash.   Neurological: Negative for tremors, speech change, focal weakness and headaches.   Endo/Heme/Allergies: Negative for environmental allergies and polydipsia. Does not bruise/bleed easily.   Psychiatric/Behavioral: Negative for hallucinations and substance abuse. The patient is not nervous/anxious.        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    Surgical History   has a past surgical history that includes av fistula creation; other orthopedic surgery; pr upper gi endoscopy,biopsy (N/A, 9/4/2020); and gastroscopy (N/A, 9/4/2020).     Family History  family history includes Cancer in his brother and mother; Heart Disease in his father; No Known Problems in his brother, brother, daughter, daughter, sister, and son; Stroke in his father.     Social History   reports that he quit smoking about 24 years ago. His smoking use included cigarettes. He has a 12.50 pack-year smoking history. He has never used smokeless tobacco. He reports that he does not drink alcohol or use drugs.    Allergies  Allergies   Allergen Reactions   • Cephalexin Rash   • Penicillins Unspecified and Rash     Pt states \"It was a childhood allergie\".         Medications  Prior to Admission Medications   Prescriptions Last Dose Informant Patient Reported? Taking?   LORazepam (ATIVAN) 0.5 MG Tab NOT TAKING at NOT TAKING Patient No No   Sig: TAKE 1 TABLET BY MOUTH ONCE DAILY AS NEEDED FOR ANXIETY   Patient not taking: Reported on 12/18/2020   Multiple Vitamins-Minerals (RENAPLEX-D) Tab 12/18/2020 at AM Patient Yes No   Sig: Take 1 Tab by mouth every day.   SENSIPAR 30 MG Tab 12/18/2020 at AM Patient Yes No   Sig: Take 30 mg by mouth every day.   colchicine (COLCRYS) 0.6 MG Tab 12/18/2020 at AM Patient Yes No   Sig: Take 0.6 mg by mouth " every 48 hours.   metoprolol SR (TOPROL XL) 100 MG TABLET SR 24 HR 12/17/2020 at AFTERNOON Patient No No   Sig: Take 1 tablet by mouth once daily   Patient taking differently: Take 100 mg by mouth see administration instructions. Takes on Sunday, Tuesday, Thursday, and Saturday   Holds on Monday, Wednesday, and Friday (Dialysis days)   pantoprazole (PROTONIX) 20 MG tablet 12/18/2020 at AM Patient No No   Sig: Take 1 Tab by mouth every day.   sevelamer carbonate (RENVELA) 800 MG Tab tablet 12/17/2020 at PM Patient Yes No   Sig: Take 1,600-2,400 mg by mouth see administration instructions. 2400 mg per meal  6036-3645 mg per snack      Facility-Administered Medications: None       Physical Exam  Temp:  [36.4 °C (97.5 °F)] 36.4 °C (97.5 °F)  Pulse:  [] 86  Resp:  [18] 18  BP: (113-121)/(63-72) 121/72  SpO2:  [90 %-100 %] 100 %    Physical Exam  Vitals signs and nursing note reviewed.   Constitutional:       General: He is not in acute distress.     Appearance: Normal appearance.   HENT:      Head: Normocephalic and atraumatic.      Nose: Nose normal.      Mouth/Throat:      Mouth: Mucous membranes are moist.   Eyes:      Extraocular Movements: Extraocular movements intact.      Pupils: Pupils are equal, round, and reactive to light.   Neck:      Musculoskeletal: Normal range of motion and neck supple.   Cardiovascular:      Rate and Rhythm: Normal rate and regular rhythm.      Heart sounds: Heart sounds are distant.   Pulmonary:      Effort: Pulmonary effort is normal.      Breath sounds: Normal breath sounds.   Abdominal:      General: Abdomen is flat. There is no distension.      Tenderness: There is no abdominal tenderness.   Musculoskeletal: Normal range of motion.         General: No swelling or deformity.      Comments: AV fistula with bruit auscultated on the left forearm   Skin:     General: Skin is warm and dry.   Neurological:      General: No focal deficit present.      Mental Status: He is alert and  oriented to person, place, and time.   Psychiatric:         Mood and Affect: Mood normal.         Behavior: Behavior normal.         Laboratory:  Recent Labs     12/18/20  1506   WBC 14.6*   RBC 3.55*   HEMOGLOBIN 10.8*   HEMATOCRIT 34.6*   MCV 97.5   MCH 30.4   MCHC 31.2*   RDW 56.5*   PLATELETCT 361   MPV 9.0     Recent Labs     12/18/20  1506   SODIUM 135   POTASSIUM 5.0   CHLORIDE 91*   CO2 28   GLUCOSE 84   BUN 27*   CREATININE 5.01*   CALCIUM 9.5     Recent Labs     12/18/20  1506   ALTSGPT 9   ASTSGOT 18   ALKPHOSPHAT 73   TBILIRUBIN 0.5   GLUCOSE 84         No results for input(s): NTPROBNP in the last 72 hours.      Recent Labs     12/18/20  1506   TROPONINT 31*       Imaging:  CT-CTA CHEST PULMONARY ARTERY W/ RECONS   Final Result      1.  No CT evidence for pulmonary emboli.   2.  Large pericardial effusion.   3.  Small bilateral pleural effusions with associated atelectasis, worse on the LEFT.            DX-CHEST-PORTABLE (1 VIEW)   Final Result      1.  No acute cardiopulmonary abnormality identified.      2.  Marked enlargement of the cardiac silhouette      EC-ECHOCARDIOGRAM COMPLETE W/O CONT    (Results Pending)         Assessment/Plan:  I anticipate this patient is appropriate for observation status at this time.    Pericardial effusion  Assessment & Plan  History of IgA positive pericarditis, has been taking colchicine at home  Uremic pericarditis is unlikely as patient has been compliant with hemodialysis, his uremia is under control  Large pericardial effusion on CT noted.  Transthoracic echo is pending  Cardiology on board  Continue colchicine, oxycodone as needed    End stage renal failure on dialysis (HCC)- (present on admission)  Assessment & Plan  Patient has been compliant with hemodialysis, last hemodialysis was today  Dr. Keller consulted for routine hemodialysis in the hospital.

## 2020-12-19 NOTE — ASSESSMENT & PLAN NOTE
No evidence of acute bacterial infection  Suspect this is due to acute stress de margination in setting of pericardial effusion/pericarditis.

## 2020-12-19 NOTE — PROGRESS NOTES
Telemetry Strip     Strip printed: y  Rhythm: SR  HR:   Measurements: 0.18/ 0.10/ 0.36  Ectopy: none              Normal Values  Rhythm SR  HR Range    Measurements 0.12-0.20 / 0.06-0.10  / 0.30-0.52

## 2020-12-19 NOTE — ASSESSMENT & PLAN NOTE
History of IgA positive pericarditis, had been taking colchicine at home  Uremic pericarditis is unlikely as patient has been compliant with hemodialysis, his uremia is under control  Large pericardial effusion on CT noted.  Transthoracic echo confirms effusion but no hemodynamic instability  No need for diagnostic pericardiocentesis per cardiology   Discussed with Dr Miller.  Continue colchicine, initiate motrin scheduled until the pain subsides from pericarditis - okayed this with nephrology- Dr Keller.

## 2020-12-19 NOTE — CONSULTS
[facility] -  CONSULTATION NOTE               Author: Wanda Bassett M.D. Date & Time: 12/19/2020  8:33 AM       REASON FOR CONSULTATION:   Inpatient hemodialysis management.    CHIEF COMPLAINT:   Chest pain, SOB    HISTORY OF PRESENT ILLNESS:    65 y.o. male with PMH of ESRD on HD MWF, HTN, anemia of CKD, and CKD-MBD  who presents to the emergency department complaining of chest pain, shortness of breath that is increasing in severity for last 3 days.  He states the pain is in the center and to the right side of his chest, increases with movement decreased with rest, has inspiratory pain as well.  No radiation to his back, to his jaw, to his abdomen or groin.     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   • Localized osteoarthritis of right shoulder 6/19/2019         PAST SURGICAL HISTORY:   Past Surgical History:   Procedure Laterality Date   • PB UPPER GI ENDOSCOPY,BIOPSY N/A 9/4/2020    Procedure: GASTROSCOPY, WITH BIOPSY;  Surgeon: Gabe West M.D.;  Location: SURGERY SAME DAY [facility];  Service: Gastroenterology   • GASTROSCOPY N/A 9/4/2020    Procedure: GASTROSCOPY- WITH DILATION;  Surgeon: Gabe West M.D.;  Location: SURGERY SAME DAY [facility];  Service: Gastroenterology   • AV FISTULA CREATION     • OTHER ORTHOPEDIC SURGERY         FAMILY HISTORY:   No family history of kidney disease    SOCIAL HISTORY:   No smoking, no etoh, no  "illicit drugs.    HOME MEDICATIONS:   Reviewed and documented in chart    ALLERGIES:  Cephalexin and Penicillins    PHYSICAL EXAM:  VS:  /61   Pulse 100   Temp 37.1 °C (98.7 °F) (Oral)   Resp 20   Ht 1.778 m (5' 10\")   Wt 87 kg (191 lb 12.8 oz)   SpO2 92%   BMI 27.52 kg/m²   GENERAL: no acute distress  NEURO: AOx3  CV: RRR, no JVD  RESP: CTAB  GI: Soft, NT, ND, BS + in 4q  Ext: no edema   SKIN: No concerning rashes  PSYCH: Cooperative  ACCESS:     LABS:  Recent Results (from the past 24 hour(s))   EKG    Collection Time: 20  2:34 PM   Result Value Ref Range    Report       [facility] Emergency Dept.    Test Date:  2020  Pt Name:    TRISTAN LOVE          Department: [facility]  MRN:        [id number]                      Room:  Gender:     Male                         Technician: GT  :        1955                   Requested By:ER TRIAGE PROTOCOL  Order #:    [id number]                    Reading MD:    Measurements  Intervals                                Axis  Rate:       103                          P:          38  WY:         117                          QRS:        20  QRSD:       97                           T:          71  QT:         329  QTc:        431    Interpretive Statements  Sinus tachycardia  Probable left atrial enlargement  Baseline wander in lead(s) III,V2  Compared to ECG 2020 20:11:10  Sinus rhythm no longer present  Myocardial infarct finding no longer present     CBC with Differential    Collection Time: 20  3:06 PM   Result Value Ref Range    WBC 14.6 (H) 4.8 - 10.8 K/uL    RBC 3.55 (L) 4.70 - 6.10 M/uL    Hemoglobin 10.8 (L) 14.0 - 18.0 g/dL    Hematocrit 34.6 (L) 42.0 - 52.0 %    MCV 97.5 81.4 - 97.8 fL    MCH 30.4 27.0 - 33.0 pg    MCHC 31.2 (L) 33.7 - 35.3 g/dL    RDW 56.5 (H) 35.9 - 50.0 fL    Platelet Count 361 164 - 446 K/uL    MPV 9.0 9.0 - 12.9 fL    Neutrophils-Polys 76.20 (H) 44.00 - 72.00 %    Lymphocytes 11.00 " (L) 22.00 - 41.00 %    Monocytes 10.90 0.00 - 13.40 %    Eosinophils 0.50 0.00 - 6.90 %    Basophils 0.50 0.00 - 1.80 %    Immature Granulocytes 0.90 0.00 - 0.90 %    Nucleated RBC 0.00 /100 WBC    Neutrophils (Absolute) 11.15 (H) 1.82 - 7.42 K/uL    Lymphs (Absolute) 1.61 1.00 - 4.80 K/uL    Monos (Absolute) 1.59 (H) 0.00 - 0.85 K/uL    Eos (Absolute) 0.08 0.00 - 0.51 K/uL    Baso (Absolute) 0.07 0.00 - 0.12 K/uL    Immature Granulocytes (abs) 0.13 (H) 0.00 - 0.11 K/uL    NRBC (Absolute) 0.00 K/uL   Complete Metabolic Panel (CMP)    Collection Time: 12/18/20  3:06 PM   Result Value Ref Range    Sodium 135 135 - 145 mmol/L    Potassium 5.0 3.6 - 5.5 mmol/L    Chloride 91 (L) 96 - 112 mmol/L    Co2 28 20 - 33 mmol/L    Anion Gap 16.0 7.0 - 16.0    Glucose 84 65 - 99 mg/dL    Bun 27 (H) 8 - 22 mg/dL    Creatinine 5.01 (H) 0.50 - 1.40 mg/dL    Calcium 9.5 8.4 - 10.2 mg/dL    AST(SGOT) 18 12 - 45 U/L    ALT(SGPT) 9 2 - 50 U/L    Alkaline Phosphatase 73 30 - 99 U/L    Total Bilirubin 0.5 0.1 - 1.5 mg/dL    Albumin 3.3 3.2 - 4.9 g/dL    Total Protein 7.7 6.0 - 8.2 g/dL    Globulin 4.4 (H) 1.9 - 3.5 g/dL    A-G Ratio 0.8 g/dL   Troponin    Collection Time: 12/18/20  3:06 PM   Result Value Ref Range    Troponin T 31 (H) 6 - 19 ng/L   D-DIMER    Collection Time: 12/18/20  3:06 PM   Result Value Ref Range    D-Dimer Screen 12.13 (H) 0.00 - 0.50 ug/mL (FEU)   ESTIMATED GFR    Collection Time: 12/18/20  3:06 PM   Result Value Ref Range    GFR If  14 (A) >60 mL/min/1.73 m 2    GFR If Non  12 (A) >60 mL/min/1.73 m 2   Routine (COVID/SARS COV-2 In-House PCR up to 24 hours)    Collection Time: 12/18/20  7:38 PM    Specimen: Nasopharyngeal; Respirate   Result Value Ref Range    COVID Order Status Received    CoV-2, Flu A/B, And RSV by PCR    Collection Time: 12/18/20  7:38 PM   Result Value Ref Range    Influenza virus A RNA Negative Negative    Influenza virus B, PCR Negative Negative    RSV, PCR  Negative Negative    SARS-CoV-2 by PCR NotDetected     SARS-CoV-2 Source NP Swab    EC-ECHOCARDIOGRAM COMPLETE W/O CONT    Collection Time: 12/18/20  9:52 PM   Result Value Ref Range    Eject.Frac. MOD BP 52.48     Eject.Frac. MOD 4C 34.69     Eject.Frac. MOD 2C 69.12     Left Ventrical Ejection Fraction 65    CBC with Differential    Collection Time: 12/19/20  6:26 AM   Result Value Ref Range    WBC 12.0 (H) 4.8 - 10.8 K/uL    RBC 3.35 (L) 4.70 - 6.10 M/uL    Hemoglobin 10.3 (L) 14.0 - 18.0 g/dL    Hematocrit 32.9 (L) 42.0 - 52.0 %    MCV 98.2 (H) 81.4 - 97.8 fL    MCH 30.7 27.0 - 33.0 pg    MCHC 31.3 (L) 33.7 - 35.3 g/dL    RDW 56.8 (H) 35.9 - 50.0 fL    Platelet Count 302 164 - 446 K/uL    MPV 9.0 9.0 - 12.9 fL    Neutrophils-Polys 78.90 (H) 44.00 - 72.00 %    Lymphocytes 10.10 (L) 22.00 - 41.00 %    Monocytes 8.40 0.00 - 13.40 %    Eosinophils 1.80 0.00 - 6.90 %    Basophils 0.30 0.00 - 1.80 %    Immature Granulocytes 0.50 0.00 - 0.90 %    Nucleated RBC 0.00 /100 WBC    Neutrophils (Absolute) 9.45 (H) 1.82 - 7.42 K/uL    Lymphs (Absolute) 1.21 1.00 - 4.80 K/uL    Monos (Absolute) 1.00 (H) 0.00 - 0.85 K/uL    Eos (Absolute) 0.21 0.00 - 0.51 K/uL    Baso (Absolute) 0.04 0.00 - 0.12 K/uL    Immature Granulocytes (abs) 0.06 0.00 - 0.11 K/uL    NRBC (Absolute) 0.00 K/uL   Comp Metabolic Panel (CMP)    Collection Time: 12/19/20  6:26 AM   Result Value Ref Range    Sodium 135 135 - 145 mmol/L    Potassium 5.0 3.6 - 5.5 mmol/L    Chloride 91 (L) 96 - 112 mmol/L    Co2 28 20 - 33 mmol/L    Anion Gap 16.0 7.0 - 16.0    Glucose 80 65 - 99 mg/dL    Bun 40 (H) 8 - 22 mg/dL    Creatinine 6.63 (HH) 0.50 - 1.40 mg/dL    Calcium 9.7 8.4 - 10.2 mg/dL    AST(SGOT) 14 12 - 45 U/L    ALT(SGPT) 9 2 - 50 U/L    Alkaline Phosphatase 71 30 - 99 U/L    Total Bilirubin 0.4 0.1 - 1.5 mg/dL    Albumin 3.0 (L) 3.2 - 4.9 g/dL    Total Protein 6.9 6.0 - 8.2 g/dL    Globulin 3.9 (H) 1.9 - 3.5 g/dL    A-G Ratio 0.8 g/dL   ESTIMATED GFR     Collection Time: 12/19/20  6:26 AM   Result Value Ref Range    GFR If African American 10 (A) >60 mL/min/1.73 m 2    GFR If Non  8 (A) >60 mL/min/1.73 m 2       (click the triangle to expand results)    IMAGING:  EC-ECHOCARDIOGRAM COMPLETE W/O CONT   Final Result      CT-CTA CHEST PULMONARY ARTERY W/ RECONS   Final Result      1.  No CT evidence for pulmonary emboli.   2.  Large pericardial effusion.   3.  Small bilateral pleural effusions with associated atelectasis, worse on the LEFT.            DX-CHEST-PORTABLE (1 VIEW)   Final Result      1.  No acute cardiopulmonary abnormality identified.      2.  Marked enlargement of the cardiac silhouette          PMH/FH/SH/meds/labs/images reviewed     ASSESSMENT:  - ESRD    * Etiology likely 2/2 HTN    * [facility]; MWF iHD; LAVF (+thrill/bruit)    * Living XPL surgery pending final work up at [facility]   - HTN    * Goal BP < 140/90    * Stable  - Anemia of CKD    * Goal Hgb 10-11    * Stable  - CKD-MBD    * Managed at HD unit  - Thrombocytopenia    * Monitor Platelets   - Chest pain , SOB. Pericardial effusion.   * ddx Fluid overload, viral pericarditis vs other. BUN is too low to cause uremic pericarditis.   *  troponin 31   * CT chest PE protocol: No CT evidence for pulmonary emboli. Large pericardial effusion. Small bilateral pleural effusions with associated atelectasis, worse on the LEFT.  * TTE 12/18: Compared to the images of the study done -09/02/2020  there has been an interval increase in the pericardial effusion.Left ventricular ejection fraction is visually estimated to be 65%. Moderate pericardial effusion without evidence of hemodynamic compromise.     PLAN:  - HD daily to gentle UF. During HD patient must be on cardiac monitor. HD must be terminated at 1st signs of dropping BP. If we pull too much of UF patient might go to cardiacTamponade. What is more, we will not be able to remove pericardial nor pleural effusion over short period of  time. Prefer cardiology input on need of pericardiocentesis with fluid analysis.   - UF as tolerated  - No dietary protein restrictions  - Dose all meds per ESRD  - He takes ativan 1 mg prior to each HD, please continue that while inpatient  - Low sodium diet  - Renal diet    Thank you for this consult, we will continue to follow.    Wanda Bassett MD

## 2020-12-20 LAB
ANION GAP SERPL CALC-SCNC: 14 MMOL/L (ref 7–16)
BUN SERPL-MCNC: 28 MG/DL (ref 8–22)
CALCIUM SERPL-MCNC: 9.9 MG/DL (ref 8.4–10.2)
CHLORIDE SERPL-SCNC: 96 MMOL/L (ref 96–112)
CO2 SERPL-SCNC: 28 MMOL/L (ref 20–33)
CREAT SERPL-MCNC: 5.41 MG/DL (ref 0.5–1.4)
ERYTHROCYTE [DISTWIDTH] IN BLOOD BY AUTOMATED COUNT: 57.4 FL (ref 35.9–50)
GLUCOSE BLD-MCNC: 127 MG/DL (ref 65–99)
GLUCOSE SERPL-MCNC: 105 MG/DL (ref 65–99)
HAV IGM SERPL QL IA: NORMAL
HBV CORE IGM SER QL: NORMAL
HBV SURFACE AB SERPL IA-ACNC: 36.3 MIU/ML (ref 0–10)
HBV SURFACE AG SER QL: NORMAL
HCT VFR BLD AUTO: 34.8 % (ref 42–52)
HCV AB SER QL: NORMAL
HGB BLD-MCNC: 10.7 G/DL (ref 14–18)
MCH RBC QN AUTO: 30.7 PG (ref 27–33)
MCHC RBC AUTO-ENTMCNC: 30.7 G/DL (ref 33.7–35.3)
MCV RBC AUTO: 100 FL (ref 81.4–97.8)
PLATELET # BLD AUTO: 303 K/UL (ref 164–446)
PMV BLD AUTO: 9.3 FL (ref 9–12.9)
POTASSIUM SERPL-SCNC: 4.3 MMOL/L (ref 3.6–5.5)
RBC # BLD AUTO: 3.48 M/UL (ref 4.7–6.1)
SODIUM SERPL-SCNC: 138 MMOL/L (ref 135–145)
WBC # BLD AUTO: 9 K/UL (ref 4.8–10.8)

## 2020-12-20 PROCEDURE — 82962 GLUCOSE BLOOD TEST: CPT

## 2020-12-20 PROCEDURE — 80074 ACUTE HEPATITIS PANEL: CPT

## 2020-12-20 PROCEDURE — 90935 HEMODIALYSIS ONE EVALUATION: CPT

## 2020-12-20 PROCEDURE — 700111 HCHG RX REV CODE 636 W/ 250 OVERRIDE (IP): Performed by: INTERNAL MEDICINE

## 2020-12-20 PROCEDURE — A9270 NON-COVERED ITEM OR SERVICE: HCPCS | Performed by: INTERNAL MEDICINE

## 2020-12-20 PROCEDURE — 5A1D70Z PERFORMANCE OF URINARY FILTRATION, INTERMITTENT, LESS THAN 6 HOURS PER DAY: ICD-10-PCS | Performed by: STUDENT IN AN ORGANIZED HEALTH CARE EDUCATION/TRAINING PROGRAM

## 2020-12-20 PROCEDURE — 85027 COMPLETE CBC AUTOMATED: CPT

## 2020-12-20 PROCEDURE — 770020 HCHG ROOM/CARE - TELE (206)

## 2020-12-20 PROCEDURE — 86706 HEP B SURFACE ANTIBODY: CPT

## 2020-12-20 PROCEDURE — 80048 BASIC METABOLIC PNL TOTAL CA: CPT

## 2020-12-20 PROCEDURE — 99232 SBSQ HOSP IP/OBS MODERATE 35: CPT | Performed by: INTERNAL MEDICINE

## 2020-12-20 PROCEDURE — 700102 HCHG RX REV CODE 250 W/ 637 OVERRIDE(OP): Performed by: INTERNAL MEDICINE

## 2020-12-20 RX ADMIN — OMEPRAZOLE 40 MG: 20 CAPSULE, DELAYED RELEASE ORAL at 05:12

## 2020-12-20 RX ADMIN — HEPARIN SODIUM 5000 UNITS: 5000 INJECTION, SOLUTION INTRAVENOUS; SUBCUTANEOUS at 05:13

## 2020-12-20 RX ADMIN — HEPARIN SODIUM 5000 UNITS: 5000 INJECTION, SOLUTION INTRAVENOUS; SUBCUTANEOUS at 21:10

## 2020-12-20 RX ADMIN — METOPROLOL SUCCINATE 100 MG: 100 TABLET, EXTENDED RELEASE ORAL at 05:13

## 2020-12-20 RX ADMIN — LORAZEPAM 1 MG: 1 TABLET ORAL at 08:13

## 2020-12-20 RX ADMIN — HEPARIN SODIUM 5000 UNITS: 5000 INJECTION, SOLUTION INTRAVENOUS; SUBCUTANEOUS at 15:25

## 2020-12-20 RX ADMIN — CINACALCET HYDROCHLORIDE 30 MG: 30 TABLET, FILM COATED ORAL at 05:13

## 2020-12-20 RX ADMIN — IBUPROFEN 600 MG: 600 TABLET ORAL at 15:25

## 2020-12-20 RX ADMIN — SEVELAMER CARBONATE 2400 MG: 800 TABLET, FILM COATED ORAL at 17:38

## 2020-12-20 RX ADMIN — IBUPROFEN 600 MG: 600 TABLET ORAL at 21:10

## 2020-12-20 RX ADMIN — COLCHICINE 0.6 MG: 0.6 TABLET, FILM COATED ORAL at 05:13

## 2020-12-20 RX ADMIN — IBUPROFEN 600 MG: 600 TABLET ORAL at 05:13

## 2020-12-20 RX ADMIN — SEVELAMER CARBONATE 2400 MG: 800 TABLET, FILM COATED ORAL at 08:12

## 2020-12-20 ASSESSMENT — ENCOUNTER SYMPTOMS
INSOMNIA: 0
VOMITING: 0
PHOTOPHOBIA: 0
MYALGIAS: 0
CLAUDICATION: 0
HEADACHES: 0
ABDOMINAL PAIN: 0
DEPRESSION: 0
SPEECH CHANGE: 0
HEARTBURN: 0
COUGH: 0
DIARRHEA: 0
BLURRED VISION: 0
SORE THROAT: 0
FEVER: 0
DIZZINESS: 0
CHILLS: 0
CONSTIPATION: 0
WEAKNESS: 0
SHORTNESS OF BREATH: 1
NERVOUS/ANXIOUS: 0
SENSORY CHANGE: 0

## 2020-12-20 NOTE — PROGRESS NOTES
[facility] Progress Note     HD/PUF treatment ordered by JASPREET Garcia x 3 hours.  Treatment Start time: 0846          End time: 1146       Net UF 1500 ml    Patient tolerated treatment well. VS stable all through out. All blood was returned. LUE AVF needle removed. Dry gauze dressing applied and changed without bleeding issue. + Bruit/Thrill pre-post Treatment. See paper flow sheet for details.     Report given to ROSALINDA Ward.

## 2020-12-20 NOTE — PROGRESS NOTES
Hospital Medicine Daily Progress Note    Date of Service  12/20/2020    Chief Complaint  65 y.o. male admitted 12/18/2020 with chest pain and shortness of breath.    Hospital Course  65 y.o. male with past medical history of stage renal disease, on hemodialysis Monday Wednesday Friday, and recurrent pericarditis, who presented 12/18/2020 with complaints of central chest pain, dull, worsening with deep inspiration, at rest, on and off, in the last 3 days, associated with subjective difficulties breathing.  Evaluation with EKG showed sinus tachycardia with heart rate 103, no findings of MI..  Troponin is elevated at 31, D-dimer is elevated 12/13.  Subsequent CTA of pulmonary artery was negative for PE, but did show a large pericardial effusion.  ERP/Dr. Park discussed case with Dr. Will; ordered stat echo which confirmed pericardial effusion without evidence of hemodynamic compromise.   Nephrology consulted also secondary to ESRD on HD and assistance in addressing fluid balance regarding his effusion and anuric state.  I spoke with Dr Miller with cardiology and no current need for diagnostic pericardiocentesis and recommending medical management.        Interval Problem Update  12/19 Patient with continued chest pain with rest and increased with any mobility.  I spoke with cardiology and nephrology and NSAIDs recommended and okayed per nephrology.  Cardiology is concerned his pericarditis and effusion is secondary to completion of the steroids from his last bout of pericarditis in September 2020.   12/20 Patient sleeping through dialysis when examined, blood pressure had been stable.  He had HD yesterday and is having PUF today.  Per bedside RN he isn't really complaining of much pain with the continued ibuprofen.      Consultants/Specialty  Nephrology - Dr Bassett  Cardiology - Paul - recommendations made    Code Status  Full Code    Disposition  tbd    Review of Systems  Review of Systems   Constitutional:  Negative for chills and fever.   HENT: Negative for congestion and sore throat.    Eyes: Negative for blurred vision and photophobia.   Respiratory: Positive for shortness of breath. Negative for cough.    Cardiovascular: Positive for chest pain. Negative for claudication and leg swelling.   Gastrointestinal: Negative for abdominal pain, constipation, diarrhea, heartburn and vomiting.   Genitourinary: Negative for dysuria and hematuria.   Musculoskeletal: Negative for joint pain and myalgias.   Skin: Negative for itching and rash.   Neurological: Negative for dizziness, sensory change, speech change, weakness and headaches.   Psychiatric/Behavioral: Negative for depression. The patient is not nervous/anxious and does not have insomnia.         Physical Exam  Temp:  [36.3 °C (97.4 °F)-36.8 °C (98.3 °F)] 36.4 °C (97.6 °F)  Pulse:  [71-97] 71  Resp:  [16-18] 18  BP: ()/(48-77) 119/74  SpO2:  [92 %-100 %] 100 %    Physical Exam  Vitals signs and nursing note reviewed.   Constitutional:       General: He is not in acute distress.     Appearance: Normal appearance.   HENT:      Head: Normocephalic and atraumatic.   Eyes:      General: No scleral icterus.     Extraocular Movements: Extraocular movements intact.   Neck:      Musculoskeletal: Normal range of motion and neck supple.   Cardiovascular:      Rate and Rhythm: Normal rate and regular rhythm.      Pulses: Normal pulses.      Heart sounds: Normal heart sounds. No murmur.   Pulmonary:      Effort: Pulmonary effort is normal. No respiratory distress.      Breath sounds: Normal breath sounds. No wheezing, rhonchi or rales.   Abdominal:      General: Abdomen is flat. Bowel sounds are normal. There is no distension.      Palpations: Abdomen is soft.      Tenderness: There is no rebound.   Musculoskeletal:         General: No swelling or tenderness.   Lymphadenopathy:      Cervical: No cervical adenopathy.   Skin:     Coloration: Skin is not jaundiced.       Findings: No erythema.   Neurological:      General: No focal deficit present.      Mental Status: He is alert and oriented to person, place, and time. Mental status is at baseline.      Cranial Nerves: No cranial nerve deficit.   Psychiatric:         Mood and Affect: Mood normal.         Behavior: Behavior normal.         Fluids    Intake/Output Summary (Last 24 hours) at 12/20/2020 1220  Last data filed at 12/19/2020 1900  Gross per 24 hour   Intake 1140 ml   Output 1100 ml   Net 40 ml       Laboratory  Recent Labs     12/18/20  1506 12/19/20  0626 12/20/20  0359   WBC 14.6* 12.0* 9.0   RBC 3.55* 3.35* 3.48*   HEMOGLOBIN 10.8* 10.3* 10.7*   HEMATOCRIT 34.6* 32.9* 34.8*   MCV 97.5 98.2* 100.0*   MCH 30.4 30.7 30.7   MCHC 31.2* 31.3* 30.7*   RDW 56.5* 56.8* 57.4*   PLATELETCT 361 302 303   MPV 9.0 9.0 9.3     Recent Labs     12/18/20  1506 12/19/20  0626 12/20/20  0359   SODIUM 135 135 138   POTASSIUM 5.0 5.0 4.3   CHLORIDE 91* 91* 96   CO2 28 28 28   GLUCOSE 84 80 105*   BUN 27* 40* 28*   CREATININE 5.01* 6.63* 5.41*   CALCIUM 9.5 9.7 9.9                   Imaging  EC-ECHOCARDIOGRAM COMPLETE W/O CONT   Final Result      CT-CTA CHEST PULMONARY ARTERY W/ RECONS   Final Result      1.  No CT evidence for pulmonary emboli.   2.  Large pericardial effusion.   3.  Small bilateral pleural effusions with associated atelectasis, worse on the LEFT.            DX-CHEST-PORTABLE (1 VIEW)   Final Result      1.  No acute cardiopulmonary abnormality identified.      2.  Marked enlargement of the cardiac silhouette           Assessment/Plan  * Pericardial effusion  Assessment & Plan  History of IgA positive pericarditis, had been taking colchicine at home  Uremic pericarditis is unlikely as patient has been compliant with hemodialysis, his uremia is under control  Large pericardial effusion on CT noted.  Transthoracic echo confirms effusion but no hemodynamic instability  No need for diagnostic pericardiocentesis per cardiology    Discussed with Dr Miller.  Continue colchicine, initiate motrin scheduled until the pain subsides from pericarditis - okayed this with nephrology- Dr Keller.    Anxiety- (present on admission)  Assessment & Plan  PRN ativan prior to HD.      Leukocytosis  Assessment & Plan  No evidence of acute bacterial infection  Suspect this is due to acute stress de margination in setting of pericardial effusion/pericarditis.      Hypertension- (present on admission)  Assessment & Plan  toprol      Anemia- (present on admission)  Assessment & Plan  Chronic  Likely secondary to renal disease      End stage renal failure on dialysis (HCC)- (present on admission)  Assessment & Plan  Dr Bassett consulted for hemodialysis in the hospital.  Daily RRT with close monitoring of blood pressure.          VTE prophylaxis: heparin

## 2020-12-20 NOTE — PROGRESS NOTES
Telemetry Shift Summary    RHYTHM:SR-ST  HR RANGE:  ECTOPY:R PVC, R-O PAC  MEASUREMENTS:0.18/0.10/0.28    Normal Measurements  Rhythm SR  HR Range:   Measurements: 0.12-0.20/0.06-0.10/0.30-0.52    Tele strips reviewed and placed in chart.

## 2020-12-20 NOTE — PROGRESS NOTES
[facility] Services Progress Note     Hemodialysis treatment ordered today per Dr. Wanda Bassett x 3 hours.   Treatment initiated at 1535 ended at 1835.      Patient tolerated tx, BP stable during HD; see paper flow sheet for details.      Net  mL.      Needles removed from access site. Dressings applied and sites held x 10 minutes; verified no bleeding. Positive bruit/thrill post tx.   Staff RN to monitor AVF/G for breakthrough bleeding. Should breakthrough bleeding occur, staff RN to apply pressure to access sites until bleeding resolved.   Notify Dialysis and Nephrologist for follow-up.     Report given to Primary RN.

## 2020-12-20 NOTE — PROGRESS NOTES
"[facility] -  PROGRESS NOTE               Author: Wanda Bassett M.D. Date & Time: 12/20/2020  8:00 AM     HPI:  65 y.o. male with PMH of ESRD on HD MWF, HTN, anemia of CKD, and CKD-MBD  who presents to the emergency department complaining of chest pain, shortness of breath that is increasing in severity for last 3 days.  He states the pain is in the center and to the right side of his chest, increases with movement decreased with rest, has inspiratory pain as well.  No radiation to his back, to his jaw, to his abdomen or groin.       DAILY NEPHROLOGY SUMMARY:  12/19: consult done.   12/20: discussed with cardiology : no need for pericardiocentesis, recommended to restart colchicine and ibuprofen for pericardial effusion. Pt Got HD yesterday.     PAST FAMILY HISTORY: Reviewed and Unchanged  SOCIAL HISTORY: Reviewed and Unchanged  CURRENT MEDICATIONS: Reviewed  IMAGING STUDIES: Reviewed    ROS  General: No malaise, chills  CV: No chest pain, palpitation  RESP: + shortness of breath, no wheezing   GI: No abdominal pain , diarrhea  : No dysuria or gross hematuria  MSK: No trauma, back pain   Skin: No rashes, bruises  Neuro: No tremors, weakness  Psych: No depression, anxiety    PHYSICAL EXAM  VS:  /48   Pulse 85   Temp 36.4 °C (97.6 °F) (Oral)   Resp 18   Ht 1.778 m (5' 10\")   Wt 87 kg (191 lb 12.8 oz)   SpO2 94%   BMI 27.52 kg/m²   GENERAL: no acute distress  NEURO: AOx3  CV: RRR, no JVD  RESP: CTAB  GI: Soft, NT, ND, BS + in 4q  Ext: no edema   SKIN: No concerning rashes  PSYCH: Cooperative  ACCESS: LUE AVF  Fluids:  In: 1940 [P.O.:1440; Dialysis:500]  Out: 1100     LABS:  Recent Results (from the past 24 hour(s))   ACCU-CHEK GLUCOSE    Collection Time: 12/20/20 12:25 AM   Result Value Ref Range    Glucose - Accu-Ck 127 (H) 65 - 99 mg/dL   CBC WITHOUT DIFFERENTIAL    Collection Time: 12/20/20  3:59 AM   Result Value Ref Range    WBC 9.0 4.8 - 10.8 K/uL    RBC 3.48 (L) 4.70 - " 6.10 M/uL    Hemoglobin 10.7 (L) 14.0 - 18.0 g/dL    Hematocrit 34.8 (L) 42.0 - 52.0 %    .0 (H) 81.4 - 97.8 fL    MCH 30.7 27.0 - 33.0 pg    MCHC 30.7 (L) 33.7 - 35.3 g/dL    RDW 57.4 (H) 35.9 - 50.0 fL    Platelet Count 303 164 - 446 K/uL    MPV 9.3 9.0 - 12.9 fL   Basic Metabolic Panel    Collection Time: 12/20/20  3:59 AM   Result Value Ref Range    Sodium 138 135 - 145 mmol/L    Potassium 4.3 3.6 - 5.5 mmol/L    Chloride 96 96 - 112 mmol/L    Co2 28 20 - 33 mmol/L    Glucose 105 (H) 65 - 99 mg/dL    Bun 28 (H) 8 - 22 mg/dL    Creatinine 5.41 (HH) 0.50 - 1.40 mg/dL    Calcium 9.9 8.4 - 10.2 mg/dL    Anion Gap 14.0 7.0 - 16.0   ESTIMATED GFR    Collection Time: 12/20/20  3:59 AM   Result Value Ref Range    GFR If  13 (A) >60 mL/min/1.73 m 2    GFR If Non African American 11 (A) >60 mL/min/1.73 m 2       (click the triangle to expand results)      ASSESSMENT:  - ESRD    * Etiology likely 2/2 HTN    * Kenyatta Swain; MWF iHD; LAVF (+thrill/bruit)    * Living XPL surgery pending final work up at [facility]   - HTN    * Goal BP < 140/90    * Stable  - Anemia of CKD    * Goal Hgb 10-11    * Stable  - CKD-MBD    * Managed at HD unit  - Thrombocytopenia    * Monitor Platelets   - Chest pain , SOB. Pericardial effusion.   * ddx Fluid overload, viral pericarditis vs other. BUN is too low to cause uremic pericarditis.   *  troponin 31   * CT chest PE protocol: No CT evidence for pulmonary emboli. Large pericardial effusion. Small bilateral pleural effusions with associated atelectasis, worse on the LEFT.  * TTE 12/18: Compared to the images of the study done -09/02/2020  there has been an interval increase in the pericardial effusion.Left ventricular ejection fraction is visually estimated to be 65%. Moderate pericardial effusion without evidence of hemodynamic compromise.     PLAN:  - appreciate cardiology recommendation: no pericardiocentesis is needed. Restart ed on colchicine and ibuprofen.   - HD  and PUF intermittently every other day to challenge dry weight . Today PUF.   - UF as tolerated  - No dietary protein restrictions  - Dose all meds per ESRD  - He takes ativan 1 mg prior to each HD, please continue that while inpatient  - Low sodium diet  - Renal diet     Wanda Bassett M.D.

## 2020-12-20 NOTE — PROGRESS NOTES
Report received from Eliane TELLEZ. Pt asleep at the time of report. Plan of care assumed. Safety precautions in place and call light within reach.

## 2020-12-21 ENCOUNTER — PATIENT OUTREACH (OUTPATIENT)
Dept: HEALTH INFORMATION MANAGEMENT | Facility: OTHER | Age: 65
End: 2020-12-21

## 2020-12-21 LAB
ANION GAP SERPL CALC-SCNC: 17 MMOL/L (ref 7–16)
BUN SERPL-MCNC: 46 MG/DL (ref 8–22)
CALCIUM SERPL-MCNC: 10.2 MG/DL (ref 8.4–10.2)
CHLORIDE SERPL-SCNC: 96 MMOL/L (ref 96–112)
CO2 SERPL-SCNC: 25 MMOL/L (ref 20–33)
CREAT SERPL-MCNC: 7.52 MG/DL (ref 0.5–1.4)
ERYTHROCYTE [DISTWIDTH] IN BLOOD BY AUTOMATED COUNT: 56.1 FL (ref 35.9–50)
GLUCOSE SERPL-MCNC: 65 MG/DL (ref 65–99)
HCT VFR BLD AUTO: 34.6 % (ref 42–52)
HGB BLD-MCNC: 10.6 G/DL (ref 14–18)
MCH RBC QN AUTO: 29.9 PG (ref 27–33)
MCHC RBC AUTO-ENTMCNC: 30.6 G/DL (ref 33.7–35.3)
MCV RBC AUTO: 97.7 FL (ref 81.4–97.8)
PLATELET # BLD AUTO: 362 K/UL (ref 164–446)
PMV BLD AUTO: 9.5 FL (ref 9–12.9)
POTASSIUM SERPL-SCNC: 4.6 MMOL/L (ref 3.6–5.5)
RBC # BLD AUTO: 3.54 M/UL (ref 4.7–6.1)
SODIUM SERPL-SCNC: 138 MMOL/L (ref 135–145)
WBC # BLD AUTO: 7.4 K/UL (ref 4.8–10.8)

## 2020-12-21 PROCEDURE — 700102 HCHG RX REV CODE 250 W/ 637 OVERRIDE(OP): Performed by: INTERNAL MEDICINE

## 2020-12-21 PROCEDURE — 5A1D70Z PERFORMANCE OF URINARY FILTRATION, INTERMITTENT, LESS THAN 6 HOURS PER DAY: ICD-10-PCS | Performed by: STUDENT IN AN ORGANIZED HEALTH CARE EDUCATION/TRAINING PROGRAM

## 2020-12-21 PROCEDURE — 770020 HCHG ROOM/CARE - TELE (206)

## 2020-12-21 PROCEDURE — A9270 NON-COVERED ITEM OR SERVICE: HCPCS | Performed by: INTERNAL MEDICINE

## 2020-12-21 PROCEDURE — 99232 SBSQ HOSP IP/OBS MODERATE 35: CPT | Performed by: INTERNAL MEDICINE

## 2020-12-21 PROCEDURE — 700111 HCHG RX REV CODE 636 W/ 250 OVERRIDE (IP): Performed by: INTERNAL MEDICINE

## 2020-12-21 PROCEDURE — 80048 BASIC METABOLIC PNL TOTAL CA: CPT

## 2020-12-21 PROCEDURE — 90935 HEMODIALYSIS ONE EVALUATION: CPT

## 2020-12-21 PROCEDURE — 85027 COMPLETE CBC AUTOMATED: CPT

## 2020-12-21 RX ADMIN — LORAZEPAM 1 MG: 1 TABLET ORAL at 08:14

## 2020-12-21 RX ADMIN — OMEPRAZOLE 40 MG: 20 CAPSULE, DELAYED RELEASE ORAL at 05:26

## 2020-12-21 RX ADMIN — HEPARIN SODIUM 5000 UNITS: 5000 INJECTION, SOLUTION INTRAVENOUS; SUBCUTANEOUS at 05:27

## 2020-12-21 RX ADMIN — SEVELAMER CARBONATE 2400 MG: 800 TABLET, FILM COATED ORAL at 17:58

## 2020-12-21 RX ADMIN — SEVELAMER CARBONATE 2400 MG: 800 TABLET, FILM COATED ORAL at 12:06

## 2020-12-21 RX ADMIN — HEPARIN SODIUM 5000 UNITS: 5000 INJECTION, SOLUTION INTRAVENOUS; SUBCUTANEOUS at 13:53

## 2020-12-21 RX ADMIN — IBUPROFEN 600 MG: 600 TABLET ORAL at 13:53

## 2020-12-21 RX ADMIN — IBUPROFEN 600 MG: 600 TABLET ORAL at 05:27

## 2020-12-21 RX ADMIN — IBUPROFEN 600 MG: 600 TABLET ORAL at 21:06

## 2020-12-21 RX ADMIN — HEPARIN SODIUM 5000 UNITS: 5000 INJECTION, SOLUTION INTRAVENOUS; SUBCUTANEOUS at 21:06

## 2020-12-21 RX ADMIN — CINACALCET HYDROCHLORIDE 30 MG: 30 TABLET, FILM COATED ORAL at 05:27

## 2020-12-21 ASSESSMENT — ENCOUNTER SYMPTOMS
PHOTOPHOBIA: 0
COUGH: 0
BLURRED VISION: 0
WEAKNESS: 0
CHILLS: 0
HEADACHES: 0
MYALGIAS: 0
DEPRESSION: 0
SHORTNESS OF BREATH: 1
VOMITING: 0
HEARTBURN: 0
FEVER: 0
ABDOMINAL PAIN: 0
NERVOUS/ANXIOUS: 0
SORE THROAT: 0
DIZZINESS: 0
SENSORY CHANGE: 0
DIARRHEA: 0
INSOMNIA: 0
SPEECH CHANGE: 0
CONSTIPATION: 0
CLAUDICATION: 0

## 2020-12-21 ASSESSMENT — PAIN DESCRIPTION - PAIN TYPE
TYPE: ACUTE PAIN;CHRONIC PAIN
TYPE: ACUTE PAIN;CHRONIC PAIN

## 2020-12-21 NOTE — PROGRESS NOTES
Hospital Medicine Daily Progress Note    Date of Service  12/21/2020    Chief Complaint  65 y.o. male admitted 12/18/2020 with chest pain and shortness of breath.    Hospital Course  65 y.o. male with past medical history of stage renal disease, on hemodialysis Monday Wednesday Friday, and recurrent pericarditis, who presented 12/18/2020 with complaints of central chest pain, dull, worsening with deep inspiration, at rest, on and off, in the last 3 days, associated with subjective difficulties breathing.  Evaluation with EKG showed sinus tachycardia with heart rate 103, no findings of MI..  Troponin is elevated at 31, D-dimer is elevated 12/13.  Subsequent CTA of pulmonary artery was negative for PE, but did show a large pericardial effusion.  ERP/Dr. Park discussed case with Dr. Will; ordered stat echo which confirmed pericardial effusion without evidence of hemodynamic compromise.   Nephrology consulted also secondary to ESRD on HD and assistance in addressing fluid balance regarding his effusion and anuric state.  I spoke with Dr Miller with cardiology and no current need for diagnostic pericardiocentesis and recommending medical management.        Interval Problem Update  12/19 Patient with continued chest pain with rest and increased with any mobility.  I spoke with cardiology and nephrology and NSAIDs recommended and okayed per nephrology.  Cardiology is concerned his pericarditis and effusion is secondary to completion of the steroids from his last bout of pericarditis in September 2020.   12/20 Patient sleeping through dialysis when examined, blood pressure had been stable.  He had HD yesterday and is having PUF today.  Per bedside RN he isn't really complaining of much pain with the continued ibuprofen.    12/21 Patient wakes easily today, seen again during dialysis.  His chest pain has improved but has not resolved.  He is concerned that he will be missing his pain management appointment today.  I've  requested the bedside RN to notify the clinic of his hospitalization.  Patient is okay to be off telemetry for a shower today.    Consultants/Specialty  Nephrology - Dr Bassett  Cardiology - Paul - recommendations made    Code Status  Full Code    Disposition  tbd    Review of Systems  Review of Systems   Constitutional: Negative for chills and fever.   HENT: Negative for congestion and sore throat.    Eyes: Negative for blurred vision and photophobia.   Respiratory: Positive for shortness of breath (better). Negative for cough.    Cardiovascular: Positive for chest pain (better). Negative for claudication and leg swelling.   Gastrointestinal: Negative for abdominal pain, constipation, diarrhea, heartburn and vomiting.   Genitourinary: Negative for dysuria and hematuria.   Musculoskeletal: Negative for joint pain and myalgias.   Skin: Negative for itching and rash.   Neurological: Negative for dizziness, sensory change, speech change, weakness and headaches.   Psychiatric/Behavioral: Negative for depression. The patient is not nervous/anxious and does not have insomnia.         Physical Exam  Temp:  [36.3 °C (97.3 °F)-36.7 °C (98.1 °F)] 36.3 °C (97.3 °F)  Pulse:  [47-85] 78  Resp:  [16] 16  BP: (110-134)/(61-83) 134/70  SpO2:  [92 %-100 %] 97 %    Physical Exam  Vitals signs and nursing note reviewed.   Constitutional:       General: He is not in acute distress.     Appearance: Normal appearance.   HENT:      Head: Normocephalic and atraumatic.   Eyes:      General: No scleral icterus.     Extraocular Movements: Extraocular movements intact.   Neck:      Musculoskeletal: Normal range of motion and neck supple.   Cardiovascular:      Rate and Rhythm: Normal rate and regular rhythm.      Pulses: Normal pulses.      Heart sounds: Normal heart sounds. No murmur.   Pulmonary:      Effort: Pulmonary effort is normal. No respiratory distress.      Breath sounds: Normal breath sounds. No wheezing, rhonchi or rales.    Abdominal:      General: Abdomen is flat. Bowel sounds are normal. There is no distension.      Palpations: Abdomen is soft.      Tenderness: There is no rebound.   Musculoskeletal:         General: No swelling or tenderness.   Lymphadenopathy:      Cervical: No cervical adenopathy.   Skin:     Coloration: Skin is not jaundiced.      Findings: No erythema.   Neurological:      General: No focal deficit present.      Mental Status: He is alert and oriented to person, place, and time. Mental status is at baseline.      Cranial Nerves: No cranial nerve deficit.   Psychiatric:         Mood and Affect: Mood normal.         Behavior: Behavior normal.         Fluids    Intake/Output Summary (Last 24 hours) at 12/21/2020 1032  Last data filed at 12/20/2020 1146  Gross per 24 hour   Intake 500 ml   Output 2000 ml   Net -1500 ml       Laboratory  Recent Labs     12/19/20  0626 12/20/20  0359 12/21/20  0401   WBC 12.0* 9.0 7.4   RBC 3.35* 3.48* 3.54*   HEMOGLOBIN 10.3* 10.7* 10.6*   HEMATOCRIT 32.9* 34.8* 34.6*   MCV 98.2* 100.0* 97.7   MCH 30.7 30.7 29.9   MCHC 31.3* 30.7* 30.6*   RDW 56.8* 57.4* 56.1*   PLATELETCT 302 303 362   MPV 9.0 9.3 9.5     Recent Labs     12/19/20  0626 12/20/20  0359 12/21/20  0401   SODIUM 135 138 138   POTASSIUM 5.0 4.3 4.6   CHLORIDE 91* 96 96   CO2 28 28 25   GLUCOSE 80 105* 65   BUN 40* 28* 46*   CREATININE 6.63* 5.41* 7.52*   CALCIUM 9.7 9.9 10.2                   Imaging  EC-ECHOCARDIOGRAM COMPLETE W/O CONT   Final Result      CT-CTA CHEST PULMONARY ARTERY W/ RECONS   Final Result      1.  No CT evidence for pulmonary emboli.   2.  Large pericardial effusion.   3.  Small bilateral pleural effusions with associated atelectasis, worse on the LEFT.            DX-CHEST-PORTABLE (1 VIEW)   Final Result      1.  No acute cardiopulmonary abnormality identified.      2.  Marked enlargement of the cardiac silhouette           Assessment/Plan  * Pericardial effusion  Assessment & Plan  History of IgA  positive pericarditis, had been taking colchicine at home  Uremic pericarditis is unlikely as patient has been compliant with hemodialysis, his uremia is under control  Large pericardial effusion on CT noted.  Transthoracic echo confirms effusion but no hemodynamic instability  No need for diagnostic pericardiocentesis per cardiology   Discussed with Dr Miller.  Continue colchicine, initiate motrin scheduled until the pain subsides from pericarditis - okayed this with nephrology- Dr Keller.    Anxiety- (present on admission)  Assessment & Plan  PRN ativan prior to HD.      Leukocytosis  Assessment & Plan  No evidence of acute bacterial infection  Suspect this is due to acute stress de margination in setting of pericardial effusion/pericarditis.      Hypertension- (present on admission)  Assessment & Plan  toprol      Anemia- (present on admission)  Assessment & Plan  Chronic  Likely secondary to renal disease      End stage renal failure on dialysis (HCC)- (present on admission)  Assessment & Plan  Dr Bassett consulted for hemodialysis in the hospital.  Daily RRT with close monitoring of blood pressure.          VTE prophylaxis: heparin

## 2020-12-21 NOTE — PROGRESS NOTES
"[facility] -  PROGRESS NOTE               Author: MADELEINE Mixon, CNN-NP  Collaborating Physician:  Dr. Wanda Bassett Date & Time: 12/21/2020  8:27 AM     HPI:  65 y.o. male with PMH of ESRD on HD MWF, HTN, anemia of CKD, and CKD-MBD  who presents to the emergency department complaining of chest pain, shortness of breath that is increasing in severity for last 3 days.  He states the pain is in the center and to the right side of his chest, increases with movement decreased with rest, has inspiratory pain as well.  No radiation to his back, to his jaw, to his abdomen or groin.       DAILY NEPHROLOGY SUMMARY:  12/19: consult done.   12/20: discussed with cardiology : no need for pericardiocentesis, recommended to restart colchicine and ibuprofen for pericardial effusion. Pt Got HD yesterday.   12/21: seen on HD, anxious about having to be in hosp and ongoing CP, SOB and CP feel a bit better but still present    PAST FAMILY HISTORY: Reviewed and Unchanged  SOCIAL HISTORY: Reviewed and Unchanged  CURRENT MEDICATIONS: Reviewed  IMAGING STUDIES: Reviewed    ROS  General: No malaise, chills  CV: No chest pain, palpitation  RESP: + shortness of breath, no wheezing   GI: No abdominal pain , diarrhea  : No dysuria or gross hematuria  MSK: No trauma, back pain   Skin: No rashes, bruises  Neuro: No tremors, weakness  Psych: No depression, anxiety    PHYSICAL EXAM  VS:  /70   Pulse 78   Temp 36.3 °C (97.3 °F) (Oral)   Resp 16   Ht 1.778 m (5' 10\")   Wt 87 kg (191 lb 12.8 oz)   SpO2 97%   BMI 27.52 kg/m²   GENERAL: no acute distress  NEURO: AOx3  CV: RRR, no JVD  RESP: CTAB  GI: Soft, NT, ND, BS + in 4q  Ext: no edema   SKIN: No concerning rashes  PSYCH: Cooperative  ACCESS: LUE AVF  Fluids:  In: 500 [Dialysis:500]  Out: 2000     LABS:  Recent Results (from the past 24 hour(s))   CBC WITHOUT DIFFERENTIAL    Collection Time: 12/21/20  4:01 AM   Result Value Ref Range    WBC 7.4 4.8 - " 10.8 K/uL    RBC 3.54 (L) 4.70 - 6.10 M/uL    Hemoglobin 10.6 (L) 14.0 - 18.0 g/dL    Hematocrit 34.6 (L) 42.0 - 52.0 %    MCV 97.7 81.4 - 97.8 fL    MCH 29.9 27.0 - 33.0 pg    MCHC 30.6 (L) 33.7 - 35.3 g/dL    RDW 56.1 (H) 35.9 - 50.0 fL    Platelet Count 362 164 - 446 K/uL    MPV 9.5 9.0 - 12.9 fL   Basic Metabolic Panel    Collection Time: 12/21/20  4:01 AM   Result Value Ref Range    Sodium 138 135 - 145 mmol/L    Potassium 4.6 3.6 - 5.5 mmol/L    Chloride 96 96 - 112 mmol/L    Co2 25 20 - 33 mmol/L    Glucose 65 65 - 99 mg/dL    Bun 46 (H) 8 - 22 mg/dL    Creatinine 7.52 (HH) 0.50 - 1.40 mg/dL    Calcium 10.2 8.4 - 10.2 mg/dL    Anion Gap 17.0 (H) 7.0 - 16.0   ESTIMATED GFR    Collection Time: 12/21/20  4:01 AM   Result Value Ref Range    GFR If  9 (A) >60 mL/min/1.73 m 2    GFR If Non African American 7 (A) >60 mL/min/1.73 m 2       (click the triangle to expand results)      ASSESSMENT:  - ESRD    * Etiology likely 2/2 HTN    * Kenyatta Swain; MWF iHD; LAVF (+thrill/bruit)    * Living XPL surgery pending final work up at [facility]   - HTN, controlled     * Goal BP < 140/90    * continue BB  - Anemia of CKD    * Goal Hgb 10-11    * Stable  - CKD-MBD    * Managed at HD unit  - Thrombocytopenia    * Monitor Platelets   - Chest pain , SOB. Pericardial effusion.   * ddx Fluid overload, viral pericarditis vs other. BUN is too low to cause uremic pericarditis.   *  troponin 31   * CT chest PE protocol: No CT evidence for pulmonary emboli. Large pericardial effusion. Small bilateral pleural effusions with associated atelectasis, worse on the LEFT.  * TTE 12/18: Compared to the images of the study done -09/02/2020  there has been an interval increase in the pericardial effusion.Left ventricular ejection fraction is visually estimated to be 65%. Moderate pericardial effusion without evidence of hemodynamic compromise.     PLAN:  - appreciate cardiology recommendation: no pericardiocentesis is needed.  Restart ed on colchicine and ibuprofen.   - HD and PUF intermittently every other day to challenge dry weight . Today HD (Mon)  - UF as tolerated  - No dietary protein restrictions  - Dose all meds per ESRD  - He takes ativan 1 mg prior to each HD, please continue that while inpatient  - Low sodium diet  - Renal diet

## 2020-12-21 NOTE — PROGRESS NOTES
12/21- JULIUS Shen attempted to meet pt bedside to introduce CCM services. Pt resting. Attempted to wake pt unsuccessful. Will try again later.   12/22- JULIUS Shen attempted to meet pt bedside. Pt receiving hemodialysis. Will try again later.   12/28- JULIUS Shen contated pt via home phone to introduce CCM services. Pt declined needs at this time. Pt has follow up visit scheduled. Will be d/c from CCM services as all needs met.

## 2020-12-21 NOTE — PROGRESS NOTES
"[facility] Services Progress Note      HD today as ordered by Dr. Wanda Bassett. Treatment started @ 0826 and ended @ 0951.    Patient is AOx4. Requested anxiety medication prior to treatment. ROSALINDA Desai notified, medication given. AVF access CDI, no s/sx of infection with +B/t prior to cannulation.      UF net: 127 mL    0915: Patient flexed arm,  increased. BFR decreased to 250-300. Slight swelling on venous site noted.    0945: Patient does not feel good, verbalized to \"bubbling on chest, and requested to stop treatment. VSS.    0951: Blood returned. Dr. TITO Bassett notified, okay to terminate with 1:35 mins remaining.    Patient in stable condition at this time.     L arm AVF with bruit and thrill post tx. [place] removed. Dressing applied and hold AVF/AVG site 5 minutes. No bleeding post HD. Dressing CDI.        Report given to Primary ZAKIYA Olsen R.N.  "

## 2020-12-21 NOTE — DISCHARGE PLANNING
Outpatient Dialysis Note    Confirmed patient is active at:    [facility]   [address] [place] [address]    Holiday Schedule: Monday, Wednesday, Saturday   Time: 04:45am     Patient is seen by Dr. JASPREET Bassett  in HD clinic.    Spoke with Ladan at facility who confirmed.    Forwarded records for review.    Jennifer Nelson- Dialysis Coordinator  [facility] # [phone]

## 2020-12-21 NOTE — PROGRESS NOTES
Telemetry Strip     Strip printed: 1615  Measurements from am strip were as follows:  Rhythm: SR  HR: 72-93  Measurements: 0.16/ 0.08/ 0.36  Ectopy: o PAC, r PVC             Normal Values  Rhythm SR  HR Range    Measurements 0.12-0.20 / 0.06-0.10  / 0.30-0.52

## 2020-12-21 NOTE — PROGRESS NOTES
Report received from Lynne TELLEZ. Pt sitting up in bed asleep at the time of report. Plan of care assumed. Safety precautions in place and call light within reach.

## 2020-12-22 ENCOUNTER — APPOINTMENT (OUTPATIENT)
Dept: CARDIOLOGY | Facility: MEDICAL CENTER | Age: 65
DRG: 314 | End: 2020-12-22
Attending: NURSE PRACTITIONER
Payer: MEDICARE

## 2020-12-22 VITALS
TEMPERATURE: 98.6 F | HEART RATE: 90 BPM | BODY MASS INDEX: 27.46 KG/M2 | RESPIRATION RATE: 16 BRPM | SYSTOLIC BLOOD PRESSURE: 144 MMHG | HEIGHT: 70 IN | WEIGHT: 191.8 LBS | DIASTOLIC BLOOD PRESSURE: 73 MMHG | OXYGEN SATURATION: 91 %

## 2020-12-22 PROBLEM — D72.829 LEUKOCYTOSIS: Status: RESOLVED | Noted: 2020-12-19 | Resolved: 2020-12-22

## 2020-12-22 LAB
ALBUMIN SERPL BCP-MCNC: 3.1 G/DL (ref 3.2–4.9)
ALBUMIN/GLOB SERPL: 0.8 G/DL
ALP SERPL-CCNC: 82 U/L (ref 30–99)
ALT SERPL-CCNC: 10 U/L (ref 2–50)
ANION GAP SERPL CALC-SCNC: 18 MMOL/L (ref 7–16)
AST SERPL-CCNC: 14 U/L (ref 12–45)
BILIRUB SERPL-MCNC: 0.2 MG/DL (ref 0.1–1.5)
BUN SERPL-MCNC: 46 MG/DL (ref 8–22)
CALCIUM SERPL-MCNC: 10.3 MG/DL (ref 8.4–10.2)
CHLORIDE SERPL-SCNC: 96 MMOL/L (ref 96–112)
CO2 SERPL-SCNC: 23 MMOL/L (ref 20–33)
CREAT SERPL-MCNC: 7.23 MG/DL (ref 0.5–1.4)
GLOBULIN SER CALC-MCNC: 4.1 G/DL (ref 1.9–3.5)
GLUCOSE SERPL-MCNC: 84 MG/DL (ref 65–99)
POTASSIUM SERPL-SCNC: 4.4 MMOL/L (ref 3.6–5.5)
PROT SERPL-MCNC: 7.2 G/DL (ref 6–8.2)
SODIUM SERPL-SCNC: 137 MMOL/L (ref 135–145)

## 2020-12-22 PROCEDURE — 93308 TTE F-UP OR LMTD: CPT

## 2020-12-22 PROCEDURE — 5A1D70Z PERFORMANCE OF URINARY FILTRATION, INTERMITTENT, LESS THAN 6 HOURS PER DAY: ICD-10-PCS | Performed by: STUDENT IN AN ORGANIZED HEALTH CARE EDUCATION/TRAINING PROGRAM

## 2020-12-22 PROCEDURE — 99239 HOSP IP/OBS DSCHRG MGMT >30: CPT | Performed by: INTERNAL MEDICINE

## 2020-12-22 PROCEDURE — 700111 HCHG RX REV CODE 636 W/ 250 OVERRIDE (IP): Performed by: INTERNAL MEDICINE

## 2020-12-22 PROCEDURE — 90935 HEMODIALYSIS ONE EVALUATION: CPT

## 2020-12-22 PROCEDURE — A9270 NON-COVERED ITEM OR SERVICE: HCPCS | Performed by: INTERNAL MEDICINE

## 2020-12-22 PROCEDURE — 700102 HCHG RX REV CODE 250 W/ 637 OVERRIDE(OP): Performed by: INTERNAL MEDICINE

## 2020-12-22 PROCEDURE — 80053 COMPREHEN METABOLIC PANEL: CPT

## 2020-12-22 PROCEDURE — 93308 TTE F-UP OR LMTD: CPT | Mod: 26 | Performed by: INTERNAL MEDICINE

## 2020-12-22 RX ORDER — IBUPROFEN 600 MG/1
600 TABLET ORAL EVERY 8 HOURS
Qty: 42 TAB | Refills: 0 | Status: SHIPPED | OUTPATIENT
Start: 2020-12-22 | End: 2021-01-05

## 2020-12-22 RX ADMIN — HEPARIN SODIUM 5000 UNITS: 5000 INJECTION, SOLUTION INTRAVENOUS; SUBCUTANEOUS at 15:27

## 2020-12-22 RX ADMIN — SEVELAMER CARBONATE 2400 MG: 800 TABLET, FILM COATED ORAL at 11:29

## 2020-12-22 RX ADMIN — COLCHICINE 0.6 MG: 0.6 TABLET, FILM COATED ORAL at 05:00

## 2020-12-22 RX ADMIN — METOPROLOL SUCCINATE 100 MG: 100 TABLET, EXTENDED RELEASE ORAL at 05:00

## 2020-12-22 RX ADMIN — IBUPROFEN 600 MG: 600 TABLET ORAL at 04:59

## 2020-12-22 RX ADMIN — LORAZEPAM 1 MG: 1 TABLET ORAL at 09:05

## 2020-12-22 RX ADMIN — HEPARIN SODIUM 5000 UNITS: 5000 INJECTION, SOLUTION INTRAVENOUS; SUBCUTANEOUS at 05:00

## 2020-12-22 RX ADMIN — IBUPROFEN 600 MG: 600 TABLET ORAL at 15:27

## 2020-12-22 RX ADMIN — CINACALCET HYDROCHLORIDE 30 MG: 30 TABLET, FILM COATED ORAL at 04:59

## 2020-12-22 RX ADMIN — OMEPRAZOLE 40 MG: 20 CAPSULE, DELAYED RELEASE ORAL at 05:00

## 2020-12-22 NOTE — PROGRESS NOTES
"[facility] -  PROGRESS NOTE               Author: MADELEINE Mixon, CNN-NP  Collaborating Physician:  Dr. Wanda Bassett Date & Time: 12/22/2020  8:28 AM     HPI:  65 y.o. male with PMH of ESRD on HD MWF, HTN, anemia of CKD, and CKD-MBD  who presents to the emergency department complaining of chest pain, shortness of breath that is increasing in severity for last 3 days.  He states the pain is in the center and to the right side of his chest, increases with movement decreased with rest, has inspiratory pain as well.  No radiation to his back, to his jaw, to his abdomen or groin.       DAILY NEPHROLOGY SUMMARY:  12/19: consult done.   12/20: discussed with cardiology : no need for pericardiocentesis, recommended to restart colchicine and ibuprofen for pericardial effusion. Pt Got HD yesterday.   12/21: seen on HD, anxious about having to be in hosp and ongoing CP, SOB and CP feel a bit better but still present  12/22: sitting up in bed, reports feeling improved, had to DC HD 1.5hrs early yesterday due to arm pain per pt, RN notes \"bubbling in chest\" but pt says it was fine, UF: not charted, for PUF today     PAST FAMILY HISTORY: Reviewed and Unchanged  SOCIAL HISTORY: Reviewed and Unchanged  CURRENT MEDICATIONS: Reviewed  IMAGING STUDIES: Reviewed    ROS  General: No malaise, chills  CV: No chest pain, palpitation  RESP: + shortness of breath, no wheezing   GI: No abdominal pain , diarrhea  : No dysuria or gross hematuria  MSK: No trauma, back pain   Skin: No rashes, bruises  Neuro: No tremors, weakness  Psych: No depression, anxiety    PHYSICAL EXAM  VS:  /81   Pulse 91   Temp 36.4 °C (97.5 °F) (Oral)   Resp 19   Ht 1.778 m (5' 10\")   Wt 87 kg (191 lb 12.8 oz)   SpO2 95%   BMI 27.52 kg/m²   GENERAL: no acute distress  NEURO: AOx3  CV: RRR, no JVD  RESP: CTAB  GI: Soft, NT, ND, BS + in 4q  Ext: no edema   SKIN: No concerning rashes  PSYCH: Cooperative  ACCESS: LUE " AVF  Fluids:  In: 500 [Dialysis:500]  Out: 627     LABS:  Recent Results (from the past 24 hour(s))   Comp Metabolic Panel    Collection Time: 12/22/20  3:59 AM   Result Value Ref Range    Sodium 137 135 - 145 mmol/L    Potassium 4.4 3.6 - 5.5 mmol/L    Chloride 96 96 - 112 mmol/L    Co2 23 20 - 33 mmol/L    Anion Gap 18.0 (H) 7.0 - 16.0    Glucose 84 65 - 99 mg/dL    Bun 46 (H) 8 - 22 mg/dL    Creatinine 7.23 (HH) 0.50 - 1.40 mg/dL    Calcium 10.3 (H) 8.4 - 10.2 mg/dL    AST(SGOT) 14 12 - 45 U/L    ALT(SGPT) 10 2 - 50 U/L    Alkaline Phosphatase 82 30 - 99 U/L    Total Bilirubin 0.2 0.1 - 1.5 mg/dL    Albumin 3.1 (L) 3.2 - 4.9 g/dL    Total Protein 7.2 6.0 - 8.2 g/dL    Globulin 4.1 (H) 1.9 - 3.5 g/dL    A-G Ratio 0.8 g/dL   ESTIMATED GFR    Collection Time: 12/22/20  3:59 AM   Result Value Ref Range    GFR If  9 (A) >60 mL/min/1.73 m 2    GFR If Non  8 (A) >60 mL/min/1.73 m 2       (click the triangle to expand results)      ASSESSMENT:  - ESRD    * Etiology likely 2/2 HTN    * Kenyatta Swain; MWF iHD; LAVF (+thrill/bruit)    * Living XPL surgery pending final work up at [facility]   - HTN, controlled     * Goal BP < 140/90    * continue BB  - Anemia of CKD    * Goal Hgb 10-11    * Stable  - CKD-MBD    * Managed at HD unit  - Thrombocytopenia    * Monitor Platelets   - Chest pain , SOB. Pericardial effusion.   * ddx Fluid overload, viral pericarditis vs other. BUN is too low to cause uremic pericarditis.   *  troponin 31   * CT chest PE protocol: No CT evidence for pulmonary emboli. Large pericardial effusion. Small bilateral pleural effusions with associated atelectasis, worse on the LEFT.  * TTE 12/18: Compared to the images of the study done -09/02/2020  there has been an interval increase in the pericardial effusion.Left ventricular ejection fraction is visually estimated to be 65%. Moderate pericardial effusion without evidence of hemodynamic compromise.     PLAN:  - appreciate  cardiology recommendation: no pericardiocentesis is needed. Restarted on colchicine and ibuprofen.   - HD and PUF intermittently every other day to challenge dry weight . Today PUF (Tue)  - Repeat ECHO today after HD tx   - UF as tolerated  - No dietary protein restrictions  - Dose all meds per ESRD  - He takes ativan 1 mg prior to each HD, please continue that while inpatient  - Low sodium diet  - Renal diet  - Discussed with pt the importance of completing treatments and not missing treatments as outpt

## 2020-12-22 NOTE — PROGRESS NOTES
Hemodialysis done today, started @ 1013 and ended @ 1410 with net UF= 1311ml. Blood returned 40 mins earlier due to requested to go to the restroom and also cartridge has mod. sign of clotting, Dr Bassett notified with order to continue tx and remove 500ml more, Replaced with net set up then tx restarted.Blood again returned for the second time  16 mins earlier due to complaint of lightheadedness, BP = 155/81@ this time also patient requested to stop HD, Dr Bassett notified with order to stop HD for today,VSS stable post HD, total PUF time 3 hours 9 mins.Report given to ROSALINDA Duncan. See flow sheet for details

## 2020-12-23 NOTE — DISCHARGE SUMMARY
Discharge Summary    CHIEF COMPLAINT ON ADMISSION  Chief Complaint   Patient presents with   • Chest Pain   • Shortness of Breath       Reason for Admission  chest pain,      Admission Date  12/18/2020    CODE STATUS  Full Code    HPI & HOSPITAL COURSE  65M ESRD on HD MWF, HTN, anemia of CKD, and CKD-MBD, History of IgA positive recurrent pericarditis who presented 12/18/2020 with complaints of central chest pain, dull, worsening with deep inspiration, at rest, on and off, in the last 3 days, associated with subjective difficulties breathing.  Evaluation with EKG showed sinus tachycardia with heart rate 103, no findings of MI..  Troponin is elevated at 31, D-dimer is elevated 12/13.  Subsequent CTA of pulmonary artery was negative for PE, but did show a large pericardial effusion.      ERP/Dr. Park discussed case with Dr. Will; ordered stat echo which confirmed pericardial effusion without evidence of hemodynamic compromise.       Nephrology consulted also secondary to ESRD on HD and assistance in addressing fluid balance regarding his effusion and anuric state.     Dr Miller with cardiology and no current need for diagnostic pericardiocentesis and recommending medical management.      Patient was continued on colchicine and motrin and daily HD/PUF.  Pain is improving on the day of discharge.  Patient was accepting to go home as he felt better.    Therefore, he is discharged in fair and stable condition to home with close outpatient follow-up.    The patient met 2-midnight criteria for an inpatient stay at the time of discharge.    Discharge Date  12/22/2020    FOLLOW UP ITEMS POST DISCHARGE  Listed below with PCP.    DISCHARGE DIAGNOSES  Principal Problem:    Pericardial effusion POA: Yes  Active Problems:    Anxiety (Chronic) POA: Yes      Overview:           End stage renal failure on dialysis (HCC) (Chronic) POA: Yes      Overview: fb [facility] nephrology // ON [facility] Transplant list today is: ACTIVE                          Cristofer Arianna      [phone] (Work)      [phone] (Fax)      [address] [place] [address]      NEPHROLOGY        [birth year]. [birth year] - Sep. 04, 2020September 04, 2020              Organization       [facility] [address]       [place] [address]             Has been on dialysis Mon Wed Fri      Pt awaiting for kidney transplant    Anemia (Chronic) POA: Yes      Overview: Base Hg 9-11.       Pt w ESRD            Most recent Hg 11.3   Sep 4 2020    Hypertension (Chronic) POA: Yes  Resolved Problems:    Leukocytosis POA: Yes      FOLLOW UP  No future appointments.  Geo Espino III, M.D.  [address] [facility] [id number] [phone]    Schedule an appointment as soon as possible for a visit in 1 week  F/U for chest pain due to chronic pericarditis, on colchicine and ibuprofen.      MEDICATIONS ON DISCHARGE     Medication List      START taking these medications      Instructions   ibuprofen 600 MG Tabs  Commonly known as: MOTRIN   Take 1 Tab by mouth every 8 hours for 14 days.  Dose: 600 mg        CHANGE how you take these medications      Instructions   metoprolol  MG Tb24  What changed:   · how much to take  · how to take this  · when to take this  · additional instructions  Commonly known as: TOPROL XL   Doctor's comments: Pt to make appt prior to more refills.  Take 1 tablet by mouth once daily        CONTINUE taking these medications      Instructions   colchicine 0.6 MG Tabs  Commonly known as: COLCRYS   Take 0.6 mg by mouth every 48 hours.  Dose: 0.6 mg     pantoprazole 20 MG tablet  Commonly known as: PROTONIX   Take 1 Tab by mouth every day.  Dose: 20 mg     RenaPlex-D Tabs   Take 1 Tab by mouth every day.  Dose: 1 Tab     Sensipar 30 MG Tabs  Generic drug: cinacalcet   Take 30 mg by mouth every day.  Dose: 30 mg     sevelamer carbonate 800 MG Tabs tablet  Commonly  "known as: RENVELA   Take 1,600-2,400 mg by mouth see administration instructions. 2400 mg per meal  2066-4319 mg per snack  Dose: 1,600-2,400 mg        STOP taking these medications    LORazepam 0.5 MG Tabs  Commonly known as: ATIVAN            Allergies  Allergies   Allergen Reactions   • Cephalexin Rash   • Penicillins Unspecified and Rash     Pt states \"It was a childhood allergie\".         DIET  Orders Placed This Encounter   Procedures   • Diet Order Diet: Renal; Fluid modifications: (optional): 720 ml Fluid Restriction     Standing Status:   Standing     Number of Occurrences:   1     Order Specific Question:   Diet:     Answer:   Renal [8]     Order Specific Question:   Fluid modifications: (optional)     Answer:   720 ml Fluid Restriction [6]       ACTIVITY  As tolerated.  Weight bearing as tolerated    CONSULTATIONS  Cardiology  Nephrology    PROCEDURES  Hemodialysis    LABORATORY  Lab Results   Component Value Date    SODIUM 137 12/22/2020    POTASSIUM 4.4 12/22/2020    CHLORIDE 96 12/22/2020    CO2 23 12/22/2020    GLUCOSE 84 12/22/2020    BUN 46 (H) 12/22/2020    CREATININE 7.23 (HH) 12/22/2020        Lab Results   Component Value Date    WBC 7.4 12/21/2020    HEMOGLOBIN 10.6 (L) 12/21/2020    HEMATOCRIT 34.6 (L) 12/21/2020    PLATELETCT 362 12/21/2020        Total time of the discharge process exceeds 31 minutes.  More than 50% of time was spent face to face with patient.  This included but not limited to review of hospital course with patient, treatment goals upon discharge, recommendations to PCP, continued and new medications and their adverse reactions and nursing instructions for patient.  "

## 2020-12-23 NOTE — DISCHARGE PLANNING
Pt met discharge criteria. Educated patient on discharge instructions, all questions answered. PT will schedule an appointment with cardiology in one week. Pt left the unit with friend. Pt left in no distress.

## 2020-12-23 NOTE — PROGRESS NOTES
Pt received dialysis today. Pt states MD told him he could go home after HD. Pt is currently waiting for an echo to be completed. VS WNL. No other concerns at this time will continue to monitor.

## 2020-12-23 NOTE — DISCHARGE INSTRUCTIONS
Discharge Instructions    Discharged to home by car with friend. Discharged via walking, hospital escort: Refused.  Special equipment needed: Not Applicable    Be sure to schedule a follow-up appointment with your primary care doctor or any specialists as instructed.     Discharge Plan:   Activity Level: Discussed  Confirmed Follow up Appointment: Patient to Call and Schedule Appointment  Confirmed Symptoms Management: Discussed  Medication Reconciliation Updated: Yes  Influenza Vaccine Indication: Patient Refuses    I understand that a diet low in cholesterol, fat, and sodium is recommended for good health. Unless I have been given specific instructions below for another diet, I accept this instruction as my diet prescription.   Other diet: Renal/Cardiac/Diabetic       Depression / Suicide Risk    As you are discharged from this [facility] facility, it is important to learn how to keep safe from harming yourself.    Recognize the warning signs:  · Abrupt changes in personality, positive or negative- including increase in energy   · Giving away possessions  · Change in eating patterns- significant weight changes-  positive or negative  · Change in sleeping patterns- unable to sleep or sleeping all the time   · Unwillingness or inability to communicate  · Depression  · Unusual sadness, discouragement and loneliness  · Talk of wanting to die  · Neglect of personal appearance   · Rebelliousness- reckless behavior  · Withdrawal from people/activities they love  · Confusion- inability to concentrate     If you or a loved one observes any of these behaviors or has concerns about self-harm, here's what you can do:  · Talk about it- your feelings and reasons for harming yourself  · Remove any means that you might use to hurt yourself (examples: pills, rope, extension cords, firearm)  · Get professional help from the community (Mental Health, Substance Abuse, psychological counseling)  · Do not be alone:Call your Safe  Contact- someone whom you trust who will be there for you.  · Call your local CRISIS HOTLINE 399-2279 or 617-944-0598  · Call your local Children's Mobile Crisis Response Team [place] (358) 277-6964 or www.[employer]  · Call the toll free National Suicide Prevention Hotlines   · National Suicide Prevention Lifeline 807-766-BUIS (1533)  · National [employer] Line Network 800-SUICIDE (392-7807)

## 2021-01-11 ENCOUNTER — APPOINTMENT (OUTPATIENT)
Dept: RADIOLOGY | Facility: MEDICAL CENTER | Age: 66
DRG: 177 | End: 2021-01-11
Attending: EMERGENCY MEDICINE
Payer: MEDICARE

## 2021-01-11 ENCOUNTER — HOSPITAL ENCOUNTER (INPATIENT)
Facility: MEDICAL CENTER | Age: 66
LOS: 6 days | DRG: 177 | End: 2021-01-17
Attending: EMERGENCY MEDICINE | Admitting: STUDENT IN AN ORGANIZED HEALTH CARE EDUCATION/TRAINING PROGRAM
Payer: MEDICARE

## 2021-01-11 DIAGNOSIS — R09.02 HYPOXIA: ICD-10-CM

## 2021-01-11 DIAGNOSIS — Z99.2 ESRD ON HEMODIALYSIS (HCC): ICD-10-CM

## 2021-01-11 DIAGNOSIS — R06.00 DYSPNEA, UNSPECIFIED TYPE: ICD-10-CM

## 2021-01-11 DIAGNOSIS — I10 ESSENTIAL HYPERTENSION: ICD-10-CM

## 2021-01-11 DIAGNOSIS — N18.6 ESRD ON HEMODIALYSIS (HCC): ICD-10-CM

## 2021-01-11 PROBLEM — J18.9 PNEUMONIA: Status: ACTIVE | Noted: 2021-01-11

## 2021-01-11 LAB
ALBUMIN SERPL BCP-MCNC: 3.2 G/DL (ref 3.2–4.9)
ALBUMIN/GLOB SERPL: 0.8 G/DL
ALP SERPL-CCNC: 78 U/L (ref 30–99)
ALT SERPL-CCNC: 462 U/L (ref 2–50)
ANION GAP SERPL CALC-SCNC: 19 MMOL/L (ref 7–16)
AST SERPL-CCNC: 319 U/L (ref 12–45)
BASOPHILS # BLD AUTO: 0.2 % (ref 0–1.8)
BASOPHILS # BLD: 0.01 K/UL (ref 0–0.12)
BILIRUB SERPL-MCNC: 0.4 MG/DL (ref 0.1–1.5)
BUN SERPL-MCNC: 86 MG/DL (ref 8–22)
CALCIUM SERPL-MCNC: 8.6 MG/DL (ref 8.4–10.2)
CHLORIDE SERPL-SCNC: 92 MMOL/L (ref 96–112)
CO2 SERPL-SCNC: 25 MMOL/L (ref 20–33)
COVID ORDER STATUS COVID19: NORMAL
CREAT SERPL-MCNC: 10.79 MG/DL (ref 0.5–1.4)
EKG IMPRESSION: NORMAL
EOSINOPHIL # BLD AUTO: 0.09 K/UL (ref 0–0.51)
EOSINOPHIL NFR BLD: 1.5 % (ref 0–6.9)
ERYTHROCYTE [DISTWIDTH] IN BLOOD BY AUTOMATED COUNT: 54.3 FL (ref 35.9–50)
EST. AVERAGE GLUCOSE BLD GHB EST-MCNC: 108 MG/DL
FLUAV RNA SPEC QL NAA+PROBE: NEGATIVE
FLUBV RNA SPEC QL NAA+PROBE: NEGATIVE
GLOBULIN SER CALC-MCNC: 3.9 G/DL (ref 1.9–3.5)
GLUCOSE SERPL-MCNC: 86 MG/DL (ref 65–99)
HBA1C MFR BLD: 5.4 % (ref 0–5.6)
HCT VFR BLD AUTO: 29.4 % (ref 42–52)
HGB BLD-MCNC: 9.2 G/DL (ref 14–18)
IMM GRANULOCYTES # BLD AUTO: 0.08 K/UL (ref 0–0.11)
IMM GRANULOCYTES NFR BLD AUTO: 1.4 % (ref 0–0.9)
LACTATE BLD-SCNC: 1.3 MMOL/L (ref 0.5–2)
LYMPHOCYTES # BLD AUTO: 0.91 K/UL (ref 1–4.8)
LYMPHOCYTES NFR BLD: 15.4 % (ref 22–41)
MAGNESIUM SERPL-MCNC: 2.3 MG/DL (ref 1.5–2.5)
MCH RBC QN AUTO: 29.5 PG (ref 27–33)
MCHC RBC AUTO-ENTMCNC: 31.3 G/DL (ref 33.7–35.3)
MCV RBC AUTO: 94.2 FL (ref 81.4–97.8)
MONOCYTES # BLD AUTO: 0.48 K/UL (ref 0–0.85)
MONOCYTES NFR BLD AUTO: 8.1 % (ref 0–13.4)
NEUTROPHILS # BLD AUTO: 4.35 K/UL (ref 1.82–7.42)
NEUTROPHILS NFR BLD: 73.4 % (ref 44–72)
NRBC # BLD AUTO: 0 K/UL
NRBC BLD-RTO: 0 /100 WBC
PHOSPHATE SERPL-MCNC: 4.5 MG/DL (ref 2.5–4.5)
PLATELET # BLD AUTO: 164 K/UL (ref 164–446)
PMV BLD AUTO: 10.2 FL (ref 9–12.9)
POTASSIUM SERPL-SCNC: 4.4 MMOL/L (ref 3.6–5.5)
PROCALCITONIN SERPL-MCNC: 5.71 NG/ML
PROT SERPL-MCNC: 7.1 G/DL (ref 6–8.2)
RBC # BLD AUTO: 3.12 M/UL (ref 4.7–6.1)
RSV RNA SPEC QL NAA+PROBE: NEGATIVE
SARS-COV-2 RNA RESP QL NAA+PROBE: DETECTED
SODIUM SERPL-SCNC: 136 MMOL/L (ref 135–145)
SPECIMEN SOURCE: ABNORMAL
WBC # BLD AUTO: 5.9 K/UL (ref 4.8–10.8)

## 2021-01-11 PROCEDURE — 93005 ELECTROCARDIOGRAM TRACING: CPT | Performed by: EMERGENCY MEDICINE

## 2021-01-11 PROCEDURE — 36415 COLL VENOUS BLD VENIPUNCTURE: CPT

## 2021-01-11 PROCEDURE — 71045 X-RAY EXAM CHEST 1 VIEW: CPT

## 2021-01-11 PROCEDURE — 83036 HEMOGLOBIN GLYCOSYLATED A1C: CPT

## 2021-01-11 PROCEDURE — 5A1D70Z PERFORMANCE OF URINARY FILTRATION, INTERMITTENT, LESS THAN 6 HOURS PER DAY: ICD-10-PCS | Performed by: INTERNAL MEDICINE

## 2021-01-11 PROCEDURE — 700111 HCHG RX REV CODE 636 W/ 250 OVERRIDE (IP): Performed by: INTERNAL MEDICINE

## 2021-01-11 PROCEDURE — 80053 COMPREHEN METABOLIC PANEL: CPT

## 2021-01-11 PROCEDURE — 770021 HCHG ROOM/CARE - ISO PRIVATE

## 2021-01-11 PROCEDURE — 0241U HCHG SARS-COV-2 COVID-19 NFCT DS RESP RNA 4 TRGT MIC: CPT

## 2021-01-11 PROCEDURE — 83605 ASSAY OF LACTIC ACID: CPT

## 2021-01-11 PROCEDURE — 84145 PROCALCITONIN (PCT): CPT

## 2021-01-11 PROCEDURE — 700102 HCHG RX REV CODE 250 W/ 637 OVERRIDE(OP): Performed by: STUDENT IN AN ORGANIZED HEALTH CARE EDUCATION/TRAINING PROGRAM

## 2021-01-11 PROCEDURE — 85025 COMPLETE CBC W/AUTO DIFF WBC: CPT

## 2021-01-11 PROCEDURE — 94760 N-INVAS EAR/PLS OXIMETRY 1: CPT

## 2021-01-11 PROCEDURE — A9270 NON-COVERED ITEM OR SERVICE: HCPCS | Performed by: STUDENT IN AN ORGANIZED HEALTH CARE EDUCATION/TRAINING PROGRAM

## 2021-01-11 PROCEDURE — 99223 1ST HOSP IP/OBS HIGH 75: CPT | Mod: AI | Performed by: STUDENT IN AN ORGANIZED HEALTH CARE EDUCATION/TRAINING PROGRAM

## 2021-01-11 PROCEDURE — 700111 HCHG RX REV CODE 636 W/ 250 OVERRIDE (IP): Performed by: STUDENT IN AN ORGANIZED HEALTH CARE EDUCATION/TRAINING PROGRAM

## 2021-01-11 PROCEDURE — 83735 ASSAY OF MAGNESIUM: CPT

## 2021-01-11 PROCEDURE — 90935 HEMODIALYSIS ONE EVALUATION: CPT

## 2021-01-11 PROCEDURE — 700111 HCHG RX REV CODE 636 W/ 250 OVERRIDE (IP): Performed by: EMERGENCY MEDICINE

## 2021-01-11 PROCEDURE — 87040 BLOOD CULTURE FOR BACTERIA: CPT

## 2021-01-11 PROCEDURE — 96374 THER/PROPH/DIAG INJ IV PUSH: CPT

## 2021-01-11 PROCEDURE — 99285 EMERGENCY DEPT VISIT HI MDM: CPT

## 2021-01-11 PROCEDURE — 84100 ASSAY OF PHOSPHORUS: CPT

## 2021-01-11 RX ORDER — LORAZEPAM 0.5 MG/1
0.5 TABLET ORAL EVERY 4 HOURS PRN
Status: DISCONTINUED | OUTPATIENT
Start: 2021-01-11 | End: 2021-01-17 | Stop reason: HOSPADM

## 2021-01-11 RX ORDER — ONDANSETRON 2 MG/ML
4 INJECTION INTRAMUSCULAR; INTRAVENOUS EVERY 4 HOURS PRN
Status: DISCONTINUED | OUTPATIENT
Start: 2021-01-11 | End: 2021-01-17 | Stop reason: HOSPADM

## 2021-01-11 RX ORDER — DEXAMETHASONE SODIUM PHOSPHATE 4 MG/ML
12 INJECTION, SOLUTION INTRA-ARTICULAR; INTRALESIONAL; INTRAMUSCULAR; INTRAVENOUS; SOFT TISSUE ONCE
Status: COMPLETED | OUTPATIENT
Start: 2021-01-11 | End: 2021-01-11

## 2021-01-11 RX ORDER — FUROSEMIDE 10 MG/ML
40 INJECTION INTRAMUSCULAR; INTRAVENOUS ONCE
Status: COMPLETED | OUTPATIENT
Start: 2021-01-11 | End: 2021-01-11

## 2021-01-11 RX ORDER — DEXAMETHASONE 4 MG/1
6 TABLET ORAL DAILY
Status: DISCONTINUED | OUTPATIENT
Start: 2021-01-12 | End: 2021-01-17 | Stop reason: HOSPADM

## 2021-01-11 RX ORDER — COLCHICINE 0.6 MG/1
0.6 TABLET ORAL DAILY
Status: DISCONTINUED | OUTPATIENT
Start: 2021-01-11 | End: 2021-01-12

## 2021-01-11 RX ORDER — HEPARIN SODIUM 1000 [USP'U]/ML
1500 INJECTION, SOLUTION INTRAVENOUS; SUBCUTANEOUS
Status: DISCONTINUED | OUTPATIENT
Start: 2021-01-11 | End: 2021-01-17 | Stop reason: HOSPADM

## 2021-01-11 RX ORDER — ACETAMINOPHEN 325 MG/1
650 TABLET ORAL EVERY 4 HOURS PRN
Status: DISCONTINUED | OUTPATIENT
Start: 2021-01-11 | End: 2021-01-17 | Stop reason: HOSPADM

## 2021-01-11 RX ORDER — OXYCODONE HYDROCHLORIDE AND ACETAMINOPHEN 5; 325 MG/1; MG/1
1 TABLET ORAL EVERY 4 HOURS PRN
Status: DISCONTINUED | OUTPATIENT
Start: 2021-01-11 | End: 2021-01-17 | Stop reason: HOSPADM

## 2021-01-11 RX ORDER — SEVELAMER CARBONATE 800 MG/1
800 TABLET, FILM COATED ORAL
Status: DISCONTINUED | OUTPATIENT
Start: 2021-01-11 | End: 2021-01-17 | Stop reason: HOSPADM

## 2021-01-11 RX ORDER — FAMOTIDINE 20 MG/1
20 TABLET, FILM COATED ORAL DAILY
Status: DISCONTINUED | OUTPATIENT
Start: 2021-01-11 | End: 2021-01-17 | Stop reason: HOSPADM

## 2021-01-11 RX ORDER — CINACALCET 30 MG/1
30 TABLET, FILM COATED ORAL DAILY
Status: DISCONTINUED | OUTPATIENT
Start: 2021-01-11 | End: 2021-01-17 | Stop reason: HOSPADM

## 2021-01-11 RX ORDER — LABETALOL HYDROCHLORIDE 5 MG/ML
10 INJECTION, SOLUTION INTRAVENOUS EVERY 4 HOURS PRN
Status: DISCONTINUED | OUTPATIENT
Start: 2021-01-11 | End: 2021-01-17 | Stop reason: HOSPADM

## 2021-01-11 RX ORDER — METOPROLOL SUCCINATE 100 MG/1
100 TABLET, EXTENDED RELEASE ORAL
Status: DISCONTINUED | OUTPATIENT
Start: 2021-01-11 | End: 2021-01-12

## 2021-01-11 RX ORDER — LEVOFLOXACIN 5 MG/ML
750 INJECTION, SOLUTION INTRAVENOUS ONCE
Status: ACTIVE | OUTPATIENT
Start: 2021-01-11 | End: 2021-01-12

## 2021-01-11 RX ADMIN — LORAZEPAM 0.5 MG: 0.5 TABLET ORAL at 10:54

## 2021-01-11 RX ADMIN — ENOXAPARIN SODIUM 30 MG: 30 INJECTION SUBCUTANEOUS at 10:54

## 2021-01-11 RX ADMIN — ONDANSETRON 4 MG: 2 INJECTION INTRAMUSCULAR; INTRAVENOUS at 10:54

## 2021-01-11 RX ADMIN — SEVELAMER CARBONATE 800 MG: 800 TABLET, FILM COATED ORAL at 10:54

## 2021-01-11 RX ADMIN — FUROSEMIDE 40 MG: 10 INJECTION, SOLUTION INTRAMUSCULAR; INTRAVENOUS at 10:55

## 2021-01-11 RX ADMIN — FAMOTIDINE 20 MG: 20 TABLET ORAL at 10:54

## 2021-01-11 RX ADMIN — OXYCODONE HYDROCHLORIDE AND ACETAMINOPHEN 1 TABLET: 5; 325 TABLET ORAL at 10:54

## 2021-01-11 RX ADMIN — OXYCODONE HYDROCHLORIDE AND ACETAMINOPHEN 1 TABLET: 5; 325 TABLET ORAL at 20:33

## 2021-01-11 RX ADMIN — DEXAMETHASONE SODIUM PHOSPHATE 12 MG: 4 INJECTION, SOLUTION INTRA-ARTICULAR; INTRALESIONAL; INTRAMUSCULAR; INTRAVENOUS; SOFT TISSUE at 06:55

## 2021-01-11 RX ADMIN — CINACALCET HYDROCHLORIDE 30 MG: 30 TABLET, FILM COATED ORAL at 10:54

## 2021-01-11 RX ADMIN — COLCHICINE 0.6 MG: 0.6 TABLET, FILM COATED ORAL at 10:54

## 2021-01-11 RX ADMIN — SEVELAMER CARBONATE 800 MG: 800 TABLET, FILM COATED ORAL at 18:31

## 2021-01-11 RX ADMIN — HEPARIN SODIUM 1500 UNITS: 1000 INJECTION, SOLUTION INTRAVENOUS; SUBCUTANEOUS at 11:18

## 2021-01-11 ASSESSMENT — ENCOUNTER SYMPTOMS
HEADACHES: 1
WHEEZING: 1
SPUTUM PRODUCTION: 1
ABDOMINAL PAIN: 1
PSYCHIATRIC NEGATIVE: 1
ORTHOPNEA: 1
CHILLS: 1
DIZZINESS: 1
SHORTNESS OF BREATH: 1
EYES NEGATIVE: 1
NAUSEA: 1
MYALGIAS: 1
COUGH: 1
WEAKNESS: 1

## 2021-01-11 ASSESSMENT — FIBROSIS 4 INDEX
FIB4 SCORE: 5.88
FIB4 SCORE: 0.79

## 2021-01-11 ASSESSMENT — PAIN DESCRIPTION - PAIN TYPE: TYPE: ACUTE PAIN

## 2021-01-11 NOTE — ASSESSMENT & PLAN NOTE
Patient is ESRD on HD MWF  BUN/Cr-10.79/5.71 (Elevated from 3 weeks ago)  GFR-5  Nephrology Consulted by ERP- Dr. Olguin On Board- Please Follow up Recommendations  Continue with Sevelamer 800mg TID

## 2021-01-11 NOTE — CONSULTS
"  [facility] -  CONSULTATION NOTE               Author: MADELEINE Tom Date & Time: 1/11/2021  8:19 AM       REASON FOR CONSULTATION:   - Inpatient hemodialysis management.    CHIEF COMPLAINT:   -  \"Shortness of Breath  \"    HISTORY OF PRESENT ILLNESS:    65 y.o. male with PMH of ESRD on HD MWF, HTN, anemia of CKD, and CKD-MBD  who presents to the emergency department complaining of shortness of breath, productive cough and malaise. It has become progressively worse over the last few days and today at dialysis he felt the SOB was increasing and was hypoxic with O2 saturation in the low 80's on room air and was therefore sent for further evaluation. The patient has tested positive for Covid-19 infection and chest X-ray today is concerning for bilateral infection/pneumonia and moderate pulmonary edema. He was most recently discharged from the hospital on 12/22/20 after being treated for large pericardial effusion and pericarditis.     REVIEW OF SYSTEMS:    Deferred due to Covid-19 +    PAST MEDICAL HISTORY:   - ESRD  - Congenital solitary kidney   - HTN  - Anemia of CKD  - CKD-MBD  - Anxiety/Depression  - Gout  - HLD  - OA R shoulder  - Pericardial effusion/pericarditis 12/2020    PAST SURGICAL HISTORY:   - Gastroscopy with biopsy and dilation 09/2020  - AV Fistula Creation  - Orthopedic Surgery     FAMILY HISTORY:   - Reviewed and non contributory to current illness    SOCIAL HISTORY:   - No tobacco currently, quit smoking 24 yr ago  - No EtOH  - No illicits    HOME MEDICATIONS:   - Reviewed and documented in chart    LABORATORY STUDIES:   - Reviewed and documented in chart    ALLERGIES:  Cephalexin and Penicillins    PHYSICAL EXAM:  Physical Exam   Deferred due to Covid-19 +     FLUID BALANCE:  No intake/output data recorded.    LABS:  Recent Labs     01/11/21  0600   SODIUM 136   POTASSIUM 4.4   CHLORIDE 92*   CO2 25   GLUCOSE 86   BUN 86*   CREATININE 10.79*   CALCIUM 8.6    "     IMAGING:  - All imaging reviewed from admission to present day    IMPRESSION:  # ESRD  - Etiology likely 2/2 congenital solitary kidney  - Normally dialyzes qMWF at [facility] via L AVF  - Living XPL surgery pending final work up at [facility]  # Acute hypoxic respiratory failure  # Covid-19 PNA  - On decadron   # Pulmonary edema   - IV Lasix dose ordered  - UF with HD   # HTN  - Goal BP < 140/90  - At goal  - On metoprolol   # Anemia of CKD, below target  - Goal Hgb 10-11  # CKD-MBD  - Managed at OP unit  - On sevelamer with meals   - On Sensipar   # Recent pericardial effusion/pericarditis  - On colchicine     PLAN:  - iHD today (MON) with daily eval for additional treatments  - UF as tolerated  - No dietary protein restrictions  - Dose all meds per ESRD  - He takes ativan 1 mg prior to each HD, please continue that while inpatient  - Low sodium renal diet with fluid restriction   - Continue home medications   - Covid-19 treatment per primary team   - Check iron stores   - Transfuse PRN Hgb <7    - Follow labs    Thank you for the consultation!

## 2021-01-11 NOTE — ED PROVIDER NOTES
ED Provider Note    I briefly evaluated Mr. Benites on arrival. He arrived via EMS. He has concerns about shortness of breath. He has a history of smoking, requires dialysis 3x a week, percarditis with large pericardial effusions. He was at dialysis center this morning when he mentioned shortness of breath and was then sent here for further evaluation. I have ordered EKG, CXR, cbc, cmp. On coming partner, Dr. Mosher, will assume care at the start of her shift.     Darin Peña II, M.D. 1/11/2021 5:46 AM

## 2021-01-11 NOTE — ED TRIAGE NOTES
"Chief Complaint   Patient presents with   • Shortness of Breath     x1 month     /69   Pulse 78   Temp 36.2 °C (97.2 °F) (Temporal)   Resp 20   Ht 1.778 m (5' 10\")   Wt 84.3 kg (185 lb 13.6 oz)   SpO2 90%   BMI 26.67 kg/m²     Pt bib EMS. Went to dialysis this morning at 0430 and had c/o SOB. Did not receive dialysis today. Pt has had this SOB x 1 month, recently dx with pericardial effusion. Pt had x2 fall at home x2 days ago \"I couldn't walk\" describes weakness and uncoordination. Denies hitting head or loss of consciousness.   "

## 2021-01-11 NOTE — H&P
Hospital Medicine History & Physical Note    Date of Service  1/11/2021    Primary Care Physician  Abel Jones M.D.    Consultants  None    Code Status  Full Code    Chief Complaint  Chief Complaint   Patient presents with   • Shortness of Breath     x1 month       History of Presenting Illness    65 y.o. male with PMHx of HTN, Gout, Hyperthyroidism and ESRD on HD MWF who presented 1/11/2021 with complaints of Cough, Shortness of Breath and Generalized Weakness. As per the patient, stating around Mid December he began experiecing worsening shortness of breath and was found to have a pericarditis with pericardial effusion, which was drained and analyzed showing mostly a transudative effusion most likely 2/2 to volume overload. His symptoms improved thereafter, however starting about a week ago he began experiencing the same progressively worsening shortness of breath. He was at the dialysis center this morning when he began experiencing severe dyspnea and was found to be hypoxic saturating in the low 80'2. He states that he has never needed to use oxygen in the past. Upon admission to the ED, CXR was performed and showed patchy ground glass and airspace opacities throughout both lung fields, which could be seen with atypical infection such as COVID 19 PNA. Moderate pulmonary edema is also in the differential. CBC was done and was unremarkable with a WBC of 5.9 and Hg stable at 9.2. BMP did show BUN/Cr. 86/10.79 with a GFR of 5, patient is ESRD on HD. Procalcitonin was very elevated to 5.71 and the patient also had elevated liver enzymes AST/ALT-319/462. COVID 19 Swab was ordered by ERP and came back positive. Nephrology was also consulted by ERP, Dr. Olguin is on board. Will admit for possible COVID PNA and HD.         Review of Systems  Review of Systems   Constitutional: Positive for chills and malaise/fatigue.   HENT: Negative.    Eyes: Negative.    Respiratory: Positive for cough, sputum production, shortness  of breath and wheezing.    Cardiovascular: Positive for orthopnea.   Gastrointestinal: Positive for abdominal pain and nausea.   Genitourinary: Negative.    Musculoskeletal: Positive for myalgias.   Skin: Negative.    Neurological: Positive for dizziness, weakness and headaches.   Endo/Heme/Allergies: Negative.    Psychiatric/Behavioral: Negative.        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    Surgical History   has a past surgical history that includes av fistula creation; other orthopedic surgery; pr upper gi endoscopy,biopsy (N/A, 9/4/2020); and gastroscopy (N/A, 9/4/2020).     Family History  family history includes Cancer in his brother and mother; Heart Disease in his father; No Known Problems in his brother, brother, daughter, daughter, sister, and son; Stroke in his father.     Social History   reports that he quit smoking about 24 years ago. His smoking use included cigarettes. He has a 12.50 pack-year smoking history. He has never used smokeless tobacco. He reports that he does not drink alcohol or use drugs.    Allergies  Allergies   Allergen Reactions   • Cephalexin Rash     Pt reports that he get a body rash   • Penicillins Rash and Unspecified     Pt reports that he get a body rash         Medications  Prior to Admission Medications   Prescriptions Last Dose Informant Patient Reported? Taking?   Multiple Vitamins-Minerals (RENAPLEX-D) Tab 1/10/2021 at 0900 Patient Yes No   Sig: Take 1 Tab by mouth every day.   SENSIPAR 30 MG Tab 1/10/2021 at 0900 Patient Yes No   Sig: Take 30 mg by mouth every day.   colchicine (COLCRYS) 0.6 MG Tab 1/9/2021 at AM Patient Yes No   Sig: Take 0.6 mg by mouth every 48 hours.   metoprolol SR (TOPROL XL) 100 MG TABLET SR 24 HR 1/9/2021 at Unknown Patient No No   Sig: Take 1 tablet by  mouth once daily   Patient taking differently: Take 100 mg by mouth see administration instructions. Takes on Sunday, Tuesday, Thursday, and Saturday   Holds on Monday, Wednesday, and Friday (Dialysis days)   pantoprazole (PROTONIX) 20 MG tablet 1/10/2021 at 0900 Patient No No   Sig: Take 1 Tab by mouth every day.   sevelamer carbonate (RENVELA) 800 MG Tab tablet 1/10/2021 at 1200 Patient Yes No   Sig: Take 1,600-2,400 mg by mouth see administration instructions. 2400 mg per meal 3 times a day  1600mg per snack      Facility-Administered Medications: None       Physical Exam  Temp:  [36.2 °C (97.2 °F)] 36.2 °C (97.2 °F)  Pulse:  [77-85] 85  Resp:  [20] 20  BP: (122-134)/(69-74) 134/74  SpO2:  [85 %-96 %] 92 %    Physical Exam  Vitals signs reviewed.   Constitutional:       Appearance: He is ill-appearing.   HENT:      Head: Normocephalic and atraumatic.      Right Ear: External ear normal.      Left Ear: External ear normal.      Nose: Nose normal.      Mouth/Throat:      Mouth: Mucous membranes are moist.   Eyes:      Pupils: Pupils are equal, round, and reactive to light.   Neck:      Musculoskeletal: Neck supple.   Cardiovascular:      Rate and Rhythm: Normal rate and regular rhythm.      Pulses: Normal pulses.      Heart sounds: Normal heart sounds.   Pulmonary:      Effort: Respiratory distress present.      Breath sounds: Normal breath sounds.   Musculoskeletal: Normal range of motion.   Skin:     General: Skin is warm.      Capillary Refill: Capillary refill takes less than 2 seconds.   Neurological:      General: No focal deficit present.      Mental Status: He is alert.   Psychiatric:         Mood and Affect: Mood normal.         Laboratory:  Recent Labs     01/11/21  0600   WBC 5.9   RBC 3.12*   HEMOGLOBIN 9.2*   HEMATOCRIT 29.4*   MCV 94.2   MCH 29.5   MCHC 31.3*   RDW 54.3*   PLATELETCT 164   MPV 10.2     Recent Labs     01/11/21  0600   SODIUM 136   POTASSIUM 4.4   CHLORIDE 92*   CO2 25   GLUCOSE 86    BUN 86*   CREATININE 10.79*   CALCIUM 8.6     Recent Labs     01/11/21  0600   ALTSGPT 462*   ASTSGOT 319*   ALKPHOSPHAT 78   TBILIRUBIN 0.4   GLUCOSE 86         No results for input(s): NTPROBNP in the last 72 hours.      No results for input(s): TROPONINT in the last 72 hours.    Imaging:  DX-CHEST-PORTABLE (1 VIEW)   Final Result         Patchy groundglass and airspace opacities throughout both lungs could be seen with atypical infection/Covid 19 pneumonia. Moderate pulmonary edema is in the differential.      Stable cardiomegaly.            Assessment/Plan:  I anticipate this patient will require at least two midnights for appropriate medical management, necessitating inpatient admission.    * Pneumonia  Assessment & Plan  65 y.o. male with PMHx of HTN, Gout, Hyperthyroidism and ESRD on HD MWF who presented 1/11/2021 with complaints of Cough, Shortness of Breath and Generalized Weakness.    About a week ago he began experiencing the same progressively worsening shortness of breath. He was at the dialysis center this morning when he began experiencing severe dyspnea and was found to be hypoxic saturating in the low 80'2    CXR- Patchy ground glass and airspace opacities throughout both lung fields, which could be seen with atypical infection such as COVID 19 PNA. Moderate pulmonary edema is also in the differential.     COVID 19-Positive  Droplet, Contact, Air[place] Precautions  WBC-5.9  Hg-9.2 Stable  BUN/Cr-10.79/5.71 (Elevated from 3 weeks ago)  GFR-5  LA-1.3  Procalcitonin-5.71    Place Patient on Medical Floor  Patient received Decadron 12mg IVP in ED  Continue 6mg Decadron PO Daily starting Tomorrow  Patient received one dose of Levaquin in ED  Will switch to Ceftriaxone 2g IVPB Daily  Give one dose Lasix 40mg IVP   Trend CBC/BMP  Patient Saturating 91% on RA. Will give Oxygen to maintain O2 Saturation to 93% and above  Pain Control-Tylenol, Percocet  DVT Prophylaxis-Lovenox  GI  Prophylaxis-Famotidine  Diet-Renal  N/V-Zofran 4mg IVP PRN    End stage renal failure on dialysis (HCC)- (present on admission)  Assessment & Plan  Patient is ESRD on HD MWF  BUN/Cr-10.79/5.71 (Elevated from 3 weeks ago)  GFR-5  Nephrology Consulted by ERP- Dr. Olguin On Board- Please Follow up Recommendations  Continue with Sevelamer 800mg TID    Hypertension- (present on admission)  Assessment & Plan  Patients BP on admission normotensive at 134/74mmHg  Continue with Metoprolol 100mg XL PO Daily  Labetalol 10mg IVP Q4H PRN for SBP>180mmHg      Anemia- (present on admission)  Assessment & Plan  Most likely Anemia of Chronic Disease  Hg stable at 9.2  Trend CBC    Gout- (present on admission)  Assessment & Plan  Continue with Colchicine 0.6mg Daily    Anxiety- (present on admission)  Assessment & Plan  Ativan 0.5mg PO Q4H PRN for Anxiety/Agitation

## 2021-01-11 NOTE — ED PROVIDER NOTES
ED Provider Note    CHIEF COMPLAINT  Chief Complaint   Patient presents with   • Shortness of Breath     x1 month       HPI  Garry Benites is a 65 y.o. male who presents to the emergency department by ambulance from dialysis for shortness of breath.  Patient describes progressive shortness of breath for 1 month, although significantly worse over the last couple of days.  Now with productive cough, clear sputum, as well.  Shortness of breath, at rest but worse with exertion.  No edema.  No fever chills.  Patient does have decreased appetite and metallic taste in his mouth but denies loss of other taste or smell.  No chest pain, palpitations or syncope.  No abdominal pain.  No vomiting or diarrhea.    Compliant with home medications and dialysis.    Arrived dyspneic and hypoxic at dialysis, EMS was called.      REVIEW OF SYSTEMS  See HPI for further details. All other systems are negative.     PAST MEDICAL HISTORY   has a past medical history of Allergy, Anxiety, Cataract, Depression, Gout due to renal impairment, Hyperlipidemia, Hypertension, Kidney disease, and Localized osteoarthritis of right shoulder (2019).    SOCIAL HISTORY  Social History     Tobacco Use   • Smoking status: Former Smoker     Packs/day: 0.50     Years: 25.00     Pack years: 12.50     Types: Cigarettes     Quit date: 10/21/1996     Years since quittin.2   • Smokeless tobacco: Never Used   Substance and Sexual Activity   • Alcohol use: No     Alcohol/week: 0.0 oz     Frequency: Never     Binge frequency: Never   • Drug use: No   • Sexual activity: Yes     Partners: Female     Comment: Lives with his brother. Work as . Social Security Disability for [place] and OA and Dialysis. No social or domestic concerns.       SURGICAL HISTORY   has a past surgical history that includes av fistula creation; other orthopedic surgery; upper gi endoscopy,biopsy (N/A, 2020); and gastroscopy (N/A, 2020).    CURRENT MEDICATIONS  Home  "Medications     Reviewed by Jennifer Hernández R.N. (Registered Nurse) on 01/11/21 at 0547  Med List Status: Partial   Medication Last Dose Status   colchicine (COLCRYS) 0.6 MG Tab  Active   metoprolol SR (TOPROL XL) 100 MG TABLET SR 24 HR  Active   Multiple Vitamins-Minerals (RENAPLEX-D) Tab  Active   pantoprazole (PROTONIX) 20 MG tablet  Active   SENSIPAR 30 MG Tab  Active   sevelamer carbonate (RENVELA) 800 MG Tab tablet  Active                ALLERGIES  Allergies   Allergen Reactions   • Cephalexin Rash   • Penicillins Unspecified and Rash     Pt states \"It was a childhood allergie\".         PHYSICAL EXAM  VITAL SIGNS: /74   Pulse 77   Temp 36.2 °C (97.2 °F) (Temporal)   Resp 20   Ht 1.778 m (5' 10\")   Wt 84.3 kg (185 lb 13.6 oz)   SpO2 96%   BMI 26.67 kg/m²   Pulse ox interpretation: I interpret this pulse ox as low, but normalizes with supplemental oxygen.  Constitutional: Alert in no apparent distress.  Disheveled.  HENT: Normocephalic, atraumatic. Bilateral external ears normal, Nose normal.  Dry mucous membranes.    Eyes: Pupils are equal and reactive, Conjunctiva normal.   Neck: Normal range of motion, Supple,.  No meningeal irritation.  Lymphatic: No lymphadenopathy noted.   Cardiovascular: Regular rate and rhythm, no murmurs. Distal pulses intact.  No peripheral edema.  Thorax & Lungs: Diminished bilaterally.  Coarse bilaterally, scattered lower rales.  No increased work of breathing, clip speech or retractions.  Abdomen: Soft, non-distended, non-tender to palpation. No palpable or pulsatile masses. No peritoneal signs.   Skin: Warm, Dry  Musculoskeletal: Good range of motion in all major joints.   Neurologic: Alert and orient x4.  Moves 4 extremities spontaneously.  Psychiatric: Flat affect otherwise judgment normal, Mood normal.       DIAGNOSTIC STUDIES / PROCEDURES    LABS  Results for orders placed or performed during the hospital encounter of 01/11/21   CBC w/ Differential   Result Value " Ref Range    WBC 5.9 4.8 - 10.8 K/uL    RBC 3.12 (L) 4.70 - 6.10 M/uL    Hemoglobin 9.2 (L) 14.0 - 18.0 g/dL    Hematocrit 29.4 (L) 42.0 - 52.0 %    MCV 94.2 81.4 - 97.8 fL    MCH 29.5 27.0 - 33.0 pg    MCHC 31.3 (L) 33.7 - 35.3 g/dL    RDW 54.3 (H) 35.9 - 50.0 fL    Platelet Count 164 164 - 446 K/uL    MPV 10.2 9.0 - 12.9 fL    Neutrophils-Polys 73.40 (H) 44.00 - 72.00 %    Lymphocytes 15.40 (L) 22.00 - 41.00 %    Monocytes 8.10 0.00 - 13.40 %    Eosinophils 1.50 0.00 - 6.90 %    Basophils 0.20 0.00 - 1.80 %    Immature Granulocytes 1.40 (H) 0.00 - 0.90 %    Nucleated RBC 0.00 /100 WBC    Neutrophils (Absolute) 4.35 1.82 - 7.42 K/uL    Lymphs (Absolute) 0.91 (L) 1.00 - 4.80 K/uL    Monos (Absolute) 0.48 0.00 - 0.85 K/uL    Eos (Absolute) 0.09 0.00 - 0.51 K/uL    Baso (Absolute) 0.01 0.00 - 0.12 K/uL    Immature Granulocytes (abs) 0.08 0.00 - 0.11 K/uL    NRBC (Absolute) 0.00 K/uL   Complete Metabolic Panel (CMP)   Result Value Ref Range    Sodium 136 135 - 145 mmol/L    Potassium 4.4 3.6 - 5.5 mmol/L    Chloride 92 (L) 96 - 112 mmol/L    Co2 25 20 - 33 mmol/L    Anion Gap 19.0 (H) 7.0 - 16.0    Glucose 86 65 - 99 mg/dL    Bun 86 (HH) 8 - 22 mg/dL    Creatinine 10.79 (HH) 0.50 - 1.40 mg/dL    Calcium 8.6 8.4 - 10.2 mg/dL    AST(SGOT) 319 (H) 12 - 45 U/L    ALT(SGPT) 462 (H) 2 - 50 U/L    Alkaline Phosphatase 78 30 - 99 U/L    Total Bilirubin 0.4 0.1 - 1.5 mg/dL    Albumin 3.2 3.2 - 4.9 g/dL    Total Protein 7.1 6.0 - 8.2 g/dL    Globulin 3.9 (H) 1.9 - 3.5 g/dL    A-G Ratio 0.8 g/dL   ESTIMATED GFR   Result Value Ref Range    GFR If  6 (A) >60 mL/min/1.73 m 2    GFR If Non African American 5 (A) >60 mL/min/1.73 m 2   PROCALCITONIN   Result Value Ref Range    Procalcitonin 5.71 (H) <0.25 ng/mL   LACTIC ACID   Result Value Ref Range    Lactic Acid 1.3 0.5 - 2.0 mmol/L   COV-2, FLU A/B, AND RSV BY PCR ([facility]): Collect NP swab in VTM    Specimen: Respirate   Result Value Ref Range    SARS-CoV-2 Source  NP Swab    COVID/SARS CoV-2 PCR   Result Value Ref Range    COVID Order Status Received      RADIOLOGY  DX-CHEST-PORTABLE (1 VIEW)   Final Result         Patchy groundglass and airspace opacities throughout both lungs could be seen with atypical infection/Covid 19 pneumonia. Moderate pulmonary edema is in the differential.      Stable cardiomegaly.          COURSE & MEDICAL DECISION MAKING  Nursing notes and vital signs were reviewed. (See chart for details)  The patients records were reviewed, history was obtained from the patient;     Labs are quite unrevealing, chronic renal failure.  Nonspecific elevation AST, ALT but normal alk phos and T bilirubin, could be due to congestion as he is due for dialysis.  No abdominal pain on my exam.  Chest x-ray is concerning for groundglass opacities, multifocal pneumonia, however in this setting cannot also exclude pulmonary edema given his history and that he is due for dialysis today.  He does however describe new productive cough, dyspnea and hypoxia which she has not had previously.  He will be tested for influenza, Covid and admitted for supplemental oxygen while further work-up ensues.  Add procalcitonin, lactate, blood cultures, will dose antibiotics if indicated then.  Decadron given as well.    0705 -hospitalist, nephrology paged.    Procalcitonin is elevated, lactate normal.  No clinical evidence for sepsis.  Patient has been given antibiotics, Levaquin (history of penicillin, cephalexin and tolerated Levaquin previously).    Hemodynamically stable, no clinical evidence for sepsis.  Comfortable supplemental oxygen without respiratory distress.    7:50 AM Dr. Huffman is aware the patient agreeable to consultation for admission.    8:00 AM Dr. Olguin is aware of the patient agreeable to consultation awaiting for dialysis.    8:11 AM Covid positive.  Hospitalist aware.    The total critical care time on this patient is 40 minutes, immediate and continuous hemodynamic  monitoring and multiple bedside evaluations, resuscitating patient and assessing response to treatment, deciphering test results, speaking with admitting and consulting physician, and arranging for hospital admission. This 40 minutes is exclusive of separately billable procedures.      FINAL IMPRESSION  (R09.02) Hypoxia  (R06.00) Dyspnea, unspecified type  (N18.6,  Z99.2) ESRD on hemodialysis (HCC)      Electronically signed by: Jennifer Mosher D.O., 1/11/2021 6:49 AM      This dictation was created using voice recognition software. The accuracy of the dictation is limited to the abilities of the software. I expect there may be some errors of grammar and possibly content. The nursing notes were reviewed and certain aspects of this information were incorporated into this note.

## 2021-01-11 NOTE — ASSESSMENT & PLAN NOTE
65 y.o. male with PMHx of HTN, Gout, Hyperthyroidism and ESRD on HD MWF who presented 1/11/2021 with complaints of Cough, Shortness of Breath and Generalized Weakness.    About a week ago he began experiencing the same progressively worsening shortness of breath. He was at the dialysis center this morning when he began experiencing severe dyspnea and was found to be hypoxic saturating in the low 80'2    COVID 19-Positive  Droplet, Contact, [place] Precautions  Patient received Decadron 12mg IVP in ED  Continue 6mg Decadron PO Daily  Patient received one dose of Levaquin in ED, stop ABX now  Trend CBC/BMP

## 2021-01-11 NOTE — ASSESSMENT & PLAN NOTE
Patients BP on admission normotensive at 134/74mmHg  Lowered BB dose as patient lightheaded at home, having falls  Labetalol 10mg IVP Q4H PRN for SBP>180mmHg

## 2021-01-11 NOTE — ED NOTES
Report received from NOC RN, POC discussed, walking rounds completed, pt sitting on conchita, RA, no s/s distress, call light within reach and will continue to monitor.

## 2021-01-11 NOTE — PROGRESS NOTES
[facility] Services Progress Note     HD treatment ordered by Dr Olguin x 3 hours.  Treatment Start time: 1121          End time: 1422       Net UF 2600 ml    Patient tolerated treatment well. VS stable all through out. All blood was returned. LUE AVF needle removed. Dry gauze dressing applied and changed without bleeding issue. + Bruit/Thrill pre-post treatment.   See paper flow sheet for details.     Report given to ROSALINDA Cheung.

## 2021-01-12 LAB
ANION GAP SERPL CALC-SCNC: 15 MMOL/L (ref 7–16)
BASOPHILS # BLD AUTO: 0.3 % (ref 0–1.8)
BASOPHILS # BLD: 0.01 K/UL (ref 0–0.12)
BUN SERPL-MCNC: 52 MG/DL (ref 8–22)
CALCIUM SERPL-MCNC: 8.9 MG/DL (ref 8.4–10.2)
CHLORIDE SERPL-SCNC: 96 MMOL/L (ref 96–112)
CHOLEST SERPL-MCNC: 127 MG/DL (ref 100–199)
CO2 SERPL-SCNC: 26 MMOL/L (ref 20–33)
CREAT SERPL-MCNC: 7.31 MG/DL (ref 0.5–1.4)
EOSINOPHIL # BLD AUTO: 0 K/UL (ref 0–0.51)
EOSINOPHIL NFR BLD: 0 % (ref 0–6.9)
ERYTHROCYTE [DISTWIDTH] IN BLOOD BY AUTOMATED COUNT: 53.1 FL (ref 35.9–50)
FERRITIN SERPL-MCNC: ABNORMAL NG/ML (ref 22–322)
GLUCOSE SERPL-MCNC: 136 MG/DL (ref 65–99)
HCT VFR BLD AUTO: 30.5 % (ref 42–52)
HDLC SERPL-MCNC: 26 MG/DL
HGB BLD-MCNC: 9.5 G/DL (ref 14–18)
IMM GRANULOCYTES # BLD AUTO: 0.04 K/UL (ref 0–0.11)
IMM GRANULOCYTES NFR BLD AUTO: 1.2 % (ref 0–0.9)
IRON SATN MFR SERPL: 42 % (ref 15–55)
IRON SERPL-MCNC: 66 UG/DL (ref 50–180)
LDLC SERPL CALC-MCNC: 65 MG/DL
LYMPHOCYTES # BLD AUTO: 0.38 K/UL (ref 1–4.8)
LYMPHOCYTES NFR BLD: 11 % (ref 22–41)
MCH RBC QN AUTO: 29.5 PG (ref 27–33)
MCHC RBC AUTO-ENTMCNC: 31.1 G/DL (ref 33.7–35.3)
MCV RBC AUTO: 94.7 FL (ref 81.4–97.8)
MONOCYTES # BLD AUTO: 0.33 K/UL (ref 0–0.85)
MONOCYTES NFR BLD AUTO: 9.6 % (ref 0–13.4)
NEUTROPHILS # BLD AUTO: 2.69 K/UL (ref 1.82–7.42)
NEUTROPHILS NFR BLD: 77.9 % (ref 44–72)
NRBC # BLD AUTO: 0 K/UL
NRBC BLD-RTO: 0 /100 WBC
PLATELET # BLD AUTO: 169 K/UL (ref 164–446)
PMV BLD AUTO: 10.9 FL (ref 9–12.9)
POTASSIUM SERPL-SCNC: 5.1 MMOL/L (ref 3.6–5.5)
RBC # BLD AUTO: 3.22 M/UL (ref 4.7–6.1)
SODIUM SERPL-SCNC: 137 MMOL/L (ref 135–145)
TIBC SERPL-MCNC: 159 UG/DL (ref 250–450)
TRIGL SERPL-MCNC: 181 MG/DL (ref 0–149)
UIBC SERPL-MCNC: 93 UG/DL (ref 110–370)
WBC # BLD AUTO: 3.5 K/UL (ref 4.8–10.8)

## 2021-01-12 PROCEDURE — 700102 HCHG RX REV CODE 250 W/ 637 OVERRIDE(OP): Performed by: STUDENT IN AN ORGANIZED HEALTH CARE EDUCATION/TRAINING PROGRAM

## 2021-01-12 PROCEDURE — 85025 COMPLETE CBC W/AUTO DIFF WBC: CPT

## 2021-01-12 PROCEDURE — 80048 BASIC METABOLIC PNL TOTAL CA: CPT

## 2021-01-12 PROCEDURE — 83550 IRON BINDING TEST: CPT

## 2021-01-12 PROCEDURE — 80061 LIPID PANEL: CPT

## 2021-01-12 PROCEDURE — 99233 SBSQ HOSP IP/OBS HIGH 50: CPT | Performed by: HOSPITALIST

## 2021-01-12 PROCEDURE — 700111 HCHG RX REV CODE 636 W/ 250 OVERRIDE (IP): Performed by: STUDENT IN AN ORGANIZED HEALTH CARE EDUCATION/TRAINING PROGRAM

## 2021-01-12 PROCEDURE — A9270 NON-COVERED ITEM OR SERVICE: HCPCS | Performed by: STUDENT IN AN ORGANIZED HEALTH CARE EDUCATION/TRAINING PROGRAM

## 2021-01-12 PROCEDURE — 83540 ASSAY OF IRON: CPT

## 2021-01-12 PROCEDURE — 82728 ASSAY OF FERRITIN: CPT

## 2021-01-12 PROCEDURE — 770021 HCHG ROOM/CARE - ISO PRIVATE

## 2021-01-12 RX ORDER — METOPROLOL SUCCINATE 25 MG/1
50 TABLET, EXTENDED RELEASE ORAL
Status: DISCONTINUED | OUTPATIENT
Start: 2021-01-13 | End: 2021-01-17 | Stop reason: HOSPADM

## 2021-01-12 RX ORDER — COLCHICINE 0.6 MG/1
0.3 TABLET ORAL
Status: DISCONTINUED | OUTPATIENT
Start: 2021-01-14 | End: 2021-01-17 | Stop reason: HOSPADM

## 2021-01-12 RX ADMIN — CINACALCET HYDROCHLORIDE 30 MG: 30 TABLET, FILM COATED ORAL at 05:52

## 2021-01-12 RX ADMIN — ENOXAPARIN SODIUM 30 MG: 30 INJECTION SUBCUTANEOUS at 05:53

## 2021-01-12 RX ADMIN — SEVELAMER CARBONATE 800 MG: 800 TABLET, FILM COATED ORAL at 18:17

## 2021-01-12 RX ADMIN — SEVELAMER CARBONATE 800 MG: 800 TABLET, FILM COATED ORAL at 12:25

## 2021-01-12 RX ADMIN — DEXAMETHASONE 6 MG: 4 TABLET ORAL at 05:53

## 2021-01-12 RX ADMIN — OXYCODONE HYDROCHLORIDE AND ACETAMINOPHEN 1 TABLET: 5; 325 TABLET ORAL at 05:51

## 2021-01-12 RX ADMIN — OXYCODONE HYDROCHLORIDE AND ACETAMINOPHEN 1 TABLET: 5; 325 TABLET ORAL at 18:17

## 2021-01-12 RX ADMIN — FAMOTIDINE 20 MG: 20 TABLET ORAL at 05:54

## 2021-01-12 RX ADMIN — SEVELAMER CARBONATE 800 MG: 800 TABLET, FILM COATED ORAL at 08:53

## 2021-01-12 ASSESSMENT — ENCOUNTER SYMPTOMS
HEARTBURN: 0
TREMORS: 0
VOMITING: 0
TINGLING: 0
NAUSEA: 0
FEVER: 0
CHILLS: 0
SENSORY CHANGE: 0
MYALGIAS: 1

## 2021-01-12 ASSESSMENT — PAIN DESCRIPTION - PAIN TYPE: TYPE: ACUTE PAIN

## 2021-01-12 NOTE — PROGRESS NOTES
"Hospital Medicine Daily Progress Note    Date of Service  1/12/2021    Chief Complaint  65 y.o. male with PMHx of HTN, Gout, Hyperthyroidism and ESRD on HD MWF who presented 1/11/2021 with complaints of Cough, Shortness of Breath and Generalized Weakness.     Hospital Course  Per HPI  \"65 y.o. male with PMHx of HTN, Gout, Hyperthyroidism and ESRD on HD MWF who presented 1/11/2021 with complaints of Cough, Shortness of Breath and Generalized Weakness. As per the patient, stating around Mid December he began experiecing worsening shortness of breath and was found to have a pericarditis with pericardial effusion, which was drained and analyzed showing mostly a transudative effusion most likely 2/2 to volume overload. His symptoms improved thereafter, however starting about a week ago he began experiencing the same progressively worsening shortness of breath. He was at the dialysis center in morning when he began experiencing severe dyspnea and was found to be hypoxic saturating in the low 80s. He states that he has never needed to use oxygen in the past. Upon admission to the ED, CXR was performed and showed patchy ground glass and airspace opacities throughout both lung fields, which could be seen with atypical infection such as COVID 19 PNA. COVID 19 Swab was ordered by ERP and came back positive. Nephrology was also consulted by ERP.\"     Interval Problem Update  Seen and examined. Chart reviewed, including labs and any pertinent imaging.  Feeling better - \"best I've felt in months\"  Mild cough  Issues with some medications, think BP meds too high dose    Consultants/Specialty  Nephrology    Code Status  Full Code    Disposition  Inpatient    Review of Systems  Review of Systems   Constitutional: Negative for chills and fever.   Cardiovascular: Negative for chest pain.   Gastrointestinal: Negative for heartburn, nausea and vomiting.   Musculoskeletal: Positive for myalgias.   Skin: Negative for itching and rash. "   Neurological: Negative for tingling, tremors and sensory change.   All other systems reviewed and are negative.       Physical Exam  Temp:  [36.1 °C (96.9 °F)-36.7 °C (98.1 °F)] 36.4 °C (97.6 °F)  Pulse:  [59-80] 59  Resp:  [16-18] 18  BP: (102-119)/(59-64) 102/59  SpO2:  [90 %-97 %] 96 %    Physical Exam  Vitals signs and nursing note reviewed.   Constitutional:       General: He is not in acute distress.     Appearance: He is not diaphoretic.   HENT:      Head: Normocephalic and atraumatic.      Nose: No congestion.      Mouth/Throat:      Mouth: Mucous membranes are moist.   Eyes:      General:         Right eye: No discharge.         Left eye: No discharge.      Extraocular Movements: Extraocular movements intact.      Conjunctiva/sclera: Conjunctivae normal.   Neck:      Musculoskeletal: Normal range of motion. No neck rigidity.   Cardiovascular:      Rate and Rhythm: Normal rate.      Pulses: Normal pulses.   Pulmonary:      Effort: Pulmonary effort is normal. No respiratory distress.      Breath sounds: No wheezing.      Comments: Occasional cough, dry  Abdominal:      General: Abdomen is flat. There is no distension.      Palpations: Abdomen is soft.      Tenderness: There is no abdominal tenderness.   Musculoskeletal: Normal range of motion.   Skin:     General: Skin is warm and dry.      Coloration: Skin is not jaundiced.   Neurological:      General: No focal deficit present.      Mental Status: He is alert and oriented to person, place, and time.      Cranial Nerves: No cranial nerve deficit.      Motor: Weakness present.   Psychiatric:         Mood and Affect: Mood normal.         Thought Content: Thought content normal.         Fluids    Intake/Output Summary (Last 24 hours) at 1/12/2021 1418  Last data filed at 1/12/2021 0900  Gross per 24 hour   Intake 1100 ml   Output 3100 ml   Net -2000 ml       Laboratory  Recent Labs     01/11/21  0600 01/12/21  0317   WBC 5.9 3.5*   RBC 3.12* 3.22*    HEMOGLOBIN 9.2* 9.5*   HEMATOCRIT 29.4* 30.5*   MCV 94.2 94.7   MCH 29.5 29.5   MCHC 31.3* 31.1*   RDW 54.3* 53.1*   PLATELETCT 164 169   MPV 10.2 10.9     Recent Labs     01/11/21  0600 01/12/21  0317   SODIUM 136 137   POTASSIUM 4.4 5.1   CHLORIDE 92* 96   CO2 25 26   GLUCOSE 86 136*   BUN 86* 52*   CREATININE 10.79* 7.31*   CALCIUM 8.6 8.9             Recent Labs     01/12/21  0317   TRIGLYCERIDE 181*   HDL 26*   LDL 65       Imaging  DX-CHEST-PORTABLE (1 VIEW)   Final Result         Patchy groundglass and airspace opacities throughout both lungs could be seen with atypical infection/Covid 19 pneumonia. Moderate pulmonary edema is in the differential.      Stable cardiomegaly.           Assessment/Plan  * Pneumonia  Assessment & Plan  65 y.o. male with PMHx of HTN, Gout, Hyperthyroidism and ESRD on HD MWF who presented 1/11/2021 with complaints of Cough, Shortness of Breath and Generalized Weakness.    About a week ago he began experiencing the same progressively worsening shortness of breath. He was at the dialysis center this morning when he began experiencing severe dyspnea and was found to be hypoxic saturating in the low 80'2    COVID 19-Positive  Droplet, Contact, [place] Precautions  Patient received Decadron 12mg IVP in ED  Continue 6mg Decadron PO Daily  Patient received one dose of Levaquin in ED, stop ABX now  Trend CBC/BMP    End stage renal failure on dialysis (HCC)- (present on admission)  Assessment & Plan  Patient is ESRD on HD MWF  BUN/Cr-10.79/5.71 (Elevated from 3 weeks ago)  GFR-5  Nephrology Consulted by ERP- Dr. Olguin On Board- Please Follow up Recommendations  Continue with Sevelamer 800mg TID    Hypertension- (present on admission)  Assessment & Plan  Patients BP on admission normotensive at 134/74mmHg  Lowered BB dose as patient lightheaded at home, having falls  Labetalol 10mg IVP Q4H PRN for SBP>180mmHg      Anemia- (present on admission)  Assessment & Plan  Most likely Anemia of  Chronic Disease  Hg stable at 9.2  Trend CBC    Gout- (present on admission)  Assessment & Plan  Reduced to 0.3mg colchicine QOD    Anxiety- (present on admission)  Assessment & Plan  Ativan 0.5mg PO Q4H PRN for Anxiety/Agitation       VTE prophylaxis: LMWH

## 2021-01-12 NOTE — DISCHARGE PLANNING
Outpatient Dialysis Note    Due to Covid 19 result pt will place at the cohort clinic at:    [facility]   [address] [place] [address]    Schedule: Tuesday, Thursday, Saturday   Time: TBD    Confirmed patient is active at:    [facility]   [address] [place] [address]    Schedule: Monday, Wednesday, Friday   Time: 04:45am     Patient is seen by Dr. Bassett in HD clinic.    Spoke with Kate at facility who confirmed.    Forwarded records for review.    Jennifer Nelson- Dialysis Coordinator  [facility] # [phone]

## 2021-01-13 PROCEDURE — 99233 SBSQ HOSP IP/OBS HIGH 50: CPT | Performed by: HOSPITALIST

## 2021-01-13 PROCEDURE — 770021 HCHG ROOM/CARE - ISO PRIVATE

## 2021-01-13 PROCEDURE — A9270 NON-COVERED ITEM OR SERVICE: HCPCS | Performed by: STUDENT IN AN ORGANIZED HEALTH CARE EDUCATION/TRAINING PROGRAM

## 2021-01-13 PROCEDURE — 700111 HCHG RX REV CODE 636 W/ 250 OVERRIDE (IP): Performed by: INTERNAL MEDICINE

## 2021-01-13 PROCEDURE — 5A1D70Z PERFORMANCE OF URINARY FILTRATION, INTERMITTENT, LESS THAN 6 HOURS PER DAY: ICD-10-PCS | Performed by: INTERNAL MEDICINE

## 2021-01-13 PROCEDURE — 700111 HCHG RX REV CODE 636 W/ 250 OVERRIDE (IP): Performed by: NURSE PRACTITIONER

## 2021-01-13 PROCEDURE — 700111 HCHG RX REV CODE 636 W/ 250 OVERRIDE (IP): Performed by: STUDENT IN AN ORGANIZED HEALTH CARE EDUCATION/TRAINING PROGRAM

## 2021-01-13 PROCEDURE — 90935 HEMODIALYSIS ONE EVALUATION: CPT

## 2021-01-13 PROCEDURE — 700102 HCHG RX REV CODE 250 W/ 637 OVERRIDE(OP): Performed by: STUDENT IN AN ORGANIZED HEALTH CARE EDUCATION/TRAINING PROGRAM

## 2021-01-13 RX ADMIN — OXYCODONE HYDROCHLORIDE AND ACETAMINOPHEN 1 TABLET: 5; 325 TABLET ORAL at 18:10

## 2021-01-13 RX ADMIN — EPOETIN ALFA-EPBX 4000 UNITS: 4000 INJECTION, SOLUTION INTRAVENOUS; SUBCUTANEOUS at 11:50

## 2021-01-13 RX ADMIN — HEPARIN SODIUM 1500 UNITS: 1000 INJECTION, SOLUTION INTRAVENOUS; SUBCUTANEOUS at 11:27

## 2021-01-13 RX ADMIN — OXYCODONE HYDROCHLORIDE AND ACETAMINOPHEN 1 TABLET: 5; 325 TABLET ORAL at 10:47

## 2021-01-13 RX ADMIN — SEVELAMER CARBONATE 800 MG: 800 TABLET, FILM COATED ORAL at 18:08

## 2021-01-13 RX ADMIN — SEVELAMER CARBONATE 800 MG: 800 TABLET, FILM COATED ORAL at 09:13

## 2021-01-13 RX ADMIN — DEXAMETHASONE 6 MG: 4 TABLET ORAL at 05:55

## 2021-01-13 RX ADMIN — CINACALCET HYDROCHLORIDE 30 MG: 30 TABLET, FILM COATED ORAL at 05:55

## 2021-01-13 RX ADMIN — ENOXAPARIN SODIUM 30 MG: 30 INJECTION SUBCUTANEOUS at 05:55

## 2021-01-13 RX ADMIN — FAMOTIDINE 20 MG: 20 TABLET ORAL at 05:55

## 2021-01-13 ASSESSMENT — ENCOUNTER SYMPTOMS
HEARTBURN: 0
NAUSEA: 0
TINGLING: 0
FEVER: 0
CHILLS: 0
TREMORS: 0
MYALGIAS: 1
VOMITING: 0
SENSORY CHANGE: 0

## 2021-01-13 NOTE — PROGRESS NOTES
Nephrology Daily Progress Note    Date of Service  1/13/2021    Chief Complaint  65 y.o. male with PMH of ESRD on HD MWF, HTN, anemia of CKD, and CKD-MBD  who presents to the emergency department complaining of shortness of breath, productive cough and malaise. It has become progressively worse over the last few days and today at dialysis he felt the SOB was increasing and was hypoxic with O2 saturation in the low 80's on room air and was therefore sent for further evaluation. The patient has tested positive for Covid-19 infection and chest X-ray today is concerning for bilateral infection/pneumonia and moderate pulmonary edema. He was most recently discharged from the hospital on 12/22/20 after being treated for large pericardial effusion and pericarditis.     Interval Problem Update  1/11 - Consult done  1/12 - RN/MD notes reviewed, HD yesterday with 2.6L UF, VSS on 1.5L NC, labs stable   1/13 - RN/MD notes reviewed, HD due today, iron stores OK, VSS on 2L NC    Review of Systems  ROS   Deferred due to Covid +    Physical Exam  Temp:  [36.6 °C (97.9 °F)-36.8 °C (98.3 °F)] 36.6 °C (97.9 °F)  Pulse:  [71-96] 96  Resp:  [18-20] 18  BP: (100-119)/(56-67) 100/67  SpO2:  [94 %-96 %] 96 %    Physical Exam  Deferred due to Covid +    Fluids    Intake/Output Summary (Last 24 hours) at 1/13/2021 0830  Last data filed at 1/12/2021 2031  Gross per 24 hour   Intake 600 ml   Output --   Net 600 ml       Laboratory  Recent Labs     01/11/21  0600 01/12/21  0317   WBC 5.9 3.5*   RBC 3.12* 3.22*   HEMOGLOBIN 9.2* 9.5*   HEMATOCRIT 29.4* 30.5*   MCV 94.2 94.7   MCH 29.5 29.5   MCHC 31.3* 31.1*   RDW 54.3* 53.1*   PLATELETCT 164 169   MPV 10.2 10.9     Recent Labs     01/11/21  0600 01/12/21 0317   SODIUM 136 137   POTASSIUM 4.4 5.1   CHLORIDE 92* 96   CO2 25 26   GLUCOSE 86 136*   BUN 86* 52*   CREATININE 10.79* 7.31*   CALCIUM 8.6 8.9         No results for input(s): NTPROBNP in the last 72 hours.  Recent Labs     01/12/21  2079    TRIGLYCERIDE 181*   HDL 26*   LDL 65       Imaging  Available labs, imaging and clinical documentation reviewed.     Assessment/Plan  # ESRD  - Etiology likely 2/2 congenital solitary kidney  - Normally dialyzes qMWF at [facility] via L AVF  - Living XPL surgery pending final work up at [facility]  # Acute hypoxic respiratory failure, improving   # Covid-19 PNA  - On decadron   # Pulmonary edema   - s/p dose of IV Lasix 1/11  - UF with HD   # HTN  - Goal BP < 140/90  - At goal  - On metoprolol   # Anemia of CKD, below target  - Goal Hgb 10-11  - Iron OK, ferritin 18,459  # CKD-MBD  - Managed at OP unit  - On sevelamer with meals   - On Sensipar   # Recent pericardial effusion/pericarditis  - On colchicine      PLAN:  - iHD qMWF, next treatment due today (WED)  - UF as tolerated  - No dietary protein restrictions  - Dose all meds per ESRD  - He takes ativan 1 mg prior to each HD, please continue that while inpatient  - Low sodium renal diet with fluid restriction   - Continue home medications   - Covid-19 treatment per primary team   - SUSHIL with HD, goal Hgb 10-11  - Transfuse PRN Hgb <7   - Ok to transition to OP HD at cohort clinic in [place] once medically cleared  - Follow labs    Thank you,

## 2021-01-13 NOTE — DISCHARGE PLANNING
Anticipated Discharge Disposition: TBD    Action: Reviewed with Dr Noe via Voalte. Pt will be unable to dc tomorrow. He was renting a room in a mobile home and was kicked out; somehow he goes to [place] for HD; now pt is positive with covid in addition to being on the transplant list at [facility].     Barriers to Discharge: not med cleared, housing issue, covid +    Plan: Reviewed above with case mngt supervisor via phone Cynthia RUIZ that pt may be a difficult dc.   CM to follow for dc planning needs.

## 2021-01-13 NOTE — PROGRESS NOTES
[facility] Progress Note     HD treatment ordered by Dr Olguin x 3 hours.  Treatment Start time: 1130          End time: 1430       Net UF 3000 ml    Patient tolerated treatment wel. VS stable all through out. All blood was returned. LUE AVF needle removed. Dry gauze dressing applied and changed without bleeding issue. + Bruit/Thrill pre-post treatment.   See paper flow sheet for details.       Report given to ROSALINDA Hidalgo.

## 2021-01-14 PROCEDURE — A9270 NON-COVERED ITEM OR SERVICE: HCPCS | Performed by: STUDENT IN AN ORGANIZED HEALTH CARE EDUCATION/TRAINING PROGRAM

## 2021-01-14 PROCEDURE — 770021 HCHG ROOM/CARE - ISO PRIVATE

## 2021-01-14 PROCEDURE — 700102 HCHG RX REV CODE 250 W/ 637 OVERRIDE(OP): Performed by: STUDENT IN AN ORGANIZED HEALTH CARE EDUCATION/TRAINING PROGRAM

## 2021-01-14 PROCEDURE — 700102 HCHG RX REV CODE 250 W/ 637 OVERRIDE(OP): Performed by: HOSPITALIST

## 2021-01-14 PROCEDURE — A9270 NON-COVERED ITEM OR SERVICE: HCPCS | Performed by: HOSPITALIST

## 2021-01-14 PROCEDURE — 700111 HCHG RX REV CODE 636 W/ 250 OVERRIDE (IP): Performed by: STUDENT IN AN ORGANIZED HEALTH CARE EDUCATION/TRAINING PROGRAM

## 2021-01-14 PROCEDURE — 99233 SBSQ HOSP IP/OBS HIGH 50: CPT | Performed by: HOSPITALIST

## 2021-01-14 RX ADMIN — SEVELAMER CARBONATE 800 MG: 800 TABLET, FILM COATED ORAL at 12:46

## 2021-01-14 RX ADMIN — DEXAMETHASONE 6 MG: 4 TABLET ORAL at 06:26

## 2021-01-14 RX ADMIN — METOPROLOL SUCCINATE 50 MG: 25 TABLET, EXTENDED RELEASE ORAL at 06:27

## 2021-01-14 RX ADMIN — OXYCODONE HYDROCHLORIDE AND ACETAMINOPHEN 1 TABLET: 5; 325 TABLET ORAL at 17:48

## 2021-01-14 RX ADMIN — CINACALCET HYDROCHLORIDE 30 MG: 30 TABLET, FILM COATED ORAL at 06:29

## 2021-01-14 RX ADMIN — SEVELAMER CARBONATE 800 MG: 800 TABLET, FILM COATED ORAL at 08:48

## 2021-01-14 RX ADMIN — SEVELAMER CARBONATE 800 MG: 800 TABLET, FILM COATED ORAL at 17:44

## 2021-01-14 RX ADMIN — COLCHICINE 0.3 MG: 0.6 TABLET, FILM COATED ORAL at 06:27

## 2021-01-14 RX ADMIN — FAMOTIDINE 20 MG: 20 TABLET ORAL at 06:27

## 2021-01-14 RX ADMIN — ENOXAPARIN SODIUM 30 MG: 30 INJECTION SUBCUTANEOUS at 06:28

## 2021-01-14 ASSESSMENT — ENCOUNTER SYMPTOMS
FEVER: 0
NAUSEA: 0
HEARTBURN: 0
MYALGIAS: 1
TINGLING: 0
CHILLS: 0
VOMITING: 0
SENSORY CHANGE: 0
TREMORS: 0

## 2021-01-14 ASSESSMENT — PAIN DESCRIPTION - PAIN TYPE: TYPE: CHRONIC PAIN

## 2021-01-14 NOTE — PROGRESS NOTES
"Hospital Medicine Daily Progress Note    Date of Service  1/14/2021    Chief Complaint  65 y.o. male with PMHx of HTN, Gout, Hyperthyroidism and ESRD on HD MWF who presented 1/11/2021 with complaints of Cough, Shortness of Breath and Generalized Weakness.     Hospital Course  Per HPI  \"65 y.o. male with PMHx of HTN, Gout, Hyperthyroidism and ESRD on HD MWF who presented 1/11/2021 with complaints of Cough, Shortness of Breath and Generalized Weakness. As per the patient, stating around Mid December he began experiecing worsening shortness of breath and was found to have a pericarditis with pericardial effusion, which was drained and analyzed showing mostly a transudative effusion most likely 2/2 to volume overload. His symptoms improved thereafter, however starting about a week ago he began experiencing the same progressively worsening shortness of breath. He was at the dialysis center in morning when he began experiencing severe dyspnea and was found to be hypoxic saturating in the low 80s. He states that he has never needed to use oxygen in the past. Upon admission to the ED, CXR was performed and showed patchy ground glass and airspace opacities throughout both lung fields, which could be seen with atypical infection such as COVID 19 PNA. COVID 19 Swab was ordered by ERP and came back positive. Nephrology was also consulted by ERP.\"     Interval Problem Update  Seen and examined. Chart reviewed, including labs and any pertinent imaging.  Many barriers to safe discharge  D/W CM at rounds    Consultants/Specialty  Nephrology    Code Status  Full Code    Disposition  Inpatient    Review of Systems  Review of Systems   Constitutional: Negative for chills and fever.   Cardiovascular: Negative for chest pain.   Gastrointestinal: Negative for heartburn, nausea and vomiting.   Musculoskeletal: Positive for myalgias.   Skin: Negative for itching and rash.   Neurological: Negative for tingling, tremors and sensory change. "   All other systems reviewed and are negative.       Physical Exam  Temp:  [36.3 °C (97.4 °F)-36.9 °C (98.4 °F)] 36.3 °C (97.4 °F)  Pulse:  [73-82] 82  Resp:  [18-20] 18  BP: (110-132)/(56-82) 132/82  SpO2:  [92 %-94 %] 94 %    Physical Exam  Vitals signs and nursing note reviewed.   Constitutional:       General: He is not in acute distress.     Appearance: He is not diaphoretic.   HENT:      Head: Normocephalic and atraumatic.      Nose: No congestion.      Mouth/Throat:      Mouth: Mucous membranes are moist.   Eyes:      General:         Right eye: No discharge.         Left eye: No discharge.      Extraocular Movements: Extraocular movements intact.      Conjunctiva/sclera: Conjunctivae normal.   Neck:      Musculoskeletal: Normal range of motion. No neck rigidity.   Cardiovascular:      Rate and Rhythm: Normal rate.      Pulses: Normal pulses.   Pulmonary:      Effort: Pulmonary effort is normal. No respiratory distress.      Breath sounds: No wheezing.      Comments: Occasional cough, dry  Abdominal:      General: Abdomen is flat. There is no distension.      Palpations: Abdomen is soft.      Tenderness: There is no abdominal tenderness.   Musculoskeletal: Normal range of motion.   Skin:     General: Skin is warm and dry.      Coloration: Skin is not jaundiced.   Neurological:      General: No focal deficit present.      Mental Status: He is alert and oriented to person, place, and time.      Cranial Nerves: No cranial nerve deficit.      Motor: Weakness present.   Psychiatric:         Mood and Affect: Mood normal.         Thought Content: Thought content normal.         Fluids    Intake/Output Summary (Last 24 hours) at 1/14/2021 1335  Last data filed at 1/14/2021 0937  Gross per 24 hour   Intake 1220 ml   Output 3500 ml   Net -2280 ml       Laboratory  Recent Labs     01/12/21  0317   WBC 3.5*   RBC 3.22*   HEMOGLOBIN 9.5*   HEMATOCRIT 30.5*   MCV 94.7   MCH 29.5   MCHC 31.1*   RDW 53.1*   PLATELETCT 169    MPV 10.9     Recent Labs     01/12/21  0317   SODIUM 137   POTASSIUM 5.1   CHLORIDE 96   CO2 26   GLUCOSE 136*   BUN 52*   CREATININE 7.31*   CALCIUM 8.9             Recent Labs     01/12/21  0317   TRIGLYCERIDE 181*   HDL 26*   LDL 65       Imaging  DX-CHEST-PORTABLE (1 VIEW)   Final Result         Patchy groundglass and airspace opacities throughout both lungs could be seen with atypical infection/Covid 19 pneumonia. Moderate pulmonary edema is in the differential.      Stable cardiomegaly.           Assessment/Plan  * Pneumonia  Assessment & Plan  65 y.o. male with PMHx of HTN, Gout, Hyperthyroidism and ESRD on HD MWF who presented 1/11/2021 with complaints of Cough, Shortness of Breath and Generalized Weakness.    About a week ago he began experiencing the same progressively worsening shortness of breath. He was at the dialysis center this morning when he began experiencing severe dyspnea and was found to be hypoxic saturating in the low 80'2    COVID 19-Positive  Droplet, Contact, [place] Precautions  Patient received Decadron 12mg IVP in ED  Continue 6mg Decadron PO Daily  Patient received one dose of Levaquin in ED, stop ABX now  Trend CBC/BMP    End stage renal failure on dialysis (HCC)- (present on admission)  Assessment & Plan  Patient is ESRD on HD MWF  BUN/Cr-10.79/5.71 (Elevated from 3 weeks ago)  GFR-5  Nephrology Consulted by ERP- Dr. Olguin On Board- Please Follow up Recommendations  Continue with Sevelamer 800mg TID    Hypertension- (present on admission)  Assessment & Plan  Patients BP on admission normotensive at 134/74mmHg  Lowered BB dose as patient lightheaded at home, having falls  Labetalol 10mg IVP Q4H PRN for SBP>180mmHg      Anemia- (present on admission)  Assessment & Plan  Most likely Anemia of Chronic Disease  Hg stable at 9.2  Trend CBC    Gout- (present on admission)  Assessment & Plan  Reduced to 0.3mg colchicine QOD    Anxiety- (present on admission)  Assessment & Plan  Ativan  0.5mg PO Q4H PRN for Anxiety/Agitation       VTE prophylaxis: LMWH

## 2021-01-14 NOTE — CARE PLAN
Problem: Safety  Goal: Will remain free from falls  Outcome: PROGRESSING AS EXPECTED     Patient rings for assistance appropriately.  Will continue to monitor.

## 2021-01-14 NOTE — PROGRESS NOTES
Nephrology Daily Progress Note    Date of Service  1/14/2021    Chief Complaint  65 y.o. male with PMH of ESRD on HD MWF, HTN, anemia of CKD, and CKD-MBD  who presents to the emergency department complaining of shortness of breath, productive cough and malaise. It has become progressively worse over the last few days and today at dialysis he felt the SOB was increasing and was hypoxic with O2 saturation in the low 80's on room air and was therefore sent for further evaluation. The patient has tested positive for Covid-19 infection and chest X-ray today is concerning for bilateral infection/pneumonia and moderate pulmonary edema. He was most recently discharged from the hospital on 12/22/20 after being treated for large pericardial effusion and pericarditis.     Interval Problem Update  1/11 - Consult done  1/12 - RN/MD notes reviewed, HD yesterday with 2.6L UF, VSS on 1.5L NC, labs stable   1/13 - RN/MD notes reviewed, HD due today, iron stores OK, VSS on 2L NC  1/14 - RN/MD notes reviewed, HD yesterday with 3L UF, VSS on RA, DC planning underway    Review of Systems  ROS   Deferred due to Covid +    Physical Exam  Temp:  [36.6 °C (97.8 °F)-36.9 °C (98.4 °F)] 36.7 °C (98 °F)  Pulse:  [64-80] 80  Resp:  [18-20] 20  BP: (110-122)/(56-70) 114/70  SpO2:  [92 %-96 %] 93 %    Physical Exam  Deferred due to Covid +    Fluids    Intake/Output Summary (Last 24 hours) at 1/14/2021 0816  Last data filed at 1/13/2021 2000  Gross per 24 hour   Intake 740 ml   Output 3500 ml   Net -2760 ml       Laboratory  Recent Labs     01/12/21 0317   WBC 3.5*   RBC 3.22*   HEMOGLOBIN 9.5*   HEMATOCRIT 30.5*   MCV 94.7   MCH 29.5   MCHC 31.1*   RDW 53.1*   PLATELETCT 169   MPV 10.9     Recent Labs     01/12/21 0317   SODIUM 137   POTASSIUM 5.1   CHLORIDE 96   CO2 26   GLUCOSE 136*   BUN 52*   CREATININE 7.31*   CALCIUM 8.9         No results for input(s): NTPROBNP in the last 72 hours.  Recent Labs     01/12/21  0317   TRIGLYCERIDE 181*    HDL 26*   LDL 65       Imaging  Available labs, imaging and clinical documentation reviewed.     Assessment/Plan  # ESRD  - Etiology likely 2/2 congenital solitary kidney  - Normally dialyzes qMWF at [facility] via L AVF  - Living XPL surgery pending final work up at [facility]  # Acute hypoxic respiratory failure, resolved   # Covid-19 PNA  - On decadron   # Pulmonary edema, improved   - s/p dose of IV Lasix 1/11  - UF with HD   # HTN  - Goal BP < 140/90  - At goal  - On metoprolol   # Anemia of CKD, below target  - Goal Hgb 10-11  - Iron OK, ferritin 18,459  # CKD-MBD  - Managed at OP unit  - On sevelamer with meals   - On Sensipar   # Recent pericardial effusion/pericarditis  - On colchicine      PLAN:  - iHD qMWF, next treatment due tomorrow (FRI)  - UF as tolerated  - No dietary protein restrictions  - Dose all meds per ESRD  - He takes ativan 1 mg prior to each HD, please continue that while inpatient  - Low sodium renal diet with fluid restriction   - Continue home medications   - Covid-19 treatment per primary team   - SUSHIL with HD, goal Hgb 10-11  - Transfuse PRN Hgb <7   - Ok to transition to OP HD at [facility] in [place] once medically cleared  - Pt reports he has no housing so this is an issue for DC planning   - Follow labs    Thank you,

## 2021-01-14 NOTE — PROGRESS NOTES
"Hospital Medicine Daily Progress Note    Date of Service  1/13/2021    Chief Complaint  65 y.o. male with PMHx of HTN, Gout, Hyperthyroidism and ESRD on HD MWF who presented 1/11/2021 with complaints of Cough, Shortness of Breath and Generalized Weakness.     Hospital Course  Per HPI  \"65 y.o. male with PMHx of HTN, Gout, Hyperthyroidism and ESRD on HD MWF who presented 1/11/2021 with complaints of Cough, Shortness of Breath and Generalized Weakness. As per the patient, stating around Mid December he began experiecing worsening shortness of breath and was found to have a pericarditis with pericardial effusion, which was drained and analyzed showing mostly a transudative effusion most likely 2/2 to volume overload. His symptoms improved thereafter, however starting about a week ago he began experiencing the same progressively worsening shortness of breath. He was at the dialysis center in morning when he began experiencing severe dyspnea and was found to be hypoxic saturating in the low 80s. He states that he has never needed to use oxygen in the past. Upon admission to the ED, CXR was performed and showed patchy ground glass and airspace opacities throughout both lung fields, which could be seen with atypical infection such as COVID 19 PNA. COVID 19 Swab was ordered by ERP and came back positive. Nephrology was also consulted by ERP.\"     Interval Problem Update  Seen and examined. Chart reviewed, including labs and any pertinent imaging.  Getting HD  We discussed discharge tomorrow but he tells me he is now homeless, he was living in mobile home but kicked out  Doesn't have transport to HD  On kidney transplant list through [facility] but that's in question now bc of covid  D/W CM    Consultants/Specialty  Nephrology    Code Status  Full Code    Disposition  Inpatient    Review of Systems  Review of Systems   Constitutional: Negative for chills and fever.   Cardiovascular: Negative for chest pain.   Gastrointestinal: " Negative for heartburn, nausea and vomiting.   Musculoskeletal: Positive for myalgias.   Skin: Negative for itching and rash.   Neurological: Negative for tingling, tremors and sensory change.   All other systems reviewed and are negative.       Physical Exam  Temp:  [36.6 °C (97.8 °F)-36.8 °C (98.3 °F)] 36.6 °C (97.8 °F)  Pulse:  [64-96] 73  Resp:  [18-20] 18  BP: (100-122)/(56-67) 116/61  SpO2:  [92 %-96 %] 92 %    Physical Exam  Vitals signs and nursing note reviewed.   Constitutional:       General: He is not in acute distress.     Appearance: He is not diaphoretic.   HENT:      Head: Normocephalic and atraumatic.      Nose: No congestion.      Mouth/Throat:      Mouth: Mucous membranes are moist.   Eyes:      General:         Right eye: No discharge.         Left eye: No discharge.      Extraocular Movements: Extraocular movements intact.      Conjunctiva/sclera: Conjunctivae normal.   Neck:      Musculoskeletal: Normal range of motion. No neck rigidity.   Cardiovascular:      Rate and Rhythm: Normal rate.      Pulses: Normal pulses.   Pulmonary:      Effort: Pulmonary effort is normal. No respiratory distress.      Breath sounds: No wheezing.      Comments: Occasional cough, dry  Abdominal:      General: Abdomen is flat. There is no distension.      Palpations: Abdomen is soft.      Tenderness: There is no abdominal tenderness.   Musculoskeletal: Normal range of motion.   Skin:     General: Skin is warm and dry.      Coloration: Skin is not jaundiced.   Neurological:      General: No focal deficit present.      Mental Status: He is alert and oriented to person, place, and time.      Cranial Nerves: No cranial nerve deficit.      Motor: Weakness present.   Psychiatric:         Mood and Affect: Mood normal.         Thought Content: Thought content normal.         Fluids    Intake/Output Summary (Last 24 hours) at 1/13/2021 1654  Last data filed at 1/13/2021 1430  Gross per 24 hour   Intake 860 ml   Output 3500  ml   Net -2640 ml       Laboratory  Recent Labs     01/11/21  0600 01/12/21  0317   WBC 5.9 3.5*   RBC 3.12* 3.22*   HEMOGLOBIN 9.2* 9.5*   HEMATOCRIT 29.4* 30.5*   MCV 94.2 94.7   MCH 29.5 29.5   MCHC 31.3* 31.1*   RDW 54.3* 53.1*   PLATELETCT 164 169   MPV 10.2 10.9     Recent Labs     01/11/21  0600 01/12/21  0317   SODIUM 136 137   POTASSIUM 4.4 5.1   CHLORIDE 92* 96   CO2 25 26   GLUCOSE 86 136*   BUN 86* 52*   CREATININE 10.79* 7.31*   CALCIUM 8.6 8.9             Recent Labs     01/12/21  0317   TRIGLYCERIDE 181*   HDL 26*   LDL 65       Imaging  DX-CHEST-PORTABLE (1 VIEW)   Final Result         Patchy groundglass and airspace opacities throughout both lungs could be seen with atypical infection/Covid 19 pneumonia. Moderate pulmonary edema is in the differential.      Stable cardiomegaly.           Assessment/Plan  * Pneumonia  Assessment & Plan  65 y.o. male with PMHx of HTN, Gout, Hyperthyroidism and ESRD on HD MWF who presented 1/11/2021 with complaints of Cough, Shortness of Breath and Generalized Weakness.    About a week ago he began experiencing the same progressively worsening shortness of breath. He was at the dialysis center this morning when he began experiencing severe dyspnea and was found to be hypoxic saturating in the low 80'2    COVID 19-Positive  Droplet, Contact, [place] Precautions  Patient received Decadron 12mg IVP in ED  Continue 6mg Decadron PO Daily  Patient received one dose of Levaquin in ED, stop ABX now  Trend CBC/BMP    End stage renal failure on dialysis (HCC)- (present on admission)  Assessment & Plan  Patient is ESRD on HD MWF  BUN/Cr-10.79/5.71 (Elevated from 3 weeks ago)  GFR-5  Nephrology Consulted by ERP- Dr. Olguin On Board- Please Follow up Recommendations  Continue with Sevelamer 800mg TID    Hypertension- (present on admission)  Assessment & Plan  Patients BP on admission normotensive at 134/74mmHg  Lowered BB dose as patient lightheaded at home, having  falls  Labetalol 10mg IVP Q4H PRN for SBP>180mmHg      Anemia- (present on admission)  Assessment & Plan  Most likely Anemia of Chronic Disease  Hg stable at 9.2  Trend CBC    Gout- (present on admission)  Assessment & Plan  Reduced to 0.3mg colchicine QOD    Anxiety- (present on admission)  Assessment & Plan  Ativan 0.5mg PO Q4H PRN for Anxiety/Agitation       VTE prophylaxis: LMWH

## 2021-01-14 NOTE — PROGRESS NOTES
Received shift report from previous RN and assumed care of patient.  Patient is resting comfortably at this time.  Ongoing care will be provided per orders and plan of care.

## 2021-01-15 PROCEDURE — 700102 HCHG RX REV CODE 250 W/ 637 OVERRIDE(OP): Performed by: STUDENT IN AN ORGANIZED HEALTH CARE EDUCATION/TRAINING PROGRAM

## 2021-01-15 PROCEDURE — 770021 HCHG ROOM/CARE - ISO PRIVATE

## 2021-01-15 PROCEDURE — 5A1D70Z PERFORMANCE OF URINARY FILTRATION, INTERMITTENT, LESS THAN 6 HOURS PER DAY: ICD-10-PCS | Performed by: INTERNAL MEDICINE

## 2021-01-15 PROCEDURE — 99232 SBSQ HOSP IP/OBS MODERATE 35: CPT | Performed by: HOSPITALIST

## 2021-01-15 PROCEDURE — 700111 HCHG RX REV CODE 636 W/ 250 OVERRIDE (IP): Performed by: INTERNAL MEDICINE

## 2021-01-15 PROCEDURE — A9270 NON-COVERED ITEM OR SERVICE: HCPCS | Performed by: STUDENT IN AN ORGANIZED HEALTH CARE EDUCATION/TRAINING PROGRAM

## 2021-01-15 PROCEDURE — A9270 NON-COVERED ITEM OR SERVICE: HCPCS | Performed by: HOSPITALIST

## 2021-01-15 PROCEDURE — 700111 HCHG RX REV CODE 636 W/ 250 OVERRIDE (IP): Performed by: NURSE PRACTITIONER

## 2021-01-15 PROCEDURE — 700102 HCHG RX REV CODE 250 W/ 637 OVERRIDE(OP): Performed by: HOSPITALIST

## 2021-01-15 PROCEDURE — 700111 HCHG RX REV CODE 636 W/ 250 OVERRIDE (IP): Performed by: STUDENT IN AN ORGANIZED HEALTH CARE EDUCATION/TRAINING PROGRAM

## 2021-01-15 PROCEDURE — 90935 HEMODIALYSIS ONE EVALUATION: CPT

## 2021-01-15 RX ADMIN — HEPARIN SODIUM 1500 UNITS: 1000 INJECTION, SOLUTION INTRAVENOUS; SUBCUTANEOUS at 11:11

## 2021-01-15 RX ADMIN — SEVELAMER CARBONATE 800 MG: 800 TABLET, FILM COATED ORAL at 12:15

## 2021-01-15 RX ADMIN — DEXAMETHASONE 6 MG: 4 TABLET ORAL at 04:12

## 2021-01-15 RX ADMIN — ENOXAPARIN SODIUM 30 MG: 30 INJECTION SUBCUTANEOUS at 04:10

## 2021-01-15 RX ADMIN — SEVELAMER CARBONATE 800 MG: 800 TABLET, FILM COATED ORAL at 08:20

## 2021-01-15 RX ADMIN — METOPROLOL SUCCINATE 50 MG: 25 TABLET, EXTENDED RELEASE ORAL at 04:11

## 2021-01-15 RX ADMIN — SEVELAMER CARBONATE 800 MG: 800 TABLET, FILM COATED ORAL at 18:18

## 2021-01-15 RX ADMIN — EPOETIN ALFA-EPBX 4000 UNITS: 4000 INJECTION, SOLUTION INTRAVENOUS; SUBCUTANEOUS at 11:11

## 2021-01-15 RX ADMIN — CINACALCET HYDROCHLORIDE 30 MG: 30 TABLET, FILM COATED ORAL at 04:11

## 2021-01-15 RX ADMIN — FAMOTIDINE 20 MG: 20 TABLET ORAL at 04:12

## 2021-01-15 RX ADMIN — OXYCODONE HYDROCHLORIDE AND ACETAMINOPHEN 1 TABLET: 5; 325 TABLET ORAL at 11:10

## 2021-01-15 ASSESSMENT — ENCOUNTER SYMPTOMS
FEVER: 0
TREMORS: 0
HEARTBURN: 0
NAUSEA: 0
VOMITING: 0
MYALGIAS: 1
CHILLS: 0
SENSORY CHANGE: 0
TINGLING: 0

## 2021-01-15 ASSESSMENT — PAIN DESCRIPTION - PAIN TYPE: TYPE: ACUTE PAIN

## 2021-01-15 NOTE — PROGRESS NOTES
[facility] Services Progress Note    Hemodialysis treatment ordered today per Dr. Olguin x 3 hours. Treatment initiated at 1142, ended at 1442.     UFG adjusted as BP tolerates; see paper flow sheet for details.     Net UF 1800 mL.     Needles removed from access site. Dressings applied and sites held x 10 minutes; verified no bleeding. Positive bruit/thrill post tx. Staff RN instructed to monitor AVF for breakthrough bleeding. Should breakthrough bleeding occur staff RN instructed to apply pressure to access sites until bleeding resolved. Notify Dialysis and Nephrologist for follow-up.    Report given to Primary RN.

## 2021-01-15 NOTE — PROGRESS NOTES
"Hospital Medicine Daily Progress Note    Date of Service  1/15/2021    Chief Complaint  65 y.o. male with PMHx of HTN, Gout, Hyperthyroidism and ESRD on HD MWF who presented 1/11/2021 with complaints of Cough, Shortness of Breath and Generalized Weakness.     Hospital Course  Per HPI  \"65 y.o. male with PMHx of HTN, Gout, Hyperthyroidism and ESRD on HD MWF who presented 1/11/2021 with complaints of Cough, Shortness of Breath and Generalized Weakness. As per the patient, stating around Mid December he began experiecing worsening shortness of breath and was found to have a pericarditis with pericardial effusion, which was drained and analyzed showing mostly a transudative effusion most likely 2/2 to volume overload. His symptoms improved thereafter, however starting about a week ago he began experiencing the same progressively worsening shortness of breath. He was at the dialysis center in morning when he began experiencing severe dyspnea and was found to be hypoxic saturating in the low 80s. He states that he has never needed to use oxygen in the past. Upon admission to the ED, CXR was performed and showed patchy ground glass and airspace opacities throughout both lung fields, which could be seen with atypical infection such as COVID 19 PNA. COVID 19 Swab was ordered by ERP and came back positive. Nephrology was also consulted by ERP.\"     Interval Problem Update  Seen and examined. Chart reviewed, including labs and any pertinent imaging. He is getting HD today and eating lunch. No complaints.     \"I don't have anywhere to go, no food, no transport\"    Consultants/Specialty  Nephrology    Code Status  Full Code    Disposition  Inpatient    Review of Systems  Review of Systems   Constitutional: Negative for chills and fever.   Cardiovascular: Negative for chest pain.   Gastrointestinal: Negative for heartburn, nausea and vomiting.   Musculoskeletal: Positive for myalgias.   Skin: Negative for itching and rash. "   Neurological: Negative for tingling, tremors and sensory change.   All other systems reviewed and are negative.       Physical Exam  Temp:  [36.1 °C (97 °F)-36.8 °C (98.3 °F)] 36.6 °C (97.8 °F)  Pulse:  [92-96] 92  Resp:  [18-19] 19  BP: (117-128)/(62-73) 117/67  SpO2:  [94 %-96 %] 94 %    Physical Exam  Vitals signs and nursing note reviewed.   Constitutional:       General: He is not in acute distress.     Appearance: He is not diaphoretic.   HENT:      Head: Normocephalic and atraumatic.      Nose: No congestion.      Mouth/Throat:      Mouth: Mucous membranes are moist.   Eyes:      General:         Right eye: No discharge.         Left eye: No discharge.      Extraocular Movements: Extraocular movements intact.      Conjunctiva/sclera: Conjunctivae normal.   Neck:      Musculoskeletal: Normal range of motion. No neck rigidity.   Cardiovascular:      Rate and Rhythm: Normal rate.      Pulses: Normal pulses.   Pulmonary:      Effort: Pulmonary effort is normal. No respiratory distress.      Breath sounds: No wheezing.      Comments: Occasional cough, dry  Abdominal:      General: Abdomen is flat. There is no distension.      Palpations: Abdomen is soft.      Tenderness: There is no abdominal tenderness.   Musculoskeletal: Normal range of motion.   Skin:     General: Skin is warm and dry.      Coloration: Skin is not jaundiced.   Neurological:      General: No focal deficit present.      Mental Status: He is alert and oriented to person, place, and time.      Cranial Nerves: No cranial nerve deficit.      Motor: Weakness present.   Psychiatric:         Mood and Affect: Mood normal.         Thought Content: Thought content normal.         Fluids    Intake/Output Summary (Last 24 hours) at 1/15/2021 1402  Last data filed at 1/15/2021 0900  Gross per 24 hour   Intake 720 ml   Output --   Net 720 ml       Laboratory                        Imaging  DX-CHEST-PORTABLE (1 VIEW)   Final Result         Patchy groundglass  and airspace opacities throughout both lungs could be seen with atypical infection/Covid 19 pneumonia. Moderate pulmonary edema is in the differential.      Stable cardiomegaly.           Assessment/Plan  * Pneumonia  Assessment & Plan  65 y.o. male with PMHx of HTN, Gout, Hyperthyroidism and ESRD on HD MWF who presented 1/11/2021 with complaints of Cough, Shortness of Breath and Generalized Weakness.    About a week ago he began experiencing the same progressively worsening shortness of breath. He was at the dialysis center this morning when he began experiencing severe dyspnea and was found to be hypoxic saturating in the low 80'2    COVID 19-Positive  Droplet, Contact, [place] Precautions  Patient received Decadron 12mg IVP in ED  Continue 6mg Decadron PO Daily  Patient received one dose of Levaquin in ED, stop ABX now  Trend CBC/BMP    End stage renal failure on dialysis (HCC)- (present on admission)  Assessment & Plan  Patient is ESRD on HD MWF  BUN/Cr-10.79/5.71 (Elevated from 3 weeks ago)  GFR-5  Nephrology Consulted by ERP- Dr. Olguin On Board- Please Follow up Recommendations  Continue with Sevelamer 800mg TID    Hypertension- (present on admission)  Assessment & Plan  Patients BP on admission normotensive at 134/74mmHg  Lowered BB dose as patient lightheaded at home, having falls  Labetalol 10mg IVP Q4H PRN for SBP>180mmHg      Anemia- (present on admission)  Assessment & Plan  Most likely Anemia of Chronic Disease  Hg stable at 9.2  Trend CBC    Gout- (present on admission)  Assessment & Plan  Reduced to 0.3mg colchicine QOD    Anxiety- (present on admission)  Assessment & Plan  Ativan 0.5mg PO Q4H PRN for Anxiety/Agitation       VTE prophylaxis: LMWH

## 2021-01-15 NOTE — PROGRESS NOTES
Nephrology Daily Progress Note    Date of Service  1/15/2021    Chief Complaint  65 y.o. male with PMH of ESRD on HD MWF, HTN, anemia of CKD, and CKD-MBD  who presents to the emergency department complaining of shortness of breath, productive cough and malaise. It has become progressively worse over the last few days and today at dialysis he felt the SOB was increasing and was hypoxic with O2 saturation in the low 80's on room air and was therefore sent for further evaluation. The patient has tested positive for Covid-19 infection and chest X-ray today is concerning for bilateral infection/pneumonia and moderate pulmonary edema. He was most recently discharged from the hospital on 12/22/20 after being treated for large pericardial effusion and pericarditis.     Interval Problem Update  1/11 - Consult done  1/12 - RN/MD notes reviewed, HD yesterday with 2.6L UF, VSS on 1.5L NC, labs stable   1/13 - RN/MD notes reviewed, HD due today, iron stores OK, VSS on 2L NC  1/14 - RN/MD notes reviewed, HD yesterday with 3L UF, VSS on RA, DC planning underway  1/15 - RN/MD notes reviewed, due for HD today, VSS on RA, team working on safe DC plan    Review of Systems  ROS   Deferred due to Covid +    Physical Exam  Temp:  [36.1 °C (97 °F)-36.8 °C (98.3 °F)] 36.6 °C (97.8 °F)  Pulse:  [92-96] 92  Resp:  [18-19] 19  BP: (117-128)/(62-73) 117/67  SpO2:  [94 %-96 %] 94 %    Physical Exam  Deferred due to Covid +    Fluids    Intake/Output Summary (Last 24 hours) at 1/15/2021 0831  Last data filed at 1/14/2021 1800  Gross per 24 hour   Intake 960 ml   Output --   Net 960 ml       Laboratory              No results for input(s): NTPROBNP in the last 72 hours.        Imaging  Available labs, imaging and clinical documentation reviewed.     Assessment/Plan  # ESRD  - Etiology likely 2/2 congenital solitary kidney  - Normally dialyzes qMWF at [facility] via L AVF  - Living XPL surgery pending final work up at [facility]  # Acute hypoxic  respiratory failure, resolved   # Covid-19 PNA  - On decadron   # Pulmonary edema, improved   - s/p dose of IV Lasix 1/11  - UF with HD   # HTN  - Goal BP < 140/90  - At goal  - On metoprolol   # Anemia of CKD, below target  - Goal Hgb 10-11  - Iron OK, ferritin 18,459  # CKD-MBD  - Managed at OP unit  - On sevelamer with meals   - On Sensipar   # Recent pericardial effusion/pericarditis  - On colchicine      PLAN:  - iHD qMWF, next treatment due today (FRI)  - UF as tolerated  - No dietary protein restrictions  - Dose all meds per ESRD  - He takes ativan 1 mg prior to each HD, please continue that while inpatient  - Low sodium renal diet with fluid restriction   - Continue home medications   - Covid-19 treatment per primary team   - SUSHIL with HD, goal Hgb 10-11  - Transfuse PRN Hgb <7   - Ok to transition to OP HD at [facility] in [place] once medically cleared  - Pt reports he has no housing so this is an issue for DC planning   - Follow labs    Thank you,

## 2021-01-15 NOTE — DISCHARGE PLANNING
Outpatient Dialysis Note    This DC received a call from Fanta CARR regarding pt may discharge to [facility] in [place] area, I relayed to Fanta that patients home HD clinic is at [facility] but due to Covid+ pt will be place at the Cohort clinic at [facility] and that [facility] is offering free transportation to/from home address to the cohort clinic, Fanta CARR will notify me when pt. DC plan is arranged.      This DC sent an e-mail to HD clinic requesting  transportation for patient when DC but HD SW will need updated address for patient to arrange transportation.       Jennifer Nelson- Dialysis Coordinator  [facility] # [phone]

## 2021-01-16 PROCEDURE — 770021 HCHG ROOM/CARE - ISO PRIVATE

## 2021-01-16 PROCEDURE — 700111 HCHG RX REV CODE 636 W/ 250 OVERRIDE (IP): Performed by: STUDENT IN AN ORGANIZED HEALTH CARE EDUCATION/TRAINING PROGRAM

## 2021-01-16 PROCEDURE — A9270 NON-COVERED ITEM OR SERVICE: HCPCS | Performed by: STUDENT IN AN ORGANIZED HEALTH CARE EDUCATION/TRAINING PROGRAM

## 2021-01-16 PROCEDURE — 700102 HCHG RX REV CODE 250 W/ 637 OVERRIDE(OP): Performed by: HOSPITALIST

## 2021-01-16 PROCEDURE — RXMED WILLOW AMBULATORY MEDICATION CHARGE: Performed by: HOSPITALIST

## 2021-01-16 PROCEDURE — A9270 NON-COVERED ITEM OR SERVICE: HCPCS | Performed by: HOSPITALIST

## 2021-01-16 PROCEDURE — 99232 SBSQ HOSP IP/OBS MODERATE 35: CPT | Performed by: HOSPITALIST

## 2021-01-16 PROCEDURE — 700102 HCHG RX REV CODE 250 W/ 637 OVERRIDE(OP): Performed by: STUDENT IN AN ORGANIZED HEALTH CARE EDUCATION/TRAINING PROGRAM

## 2021-01-16 RX ORDER — DEXAMETHASONE 4 MG/1
6 TABLET ORAL DAILY
Qty: 5 TAB | Refills: 0 | Status: SHIPPED | OUTPATIENT
Start: 2021-01-17 | End: 2021-01-20

## 2021-01-16 RX ORDER — COLCHICINE 0.6 MG/1
0.3 TABLET ORAL
Qty: 30 TAB | Refills: 1 | Status: SHIPPED | OUTPATIENT
Start: 2021-01-16 | End: 2021-01-29 | Stop reason: SDUPTHER

## 2021-01-16 RX ORDER — CINACALCET HYDROCHLORIDE 30 MG/1
30 TABLET, COATED ORAL DAILY
Qty: 30 TAB | Refills: 11 | Status: SHIPPED | OUTPATIENT
Start: 2021-01-16

## 2021-01-16 RX ORDER — METOPROLOL SUCCINATE 100 MG/1
100 TABLET, EXTENDED RELEASE ORAL DAILY
Qty: 90 TAB | Refills: 2 | Status: SHIPPED | OUTPATIENT
Start: 2021-01-16 | End: 2021-02-03

## 2021-01-16 RX ORDER — PANTOPRAZOLE SODIUM 20 MG/1
20 TABLET, DELAYED RELEASE ORAL DAILY
Qty: 90 TAB | Refills: 1 | Status: SHIPPED | OUTPATIENT
Start: 2021-01-16 | End: 2021-01-29 | Stop reason: SDUPTHER

## 2021-01-16 RX ORDER — SEVELAMER CARBONATE 800 MG/1
TABLET, FILM COATED ORAL SEE ADMIN INSTRUCTIONS
Qty: 270 TAB | Refills: 5 | Status: SHIPPED | OUTPATIENT
Start: 2021-01-16 | End: 2021-05-19

## 2021-01-16 RX ORDER — B,C/FOLIC/ZINC/SELENOMETH/D3/E 0.8-12.5MG
1 TABLET ORAL DAILY
Qty: 30 TAB | Refills: 2 | Status: SHIPPED | OUTPATIENT
Start: 2021-01-16 | End: 2021-01-17 | Stop reason: SDUPTHER

## 2021-01-16 RX ADMIN — METOPROLOL SUCCINATE 50 MG: 25 TABLET, EXTENDED RELEASE ORAL at 05:00

## 2021-01-16 RX ADMIN — ENOXAPARIN SODIUM 30 MG: 30 INJECTION SUBCUTANEOUS at 05:00

## 2021-01-16 RX ADMIN — FAMOTIDINE 20 MG: 20 TABLET ORAL at 04:59

## 2021-01-16 RX ADMIN — DEXAMETHASONE 6 MG: 4 TABLET ORAL at 04:59

## 2021-01-16 RX ADMIN — SEVELAMER CARBONATE 800 MG: 800 TABLET, FILM COATED ORAL at 17:47

## 2021-01-16 RX ADMIN — SEVELAMER CARBONATE 800 MG: 800 TABLET, FILM COATED ORAL at 12:49

## 2021-01-16 RX ADMIN — OXYCODONE HYDROCHLORIDE AND ACETAMINOPHEN 1 TABLET: 5; 325 TABLET ORAL at 17:47

## 2021-01-16 RX ADMIN — SEVELAMER CARBONATE 800 MG: 800 TABLET, FILM COATED ORAL at 09:05

## 2021-01-16 RX ADMIN — COLCHICINE 0.3 MG: 0.6 TABLET, FILM COATED ORAL at 04:57

## 2021-01-16 RX ADMIN — CINACALCET HYDROCHLORIDE 30 MG: 30 TABLET, FILM COATED ORAL at 04:59

## 2021-01-16 ASSESSMENT — ENCOUNTER SYMPTOMS
HEARTBURN: 0
VOMITING: 0
TINGLING: 0
NAUSEA: 0
MYALGIAS: 1
FEVER: 0
SENSORY CHANGE: 0
TREMORS: 0
CHILLS: 0

## 2021-01-16 ASSESSMENT — PAIN DESCRIPTION - PAIN TYPE: TYPE: ACUTE PAIN

## 2021-01-16 ASSESSMENT — PAIN SCALES - WONG BAKER: WONGBAKER_NUMERICALRESPONSE: DOESN'T HURT AT ALL

## 2021-01-16 NOTE — DISCHARGE PLANNING
COVID housing application and patient forms emailed to [facility] team at [email or web address].

## 2021-01-16 NOTE — PROGRESS NOTES
[facility] Nephrology Daily Progress Note    Date of Service  1/16/2021    Chief Complaint  65 y.o. male with PMH of ESRD on HD MWF, HTN, anemia of CKD, and CKD-MBD  who presents to the emergency department complaining of shortness of breath, productive cough and malaise. It has become progressively worse over the last few days and today at dialysis he felt the SOB was increasing and was hypoxic with O2 saturation in the low 80's on room air and was therefore sent for further evaluation. The patient has tested positive for Covid-19 infection and chest X-ray today is concerning for bilateral infection/pneumonia and moderate pulmonary edema. He was most recently discharged from the hospital on 12/22/20 after being treated for large pericardial effusion and pericarditis.     Interval Problem Update  1/11 - Consult done  1/12 - RN/MD notes reviewed, HD yesterday with 2.6L UF, VSS on 1.5L NC, labs stable   1/13 - RN/MD notes reviewed, HD due today, iron stores OK, VSS on 2L NC  1/14 - RN/MD notes reviewed, HD yesterday with 3L UF, VSS on RA, DC planning underway  1/15 - RN/MD notes reviewed, due for HD today, VSS on RA, team working on safe DC plan  1/16 - No new overnight renal events. S/p HD yesterday with net UF of 1.8L UF and tolerated well.     Review of Systems  ROS   Deferred due to Covid +    Physical Exam  Temp:  [36.3 °C (97.3 °F)-36.6 °C (97.9 °F)] 36.3 °C (97.3 °F)  Pulse:  [68-71] 68  Resp:  [18-20] 18  BP: (106-128)/(61-77) 116/77  SpO2:  [95 %-96 %] 95 %    Physical Exam  Deferred due to Covid +    Fluids    Intake/Output Summary (Last 24 hours) at 1/16/2021 0816  Last data filed at 1/16/2021 0109  Gross per 24 hour   Intake 1440 ml   Output 2400 ml   Net -960 ml       Laboratory              No results for input(s): NTPROBNP in the last 72 hours.        Imaging  Available labs, imaging and clinical documentation reviewed.     Assessment/Plan  # ESRD  - Etiology likely 2/2 congenital solitary kidney  -  Normally dialyzes qMWF at [facility] via L AVF  - Living XPL surgery pending final work up at [facility]  # Acute hypoxic respiratory failure, resolved   # Covid-19 PNA  - On decadron   # Pulmonary edema, improved   - s/p dose of IV Lasix 1/11  - UF with HD   # HTN  - Goal BP < 140/90  - At goal  - On metoprolol   # Anemia of CKD, below target  - Goal Hgb 10-11  - Iron OK, ferritin 18,459  # CKD-MBD  - Managed at OP unit  - On sevelamer with meals   - On Sensipar   # Recent pericardial effusion/pericarditis  - On colchicine      PLAN:  - No plans for HD today.  - iHD qMWF. Next Treatment is Monday  - UF as tolerated  - No dietary protein restrictions  - Dose all meds per ESRD  - Low sodium renal diet with fluid restriction   - Continue home medications   - Covid-19 treatment per primary team   - SUSHIL with HD, goal Hgb 10-11  - Transfuse PRN Hgb <7   - Ok to transition to OP HD at [facility] in [place] once medically cleared  - Pt reports he has no housing so this is an issue for DC planning   - Follow labs    Thank you,

## 2021-01-16 NOTE — DISCHARGE PLANNING
Anticipated Discharge Disposition: COVID [employer]     Action: LSW received a call from Fanta ([phone]) with [email or web address].   Fanta informed LSW that she has received the referral for [facility] for pt.     Fanta requesting-   D/c summary- scanned and emailed to [email or web address] [email or web address].  Medications- pt will require medications for at least [facility]   Oxygen- if on O2 will need to be setup. However, LSW informed that pt not on O2.     [facility]     Plan: LSW to ensure pt has medications, LSW to continue to assist with d/c needs       Addendum 1515  LSW called meds to beds [phone]. LSW spoke uSsan. Susan informed LSW that meds to beds delivers to [facility] at 1530. Too late to get pt's medications delivered today.     Metoprolol- covered by insurance  colchicine-covered by insurance  Total for both- $7.40    Pt still has 2 days for insurance to cover for  Dexamethasone- Approved service- $7.63  Pantoprazole- Approved service- $8.28    Multiple Vitamins-Minerals ([employer])- not in stock and not covered    Sensipar- not covered by insurance. Approved service- $7.80     Approved service can be sent to fax- [phone]      Addendum 2440   LSW called Fanta with [email or web address] to provide update.   Fanta informed LSW that she won't be covering tomorrow. The number for the weekend worker to call tomorrow 1/17 will be [phone]    LSW to leave information in hand off report.

## 2021-01-16 NOTE — PROGRESS NOTES
"Hospital Medicine Daily Progress Note    Date of Service  1/16/2021    Chief Complaint  65 y.o. male with PMHx of HTN, Gout, Hyperthyroidism and ESRD on HD MWF who presented 1/11/2021 with complaints of Cough, Shortness of Breath and Generalized Weakness.     Hospital Course  Per HPI  \"65 y.o. male with PMHx of HTN, Gout, Hyperthyroidism and ESRD on HD MWF who presented 1/11/2021 with complaints of Cough, Shortness of Breath and Generalized Weakness. As per the patient, stating around Mid December he began experiecing worsening shortness of breath and was found to have a pericarditis with pericardial effusion, which was drained and analyzed showing mostly a transudative effusion most likely 2/2 to volume overload. His symptoms improved thereafter, however starting about a week ago he began experiencing the same progressively worsening shortness of breath. He was at the dialysis center in morning when he began experiencing severe dyspnea and was found to be hypoxic saturating in the low 80s. He states that he has never needed to use oxygen in the past. Upon admission to the ED, CXR was performed and showed patchy ground glass and airspace opacities throughout both lung fields, which could be seen with atypical infection such as COVID 19 PNA. COVID 19 Swab was ordered by ERP and came back positive. Nephrology was also consulted by ERP.\"     Interval Problem Update  Seen and examined. Chart reviewed, including labs and any pertinent imaging.    \"I don't have anywhere to go, no food, no transport\"    Walking in hallways, feels bored, no complaints.   Send medications in anticipation of DC to [facility].     Consultants/Specialty  Nephrology    Code Status  Full Code    Disposition  Inpatient    Review of Systems  Review of Systems   Constitutional: Negative for chills and fever.   Cardiovascular: Negative for chest pain.   Gastrointestinal: Negative for heartburn, nausea and vomiting.   Musculoskeletal: Positive " for myalgias.   Skin: Negative for itching and rash.   Neurological: Negative for tingling, tremors and sensory change.   All other systems reviewed and are negative.       Physical Exam  Temp:  [36.3 °C (97.3 °F)-36.6 °C (97.9 °F)] 36.6 °C (97.8 °F)  Pulse:  [68-71] 69  Resp:  [18-20] 18  BP: ()/(59-77) 96/59  SpO2:  [95 %-97 %] 97 %    Physical Exam  Vitals signs and nursing note reviewed.   Constitutional:       General: He is not in acute distress.     Appearance: He is not diaphoretic.   HENT:      Head: Normocephalic and atraumatic.      Nose: No congestion.      Mouth/Throat:      Mouth: Mucous membranes are moist.   Eyes:      General:         Right eye: No discharge.         Left eye: No discharge.      Extraocular Movements: Extraocular movements intact.      Conjunctiva/sclera: Conjunctivae normal.   Neck:      Musculoskeletal: Normal range of motion. No neck rigidity.   Cardiovascular:      Rate and Rhythm: Normal rate.      Pulses: Normal pulses.   Pulmonary:      Effort: Pulmonary effort is normal. No respiratory distress.      Breath sounds: No wheezing.      Comments: Occasional cough, dry  Abdominal:      General: Abdomen is flat. There is no distension.      Palpations: Abdomen is soft.      Tenderness: There is no abdominal tenderness.   Musculoskeletal: Normal range of motion.   Skin:     General: Skin is warm and dry.      Coloration: Skin is not jaundiced.   Neurological:      General: No focal deficit present.      Mental Status: He is alert and oriented to person, place, and time.      Cranial Nerves: No cranial nerve deficit.      Motor: Weakness present.   Psychiatric:         Mood and Affect: Mood normal.         Thought Content: Thought content normal.         Fluids    Intake/Output Summary (Last 24 hours) at 1/16/2021 1428  Last data filed at 1/16/2021 1200  Gross per 24 hour   Intake 1740 ml   Output 2400 ml   Net -660 ml       Laboratory                         Imaging  DX-CHEST-PORTABLE (1 VIEW)   Final Result         Patchy groundglass and airspace opacities throughout both lungs could be seen with atypical infection/Covid 19 pneumonia. Moderate pulmonary edema is in the differential.      Stable cardiomegaly.           Assessment/Plan  * Pneumonia  Assessment & Plan  65 y.o. male with PMHx of HTN, Gout, Hyperthyroidism and ESRD on HD MWF who presented 1/11/2021 with complaints of Cough, Shortness of Breath and Generalized Weakness.    About a week ago he began experiencing the same progressively worsening shortness of breath. He was at the dialysis center this morning when he began experiencing severe dyspnea and was found to be hypoxic saturating in the low 80'2    COVID 19-Positive  Droplet, Contact, [place] Precautions  Patient received Decadron 12mg IVP in ED  Continue 6mg Decadron PO Daily  Patient received one dose of Levaquin in ED, stop ABX now  Trend CBC/BMP    End stage renal failure on dialysis (HCC)- (present on admission)  Assessment & Plan  Patient is ESRD on HD MWF  BUN/Cr-10.79/5.71 (Elevated from 3 weeks ago)  GFR-5  Nephrology Consulted by ERP- Dr. Olguin On Board- Please Follow up Recommendations  Continue with Sevelamer 800mg TID    Hypertension- (present on admission)  Assessment & Plan  Patients BP on admission normotensive at 134/74mmHg  Lowered BB dose as patient lightheaded at home, having falls  Labetalol 10mg IVP Q4H PRN for SBP>180mmHg      Anemia- (present on admission)  Assessment & Plan  Most likely Anemia of Chronic Disease  Hg stable at 9.2  Trend CBC    Gout- (present on admission)  Assessment & Plan  Reduced to 0.3mg colchicine QOD    Anxiety- (present on admission)  Assessment & Plan  Ativan 0.5mg PO Q4H PRN for Anxiety/Agitation       VTE prophylaxis: LMWH

## 2021-01-17 ENCOUNTER — PHARMACY VISIT (OUTPATIENT)
Dept: PHARMACY | Facility: MEDICAL CENTER | Age: 66
End: 2021-01-17
Payer: COMMERCIAL

## 2021-01-17 VITALS
DIASTOLIC BLOOD PRESSURE: 78 MMHG | WEIGHT: 177.25 LBS | RESPIRATION RATE: 18 BRPM | HEIGHT: 70 IN | BODY MASS INDEX: 25.38 KG/M2 | OXYGEN SATURATION: 95 % | SYSTOLIC BLOOD PRESSURE: 131 MMHG | HEART RATE: 62 BPM | TEMPERATURE: 97.7 F

## 2021-01-17 DIAGNOSIS — Z76.82 LIVER TRANSPLANT CANDIDATE: ICD-10-CM

## 2021-01-17 PROCEDURE — 700102 HCHG RX REV CODE 250 W/ 637 OVERRIDE(OP): Performed by: STUDENT IN AN ORGANIZED HEALTH CARE EDUCATION/TRAINING PROGRAM

## 2021-01-17 PROCEDURE — A9270 NON-COVERED ITEM OR SERVICE: HCPCS | Performed by: STUDENT IN AN ORGANIZED HEALTH CARE EDUCATION/TRAINING PROGRAM

## 2021-01-17 PROCEDURE — RXMED WILLOW AMBULATORY MEDICATION CHARGE: Performed by: HOSPITALIST

## 2021-01-17 PROCEDURE — 700111 HCHG RX REV CODE 636 W/ 250 OVERRIDE (IP): Performed by: STUDENT IN AN ORGANIZED HEALTH CARE EDUCATION/TRAINING PROGRAM

## 2021-01-17 PROCEDURE — 99239 HOSP IP/OBS DSCHRG MGMT >30: CPT | Performed by: STUDENT IN AN ORGANIZED HEALTH CARE EDUCATION/TRAINING PROGRAM

## 2021-01-17 RX ORDER — B,C/FOLIC/ZINC/SELENOMETH/D3/E 0.8-12.5MG
1 TABLET ORAL DAILY
Qty: 30 TAB | Refills: 2 | Status: SHIPPED | OUTPATIENT
Start: 2021-01-17 | End: 2021-01-29

## 2021-01-17 RX ADMIN — SEVELAMER CARBONATE 800 MG: 800 TABLET, FILM COATED ORAL at 17:03

## 2021-01-17 RX ADMIN — ENOXAPARIN SODIUM 30 MG: 30 INJECTION SUBCUTANEOUS at 05:07

## 2021-01-17 RX ADMIN — CINACALCET HYDROCHLORIDE 30 MG: 30 TABLET, FILM COATED ORAL at 05:09

## 2021-01-17 RX ADMIN — FAMOTIDINE 20 MG: 20 TABLET ORAL at 05:10

## 2021-01-17 RX ADMIN — SEVELAMER CARBONATE 800 MG: 800 TABLET, FILM COATED ORAL at 09:05

## 2021-01-17 RX ADMIN — DEXAMETHASONE 6 MG: 4 TABLET ORAL at 05:09

## 2021-01-17 RX ADMIN — SEVELAMER CARBONATE 800 MG: 800 TABLET, FILM COATED ORAL at 12:25

## 2021-01-17 NOTE — DISCHARGE PLANNING
LSW emailed d/c summary to [email or web address]     LSW called covid housing intake. LSW updated them that she is waiting for meds to be delivered and that pts vitamins will need to be filled at the [facility] pharmacy as [facility] is out of stock.     LSW will arrange transport time was Rx are delivered.

## 2021-01-17 NOTE — PROGRESS NOTES
Report from ROSALINDA Cherry. Patient has no signs of distress or complaints at this time. Call light in reach with bed locked in lowest position. Will continue to monitor.

## 2021-01-17 NOTE — DISCHARGE SUMMARY
Discharge Summary    CHIEF COMPLAINT ON ADMISSION  Chief Complaint   Patient presents with   • Shortness of Breath     x1 month       Reason for Admission  Shortness of Breath      Admission Date  1/11/2021    CODE STATUS  Full Code    HPI & HOSPITAL COURSE  This is a 65 y.o. male here with past medical history of hypertension, gout, hyperthyroidism and end stage renal disease on hemodialysis (MWF) who presented 1/11/2021 with complaints of Cough, Shortness of Breath and Generalized Weakness. Per the patient he was evaluated for similar symptoms in Mid December and was found to have a pericarditis with pericardial effusion, which was drained and analyzed showing mostly a transudative effusion most likely due to volume overload. His symptoms improved thereafter, however starting about a week ago he began experiencing the same progressively worsening shortness of breath. He was at the dialysis center in morning when he began experiencing severe dyspnea and was found to be hypoxic saturating in the low 80s.  Upon admission to the ED, chest xray was performed and showed patchy ground glass and airspace opacities throughout both lung fields, which could be seen with atypical infection such as COVID 19 PNA. COVID 19 Swab was ordered by ERP and came back positive. Nephrology was also consulted and patient underwent hemodialysis while inpatient. Patient was weaned off oxygen during his stay and is up walking saturating well without supplemental oxygen. He was treated with oral decadron but was not a candidate for remdesivir due to his O2 needs and renal function.     Due to patients social situation, pt will be discharged to [facility].    Therefore, he is discharged in fair and stable condition to home with close outpatient follow-up.    The patient met 2-midnight criteria for an inpatient stay at the time of discharge.    Discharge Date  01/17/21      FOLLOW UP ITEMS POST DISCHARGE  Chronic medical conditions   Follow  up CBC to ensure resolution of mild leukopenia     DISCHARGE DIAGNOSES  Principal Problem:    Pneumonia POA: Unknown  Active Problems:    End stage renal failure on dialysis (HCC) (Chronic) POA: Yes      Overview: fb [facility] nephrology // ON [facility] Transplant list today is: ACTIVE                         Cristofer Keller      [phone] (Work)      [phone] (Fax)      [address] [address]      NEPHROLOGY        [birth year] - Sep. 04, 2020September 04, 2020              Organization       [facility] [address]       [place] [address]             Has been on dialysis Mon Wed Fri      Pt awaiting for kidney transplant    Gout (Chronic) POA: Yes    Anemia (Chronic) POA: Yes      Overview: Base Hg 9-11.       Pt w ESRD            Most recent Hg 11.3   Sep 4 2020    Hypertension (Chronic) POA: Yes    Anxiety (Chronic) POA: Yes      Overview:         Resolved Problems:    * No resolved hospital problems. *      FOLLOW UP  Future Appointments   Date Time Provider Department Center   1/29/2021 11:00 AM MADELEINE Leija [place]     No follow-up provider specified.    MEDICATIONS ON DISCHARGE     Medication List      START taking these medications      Instructions   dexamethasone 4 MG Tabs  Commonly known as: DECADRON   Take 1.5 Tabs by mouth every day for 3 days.  Dose: 6 mg        CHANGE how you take these medications      Instructions   colchicine 0.6 MG Tabs  What changed: how much to take  Commonly known as: COLCRYS   Take 0.5 Tabs by mouth every 48 hours.  Dose: 0.3 mg     metoprolol  MG Tb24  What changed:   · how much to take  · how to take this  · when to take this  · additional instructions  Commonly known as: TOPROL XL   Doctor's comments: Pt to make appt prior to more refills.  Take 1 tablet by mouth once daily  Dose: 100 mg        CONTINUE taking these medications       Instructions   pantoprazole 20 MG tablet  Commonly known as: PROTONIX   Take 1 Tab by mouth every day.  Dose: 20 mg     RenaPlex-D Tabs   Take 1 Tab by mouth every day.  Dose: 1 Tab     Sensipar 30 MG Tabs  Generic drug: cinacalcet   Take 1 Tab by mouth every day.  Dose: 30 mg     sevelamer carbonate 800 MG Tabs tablet  Commonly known as: RENVELA   Take 2-3 Tabs by mouth see administration instructions. 2400 mg per meal 3 times a day  1600mg per snack  Dose: 1,600-2,400 mg            Allergies  Allergies   Allergen Reactions   • Cephalexin Rash     Pt reports that he get a body rash   • Penicillins Rash and Unspecified     Pt reports that he get a body rash         DIET  Orders Placed This Encounter   Procedures   • Diet Order Diet: Renal     Standing Status:   Standing     Number of Occurrences:   1     Order Specific Question:   Diet:     Answer:   Renal [8]       ACTIVITY  As tolerated.    CONSULTATIONS  Nephrology    PROCEDURES  NA    LABORATORY  Lab Results   Component Value Date    SODIUM 137 01/12/2021    POTASSIUM 5.1 01/12/2021    CHLORIDE 96 01/12/2021    CO2 26 01/12/2021    GLUCOSE 136 (H) 01/12/2021    BUN 52 (H) 01/12/2021    CREATININE 7.31 (HH) 01/12/2021        Lab Results   Component Value Date    WBC 3.5 (L) 01/12/2021    HEMOGLOBIN 9.5 (L) 01/12/2021    HEMATOCRIT 30.5 (L) 01/12/2021    PLATELETCT 169 01/12/2021        Total time of the discharge process exceeds 32 minutes.

## 2021-01-17 NOTE — DISCHARGE PLANNING
LSW placed call to [facility] to f/u on medications that need to be delivered for pt to d/c to [facility]. [facility] and will [facility]-open Monday.

## 2021-01-17 NOTE — DISCHARGE PLANNING
LSW called meds 2 beds. LSW can do approved service for:   Dexamethasone- Approved service- $7.63  Pantoprazole- Approved service- $8.28  Sensipar-  Approved service- $7.80  As these are not covered by insurance    Pts Rx   Metoprolol  colchicine  Are both covered by insurance and total for both- $7.40. Pt will be responsible for his co-pay.     LSW asked that pts Multivitans be sent to [facility] for [facility] to  tomorrow as they are not in stock at [facility].

## 2021-01-17 NOTE — DISCHARGE PLANNING
JEMIMAW updated by [facility] that they will deliver Rx around 3:30pm.     JEMIMAW updated covid [facility] CM Benjamin Valdez who stated they can get pt today at 5:00 via [employer].     JEMIMAW updated MD and RN    Pt going to [address] [place]

## 2021-01-18 NOTE — DISCHARGE INSTRUCTIONS
Discharge Instructions    Discharged to [facility] home by medical transportation with self. Discharged via walking, hospital escort: Yes.  Special equipment needed: Not Applicable    Be sure to schedule a follow-up appointment with your primary care doctor or any specialists as instructed.     Discharge Plan:        I understand that a diet low in cholesterol, fat, and sodium is recommended for good health. Unless I have been given specific instructions below for another diet, I accept this instruction as my diet prescription.   Other diet: cardiac/low fat/low sodium    Special Instructions:   Sepsis, Diagnosis, Adult  Sepsis is a serious bodily reaction to an infection. The infection that triggers sepsis may be from a bacteria, virus, or fungus. Sepsis can result from an infection in any part of your body. Infections that commonly lead to sepsis include skin, lung, and urinary tract infections.  Sepsis is a medical emergency that must be treated right away in a hospital. In severe cases, it can lead to septic shock. Septic shock can weaken your heart and cause your blood pressure to drop. This can cause your central nervous system and your body's organs to stop working.  What are the causes?  This condition is caused by a severe reaction to infections from bacteria, viruses, or fungus. The germs that most often lead to sepsis include:  · Escherichia coli (E. coli) bacteria.  · Staphylococcus aureus (staph) bacteria.  · Some types of Streptococcus bacteria.  The most common infections affect these organs:  · The lung (pneumonia).  · The kidneys or bladder (urinary tract infection).  · The skin (cellulitis).  · The bowel, gallbladder, or pancreas.  What increases the risk?  You are more likely to develop this condition if:  · Your body's disease-fighting system (immune system) is weakened.  · You are age 65 or older.  · You are male.  · You had surgery or you have been hospitalized.  · You have these devices inserted  into your body:  ? A small, thin tube (catheter).  ? IV line.  ? Breathing tube.  ? Drainage tube.  · You are not getting enough nutrients from food (malnourished).  · You have a long-term (chronic) disease, such as cancer, lung disease, kidney disease, or diabetes.  · You are .  What are the signs or symptoms?  Symptoms of this condition may include:  · Fever.  · Chills or feeling very cold.  · Confusion or anxiety.  · Fatigue.  · Muscle aches.  · Shortness of breath.  · Nausea and vomiting.  · Urinating much less than usual.  · Fast heart rate (tachycardia).  · Rapid breathing (hyperventilation).  · Changes in skin color. Your skin may look blotchy, pale, or blue.  · Cool, clammy, or sweaty skin.  · Skin rash.  Other symptoms depend on the source of your infection.  How is this diagnosed?  This condition is diagnosed based on:  · Your symptoms.  · Your medical history.  · A physical exam.  Other tests may also be done to find out the cause of the infection and how severe the sepsis is. These tests may include:  · Blood tests.  · Urine tests.  · Swabs from other areas of your body that may have an infection. These samples may be tested (cultured) to find out what type of bacteria is causing the infection.  · Chest X-ray to check for pneumonia. Other imaging tests, such as a CT scan, may also be done.  · Lumbar puncture. This removes a small amount of the fluid that surrounds your brain and spinal cord. The fluid is then examined for infection.  How is this treated?  This condition must be treated in a hospital. Based on the cause of your infection, you may be given an antibiotic, antiviral, or antifungal medicine.  You may also receive:  · Fluids through an IV.  · Oxygen and breathing assistance.  · Medicines to increase your blood pressure.  · Kidney dialysis. This process cleans your blood if your kidneys have failed.  · Surgery to remove infected tissue.  · Blood transfusion if  needed.  · Medicine to prevent blood clots.  · Nutrients to correct imbalances in basic body function (metabolism). You may:  ? Receive important salts and minerals (electrolytes) through an IV.  ? Have your blood sugar level adjusted.  Follow these instructions at home:  Medicines    · Take over-the-counter and prescription medicines only as told by your health care provider.  · If you were prescribed an antibiotic, antiviral, or antifungal medicine, take it as told by your health care provider. Do not stop taking the medicine even if you start to feel better.  General instructions  · If you have a catheter or other indwelling device, ask to have it removed as soon as possible.  · Keep all follow-up visits as told by your health care provider. This is important.  Contact a health care provider if:  · You do not feel like you are getting better or regaining strength.  · You are having trouble coping with your recovery.  · You frequently feel tired.  · You feel worse or do not seem to get better after surgery.  · You think you may have an infection after surgery.  Get help right away if:  · You have any symptoms of sepsis.  · You have difficulty breathing.  · You have a rapid or skipping heartbeat.  · You become confused or disoriented.  · You have a high fever.  · Your skin becomes blotchy, pale, or blue.  · You have an infection that is getting worse or not getting better.  These symptoms may represent a serious problem that is an emergency. Do not wait to see if the symptoms will go away. Get medical help right away. Call your local emergency services (911 in the [place] Do not drive yourself to the hospital.  Summary  · Sepsis is a medical emergency that requires immediate treatment in a hospital.  · This condition is caused by a severe reaction to infections from bacteria, viruses, or fungus.  · Based on the cause of your infection, you may be given an antibiotic, antiviral, or antifungal medicine.  · Treatment  may also include IV fluids, breathing assistance, and kidney dialysis.  This information is not intended to replace advice given to you by your health care provider. Make sure you discuss any questions you have with your health care provider.  Document Released: 09/15/2004 Document Revised: 07/26/2019 Document Reviewed: 07/26/2019  Elsevier Patient Education © 2020 [employer]      · Is patient discharged on Warfarin / Coumadin?   No     Depression / Suicide Risk    As you are discharged from this [facility] facility, it is important to learn how to keep safe from harming yourself.    Recognize the warning signs:  · Abrupt changes in personality, positive or negative- including increase in energy   · Giving away possessions  · Change in eating patterns- significant weight changes-  positive or negative  · Change in sleeping patterns- unable to sleep or sleeping all the time   · Unwillingness or inability to communicate  · Depression  · Unusual sadness, discouragement and loneliness  · Talk of wanting to die  · Neglect of personal appearance   · Rebelliousness- reckless behavior  · Withdrawal from people/activities they love  · Confusion- inability to concentrate     If you or a loved one observes any of these behaviors or has concerns about self-harm, here's what you can do:  · Talk about it- your feelings and reasons for harming yourself  · Remove any means that you might use to hurt yourself (examples: pills, rope, extension cords, firearm)  · Get professional help from the community (Mental Health, Substance Abuse, psychological counseling)  · Do not be alone:Call your Safe Contact- someone whom you trust who will be there for you.  · Call your local CRISIS HOTLINE 771-9312 or 127-122-6178  · Call your local Children's Mobile Crisis Response Team [place] (002) 870-6674 or www.SHOP.COM  · Call the toll free National Suicide Prevention Hotlines   · National Suicide Prevention Lifeline  192-694-FLKI (1919)  · National [employer] Line Network 800-SUICIDE (243-2169)

## 2021-01-18 NOTE — PROGRESS NOTES
Discharge education completed with all questions answered. IV removed, skin intact, dressing applied. Patient currently has medication from pharmacy in bag at bedside with all belongings. Report given to ROSALINDA Parker. Patient has no signs of distress or complaints at this time. Call light in reach with bed locked in lowest position. Care relinquished.

## 2021-01-26 NOTE — DISCHARGE PLANNING
Anticipated Discharge Disposition: covid housing    Action: Pt’s RN caretaker called, her name is Felicia Suh, to report complaints about pt’s discharge to [facility] on [address] [place]. Felicia’s number is [phone]. Felicia reports that pt was discharged at 1:00AM in the morning to this [facility] location in only a hospital gown and was left to stand out in the cold for an hour while the staff at [facility] did not assist pt. She states she went to the [facility] the next morning to  pt and noted that the place is moldy in multiple locations, floors, walls, refrigerator, etc. She states she reported this complaint to [facility] to a Gayatri Alexander and also to the Contractor for the [employer], Max Casal. Max Casal responded to her complaints that they were “unfounded”. Felicia states that [facility] should not continue to utilize this covid housing.  Above complaints reported to Jennifer Mistry CM Supervisor who will call Felicia to follow up.     Barriers to Discharge:     Plan: dc’d 1/17 to [facility].

## 2021-01-29 ENCOUNTER — OFFICE VISIT (OUTPATIENT)
Dept: MEDICAL GROUP | Facility: PHYSICIAN GROUP | Age: 66
End: 2021-01-29
Payer: MEDICARE

## 2021-01-29 VITALS
RESPIRATION RATE: 18 BRPM | BODY MASS INDEX: 23.48 KG/M2 | HEIGHT: 70 IN | TEMPERATURE: 99.2 F | OXYGEN SATURATION: 97 % | WEIGHT: 164 LBS | SYSTOLIC BLOOD PRESSURE: 116 MMHG | DIASTOLIC BLOOD PRESSURE: 70 MMHG | HEART RATE: 124 BPM

## 2021-01-29 DIAGNOSIS — F06.4 ANXIETY DISORDER DUE TO KNOWN PHYSIOLOGICAL CONDITION: ICD-10-CM

## 2021-01-29 DIAGNOSIS — Z99.2 ANEMIA DUE TO CHRONIC KIDNEY DISEASE, ON CHRONIC DIALYSIS (HCC): ICD-10-CM

## 2021-01-29 DIAGNOSIS — N18.6 ANEMIA DUE TO CHRONIC KIDNEY DISEASE, ON CHRONIC DIALYSIS (HCC): ICD-10-CM

## 2021-01-29 DIAGNOSIS — R52 WHOLE BODY PAIN: ICD-10-CM

## 2021-01-29 DIAGNOSIS — Z86.16 HISTORY OF COVID-19: Primary | ICD-10-CM

## 2021-01-29 DIAGNOSIS — Z23 NEED FOR VACCINATION: ICD-10-CM

## 2021-01-29 DIAGNOSIS — M10.00 IDIOPATHIC GOUT, UNSPECIFIED CHRONICITY, UNSPECIFIED SITE: ICD-10-CM

## 2021-01-29 DIAGNOSIS — D63.1 ANEMIA DUE TO CHRONIC KIDNEY DISEASE, ON CHRONIC DIALYSIS (HCC): ICD-10-CM

## 2021-01-29 PROBLEM — R05.9 COUGH: Status: ACTIVE | Noted: 2021-01-12

## 2021-01-29 PROBLEM — R06.02 SHORTNESS OF BREATH: Status: ACTIVE | Noted: 2021-01-12

## 2021-01-29 PROBLEM — R53.1 WEAKNESS: Status: ACTIVE | Noted: 2021-01-12

## 2021-01-29 PROBLEM — J81.0 ACUTE PULMONARY EDEMA (HCC): Status: RESOLVED | Noted: 2021-01-12 | Resolved: 2021-01-29

## 2021-01-29 PROBLEM — E78.5 HYPERLIPIDEMIA, UNSPECIFIED: Status: ACTIVE | Noted: 2019-06-19

## 2021-01-29 PROBLEM — E87.70 FLUID OVERLOAD, UNSPECIFIED: Status: ACTIVE | Noted: 2021-01-12

## 2021-01-29 PROBLEM — J81.0 ACUTE PULMONARY EDEMA (HCC): Status: ACTIVE | Noted: 2021-01-12

## 2021-01-29 PROCEDURE — 99214 OFFICE O/P EST MOD 30 MIN: CPT | Mod: 25 | Performed by: NURSE PRACTITIONER

## 2021-01-29 PROCEDURE — G0010 ADMIN HEPATITIS B VACCINE: HCPCS | Performed by: NURSE PRACTITIONER

## 2021-01-29 PROCEDURE — 90750 HZV VACC RECOMBINANT IM: CPT | Performed by: NURSE PRACTITIONER

## 2021-01-29 PROCEDURE — 90747 HEPB VACC 4 DOSE IMMUNSUP IM: CPT | Performed by: NURSE PRACTITIONER

## 2021-01-29 PROCEDURE — 90472 IMMUNIZATION ADMIN EACH ADD: CPT | Performed by: NURSE PRACTITIONER

## 2021-01-29 RX ORDER — COLCHICINE 0.6 MG/1
0.3 TABLET ORAL
Qty: 30 TAB | Refills: 1 | Status: SHIPPED | OUTPATIENT
Start: 2021-01-29 | End: 2021-02-03

## 2021-01-29 RX ORDER — PANTOPRAZOLE SODIUM 20 MG/1
20 TABLET, DELAYED RELEASE ORAL DAILY
Qty: 90 TAB | Refills: 1 | Status: SHIPPED | OUTPATIENT
Start: 2021-01-29 | End: 2021-03-01 | Stop reason: SDUPTHER

## 2021-01-29 RX ORDER — HYDROXYZINE PAMOATE 50 MG/1
50 CAPSULE ORAL 3 TIMES DAILY PRN
Qty: 100 CAP | Refills: 0 | Status: SHIPPED | OUTPATIENT
Start: 2021-01-29 | End: 2021-02-03

## 2021-01-29 ASSESSMENT — PATIENT HEALTH QUESTIONNAIRE - PHQ9: CLINICAL INTERPRETATION OF PHQ2 SCORE: 0

## 2021-01-29 ASSESSMENT — FIBROSIS 4 INDEX: FIB4 SCORE: 5.71

## 2021-01-29 NOTE — ASSESSMENT & PLAN NOTE
Chronic condition, stable.  Patient is well maintained with his symptoms on 0.3 mg every other day.

## 2021-01-29 NOTE — ASSESSMENT & PLAN NOTE
Patient has a history of anxiety associated with dialysis, and his prior physicians used as prescribed benzodiazepines for use with dialysis.  Patient is pending referral to pain management, and in anticipation of potential opioid use, would prefer not to use a benzo in this patient.  Discussed this with patient and he agrees to try alternative agent.

## 2021-01-29 NOTE — ASSESSMENT & PLAN NOTE
Patient has a history of anemia likely due to his end-stage renal disease, and this is closely watched by his nephrologist as well as [facility] where he is undergoing work-up and management.

## 2021-01-29 NOTE — ASSESSMENT & PLAN NOTE
Patient was recently admitted to [facility] on December 28, 2020 with COVID-19.  He was discharged on January 11, 2021.  Overall he is feeling fairly well, however, he is feeling fatigue, and joint aches and pains.  He is not requiring oxygen and does not feel short of breath other than his baseline.

## 2021-01-29 NOTE — PROGRESS NOTES
Subjective:     CC: Hospital follow-up, and anxiety.    HPI:   Garry presents today to establish care with me.  He is a prior patient of Dr. Jones.  The patient was recently hospitalized at [facility] from December 28 through January 11 for COVID-19.  He has a complicated past medical history with many comorbidities.  He does have a solitary kidney and has been on the transplant list.  He tells me today that a 100% match has been found for him, and he is hopeful that he can have his transplant procedure in March or April of this year.  He has completed a psychological evaluation, and has an appointment with cardiology on February 3.  He also has an appointment with physiatry, in March, but prefers to see them sooner if possible as this is also a requirement for his transplant.  The patient tells me that since his hospital discharge he has been doing relatively well.  He does need some of his medications refilled today.  The following issues were discussed at todays visit:    Anxiety disorder, unspecified  Patient has a history of anxiety associated with dialysis, and his prior physicians used as prescribed benzodiazepines for use with dialysis.  Patient is pending referral to pain management, and in anticipation of potential opioid use, would prefer not to use a benzo in this patient.  Discussed this with patient and he agrees to try alternative agent.    Anemia  Patient has a history of anemia likely due to his end-stage renal disease, and this is closely watched by his nephrologist as well as [facility] where he is undergoing work-up and management.    Gout  Chronic condition, stable.  Patient is well maintained with his symptoms on 0.3 mg every other day.    Whole body pain  Patient reports whole body pain, increased since he was hospitalized with Covid.  Reports multiple views joint aches and pains.  He is scheduled for a visit with pain management in March, however, he was told that if an urgent referral is placed  they may be able to get him in sooner.    History of COVID-19  Patient was recently admitted to [facility] on 2020 with COVID-19.  He was discharged on 2021.  Overall he is feeling fairly well, however, he is feeling fatigue, and joint aches and pains.  He is not requiring oxygen and does not feel short of breath other than his baseline.    Solitary kidney  Patient tells me today that he has found a 100% matched donor.  The proposed transplant will possibly be taking place in March or 2021.  The patient is very appreciative and grateful for this potential opportunity.  He is looking forward to coming off of dialysis if this is successful.  He is managed by [facility] for this process.      Past Medical History:   Diagnosis Date   • Allergy    • Anxiety    • Cataract    • Depression    • Gout due to renal impairment    • Hyperlipidemia    • Hypertension    • Kidney disease     Congenital solitary kidney on dialysis 3 times a week.   • Localized osteoarthritis of right shoulder 2019       Social History     Tobacco Use   • Smoking status: Former Smoker     Packs/day: 0.50     Years: 25.00     Pack years: 12.50     Types: Cigarettes     Quit date: 10/21/1996     Years since quittin.2   • Smokeless tobacco: Never Used   Substance Use Topics   • Alcohol use: No     Alcohol/week: 0.0 oz     Frequency: Never     Binge frequency: Never   • Drug use: No       Current Outpatient Medications Ordered in Epic   Medication Sig Dispense Refill   • pantoprazole (PROTONIX) 20 MG tablet Take 1 Tab by mouth every day. 90 Tab 1   • colchicine (COLCRYS) 0.6 MG Tab Take 0.5 Tabs by mouth every 48 hours. 30 Tab 1   • hydrOXYzine pamoate (VISTARIL) 50 MG Cap Take 1 Cap by mouth 3 times a day as needed for Itching. 100 Cap 0   • SENSIPAR 30 MG Tab Take 1 Tab by mouth every day. 30 Tab 11   • sevelamer carbonate (RENVELA) 800 MG Tab tablet Take 2-3 Tabs by mouth see administration instructions. 2400 mg per  "meal 3 times a day  1600mg per snack 270 Tab 5   • metoprolol SR (TOPROL XL) 100 MG TABLET SR 24 HR Take 1 tablet by mouth once daily (Patient not taking: Reported on 1/29/2021) 90 Tab 2     No current Epic-ordered facility-administered medications on file.        Allergies:  Cephalexin and Penicillins    Health Maintenance: Completed    ROS:  Gen: no fevers/chills, no changes in weight  Eyes: no changes in vision  ENT: no sore throat, no hearing loss, no bloody nose  Pulm: no sob, no cough  CV: no chest pain, no palpitations  GI: no nausea/vomiting, no diarrhea  : no dysuria  MSk: no myalgias  Skin: no rash  Neuro: no headaches, no numbness/tingling  Heme/Lymph: no easy bruising      Objective:       Exam:  /70 (BP Location: Right arm, Patient Position: Sitting, BP Cuff Size: Adult)   Pulse (!) 124   Temp 37.3 °C (99.2 °F) (Temporal)   Resp 18   Ht 1.778 m (5' 10\")   Wt 74.4 kg (164 lb)   SpO2 97%   BMI 23.53 kg/m²  Body mass index is 23.53 kg/m².    Gen: Alert and oriented, No apparent distress.  Neck: Neck is supple without lymphadenopathy.  Lungs: Normal effort, CTA bilaterally, no wheezes, rhonchi, or rales  CV: Regular rate and rhythm. No murmurs, rubs, or gallops.  Ext: No clubbing, cyanosis, edema.    Labs: Reviewed hospital labs from recent admission 12/28/2020 through 1/11/2021.    Assessment & Plan:     65 y.o. male with the following -     1. Need for vaccination  - Shingrix Vaccine  - Hepatitis B Vaccine Adult 20+    2. Idiopathic gout, unspecified chronicity, unspecified site  Chronic condition, stable.  Continue current regimen.  - colchicine (COLCRYS) 0.6 MG Tab; Take 0.5 Tabs by mouth every 48 hours.  Dispense: 30 Tab; Refill: 1    3. Whole body pain  Diffuse joint aches and pains, patient would benefit from pain management.  - REFERRAL TO PAIN MANAGEMENT    4. Anemia due to chronic kidney disease, on chronic dialysis (HCC)  Chronic condition, stable.  Followed by SUKHDEV Wright.  - CBC " WITH DIFFERENTIAL; Future  - Comp Metabolic Panel; Future  - LIPID PANEL    5. Anxiety disorder due to known physiological condition  Chronic condition, not well controlled.  Patient used to take benzodiazepene for control of his anxiety with dialysis.  Due to potential for patient to be placed on opioids, recommended trial of hydroxyzine.  Patient will let me know if this helps  - hydrOXYzine pamoate (VISTARIL) 50 MG Cap; Take 1 Cap by mouth 3 times a day as needed for Itching.  Dispense: 100 Cap; Refill: 0    6. Solitary kidney  Chronic condition.  Donor has been located, plan for transplant in March or April 2021.    7. History of COVID-19  Reviewed records from recent hospitalization.  Patient overall feeling well, and will let me know if things worsen.    Patient will follow up in 1 month, sooner if need be.    Please note that this dictation was created using voice recognition software. I have made every reasonable attempt to correct obvious errors, but I expect that there are errors of grammar and possibly content that I did not discover before finalizing the note.

## 2021-01-29 NOTE — ASSESSMENT & PLAN NOTE
Patient reports whole body pain, increased since he was hospitalized with Covid.  Reports multiple views joint aches and pains.  He is scheduled for a visit with pain management in March, however, he was told that if an urgent referral is placed they may be able to get him in sooner.

## 2021-01-29 NOTE — ASSESSMENT & PLAN NOTE
Patient tells me today that he has found a 100% matched donor.  The proposed transplant will possibly be taking place in March or April 2021.  The patient is very appreciative and grateful for this potential opportunity.  He is looking forward to coming off of dialysis if this is successful.  He is managed by [facility] for this process.

## 2021-02-01 ENCOUNTER — HOSPITAL ENCOUNTER (OUTPATIENT)
Dept: LAB | Facility: MEDICAL CENTER | Age: 66
End: 2021-02-01
Attending: NURSE PRACTITIONER
Payer: MEDICARE

## 2021-02-01 ENCOUNTER — TELEPHONE (OUTPATIENT)
Dept: MEDICAL GROUP | Facility: PHYSICIAN GROUP | Age: 66
End: 2021-02-01

## 2021-02-01 DIAGNOSIS — N18.6 ANEMIA DUE TO CHRONIC KIDNEY DISEASE, ON CHRONIC DIALYSIS (HCC): ICD-10-CM

## 2021-02-01 DIAGNOSIS — F06.4 ANXIETY DISORDER DUE TO KNOWN PHYSIOLOGICAL CONDITION: ICD-10-CM

## 2021-02-01 DIAGNOSIS — Z99.2 ANEMIA DUE TO CHRONIC KIDNEY DISEASE, ON CHRONIC DIALYSIS (HCC): ICD-10-CM

## 2021-02-01 DIAGNOSIS — D63.1 ANEMIA DUE TO CHRONIC KIDNEY DISEASE, ON CHRONIC DIALYSIS (HCC): ICD-10-CM

## 2021-02-01 LAB
ALBUMIN SERPL BCP-MCNC: 3.8 G/DL (ref 3.2–4.9)
ALBUMIN/GLOB SERPL: 1.1 G/DL
ALP SERPL-CCNC: 62 U/L (ref 30–99)
ALT SERPL-CCNC: 16 U/L (ref 2–50)
ANION GAP SERPL CALC-SCNC: 7 MMOL/L (ref 7–16)
AST SERPL-CCNC: 15 U/L (ref 12–45)
BASOPHILS # BLD AUTO: 0.5 % (ref 0–1.8)
BASOPHILS # BLD: 0.02 K/UL (ref 0–0.12)
BILIRUB SERPL-MCNC: 0.3 MG/DL (ref 0.1–1.5)
BUN SERPL-MCNC: 20 MG/DL (ref 8–22)
CALCIUM SERPL-MCNC: 9.5 MG/DL (ref 8.5–10.5)
CHLORIDE SERPL-SCNC: 97 MMOL/L (ref 96–112)
CHOLEST SERPL-MCNC: 140 MG/DL (ref 100–199)
CO2 SERPL-SCNC: 34 MMOL/L (ref 20–33)
CREAT SERPL-MCNC: 3.51 MG/DL (ref 0.5–1.4)
EOSINOPHIL # BLD AUTO: 0.2 K/UL (ref 0–0.51)
EOSINOPHIL NFR BLD: 4.9 % (ref 0–6.9)
ERYTHROCYTE [DISTWIDTH] IN BLOOD BY AUTOMATED COUNT: 60.2 FL (ref 35.9–50)
FASTING STATUS PATIENT QL REPORTED: NORMAL
GLOBULIN SER CALC-MCNC: 3.6 G/DL (ref 1.9–3.5)
GLUCOSE SERPL-MCNC: 88 MG/DL (ref 65–99)
HCT VFR BLD AUTO: 34 % (ref 42–52)
HDLC SERPL-MCNC: 42 MG/DL
HGB BLD-MCNC: 10.4 G/DL (ref 14–18)
IMM GRANULOCYTES # BLD AUTO: 0.02 K/UL (ref 0–0.11)
IMM GRANULOCYTES NFR BLD AUTO: 0.5 % (ref 0–0.9)
LDLC SERPL CALC-MCNC: 62 MG/DL
LYMPHOCYTES # BLD AUTO: 0.93 K/UL (ref 1–4.8)
LYMPHOCYTES NFR BLD: 22.8 % (ref 22–41)
MCH RBC QN AUTO: 29.5 PG (ref 27–33)
MCHC RBC AUTO-ENTMCNC: 30.6 G/DL (ref 33.7–35.3)
MCV RBC AUTO: 96.3 FL (ref 81.4–97.8)
MONOCYTES # BLD AUTO: 0.37 K/UL (ref 0–0.85)
MONOCYTES NFR BLD AUTO: 9.1 % (ref 0–13.4)
NEUTROPHILS # BLD AUTO: 2.54 K/UL (ref 1.82–7.42)
NEUTROPHILS NFR BLD: 62.2 % (ref 44–72)
NRBC # BLD AUTO: 0 K/UL
NRBC BLD-RTO: 0 /100 WBC
PLATELET # BLD AUTO: 129 K/UL (ref 164–446)
PMV BLD AUTO: 10.6 FL (ref 9–12.9)
POTASSIUM SERPL-SCNC: 3.7 MMOL/L (ref 3.6–5.5)
PROT SERPL-MCNC: 7.4 G/DL (ref 6–8.2)
RBC # BLD AUTO: 3.53 M/UL (ref 4.7–6.1)
SODIUM SERPL-SCNC: 138 MMOL/L (ref 135–145)
TRIGL SERPL-MCNC: 182 MG/DL (ref 0–149)
WBC # BLD AUTO: 4.1 K/UL (ref 4.8–10.8)

## 2021-02-01 PROCEDURE — 80053 COMPREHEN METABOLIC PANEL: CPT

## 2021-02-01 PROCEDURE — 80061 LIPID PANEL: CPT

## 2021-02-01 PROCEDURE — 85025 COMPLETE CBC W/AUTO DIFF WBC: CPT

## 2021-02-01 PROCEDURE — 36415 COLL VENOUS BLD VENIPUNCTURE: CPT

## 2021-02-01 NOTE — TELEPHONE ENCOUNTER
----- Message from Jena Knight sent at 2/1/2021  9:13 AM PST -----  Regarding: MEDICATION  GOOD MORNING,  PT IS CLAIMING HIS MEDS ARE NOT WORKING. DO YOU MIND REACHING OUT TO THIS PT?  THANKS :]

## 2021-02-01 NOTE — PROGRESS NOTES
Patient has anxiety related to dialysis treatments.  He is pending kidney transplant in March or April 2021, as match has been identified.  Patient tried hydralazine for his dialysis today and found this to be ineffective.  I will give patient limited supply of lorazepam as he should have a new kidney within approximately 60 days.  Patient will be required to sign controlled substance agreement plan as well as provide urine sample.

## 2021-02-01 NOTE — TELEPHONE ENCOUNTER
Called pt and Bee Dawood will be changing his medication and he will need to come in to the lab to have a urine drug screen and to sign Controlled substance agreement. LVM regarding this message.

## 2021-02-03 ENCOUNTER — HOSPITAL ENCOUNTER (OUTPATIENT)
Dept: LAB | Facility: MEDICAL CENTER | Age: 66
End: 2021-02-03
Attending: NURSE PRACTITIONER
Payer: MEDICARE

## 2021-02-03 ENCOUNTER — OFFICE VISIT (OUTPATIENT)
Dept: CARDIOLOGY | Facility: MEDICAL CENTER | Age: 66
End: 2021-02-03
Payer: MEDICARE

## 2021-02-03 ENCOUNTER — TELEPHONE (OUTPATIENT)
Dept: MEDICAL GROUP | Facility: PHYSICIAN GROUP | Age: 66
End: 2021-02-03

## 2021-02-03 ENCOUNTER — HOSPITAL ENCOUNTER (OUTPATIENT)
Facility: MEDICAL CENTER | Age: 66
End: 2021-02-03
Attending: NURSE PRACTITIONER
Payer: MEDICARE

## 2021-02-03 VITALS
OXYGEN SATURATION: 97 % | BODY MASS INDEX: 26.34 KG/M2 | WEIGHT: 184 LBS | SYSTOLIC BLOOD PRESSURE: 128 MMHG | DIASTOLIC BLOOD PRESSURE: 80 MMHG | HEART RATE: 126 BPM | HEIGHT: 70 IN

## 2021-02-03 DIAGNOSIS — I15.0 RENOVASCULAR HYPERTENSION: ICD-10-CM

## 2021-02-03 DIAGNOSIS — F06.4 ANXIETY DISORDER DUE TO KNOWN PHYSIOLOGICAL CONDITION: ICD-10-CM

## 2021-02-03 DIAGNOSIS — Z01.810 PRE-OPERATIVE CARDIOVASCULAR EXAMINATION: ICD-10-CM

## 2021-02-03 DIAGNOSIS — R00.0 SINUS TACHYCARDIA: ICD-10-CM

## 2021-02-03 DIAGNOSIS — N18.6 END STAGE RENAL FAILURE ON DIALYSIS (HCC): Chronic | ICD-10-CM

## 2021-02-03 DIAGNOSIS — I31.0 CHRONIC ADHESIVE IDIOPATHIC PERICARDITIS: ICD-10-CM

## 2021-02-03 DIAGNOSIS — E78.5 DYSLIPIDEMIA: ICD-10-CM

## 2021-02-03 DIAGNOSIS — Z86.718 HISTORY OF DVT (DEEP VEIN THROMBOSIS): ICD-10-CM

## 2021-02-03 DIAGNOSIS — Z99.2 END STAGE RENAL FAILURE ON DIALYSIS (HCC): Chronic | ICD-10-CM

## 2021-02-03 DIAGNOSIS — Z86.16 HISTORY OF COVID-19: ICD-10-CM

## 2021-02-03 PROBLEM — R06.02 SHORTNESS OF BREATH: Status: RESOLVED | Noted: 2021-01-12 | Resolved: 2021-02-03

## 2021-02-03 PROBLEM — R07.9 CHEST PAIN, UNSPECIFIED: Status: RESOLVED | Noted: 2020-09-02 | Resolved: 2021-02-03

## 2021-02-03 PROBLEM — E87.70 FLUID OVERLOAD, UNSPECIFIED: Status: RESOLVED | Noted: 2021-01-12 | Resolved: 2021-02-03

## 2021-02-03 LAB — EKG IMPRESSION: NORMAL

## 2021-02-03 PROCEDURE — 93000 ELECTROCARDIOGRAM COMPLETE: CPT | Performed by: INTERNAL MEDICINE

## 2021-02-03 PROCEDURE — 99215 OFFICE O/P EST HI 40 MIN: CPT | Performed by: INTERNAL MEDICINE

## 2021-02-03 PROCEDURE — 80307 DRUG TEST PRSMV CHEM ANLYZR: CPT

## 2021-02-03 RX ORDER — LORAZEPAM 0.5 MG/1
0.5 TABLET ORAL
Qty: 30 TAB | Refills: 0 | Status: SHIPPED | OUTPATIENT
Start: 2021-02-03 | End: 2021-03-01 | Stop reason: SDUPTHER

## 2021-02-03 RX ORDER — COLCHICINE 0.6 MG/1
0.6 TABLET ORAL
COMMUNITY
End: 2021-02-22

## 2021-02-03 ASSESSMENT — ENCOUNTER SYMPTOMS: CLAUDICATION: 0

## 2021-02-03 ASSESSMENT — FIBROSIS 4 INDEX: FIB4 SCORE: 1.89

## 2021-02-03 NOTE — TELEPHONE ENCOUNTER
----- Message from MADELEINE Leija sent at 2/3/2021 11:14 AM PST -----  Can you call Josafat and let him know that I sent in lorazepam 0.5 mg for use with dialysis for him, #30 pills please?    Thanks,   Bee

## 2021-02-03 NOTE — PROGRESS NOTES
PDMP reviewed, contract signed and urine sample provided.  Agreed to provide lorazepam 0.5 mg for patient to use with dialysis x3 days per week.  Pt has donor identified for kidney transplant, should be in March or April of 2021.

## 2021-02-03 NOTE — PROGRESS NOTES
Cardiology Follow-up Consultation Note    Date of note:    2/3/2021    Primary Care Provider: MADELEINE Leija  Referring Provider: Bee Seay A.P.R.N.     Patient Name: Garry Benites     YOB: 1955  MRN:              [id number]    Chief Complaint: pre-operative evaluation    History of Present Illness: Garry Benites is a 65 y.o. male whose current medical problems include idiopathic pericarditis, hypertension, ESRD due to solitary kidney who has been on dialysis since 11/28/2018 (LUE fistula), listed for renal transplant 12/3/2019 (has a known living donor), venous stasis ulcers, and gout who is here for cardiac consultation for pre-transplant evaluation.    In terms of ESRD, dry weight around 205 pounds previosuly lost 30 pounds when he was in the hospital 3 weeks ago with COVID. Now up to 185 or so. Was placed on steroids which he recently finished.     Currently can walk miles without stopping. Walked up 4 floors today. No chest pain or SOB.     Of note, he has a diagnosis of recurrent pericarditis. Initial diagnosis in September, treated with prednisone taper, aspirin 325mg PO tid, and colchicine. Saw Mariam Del Valle in our clinic later in September and symptoms had resolved. CT PE this visit showed no PE. Moderate pericardial effusion, and minimal coronary artery calcification. Echo showed moderate pericardial effusion without clear evidence of tamponade physiology.     He then presented again 12/18/2020 with symptomatic pericarditis.  I suspect this recurrent effusion is secondary to prednisone withdrawal vs recurrent viral pericarditis. He did not appear uremic. He was covid and flu negative at that time. His colchicine was continued and he was restarted on NSAIDS. At the time, I discussed his case with Dr. Bassett from nephrology. Based on his echo he was euvolemic, and I do believe part of his pleural and pericardial effusion was from his low albumin level and inflammatory  state (leukocytosis on admission) and not due to volume overload at this time. She did not feel we need pericardial fluid for diagnostic purposes at this time for his renal transplant status.      Per patient, his nephrologists at [facility] wanted another cardiac evaluation prior to him being scheduled for his surgery.     Of note, his heart rate is in the 120s today. HE reports recently his heart rate has ranged from the 90s to 120s whenever he is taking his BP. No low blood pressures.     Review of Systems   HENT: Positive for hearing loss.    Cardiovascular: Negative for claudication.   Skin: Positive for itching.   Musculoskeletal: Positive for joint pain.   Allergic/Immunologic: Positive for environmental allergies.     All other systems reviewed and discussed using a comprehensive questionnaire and are negative.       Past Medical History:   Diagnosis Date   • Allergy    • Anxiety    • Cataract    • Depression    • Gout due to renal impairment    • Hyperlipidemia    • Hypertension    • Kidney disease     Congenital solitary kidney on dialysis 3 times a week.   • Localized osteoarthritis of right shoulder 6/19/2019         Past Surgical History:   Procedure Laterality Date   • PB UPPER GI ENDOSCOPY,BIOPSY N/A 9/4/2020    Procedure: GASTROSCOPY, WITH BIOPSY;  Surgeon: Gabe West M.D.;  Location: SURGERY SAME DAY [facility];  Service: Gastroenterology   • GASTROSCOPY N/A 9/4/2020    Procedure: GASTROSCOPY- WITH DILATION;  Surgeon: Gabe West M.D.;  Location: SURGERY SAME DAY [facility];  Service: Gastroenterology   • AV FISTULA CREATION     • OTHER ORTHOPEDIC SURGERY           Current Outpatient Medications   Medication Sig Dispense Refill   • colchicine (COLCRYS) 0.6 MG Tab Take 0.6 mg by mouth every 48 hours.     • pantoprazole (PROTONIX) 20 MG tablet Take 1 Tab by mouth every day. 90 Tab 1   • SENSIPAR 30 MG Tab Take 1 Tab by mouth every day. 30 Tab 11   • sevelamer carbonate (RENVELA) 800 MG  Tab tablet Take 2-3 Tabs by mouth see administration instructions. 2400 mg per meal 3 times a day  1600mg per snack 270 Tab 5   • LORazepam (ATIVAN) 0.5 MG Tab Take 1 Tab by mouth 1 time a day as needed for Anxiety for up to 30 days. 30 Tab 0     No current facility-administered medications for this visit.          Allergies   Allergen Reactions   • Cephalexin Rash     Pt reports that he get a body rash   • Penicillins Rash and Unspecified     Pt reports that he get a body rash           Family History   Problem Relation Age of Onset   • Cancer Mother         lymphoma   • Stroke Father    • Heart Disease Father    • No Known Problems Sister    • No Known Problems Brother    • No Known Problems Brother    • Cancer Brother         BRAIN TUMOR   • No Known Problems Daughter    • No Known Problems Daughter    • No Known Problems Son          Social History     Socioeconomic History   • Marital status:      Spouse name: Not on file   • Number of children: Not on file   • Years of education: Not on file   • Highest education level: Not on file   Occupational History   • Not on file   Social Needs   • Financial resource strain: Somewhat hard   • Food insecurity     Worry: Sometimes true     Inability: Sometimes true   • Transportation needs     Medical: No     Non-medical: No   Tobacco Use   • Smoking status: Former Smoker     Packs/day: 0.50     Years: 25.00     Pack years: 12.50     Types: Cigarettes     Quit date: 10/21/1996     Years since quittin.3   • Smokeless tobacco: Never Used   Substance and Sexual Activity   • Alcohol use: No     Alcohol/week: 0.0 oz     Frequency: Never     Binge frequency: Never   • Drug use: No   • Sexual activity: Yes     Partners: Female     Comment: Lives with his brother. Work as . Social Security Disability for [place] and OA and Dialysis. No social or domestic concerns.   Lifestyle   • Physical activity     Days per week: Not on file     Minutes per session: Not on  "file   • Stress: Not on file   Relationships   • Social connections     Talks on phone: Not on file     Gets together: Not on file     Attends Baptism service: Not on file     Active member of club or organization: Not on file     Attends meetings of clubs or organizations: Not on file     Relationship status: Not on file   • Intimate partner violence     Fear of current or ex partner: Not on file     Emotionally abused: Not on file     Physically abused: Not on file     Forced sexual activity: Not on file   Other Topics Concern   • Not on file   Social History Narrative   • Not on file         Physical Exam:  Ambulatory Vitals  /80 (BP Location: Right arm, Patient Position: Sitting)   Pulse (!) 126   Ht 1.778 m (5' 10\")   Wt 83.5 kg (184 lb)   SpO2 97%    Oxygen Therapy:  Pulse Oximetry: 97 %  BP Readings from Last 4 Encounters:   02/03/21 128/80   01/29/21 116/70   01/17/21 131/78   12/22/20 144/73       Weight/BMI: Body mass index is 26.4 kg/m².  Wt Readings from Last 4 Encounters:   02/03/21 83.5 kg (184 lb)   01/29/21 74.4 kg (164 lb)   01/11/21 80.4 kg (177 lb 4 oz)   12/18/20 87 kg (191 lb 12.8 oz)       General: No apparent distress  Eyes: nl conjunctiva  ENT: OP clear, normal external appearance of nose and ears  Neck: JVP 4-5 cm H2O, no carotid bruits  Lungs: normal respiratory effort, CTAB  Heart: tachycardic, regular,  + friction rub, no murmurs, no gallops, 1+ edema bilateral lower extremities. No LV/RV heave on cardiac palpatation. LUE fistula  Noted  Abdomen: soft, non tender, non distended, no masses, normal bowel sounds.  No HSM.  Extremities/MSK: no clubbing, no cyanosis  Neurological: No focal sensory deficits  Psychiatric: Appropriate affect, A/O x 3, intact judgement and insight  Skin: Warm extremities      Lab Data Review:  Lab Results   Component Value Date/Time    CHOLSTRLTOT 140 02/01/2021 09:18 AM    LDL 62 02/01/2021 09:18 AM    HDL 42 02/01/2021 09:18 AM    TRIGLYCERIDE 182 " (H) 02/01/2021 09:18 AM       Lab Results   Component Value Date/Time    SODIUM 138 02/01/2021 09:18 AM    POTASSIUM 3.7 02/01/2021 09:18 AM    CHLORIDE 97 02/01/2021 09:18 AM    CO2 34 (H) 02/01/2021 09:18 AM    GLUCOSE 88 02/01/2021 09:18 AM    BUN 20 02/01/2021 09:18 AM    CREATININE 3.51 (H) 02/01/2021 09:18 AM     Lab Results   Component Value Date/Time    ALKPHOSPHAT 62 02/01/2021 09:18 AM    ASTSGOT 15 02/01/2021 09:18 AM    ALTSGPT 16 02/01/2021 09:18 AM    TBILIRUBIN 0.3 02/01/2021 09:18 AM      Lab Results   Component Value Date/Time    WBC 4.1 (L) 02/01/2021 09:18 AM     No components found for: HBGA1C  No components found for: TROPONIN  No results found for: BNP      Cardiac Imaging and Procedures Review:    EKG dated 1/11/2021 : My personal interpretation is NSR, non-specific st changes.     Echo dated 12/18/2020:   Compared to the images of the study done -09/02/2020  there has been an   interval increase in the pericardial effusion.  Left ventricular ejection fraction is visually estimated to be 65%.  Estimated right ventricular systolic pressure  is 35 mmHg.  Moderate pericardial effusion without evidence of hemodynamic   compromise.      Echo dated 12/22/2020:  CONCLUSIONS  Limited study to assess for pericardial effusion.  Small posterior pericardial effusion without evidence of hemodynamic   compromise.    Radiology test Review:  CXR: 1/11/2021  IMPRESSION:        Patchy groundglass and airspace opacities throughout both lungs could be seen with atypical infection/Covid 19 pneumonia. Moderate pulmonary edema is in the differential.     Stable cardiomegaly.     CT chest 12/18/2020:  FINDINGS:  Thoracic aorta shows mild atherosclerotic calcifications.  Large pericardial effusion.  Coronary artery calcifications.  Small bilateral pleural effusions, larger on the LEFT, with associated atelectasis.  No filling defects within the pulmonary arteries to indicate emboli.  No pneumothorax.  Degenerative  change of thoracic spine.  Moderate anterior wedge compression of T5, chronic.     IMPRESSION:     1.  No CT evidence for pulmonary emboli.  2.  Large pericardial effusion.  3.  Small bilateral pleural effusions with associated atelectasis, worse on the LEFT.    DVT US 2019  Vascular Laboratory   CONCLUSIONS   Left lower extremity -   Chronic, fully recanalized fibrous thrombus throughout the popliteal vein.    Significant reflux within the femoral, popliteal and greater saphenous    veins.      Right lower extremity -   No DVT.   Significant reflux within the femoral, popliteal and greater saphenous    veins.       Arterial LE ultrasound 2019:   Vascular Laboratory   Conclusions   There is no evidence of major arterial disease demonstrated.     ECHO - 19 [UCD]  SUMMARY:    1. Moderate concentric left ventricular hypertrophy.    2. The left ventricular diastolic function is indeterminate.    3. The left atrium is normal in size and structure.    4. The right atrium is normal in size and structure    STRESS - 19 [UCD]  IMPRESSION:   1. Mild normal variant apical thinning. Otherwise normal myocardial perfusion in all wall segments at rest and stress.  2. Normal left ventricular wall motion.  3. Ejection fraction of 55% at rest and 61% after stress.  4. Limited noncontrast CT is unremarkable.  5. Normal CFR of 3.23.    CXR - 19  IMPRESSION:  1. No active cardiopulmonary disease      CTA A/P - 19  IMPRESSION:  1. No severe atherosclerotic vascular calcifications of the major pelvic arteries.  2. Patent pelvic veins.         Medical Decision Makin. End stage renal failure on dialysis (HCC)  Due to congenital absence of one kidney. Followed by SUKHDEV Craig and pending transplant scheduling with live donor.   -f/u with SUKHDEV craig as scheduled in the next month.     2. History of COVID-19  Resolved.     3. Pre-operative cardiovascular examination  Mild coronary artery calcification by non-gated CT  scan. Previously had a negative stress test back in 2019, however he certainly is high risk for obstructive CAD.   -work-up for tachycardia as below. After that is resolved, I would proceed with diagnostic cardiac catheterization to assess his coronary arteries.     4. Chronic adhesive idiopathic pericarditis  Unclear etiology. Significant concern for recurrence given tachycardia. Did have a HR by EKG in the 70s just a month ago. He also recently weaned off steroids given to him for COVID.   -state echo.     5. Dyslipidemia  Cholesterol panel looks excellent, however he does have ESRD. Will consider low dose statin at future visits.     6. Renovascular hypertension  Normotensive today    7. History of DVT (deep vein thrombosis)  Per ultrasound, however patient says he purposefully had his veins plugged in the past. Currently not on anticoagulation.   -CTM, may need CT PE to check for CTEPH or PE depending on his echo.    8. Sinus tachycardia  Differential includes dehydration, pericardial effusion, PE, or SVT  -stat echo first.     Return in about 4 weeks (around 3/3/2021).      David Miller MD, [facility]  [facility], [address]  [address] [address]  Phone: [phone]  Fax: [phone]

## 2021-02-03 NOTE — TELEPHONE ENCOUNTER
Phone Number Called: [phone]  Call outcome: Did not leave a detailed message. Requested patient to call back. @ [phone]    1st Attempt:    Message:  Message  Received: Yesterday  Message Contents   LETICIA Leija.  Rakel Lombardi, Med Ass't             Please call Josafat and let him know his labs are about what we expect for his stage of kidney failure/dialysis.  His creatinine is improved.  Lipids look good, except for an elevated triglyceride level of 182 (like to see it below 149), however, his HDL (good cholesterol) has improved to 42 from 26 which is great.  His LDL (bad cholesterol) remains great at 62.       Also, can you follow up with him about the anxiety during dialysis please?   Bee

## 2021-02-04 ENCOUNTER — HOSPITAL ENCOUNTER (OUTPATIENT)
Dept: CARDIOLOGY | Facility: MEDICAL CENTER | Age: 66
End: 2021-02-04
Attending: INTERNAL MEDICINE
Payer: MEDICARE

## 2021-02-04 DIAGNOSIS — I31.0 CHRONIC ADHESIVE IDIOPATHIC PERICARDITIS: ICD-10-CM

## 2021-02-04 DIAGNOSIS — R00.0 SINUS TACHYCARDIA: ICD-10-CM

## 2021-02-04 LAB
LV EJECT FRACT  99904: 65
LV EJECT FRACT MOD 2C 99903: 68.35
LV EJECT FRACT MOD 4C 99902: 55.09
LV EJECT FRACT MOD BP 99901: 62.18

## 2021-02-04 PROCEDURE — 93306 TTE W/DOPPLER COMPLETE: CPT

## 2021-02-04 PROCEDURE — 93306 TTE W/DOPPLER COMPLETE: CPT | Mod: 26 | Performed by: INTERNAL MEDICINE

## 2021-02-05 ENCOUNTER — TELEPHONE (OUTPATIENT)
Dept: CARDIOLOGY | Facility: MEDICAL CENTER | Age: 66
End: 2021-02-05

## 2021-02-05 DIAGNOSIS — Z99.2 END STAGE RENAL FAILURE ON DIALYSIS (HCC): ICD-10-CM

## 2021-02-05 DIAGNOSIS — N18.6 END STAGE RENAL FAILURE ON DIALYSIS (HCC): ICD-10-CM

## 2021-02-05 DIAGNOSIS — Z01.810 PRE-OPERATIVE CARDIOVASCULAR EXAMINATION: ICD-10-CM

## 2021-02-05 LAB
AMPHET CTO UR CFM-MCNC: NEGATIVE NG/ML
BARBITURATES CTO UR CFM-MCNC: NEGATIVE NG/ML
BENZODIAZ CTO UR CFM-MCNC: NEGATIVE NG/ML
CANNABINOIDS CTO UR CFM-MCNC: NEGATIVE NG/ML
COCAINE CTO UR CFM-MCNC: NEGATIVE NG/ML
DRUG COMMENT 753798: NORMAL
METHADONE CTO UR CFM-MCNC: NEGATIVE NG/ML
OPIATES CTO UR CFM-MCNC: NEGATIVE NG/ML
PCP CTO UR CFM-MCNC: NEGATIVE NG/ML
PROPOXYPH CTO UR CFM-MCNC: NEGATIVE NG/ML

## 2021-02-05 NOTE — TELEPHONE ENCOUNTER
Echo reviewed and was WNL. HR down to 90s during this study.     I would schedule him for a cardiac catheterization, diagnostic, pre-renal transplant.    Also, if he continues to have tachycardia in the >100s, he should let us know, we may need to do a holter to fully assess his heart rate.     Thanks!

## 2021-02-10 NOTE — TELEPHONE ENCOUNTER
Called and spoke to the patient and relayed all results and recommendations.    He is agreeable to the cardiac catheterization.  Routed to AGUEDA for procedure name to order correct procedure.

## 2021-02-11 ENCOUNTER — TELEPHONE (OUTPATIENT)
Dept: CARDIOLOGY | Facility: MEDICAL CENTER | Age: 66
End: 2021-02-11

## 2021-02-11 NOTE — TELEPHONE ENCOUNTER
Patient is scheduled on 2-18-21 for a C w/poss with Dr. Schneider. No meds to stop and patient to check in at 6:00AM for a 7:30 procedure. H&P was done on 2-3-21 by Dr. Miller. Pre admit to call patient.

## 2021-02-12 NOTE — TELEPHONE ENCOUNTER
Called  back.  Spoke to Kay and she stated everything looks ok, she will mention to Cee if there is something else.

## 2021-02-12 NOTE — TELEPHONE ENCOUNTER
AGUEDA    NM: Kelly   HOSP: [facility]   PH: [phone]  EXT: option 1  and option 1 again   PT NM: Josafat Benites   : 55   REG DR: Dr Schneider   RE: Needs to verify insurance   DISP HIST: 2021 03:42P AF p/disp  2021 03:43P DLR man faxed      I tried to call over to them but was on hold for over 5 minutes. Please call them when you have a moment.      Thank you,  Mary GLASS

## 2021-02-17 ENCOUNTER — PRE-ADMISSION TESTING (OUTPATIENT)
Dept: ADMISSIONS | Facility: MEDICAL CENTER | Age: 66
End: 2021-02-17
Attending: INTERNAL MEDICINE
Payer: MEDICARE

## 2021-02-17 DIAGNOSIS — Z01.812 PRE-OPERATIVE LABORATORY EXAMINATION: ICD-10-CM

## 2021-02-17 DIAGNOSIS — Z01.810 PRE-OPERATIVE CARDIOVASCULAR EXAMINATION: ICD-10-CM

## 2021-02-17 LAB
ALBUMIN SERPL BCP-MCNC: 4.1 G/DL (ref 3.2–4.9)
ALBUMIN/GLOB SERPL: 1.1 G/DL
ALP SERPL-CCNC: 75 U/L (ref 30–99)
ALT SERPL-CCNC: 8 U/L (ref 2–50)
ANION GAP SERPL CALC-SCNC: 11 MMOL/L (ref 7–16)
APTT PPP: 35.3 SEC (ref 24.7–36)
AST SERPL-CCNC: 15 U/L (ref 12–45)
BILIRUB SERPL-MCNC: 0.9 MG/DL (ref 0.1–1.5)
BUN SERPL-MCNC: 17 MG/DL (ref 8–22)
CALCIUM SERPL-MCNC: 10 MG/DL (ref 8.5–10.5)
CHLORIDE SERPL-SCNC: 92 MMOL/L (ref 96–112)
CO2 SERPL-SCNC: 32 MMOL/L (ref 20–33)
CREAT SERPL-MCNC: 4.56 MG/DL (ref 0.5–1.4)
EKG IMPRESSION: NORMAL
ERYTHROCYTE [DISTWIDTH] IN BLOOD BY AUTOMATED COUNT: 65 FL (ref 35.9–50)
GLOBULIN SER CALC-MCNC: 3.8 G/DL (ref 1.9–3.5)
GLUCOSE SERPL-MCNC: 99 MG/DL (ref 65–99)
HCT VFR BLD AUTO: 38.6 % (ref 42–52)
HGB BLD-MCNC: 12.3 G/DL (ref 14–18)
INR PPP: 1.27 (ref 0.87–1.13)
MCH RBC QN AUTO: 30.4 PG (ref 27–33)
MCHC RBC AUTO-ENTMCNC: 31.9 G/DL (ref 33.7–35.3)
MCV RBC AUTO: 95.3 FL (ref 81.4–97.8)
PLATELET # BLD AUTO: 188 K/UL (ref 164–446)
PMV BLD AUTO: 10.5 FL (ref 9–12.9)
POTASSIUM SERPL-SCNC: 4.5 MMOL/L (ref 3.6–5.5)
PROT SERPL-MCNC: 7.9 G/DL (ref 6–8.2)
PROTHROMBIN TIME: 16.3 SEC (ref 12–14.6)
RBC # BLD AUTO: 4.05 M/UL (ref 4.7–6.1)
SODIUM SERPL-SCNC: 135 MMOL/L (ref 135–145)
WBC # BLD AUTO: 9.4 K/UL (ref 4.8–10.8)

## 2021-02-17 PROCEDURE — 80053 COMPREHEN METABOLIC PANEL: CPT

## 2021-02-17 PROCEDURE — 85027 COMPLETE CBC AUTOMATED: CPT

## 2021-02-17 PROCEDURE — 93010 ELECTROCARDIOGRAM REPORT: CPT | Performed by: INTERNAL MEDICINE

## 2021-02-17 PROCEDURE — 93005 ELECTROCARDIOGRAM TRACING: CPT

## 2021-02-17 PROCEDURE — 85730 THROMBOPLASTIN TIME PARTIAL: CPT

## 2021-02-17 PROCEDURE — 85610 PROTHROMBIN TIME: CPT

## 2021-02-17 PROCEDURE — 36415 COLL VENOUS BLD VENIPUNCTURE: CPT

## 2021-02-17 ASSESSMENT — FIBROSIS 4 INDEX: FIB4 SCORE: 1.89

## 2021-02-17 NOTE — OR NURSING
COVID-19 Pre-Surgery Screenin. Do you have an undiagnosed respiratory illness or symptoms such as coughing or sneezing? NO     • Onset of Sx: N/A    • Acute vs. chronic respiratory illness: N/A     2. Do you have an unexplained fever greater than 100.4 degrees Fahrenheit or 38 degrees Celsius? NO  ?  3. Have you had direct exposure to a patient who tested positive for Covid-19? NO    4. Patient informed of current visitation and mask policies by this RN.

## 2021-02-18 ENCOUNTER — HOSPITAL ENCOUNTER (OUTPATIENT)
Facility: MEDICAL CENTER | Age: 66
End: 2021-02-18
Attending: INTERNAL MEDICINE | Admitting: INTERNAL MEDICINE
Payer: MEDICARE

## 2021-02-18 ENCOUNTER — APPOINTMENT (OUTPATIENT)
Dept: CARDIOLOGY | Facility: MEDICAL CENTER | Age: 66
End: 2021-02-18
Attending: INTERNAL MEDICINE
Payer: MEDICARE

## 2021-02-18 VITALS
HEART RATE: 97 BPM | BODY MASS INDEX: 25.66 KG/M2 | HEIGHT: 70 IN | RESPIRATION RATE: 20 BRPM | SYSTOLIC BLOOD PRESSURE: 101 MMHG | WEIGHT: 179.23 LBS | DIASTOLIC BLOOD PRESSURE: 55 MMHG | TEMPERATURE: 97.2 F | OXYGEN SATURATION: 96 %

## 2021-02-18 DIAGNOSIS — Z01.810 PRE-OPERATIVE CARDIOVASCULAR EXAMINATION: ICD-10-CM

## 2021-02-18 DIAGNOSIS — N18.6 END STAGE RENAL FAILURE ON DIALYSIS (HCC): ICD-10-CM

## 2021-02-18 DIAGNOSIS — Z99.2 END STAGE RENAL FAILURE ON DIALYSIS (HCC): ICD-10-CM

## 2021-02-18 PROCEDURE — 700111 HCHG RX REV CODE 636 W/ 250 OVERRIDE (IP)

## 2021-02-18 PROCEDURE — 93458 L HRT ARTERY/VENTRICLE ANGIO: CPT | Mod: 26 | Performed by: INTERNAL MEDICINE

## 2021-02-18 PROCEDURE — 99152 MOD SED SAME PHYS/QHP 5/>YRS: CPT | Performed by: INTERNAL MEDICINE

## 2021-02-18 PROCEDURE — 700102 HCHG RX REV CODE 250 W/ 637 OVERRIDE(OP)

## 2021-02-18 PROCEDURE — 160002 HCHG RECOVERY MINUTES (STAT)

## 2021-02-18 PROCEDURE — 99153 MOD SED SAME PHYS/QHP EA: CPT

## 2021-02-18 PROCEDURE — 700101 HCHG RX REV CODE 250

## 2021-02-18 PROCEDURE — G0278 ILIAC ART ANGIO,CARDIAC CATH: HCPCS | Performed by: INTERNAL MEDICINE

## 2021-02-18 PROCEDURE — A9270 NON-COVERED ITEM OR SERVICE: HCPCS

## 2021-02-18 PROCEDURE — 700117 HCHG RX CONTRAST REV CODE 255: Performed by: INTERNAL MEDICINE

## 2021-02-18 RX ORDER — LIDOCAINE HYDROCHLORIDE 20 MG/ML
INJECTION, SOLUTION INFILTRATION; PERINEURAL
Status: COMPLETED
Start: 2021-02-18 | End: 2021-02-18

## 2021-02-18 RX ORDER — MIDAZOLAM HYDROCHLORIDE 1 MG/ML
INJECTION INTRAMUSCULAR; INTRAVENOUS
Status: COMPLETED
Start: 2021-02-18 | End: 2021-02-18

## 2021-02-18 RX ORDER — VERAPAMIL HYDROCHLORIDE 2.5 MG/ML
INJECTION, SOLUTION INTRAVENOUS
Status: COMPLETED
Start: 2021-02-18 | End: 2021-02-18

## 2021-02-18 RX ORDER — ASPIRIN 81 MG/1
TABLET, CHEWABLE ORAL
Status: COMPLETED
Start: 2021-02-18 | End: 2021-02-18

## 2021-02-18 RX ORDER — HEPARIN SODIUM 1000 [USP'U]/ML
INJECTION, SOLUTION INTRAVENOUS; SUBCUTANEOUS
Status: COMPLETED
Start: 2021-02-18 | End: 2021-02-18

## 2021-02-18 RX ORDER — HEPARIN SODIUM 200 [USP'U]/100ML
INJECTION, SOLUTION INTRAVENOUS
Status: COMPLETED
Start: 2021-02-18 | End: 2021-02-18

## 2021-02-18 RX ADMIN — IOHEXOL 96 ML: 350 INJECTION, SOLUTION INTRAVENOUS at 08:15

## 2021-02-18 RX ADMIN — NITROGLYCERIN 10 ML: 20 INJECTION INTRAVENOUS at 08:02

## 2021-02-18 RX ADMIN — HEPARIN SODIUM: 1000 INJECTION, SOLUTION INTRAVENOUS; SUBCUTANEOUS at 07:53

## 2021-02-18 RX ADMIN — HEPARIN SODIUM 2000 UNITS: 200 INJECTION, SOLUTION INTRAVENOUS at 08:02

## 2021-02-18 RX ADMIN — ASPIRIN 324 MG: 81 TABLET, CHEWABLE ORAL at 08:03

## 2021-02-18 RX ADMIN — LIDOCAINE HYDROCHLORIDE: 20 INJECTION, SOLUTION INFILTRATION; PERINEURAL at 08:02

## 2021-02-18 RX ADMIN — VERAPAMIL HYDROCHLORIDE 2.5 MG: 2.5 INJECTION, SOLUTION INTRAVENOUS at 08:02

## 2021-02-18 RX ADMIN — FENTANYL CITRATE 50 MCG: 50 INJECTION, SOLUTION INTRAMUSCULAR; INTRAVENOUS at 08:11

## 2021-02-18 RX ADMIN — MIDAZOLAM HYDROCHLORIDE 1.5 MG: 1 INJECTION, SOLUTION INTRAMUSCULAR; INTRAVENOUS at 08:11

## 2021-02-18 ASSESSMENT — FIBROSIS 4 INDEX: FIB4 SCORE: 1.83

## 2021-02-18 NOTE — OR NURSING
0833: atient from cath lab to PPU via gurney s/p L heart cath. Patient is awake. VSS. RUE CMS intact. R radial TR band intact. Vascular access care management per MD order (see assessment). No c/o pain or nausea at this time. Will monitor closely.   0844: Patient tolerated PO intake well.   0930: Air completely off hemoband per MD order. Gauze and tegaderm dressing to R radial site.  RUE circulation, movement and sensation intact.   1000: DC instructions given. Questions answered. R radial site soft,CDI. No c/o pain or nausea. Patient wide awake. VSS. Patient met criteria for discharge.

## 2021-02-18 NOTE — PROCEDURES
DATE OF PROCEDURE:  02/18/2021     REFERRING PHYSICIAN:  David Miller MD     PROCEDURES:  1.  Left heart catheterization.  2.  Coronary angiography.  3.  Left ventriculogram.  4.  Descending aortogram.  5.  Monitored conscious sedation.     PREPROCEDURE DIAGNOSIS:  Free kidney transplant evaluation.     POSTPROCEDURE DIAGNOSES:  1.  No angiographic evidence of coronary artery disease.  2.  Normal left ventricular systolic function with ejection fraction of 55%.  3.  Elevated left ventricular end diastolic pressure.  4.  Normal-appearing descending aorta and bilateral iliofemoral system.     INDICATIONS:  The patient is a 65-year-old male with end-stage renal disease.    He is pending renal transplant at [facility].  He was scheduled for cardiac   catheterization.     DESCRIPTION OF PROCEDURE:  After informed consent was signed by the patient,   the patient was brought to the cardiac catheterization laboratory.  He was   prepped and draped in the usual sterile manner.  The right wrist area was   anesthetized with 2% Xylocaine.  A Mexican sheath was inserted into the right   radial artery using the modified Seldinger technique.  Intra-arterial   verapamil and nitroglycerin were given.  IV heparin was given.  A 4-Mexican   pigtail catheter was positioned into the left ventricle.  Left angiography was   performed.  This was exchanged for 4-Mexican JL4 catheter, which was   positioned into the left main coronary artery.  Coronary angiography was   performed.  This was exchanged for 4-Mexican JR4 catheter, which was positioned   into the right coronary artery.  Coronary angiography was performed.  This   was exchanged for a long 4-Mexican pigtail catheter, which was positioned at   the descending aorta and aortogram was performed.  The patient tolerated the   procedure well.  At the end of procedure, all catheters and sheaths were   removed.  Hemoband was placed in the right wrist.  He was transferred back to   PPU in  stable condition.     HEMODYNAMIC DATA:  Hemodynamic data shows aortic pressures of 110/70 mmHg with   mean of 85 mmHg and /0 mmHg with LVEDP of 24 mmHg.     AORTIC VALVE:  There was no significant gradient noted.     LEFT VENTRICULOGRAM:  10 mL of contrast was delivered for 2 seconds.  Ejection   fraction was estimated to be 65%.  There were no segmental wall motion   abnormalities noted.      DESCENDING AORTOGRAM:  10 mL of contrast were delivered for 2 seconds.    Descending aorta and bilateral iliofemoral system was without significant   disease.     ANGIOGRAM LEFT MAIN CORONARY ARTERY:  Left main coronary artery is a long   large-caliber vessel without significant stenosis.     LEFT ANTERIOR DESCENDING ARTERY:  Left anterior descending artery is a long   moderate-caliber vessel which wraps around the apex.  There is a small-caliber   diagonal branch and a moderate-caliber trifurcating second diagonal branch.    Left anterior descending artery and its branches are free of disease.     RAMUS INTERMEDIUS:  The ramus intermedius is a long bifurcating vessel, free   of disease.     LEFT CIRCUMFLEX ARTERY:  Left circumflex artery is a nondominant   moderate-caliber vessel with long moderate-caliber first obtuse marginal   branch and a moderate-caliber bifurcating distal obtuse marginal branch.  Left   circumflex artery and its branches are free of disease.     RIGHT CORONARY ARTERY:  Right coronary artery is a dominant, large-caliber   vessel with long large-caliber posterior descending artery and a   small-caliber, moderate-length posterolateral branch.  Right coronary artery   and its branches are free of disease.     IMPRESSION:  Continue with post-procedure diagnosis.     RECOMMENDATIONS:  Further plans per [facility] transplant team.    SEDATION TIME: The patient's sedation was managed by myself with continuous   face to face time with the patient for 15 minutes from 07:35 to 07:50.        ______________________________  MD VIPUL ROJAS/KENAN/CHELA    DD:  02/18/2021 08:26  DT:  02/18/2021 09:02    Job#:  [id number]

## 2021-02-18 NOTE — DISCHARGE INSTRUCTIONS
ACTIVITY: Rest and take it easy for the first 24 hours.  A responsible adult is recommended to remain with you during that time.  It is normal to feel sleepy.  We encourage you to not do anything that requires balance, judgment or coordination.    MILD FLU-LIKE SYMPTOMS ARE NORMAL. YOU MAY EXPERIENCE GENERALIZED MUSCLE ACHES, THROAT IRRITATION, HEADACHE AND/OR SOME NAUSEA.    FOR 24 HOURS DO NOT:  Drive, operate machinery or run household appliances.  Drink beer or alcoholic beverages.   Make important decisions or sign legal documents.      DIET: To avoid nausea, slowly advance diet as tolerated, avoiding spicy or greasy foods for the first day.  Add more substantial food to your diet according to your physician's instructions.  Babies can be fed formula or breast milk as soon as they are hungry.  INCREASE FLUIDS AND FIBER TO AVOID CONSTIPATION.    SURGICAL DRESSING/BATHING:     Keep the dressing clean and dry for 24 hours.  May remove dressing tomorrow  and can shower.  Do not submerge site under water for 1 week.  No heavy lifting and limit wrist movement for 2 days.      FOLLOW-UP APPOINTMENT:  A follow-up appointment should be arranged with your doctor ; call to schedule.    You should CALL YOUR PHYSICIAN if you develop:  Fever greater than 101 degrees F.  Pain not relieved by medication, or persistent nausea or vomiting.  Excessive bleeding (blood soaking through dressing) or unexpected drainage from the wound.  Extreme redness or swelling around the incision site, drainage of pus or foul smelling drainage.  Inability to urinate or empty your bladder within 8 hours.  Problems with breathing or chest pain.    You should call 911 if you develop problems with breathing or chest pain.  If you are unable to contact your doctor or surgical center, you should go to the nearest emergency room or urgent care center.      Physician's telephone #: [phone]    If any questions arise, call your doctor.  If your doctor is  not available, please feel free to call the Surgical Center at [phone]. The Contact Center is open Monday through Friday 7AM to 5PM and may speak to a nurse at [phone], or toll free at [phone].     A registered nurse may call you a few days after your surgery to see how you are doing after your procedure.    MEDICATIONS: Resume taking daily medication.  Take prescribed pain medication with food.  If no medication is prescribed, you may take non-aspirin pain medication if needed.  PAIN MEDICATION CAN BE VERY CONSTIPATING.  Take a stool softener or laxative such as senokot, pericolace, or milk of magnesia if needed.      If your physician has prescribed pain medication that includes Acetaminophen (Tylenol), do not take additional Acetaminophen (Tylenol) while taking the prescribed medication.    Depression / Suicide Risk    As you are discharged from this [facility] facility, it is important to learn how to keep safe from harming yourself.    Recognize the warning signs:  · Abrupt changes in personality, positive or negative- including increase in energy   · Giving away possessions  · Change in eating patterns- significant weight changes-  positive or negative  · Change in sleeping patterns- unable to sleep or sleeping all the time   · Unwillingness or inability to communicate  · Depression  · Unusual sadness, discouragement and loneliness  · Talk of wanting to die  · Neglect of personal appearance   · Rebelliousness- reckless behavior  · Withdrawal from people/activities they love  · Confusion- inability to concentrate     If you or a loved one observes any of these behaviors or has concerns about self-harm, here's what you can do:  · Talk about it- your feelings and reasons for harming yourself  · Remove any means that you might use to hurt yourself (examples: pills, rope, extension cords, firearm)  · Get professional help from the community (Mental Health, Substance Abuse, psychological  counseling)  · Do not be alone:Call your Safe Contact- someone whom you trust who will be there for you.  · Call your local CRISIS HOTLINE 962-5727 or 179-476-0542  · Call your local Children's Mobile Crisis Response Team [place] (452) 938-7440 or www.[employer]  · Call the toll free National Suicide Prevention Hotlines   · National Suicide Prevention Lifeline 191-440-VLKU (5956)  · [employer] 800-SUICIDE (502-9579)      POST ANGIOGRAM    General Care Instructions  • Maintain a bandage over the incision site for 24 hours.  It's normal to find a small bruise or dime-sized lump at the insertion site. This should disappear within a few weeks.  • Do not apply lotions or powders to the site.  Do not immerse the catheter insertion site in water (bathtub/swimming) for five days. It is ok to shower 24 hours after the procedure.  • You may resume your normal diet immediately; on the day of your procedure, drink 6-10 glasses of water to help flush the contrast liquid out of your system.  • If the doctor inserted the catheter in through your groin:  o Walking short distances on a flat surface is OK. Limit going up/down stairs for the first 2 days.  o DO NOT do yard work, drive, squat, lift heavy objects, or play sports for 2 days; or until your health care provider tells you it is OK.  • If the doctor inserted the catheter in your arm:  o For 24 hours, DO NOT lift anything heavier than 10 pounds (approximately a gallon of milk). DO NOT do any heavy pushing, pulling, or twisting.    Medications  • If your current medications need to be changed, you will be provided with an updated list of your medications prior to discharge.  • If you take warfarin (Coumadin), resume taking your usual dose the evening after the procedure.  • DO NOT STOP taking prescribed blood thinning (anti-platelet) medications unless instructed by your cardiologist.  These medications include:  o Aspirin, Clopidogrel (Plavix),  Ticagrelor (Brilinta), or Prasugrel (Effient)   • If you take one of the following anticoagulants, RESUME 24 HOURS after your procedure:  o Apixiban (Eliquis), Rivaroxaban (Xarelto), Dabigatran (Pradaxa), Edoxaban (Savaysa)  • If you take metformin (Glucophage), RESUME 48 HOURS after your procedure.    When to call your healthcare provider  Call your cardiologist right away at [phone] if you have any of the following:  ?  Problems/Concerns taking any of your prescribed heart medicines.  ?  The insertion site has increasing pain, swelling, redness, bleeding, or drainage.  ?  Your arm or leg below where the insertion site changes color, is cool, or is numb.  ?  You have chest pain or shortness of breath that does not go away with rest.  ?  Your pulse feels irregular -- very slow (less than 60 beats/minute) or very fast (over 100 beats/minute).  ?  You have dizziness, fainting, or you are very tired.  ?  You are coughing up blood or yellow or green mucus.  ?  You have chills or a fever over 101°F (38.3°C).   ?  If there is bleeding at the catheter insertion site, apply pressure for 10 minutes.  If bleeding persists, call 911, and continue to hold pressure until advanced medical support arrives.        Exercising Safely After Percutaneous Coronary Intervention (PCI)  After percutaneous coronary intervention (PCI), which involves angioplasty and often stenting, it's important to focus on your heart health. Exercise can help strengthen your heart. It can also help you feel good and improve your overall health. Talk with your health care provider or cardiac rehab team member about good options for you.  • Start slowly. Work up to more vigorous exercise as you get stronger. Aim for at least 150 minutes of exercise each week.  • Include aerobic activities. These make the heart beat faster. They work the heart and lungs, and improve the body's ability to use oxygen. Good choices include walking, swimming, and biking  .  Always follow your doctor's recommendation for exercise.   You have been referred to cardiac rehabilitation, which is important for your recovery.  You may contact [facility] at [phone] to learn more and schedule a visit.        Lifestyle Management After Percutaneous Coronary Intervention (PCI)  Percutaneous coronary intervention (PCI)  involves angioplasty and often stenting. This procedure can open arteries and relieve symptoms. But, it doesn't cure coronary artery disease. New blockages can still form. You need to take steps to prevent this by managing risk factors. Doing so will help make your heart and arteries healthier. Your doctor may prescribe cardiac rehabilitation to help with this lifelong process.  Understanding risk factors  Some risk factors for coronary artery disease can be controlled. These include smoking, high blood pressure, cholesterol, diabetes, and obesity. They can be managed with medication, diet, and exercise. Support and counseling can also play a role. The effort will pay off! Managing risk factors can help you be more active, feel better, and reduce the risk of heart attack.    If you smoke, quit!  If your doctor has been urging you to quit smoking, it's for good reasons. Smoking damages your heart, blood vessels, and lungs. The good news is that quitting can halt or even reverse the damage of smoking. To quit now:  • Get medical help. Ask your doctor for advice on stop-smoking programs. Also ask about medications or nicotine replacement therapy products that may help you quit smoking.  • Get support. Join a support group. Ask for help from your family and friends.  • Don't give up. It often takes several tries to succeed in quitting smoking.  • Avoid secondhand smoke. Ask family and friends not to smoke around you.

## 2021-02-18 NOTE — INFECTION CONTROL
This patient is considered COVID RECOVERED.    Patient initially tested positive for COVID on 1/11/2021.  Patient is greater than or equal to 21 days from symptom onset and/or positive test, with symptom improvement.  Per the CDC guidance, this patient no longer requires transmission based precautions.  Patient may be placed on any unit per the bed assignment policy (except CNU), including placement in a semi-private room with a roommate.

## 2021-02-22 RX ORDER — COLCHICINE 0.6 MG/1
TABLET ORAL
Qty: 30 TABLET | Refills: 5 | Status: SHIPPED | OUTPATIENT
Start: 2021-02-22 | End: 2021-03-01 | Stop reason: SDUPTHER

## 2021-03-01 DIAGNOSIS — F06.4 ANXIETY DISORDER DUE TO KNOWN PHYSIOLOGICAL CONDITION: ICD-10-CM

## 2021-03-02 RX ORDER — LORAZEPAM 0.5 MG/1
0.5 TABLET ORAL
Qty: 30 TABLET | Refills: 0 | Status: SHIPPED | OUTPATIENT
Start: 2021-03-02 | End: 2021-04-01

## 2021-03-02 RX ORDER — PANTOPRAZOLE SODIUM 20 MG/1
20 TABLET, DELAYED RELEASE ORAL EVERY MORNING
Qty: 30 TABLET | Refills: 2 | Status: SHIPPED | OUTPATIENT
Start: 2021-03-02 | End: 2021-03-09

## 2021-03-02 RX ORDER — COLCHICINE 0.6 MG/1
0.3 TABLET ORAL
Qty: 30 TABLET | Refills: 5 | Status: SHIPPED | OUTPATIENT
Start: 2021-03-02 | End: 2021-05-19

## 2021-03-03 DIAGNOSIS — Z23 NEED FOR VACCINATION: ICD-10-CM

## 2021-03-09 RX ORDER — PANTOPRAZOLE SODIUM 20 MG/1
20 TABLET, DELAYED RELEASE ORAL EVERY MORNING
Qty: 90 TABLET | Refills: 1 | Status: SHIPPED | OUTPATIENT
Start: 2021-03-09 | End: 2021-09-02 | Stop reason: SDUPTHER

## 2021-03-19 ENCOUNTER — OFFICE VISIT (OUTPATIENT)
Dept: PHYSICAL MEDICINE AND REHAB | Facility: MEDICAL CENTER | Age: 66
End: 2021-03-19
Payer: MEDICARE

## 2021-03-19 VITALS
HEIGHT: 70 IN | HEART RATE: 97 BPM | OXYGEN SATURATION: 98 % | DIASTOLIC BLOOD PRESSURE: 64 MMHG | TEMPERATURE: 97.7 F | BODY MASS INDEX: 25.28 KG/M2 | SYSTOLIC BLOOD PRESSURE: 110 MMHG | WEIGHT: 176.59 LBS

## 2021-03-19 DIAGNOSIS — M54.50 LUMBOSACRAL PAIN: ICD-10-CM

## 2021-03-19 DIAGNOSIS — N18.6 END STAGE RENAL FAILURE ON DIALYSIS (HCC): Chronic | ICD-10-CM

## 2021-03-19 DIAGNOSIS — M79.642 PAIN IN BOTH HANDS: ICD-10-CM

## 2021-03-19 DIAGNOSIS — M79.641 PAIN IN BOTH HANDS: ICD-10-CM

## 2021-03-19 DIAGNOSIS — G89.29 OTHER CHRONIC PAIN: ICD-10-CM

## 2021-03-19 DIAGNOSIS — M25.511 BILATERAL SHOULDER PAIN, UNSPECIFIED CHRONICITY: ICD-10-CM

## 2021-03-19 DIAGNOSIS — Z99.2 END STAGE RENAL FAILURE ON DIALYSIS (HCC): Chronic | ICD-10-CM

## 2021-03-19 DIAGNOSIS — M79.672 PAIN IN BOTH FEET: ICD-10-CM

## 2021-03-19 DIAGNOSIS — M25.512 BILATERAL SHOULDER PAIN, UNSPECIFIED CHRONICITY: ICD-10-CM

## 2021-03-19 DIAGNOSIS — M79.671 PAIN IN BOTH FEET: ICD-10-CM

## 2021-03-19 DIAGNOSIS — M54.6 MIDLINE THORACIC BACK PAIN, UNSPECIFIED CHRONICITY: ICD-10-CM

## 2021-03-19 PROCEDURE — 99204 OFFICE O/P NEW MOD 45 MIN: CPT | Performed by: PHYSICAL MEDICINE & REHABILITATION

## 2021-03-19 ASSESSMENT — PATIENT HEALTH QUESTIONNAIRE - PHQ9
5. POOR APPETITE OR OVEREATING: 0 - NOT AT ALL
CLINICAL INTERPRETATION OF PHQ2 SCORE: 2
SUM OF ALL RESPONSES TO PHQ QUESTIONS 1-9: 5

## 2021-03-19 ASSESSMENT — PAIN SCALES - GENERAL: PAINLEVEL: 8=MODERATE-SEVERE PAIN

## 2021-03-19 ASSESSMENT — FIBROSIS 4 INDEX: FIB4 SCORE: 1.83

## 2021-03-19 NOTE — PROGRESS NOTES
"New patient note    Physiatry (physical medicine and  Rehabilitation), interventional spine and sports medicine    Date of Service: 03/19/2021    Chief complaint:   Chief Complaint   Patient presents with   • New Patient     Shoulder and knee pain       HISTORY    HPI: Garry Benites 65 y.o. male who presents today for evaluation of pain.  He reports that he has had gout in \"every joint in his body.\"  He reports that he fell 40 feet once and broke a number of bones.  Also, other injuries in the 1980s.    From what he reports, he is going to have a kidney transplant at the end of the month at [facility].    He takes lorazepam to help him to sleep at dialysis.  He takes 0.5mg three times a week to manage dialysis.  It is difficult for him to tolerate sitting there.    He reports his shoulder, feet and hands are the most painful for him.  The shoulders are limited.  His left shoulder has been increasingly limited for at least 6 months.  History of injury and dislocations bilaterally.  Physical therapy has been done, but years ago.  Injections were not particularly help.  Last was about 1 year ago on the right shoulder.  The right shoulder is less limited than the left    Feet are painful and he reports that he broke his left foot when he fell 40 feet.  There were pins, but most of these have been removed. This is mostly aching.  He has some tingling in his left hand.  Right hand has a history of infection in 3rd digits.    Pain is 9/10 on the NRS.  Pain is worse with sitting, standing, walking, bending, walking up and down stairs.    Medical records review:  I reviewed the note from the referring provider Bee Seay A.P.R.*     Previous treatments:    Physical Therapy: Yes    Medications the patient is tried: Narcotics, reports that he was taking 5-6 lortab/day when he lived in [place].      Previous interventions: none    Previous surgeries to relieve the above pain:  none      ROS:   Red Flags ROS:   Fever, " Chills, Sweats: Denies  Involuntary Weight Loss: Denies  Bladder Incontinence: Denies  Bowel Incontinence: Denies  Saddle Anesthesia: Denies    All other systems reviewed and negative.       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   • Localized osteoarthritis of right shoulder 6/19/2019   • Pain     feet, knees, shoulders, hands, back        PSHx:   Past Surgical History:   Procedure Laterality Date   • PB UPPER GI ENDOSCOPY,BIOPSY N/A 9/4/2020    Procedure: GASTROSCOPY, WITH BIOPSY;  Surgeon: Gabe West M.D.;  Location: SURGERY SAME DAY [facility];  Service: Gastroenterology   • GASTROSCOPY N/A 9/4/2020    Procedure: GASTROSCOPY- WITH DILATION;  Surgeon: Gabe West M.D.;  Location: SURGERY SAME DAY [facility];  Service: Gastroenterology   • AV FISTULA CREATION     • CATARACT EXTRACTION WITH IOL Bilateral    • OTHER ORTHOPEDIC SURGERY      left foot, right knee, bilateral hands       Family history   Family History   Problem Relation Age of Onset   • Cancer Mother         lymphoma   • Stroke Father    • Heart Disease Father    • No Known Problems Sister    • No Known Problems Brother    • No Known Problems Brother    • Cancer Brother         BRAIN TUMOR   • No Known Problems Daughter    • No Known Problems Daughter    • No Known Problems Son          Medications:   Current Outpatient Medications   Medication   • pantoprazole (PROTONIX) 20 MG tablet   • colchicine (COLCRYS) 0.6 MG Tab   • LORazepam  (ATIVAN) 0.5 MG Tab   • Methoxy PEG-Epoetin Beta (MIRCERA INJ)   • VITAMIN D PO   • SENSIPAR 30 MG Tab   • sevelamer carbonate (RENVELA) 800 MG Tab tablet   • Metoprolol Succinate 50 MG Capsule ER 24 Hour Sprinkle     No current facility-administered medications for this visit.       Allergies:   Allergies   Allergen Reactions   • Cephalexin Rash     Pt reports that he get a body rash   • Penicillins Rash and Unspecified     Pt reports that he get a body rash         Social Hx:   Social History     Socioeconomic History   • Marital status:      Spouse name: Not on file   • Number of children: Not on file   • Years of education: Not on file   • Highest education level: Not on file   Occupational History   • Not on file   Tobacco Use   • Smoking status: Former Smoker     Packs/day: 0.50     Years: 5.00     Pack years: 2.50     Types: Cigarettes     Quit date: 10/21/1996     Years since quittin.4   • Smokeless tobacco: Never Used   Substance and Sexual Activity   • Alcohol use: No     Alcohol/week: 0.0 oz   • Drug use: No   • Sexual activity: Yes     Partners: Female     Comment: Lives with his brother. Work as . Social Security Disability for [place] and OA and Dialysis. No social or domestic concerns.   Other Topics Concern   •  Service No   • Blood Transfusions No   • Caffeine Concern No   • Occupational Exposure No   • Hobby Hazards No   • Sleep Concern No   • Stress Concern No   • Weight Concern No   • Special Diet Yes   • Back Care No   • Exercise Yes   • Bike Helmet No   • Seat Belt Yes   • Self-Exams No   Social History Narrative   • Not on file     Social Determinants of Health     Financial Resource Strain: Medium Risk   • Difficulty of Paying Living Expenses: Somewhat hard   Food Insecurity: Food Insecurity Present   • Worried About Running Out of Food in the Last Year: Sometimes true   • Ran Out of Food in the Last Year: Sometimes true   Transportation Needs: No Transportation  "Needs   • Lack of Transportation (Medical): No   • Lack of Transportation (Non-Medical): No   Physical Activity:    • Days of Exercise per Week:    • Minutes of Exercise per Session:    Stress:    • Feeling of Stress :    Social Connections:    • Frequency of Communication with Friends and Family:    • Frequency of Social Gatherings with Friends and Family:    • Attends Hindu Services:    • Active Member of Clubs or Organizations:    • Attends Club or Organization Meetings:    • Marital Status:    Intimate Partner Violence:    • Fear of Current or Ex-Partner:    • Emotionally Abused:    • Physically Abused:    • Sexually Abused:          EXAMINATION     Physical Exam:   Vitals: /64 (BP Location: Right arm, Patient Position: Sitting, BP Cuff Size: Small adult)   Pulse 97   Temp 36.5 °C (97.7 °F) (Temporal)   Ht 1.778 m (5' 10\")   Wt 80.1 kg (176 lb 9.4 oz)   SpO2 98%     Constitutional:   Body Habitus: Body mass index is 25.34 kg/m².  Cooperation: Fully cooperates with exam  Appearance: Well-groomed, well-nourished, not disheveled, in no acute distress    Eyes: No scleral icterus, no proptosis     ENT -no obvious auditory deficits, wearing a face mask    Skin -no rashes or lesions noted     Respiratory-  breathing comfortable on room air, no audible wheezing    Cardiovascular- capillary refills less than 2 seconds. No lower extremity edema is noted.     Psychiatric- alert and oriented ×3. Normal affect.     Gait - normal gait, no use of ambulatory device, nonantalgic. The patient has difficulty with heel walking.  The patient has difficulty with toe walking..     Musculoskeletal -   Cervical spine   Inspection: No deformities of the skin over the cervical spine. No rashes or lesions.    decreased A/P ROM in all directions, with  pain      Spurling’s sign: negative bilaterally    Mild signs of muscular atrophy in bilateral upper extremities in thenar eminence on the right and bilateral FDI    No " tenderness to palpation of the cervical facets    Shoulders   Left shoulder with pain starting around 60 degrees with passive ROM near normal, but painful.  Neer's is positive, Hawkin's is positive.  Right shoulder with decreased internal rotation compared with the left, positive Hawkin's.  Negative Neer's.   Empty can test is positive bilaterally    Thoracic/Lumbar Spine/Sacral Spine/Hips   Inspection: No evidence of atrophy in bilateral lower extremities throughout     ROM: decreased AROM with flexion, extension, lateral flexion, and rotation bilaterally, without pain     Palpation:   No tenderness to palpation in midline at T1-T12 levels. No tenderness to palpation in the left and right of the midline T1-L5  palpation over SI joint: negative bilaterally    palpation over buttock: negative bilaterally    palpation in hip or over the greater trochanters: negative bilaterally      Lumbar spine Special tests  Neuro tension  Straight leg test negative bilaterally      HIP    Range of motion in the hips is symmetrically decreased in internal rotation bilaterally, but nonpainful    SI joint tests  Observation patient sits on one buttocks: Negative    SKY test negative bilaterally       Neuro       Key points for the international standards for neurological classification of spinal cord injury (ISNCSCI) to light touch.     Dermatome R L   C4 2 2   C5 2 2   C6 2 2   C7 2 2   C8 2 2   T1 2 2   T2 2 2   L2 2 2   L3 2 2   L4 2 2   L5 2 2   S1 2 2   S2 2 2       Motor Exam Upper Extremities   ? Myotome R L   Shoulder flexion C5 5 5   Elbow flexion C5 5 5   Wrist extension C6 5 5   Elbow extension C7 5 5   Finger flexion C8 5 5   Finger abduction T1 5 5       Motor Exam Lower Extremities    ? Myotome R L   Hip flexion L2 5 5   Knee extension L3 5 5   Ankle dorsiflexion L4 5 5   Toe extension L5 5 5   Ankle plantarflexion S1 5 5         Santillan’s sign negative bilaterally   Babinski sign negative bilaterally   Clonus of the  ankle negative bilaterally     Reflexes  ?  R L   Biceps  2+ 2+   Brachioradialis  2+ 2+   Patella  1+ 1+   Achilles   1+ 1+       MEDICAL DECISION MAKING    Medical records review: see under HPI section.     DATA    Labs:   Lab Results   Component Value Date/Time    SODIUM 135 02/17/2021 10:41 AM    POTASSIUM 4.5 02/17/2021 10:41 AM    CHLORIDE 92 (L) 02/17/2021 10:41 AM    CO2 32 02/17/2021 10:41 AM    ANION 11.0 02/17/2021 10:41 AM    GLUCOSE 99 02/17/2021 10:41 AM    BUN 17 02/17/2021 10:41 AM    CREATININE 4.56 (H) 02/17/2021 10:41 AM    CALCIUM 10.0 02/17/2021 10:41 AM    ASTSGOT 15 02/17/2021 10:41 AM    ALTSGPT 8 02/17/2021 10:41 AM    TBILIRUBIN 0.9 02/17/2021 10:41 AM    ALBUMIN 4.1 02/17/2021 10:41 AM    ALBUMIN 3.73 (L) 01/26/2017 03:33 AM    TOTPROTEIN 7.9 02/17/2021 10:41 AM    TOTPROTEIN 6.90 01/26/2017 03:33 AM    GLOBULIN 3.8 (H) 02/17/2021 10:41 AM    AGRATIO 1.1 02/17/2021 10:41 AM       Lab Results   Component Value Date/Time    PROTHROMBTM 16.3 (H) 02/17/2021 10:41 AM    INR 1.27 (H) 02/17/2021 10:41 AM        Lab Results   Component Value Date/Time    WBC 9.4 02/17/2021 10:41 AM    RBC 4.05 (L) 02/17/2021 10:41 AM    HEMOGLOBIN 12.3 (L) 02/17/2021 10:41 AM    HEMATOCRIT 38.6 (L) 02/17/2021 10:41 AM    MCV 95.3 02/17/2021 10:41 AM    MCH 30.4 02/17/2021 10:41 AM    MCHC 31.9 (L) 02/17/2021 10:41 AM    MPV 10.5 02/17/2021 10:41 AM    NEUTSPOLYS 62.20 02/01/2021 09:18 AM    LYMPHOCYTES 22.80 02/01/2021 09:18 AM    MONOCYTES 9.10 02/01/2021 09:18 AM    EOSINOPHILS 4.90 02/01/2021 09:18 AM    BASOPHILS 0.50 02/01/2021 09:18 AM    ANISOCYTOSIS 1+ 10/10/2020 02:46 AM        Lab Results   Component Value Date/Time    HBA1C 5.4 01/11/2021 06:00 AM        Imaging: I personally reviewed following images, these are my reads  Xray right shoulder 05/17/20219  Moderate osteoarthritis    IMAGING radiology reads. I reviewed the following radiology reads                                                 Results  for orders placed in visit on 10/26/15   DX-KNEE 3 VIEWS RIGHT    Impression 1.  No evidence of fracture or dislocation.    2.  Mild osteoarthritis.    3.  Soft tissue swelling is identified.                                  Results for orders placed during the hospital encounter of 05/17/19   DX-SHOULDER 2+ RIGHT    Impression There is no evidence of acute fracture.  Findings are consistent with moderate osteoarthritis.                     Diagnosis   Visit Diagnoses     ICD-10-CM   1. Bilateral shoulder pain, unspecified chronicity  M25.511    M25.512   2. Pain in both feet  M79.671    M79.672   3. Midline thoracic back pain, unspecified chronicity  M54.6   4. Lumbosacral pain  M54.5   5. Pain in both hands  M79.641    M79.642   6. Other chronic pain  G89.29   7. End stage renal failure on dialysis (HCC)  N18.6    Z99.2           ASSESSMENT:  Garry Benites 65 y.o. male seen for above     Garry was seen today for new patient.    Diagnoses and all orders for this visit:    Bilateral shoulder pain, unspecified chronicity  -     DX-SHOULDER 2+ LEFT; Future  -     DX-SHOULDER 2+ RIGHT; Future    Pain in both feet  -     DX-FOOT-COMPLETE 3+ RIGHT; Future  -     DX-FOOT-COMPLETE 3+ LEFT; Future    Midline thoracic back pain, unspecified chronicity  -     DX-THORACIC SPINE-WITH SWIMMERS VIEW; Future    Lumbosacral pain  -     DX-LUMBAR SPINE-2 OR 3 VIEWS; Future    Pain in both hands  -     DX-JOINT SURVEY-HANDS SINGLE VIEW; Future    Other chronic pain  -     Cancel: Pain Management Screen; Future  -     Pain Management Screen; Future    End stage renal failure on dialysis ([facility])      1. Discussed that he has only a few weeks prior to planned renal transplant, which limits the work-up and treatment that we can achieve.  He understands.  He was 20 minutes early to his appointment and is committed his health care plans.  Anticipate that I will see him after he has his transplant.  2. Xrays of multiple joints ordered,  limited data available here.  3. Discussed that use of lorazepam with opioids, if these are needed, is not preferrable due to possible drug interaction.  He understands and hopes that he will wean this after his transplant.  I can assist with this, if needed.  4. He will continue medications for gout, which has helped stabilize symptoms.  5. Encouraged activity as tolerated for now and to continue to work on maintaining ROM of his shoulders, which he has been doing.  Physical therapy may be helpful in the future.    Outside records requested:  The patient signed outside records request form for his outside records including outside images.      Follow-up: Return in about 2 months (around 5/19/2021), or if symptoms worsen or fail to improve.    Thank you very much for asking me to participate in Garry Benites's care.  Please contact me with any questions or concerns.    Please note that this dictation was created using voice recognition software. I have made every reasonable attempt to correct obvious errors but there may be errors of grammar and content that I may have overlooked prior to finalization of this note.      Kb Mcmahon MD  Physical Medicine and Rehabilitation  Interventional Spine and Sports Physiatry  [facility]           CC Bee Seay A.P.R

## 2021-03-22 ENCOUNTER — HOSPITAL ENCOUNTER (OUTPATIENT)
Dept: RADIOLOGY | Facility: MEDICAL CENTER | Age: 66
End: 2021-03-22
Attending: PHYSICAL MEDICINE & REHABILITATION
Payer: MEDICARE

## 2021-03-22 DIAGNOSIS — M54.6 MIDLINE THORACIC BACK PAIN, UNSPECIFIED CHRONICITY: ICD-10-CM

## 2021-03-22 DIAGNOSIS — M79.672 PAIN IN BOTH FEET: ICD-10-CM

## 2021-03-22 DIAGNOSIS — M25.512 BILATERAL SHOULDER PAIN, UNSPECIFIED CHRONICITY: ICD-10-CM

## 2021-03-22 DIAGNOSIS — M79.671 PAIN IN BOTH FEET: ICD-10-CM

## 2021-03-22 DIAGNOSIS — M25.511 BILATERAL SHOULDER PAIN, UNSPECIFIED CHRONICITY: ICD-10-CM

## 2021-03-22 DIAGNOSIS — M79.641 PAIN IN BOTH HANDS: ICD-10-CM

## 2021-03-22 DIAGNOSIS — M54.50 LUMBOSACRAL PAIN: ICD-10-CM

## 2021-03-22 DIAGNOSIS — M79.642 PAIN IN BOTH HANDS: ICD-10-CM

## 2021-03-22 PROCEDURE — 77077 JOINT SURVEY SINGLE VIEW: CPT

## 2021-03-22 PROCEDURE — 72072 X-RAY EXAM THORAC SPINE 3VWS: CPT

## 2021-03-22 PROCEDURE — 73030 X-RAY EXAM OF SHOULDER: CPT | Mod: LT

## 2021-03-22 PROCEDURE — 73630 X-RAY EXAM OF FOOT: CPT | Mod: RT

## 2021-03-22 PROCEDURE — 73630 X-RAY EXAM OF FOOT: CPT | Mod: LT

## 2021-03-22 PROCEDURE — 73030 X-RAY EXAM OF SHOULDER: CPT | Mod: RT

## 2021-03-22 PROCEDURE — 72100 X-RAY EXAM L-S SPINE 2/3 VWS: CPT

## 2021-03-22 RX ORDER — B,C/FOLIC/ZINC/SELENOMETH/D3/E 0.8-12.5MG
1 TABLET ORAL
COMMUNITY
Start: 2021-03-09 | End: 2021-05-19

## 2021-03-22 RX ORDER — ASPIRIN 81 MG/1
81 TABLET, CHEWABLE ORAL DAILY
COMMUNITY
Start: 2021-02-18 | End: 2021-05-19

## 2021-05-03 ENCOUNTER — HOSPITAL ENCOUNTER (OUTPATIENT)
Dept: LAB | Facility: MEDICAL CENTER | Age: 66
End: 2021-05-03
Attending: INTERNAL MEDICINE
Payer: MEDICARE

## 2021-05-19 ENCOUNTER — OFFICE VISIT (OUTPATIENT)
Dept: MEDICAL GROUP | Facility: PHYSICIAN GROUP | Age: 66
End: 2021-05-19
Payer: MEDICARE

## 2021-05-19 ENCOUNTER — TELEPHONE (OUTPATIENT)
Dept: MEDICAL GROUP | Facility: PHYSICIAN GROUP | Age: 66
End: 2021-05-19

## 2021-05-19 VITALS
HEART RATE: 94 BPM | WEIGHT: 186 LBS | HEIGHT: 70 IN | OXYGEN SATURATION: 97 % | BODY MASS INDEX: 26.63 KG/M2 | RESPIRATION RATE: 16 BRPM | SYSTOLIC BLOOD PRESSURE: 130 MMHG | DIASTOLIC BLOOD PRESSURE: 68 MMHG

## 2021-05-19 DIAGNOSIS — H61.23 BILATERAL IMPACTED CERUMEN: ICD-10-CM

## 2021-05-19 DIAGNOSIS — Z94.0 KIDNEY TRANSPLANT RECIPIENT: Primary | ICD-10-CM

## 2021-05-19 PROBLEM — Z99.2 END STAGE RENAL FAILURE ON DIALYSIS (HCC): Chronic | Status: RESOLVED | Noted: 2020-07-23 | Resolved: 2021-05-19

## 2021-05-19 PROBLEM — N18.6 END STAGE RENAL FAILURE ON DIALYSIS (HCC): Chronic | Status: RESOLVED | Noted: 2020-07-23 | Resolved: 2021-05-19

## 2021-05-19 PROCEDURE — 99213 OFFICE O/P EST LOW 20 MIN: CPT | Performed by: NURSE PRACTITIONER

## 2021-05-19 RX ORDER — PRAVASTATIN SODIUM 20 MG
20 TABLET ORAL
COMMUNITY
Start: 2021-04-28 | End: 2021-09-02 | Stop reason: SDUPTHER

## 2021-05-19 RX ORDER — SENNOSIDES A AND B 8.6 MG/1
8.6 TABLET, FILM COATED ORAL
COMMUNITY
Start: 2021-03-30 | End: 2021-05-19

## 2021-05-19 RX ORDER — SULFAMETHOXAZOLE AND TRIMETHOPRIM 400; 80 MG/1; MG/1
1 TABLET ORAL
COMMUNITY
Start: 2021-03-30 | End: 2021-05-19

## 2021-05-19 RX ORDER — TACROLIMUS 1 MG/1
CAPSULE ORAL
COMMUNITY
Start: 2021-04-30 | End: 2021-05-19 | Stop reason: SDUPTHER

## 2021-05-19 RX ORDER — ASPIRIN 81 MG/1
TABLET ORAL
COMMUNITY
Start: 2021-04-02 | End: 2021-05-19

## 2021-05-19 RX ORDER — CINACALCET 60 MG/1
TABLET, FILM COATED ORAL
COMMUNITY
Start: 2021-04-23 | End: 2021-05-19

## 2021-05-19 RX ORDER — OXYCODONE HYDROCHLORIDE 5 MG/1
TABLET ORAL
COMMUNITY
Start: 2021-04-07 | End: 2021-05-19

## 2021-05-19 RX ORDER — TACROLIMUS 1 MG/1
CAPSULE ORAL
Qty: 210 CAPSULE | Refills: 3 | Status: SHIPPED | OUTPATIENT
Start: 2021-05-19 | End: 2023-04-18

## 2021-05-19 RX ORDER — NALOXONE HYDROCHLORIDE 4 MG/.1ML
SPRAY NASAL
COMMUNITY
Start: 2021-04-01 | End: 2021-05-19

## 2021-05-19 RX ORDER — SODIUM POLYSTYRENE SULFONATE 15 G/60ML
SUSPENSION ORAL; RECTAL
COMMUNITY
Start: 2021-03-30 | End: 2021-05-19

## 2021-05-19 RX ORDER — VALGANCICLOVIR 450 MG/1
TABLET, FILM COATED ORAL
COMMUNITY
Start: 2021-04-23 | End: 2021-05-19

## 2021-05-19 RX ORDER — PRAVASTATIN SODIUM 20 MG
TABLET ORAL
COMMUNITY
Start: 2021-04-23 | End: 2021-05-18

## 2021-05-19 RX ORDER — MYCOPHENOLIC ACID 180 MG/1
540 TABLET, DELAYED RELEASE ORAL
COMMUNITY
Start: 2021-04-27 | End: 2021-05-19

## 2021-05-19 RX ORDER — ACETAMINOPHEN 500 MG
500-1000 TABLET ORAL
COMMUNITY
Start: 2021-03-30 | End: 2022-03-25

## 2021-05-19 RX ORDER — SULFAMETHOXAZOLE AND TRIMETHOPRIM 400; 80 MG/1; MG/1
1 TABLET ORAL DAILY
Qty: 30 TABLET | Refills: 0 | Status: SHIPPED | OUTPATIENT
Start: 2021-05-19 | End: 2021-09-02

## 2021-05-19 RX ORDER — MYCOPHENOLIC ACID 180 MG/1
540 TABLET, DELAYED RELEASE ORAL DAILY
Qty: 240 TABLET | Refills: 3 | Status: SHIPPED | OUTPATIENT
Start: 2021-05-19 | End: 2023-04-18

## 2021-05-19 RX ORDER — PANTOPRAZOLE SODIUM 20 MG/1
20 TABLET, DELAYED RELEASE ORAL
COMMUNITY
Start: 2021-03-30 | End: 2021-05-19

## 2021-05-19 RX ORDER — POLYETHYLENE GLYCOL 3350 17 G/17G
17 POWDER, FOR SOLUTION ORAL
COMMUNITY
Start: 2021-03-30 | End: 2021-06-28

## 2021-05-19 RX ORDER — DIPHENOXYLATE HYDROCHLORIDE AND ATROPINE SULFATE 2.5; .025 MG/1; MG/1
1 TABLET ORAL
COMMUNITY
Start: 2021-04-27 | End: 2021-05-19

## 2021-05-19 RX ORDER — CINACALCET 60 MG/1
60 TABLET, FILM COATED ORAL
COMMUNITY
Start: 2021-04-13 | End: 2021-05-19

## 2021-05-19 RX ORDER — VALGANCICLOVIR 450 MG/1
450 TABLET, FILM COATED ORAL DAILY
Qty: 30 TABLET | Refills: 0 | Status: SHIPPED | OUTPATIENT
Start: 2021-05-19 | End: 2021-09-02

## 2021-05-19 RX ORDER — VALGANCICLOVIR 450 MG/1
450 TABLET, FILM COATED ORAL
COMMUNITY
Start: 2021-03-30 | End: 2021-05-19 | Stop reason: SDUPTHER

## 2021-05-19 RX ORDER — SULFAMETHOXAZOLE AND TRIMETHOPRIM 400; 80 MG/1; MG/1
TABLET ORAL
COMMUNITY
Start: 2021-04-23 | End: 2021-05-19 | Stop reason: SDUPTHER

## 2021-05-19 ASSESSMENT — FIBROSIS 4 INDEX: FIB4 SCORE: 1.83

## 2021-05-19 NOTE — PROGRESS NOTES
Subjective:     CC: Follow up after kidney transplant 2021.    HPI:   Garry presents today for follow up.  He underwent successful kidney transplantation 2021.  Patient reports that he is feeling remarkably well since that time, but sometimes is understandably tired.  His blood pressure is under good control and he is off of all of his blood pressure medications.  He reports good compliance with his medication regimen with antirejection medications as well as anitiinfectives.  He does need refills of those today.  He reports that his ears feel impacted with cerumen and he would like them evaluated today.      Past Medical History:   Diagnosis Date   • Allergy    • Anemia    • Anginal syndrome ([facility]) 2020   • Anxiety    • Arrhythmia     tachycardia    • Arthritis     osteoarthritis    • Cataract     gaby IOL    • Depression     anxiety    • Dialysis patient ([facility])     M, W, F ([place])    • Gout due to renal impairment    • History of COVID-19 2021    pt denies any symptoms since 21   • Hyperlipidemia    • Hypertension    • Idiopathic pericarditis    • Kidney disease     Congenital solitary kidney on dialysis 3 times a week.   • Localized osteoarthritis of right shoulder 2019   • Pain     feet, knees, shoulders, hands, back        Social History     Tobacco Use   • Smoking status: Former Smoker     Packs/day: 0.50     Years: 5.00     Pack years: 2.50     Types: Cigarettes     Quit date: 10/21/1996     Years since quittin.5   • Smokeless tobacco: Never Used   Vaping Use   • Vaping Use: Never used   Substance Use Topics   • Alcohol use: No     Alcohol/week: 0.0 oz   • Drug use: No       Current Outpatient Medications Ordered in Epic   Medication Sig Dispense Refill   • acetaminophen (TYLENOL) 500 MG Tab Take 500-1,000 mg by mouth.     • pravastatin (PRAVACHOL) 20 MG Tab Take 20 mg by mouth.     • tacrolimus (PROGRAF) 1 MG Cap Take 3 capsules by mouth in the morning and 4  "capsules by mouth in evening; Take doses every 12 hours. Take morning dose AFTER blood draw on lab days. (DO NOT CHANGE MFG FOR REFILLS) +++ ICD 10:Z 94.0 +++ 210 capsule 3   • valGANciclovir (VALCYTE) 450 MG tablet Take 1 tablet by mouth every day. 30 tablet 0   • sulfamethoxazole-trimethoprim (BACTRIM) 400-80 MG Tab Take 1 tablet by mouth every day. 30 tablet 0   • mycophenolate (MYFORTIC) 180 MG EC tablet Take 3 Tablets by mouth every day. 240 tablet 3   • pantoprazole (PROTONIX) 20 MG tablet TAKE 1 TABLET BY MOUTH EVERY MORNING 90 tablet 1   • SENSIPAR 30 MG Tab Take 1 Tab by mouth every day. (Patient taking differently: Take 60 mg by mouth every morning.) 30 Tab 11   • polyethylene glycol 3350 (MIRALAX) 17 GM/SCOOP Powder Take 17 g by mouth. (Patient not taking: Reported on 5/19/2021)       No current [facility]-ordered facility-administered medications on file.       Allergies:  Penicillins and Cephalexin    Health Maintenance: Completed    ROS:  Gen: no fevers/chills, no changes in weight  Eyes: no changes in vision  ENT: no sore throat, no hearing loss, no bloody nose  Pulm: no sob, no cough  CV: no chest pain, no palpitations  GI: no nausea/vomiting, no diarrhea  : no dysuria  MSk: no myalgias  Skin: no rash  Neuro: no headaches, no numbness/tingling  Heme/Lymph: no easy bruising      Objective:       Exam:  /68 (BP Location: Right arm, Patient Position: Sitting, BP Cuff Size: Adult)   Pulse 94   Resp 16   Ht 1.778 m (5' 10\")   Wt 84.4 kg (186 lb)   SpO2 97%   BMI 26.69 kg/m²  Body mass index is 26.69 kg/m².    Gen: Alert and oriented, No apparent distress.  Ears:   Right ear completely occluded with cerumen.  Left ear partially occluded with cerumen.  After ear lavage, patient's ears were clear of cerumen.  Canals were slightly pink, but patient had improved hearing.   Neck: Neck is supple without lymphadenopathy.  No carotid bruits.  Lungs: Normal effort, CTA bilaterally, no wheezes, rhonchi, or " rales  CV: Regular rate and rhythm. No murmurs, rubs, or gallops.  Ext: No clubbing, cyanosis, edema.    Labs: None.    Assessment & Plan:     65 y.o. male with the following -     1. Kidney transplant recipient  Patient underwent successful kidney transplant at [facility] on March 30th.  He states that he has been doing well and feeling well since the transplant. He is tolerating his antirejection medications well.  His blood pressure is under good control and he is off all of his antihypertensive medications.  His blood pressure at home is usually in the 120/80 or less.  Today in the office he is 130/68, but he has been up all night for his work as a  and is running on caffeine only.  He is following up with [facility] every two weeks at this time, and hopes to get this down to monthly with his next visit.  He does need refills of his medications today, and he knows to avoid nephrotoxins.  As [facility] is following him so closely right now, I will see him back every three months.   - tacrolimus (PROGRAF) 1 MG Cap; Take 3 capsules by mouth in the morning and 4 capsules by mouth in evening; Take doses every 12 hours. Take morning dose AFTER blood draw on lab days. (DO NOT CHANGE MFG FOR REFILLS) +++ ICD 10:Z 94.0 +++  Dispense: 210 capsule; Refill: 3  - valGANciclovir (VALCYTE) 450 MG tablet; Take 1 tablet by mouth every day.  Dispense: 30 tablet; Refill: 0  - sulfamethoxazole-trimethoprim (BACTRIM) 400-80 MG Tab; Take 1 tablet by mouth every day.  Dispense: 30 tablet; Refill: 0  - mycophenolate (MYFORTIC) 180 MG EC tablet; Take 3 Tablets by mouth every day.  Dispense: 240 tablet; Refill: 3    2. Solitary kidney  Patient did undergo a kidney transplant at [facility] on March 30th, and is being followed by [facility] regularly.    3. Bilateral impacted cerumen  Patient reports bilateral hearing loss and feels that his ears are occluded with wax, which upon examination they are both occluded with wax.   He would  like us to clean them for him today.  This was done in the office, and patient could hear much better immediately.       Return in about 3 months (around 8/19/2021).    I have placed the below orders and discussed them with an approved delegating provider.  The MA is performing the below orders under the direction of Ita Miller MD.    Please note that this dictation was created using voice recognition software. I have made every reasonable attempt to correct obvious errors, but I expect that there are errors of grammar and possibly content that I did not discover before finalizing the note.

## 2021-05-19 NOTE — ASSESSMENT & PLAN NOTE
Patient reports bilateral hearing loss and feels that his ears are occluded with wax, which upon examination they are both occluded with wax.   He would like us to clean them for him today.  This was done in the office, and patient could hear much better immediately.

## 2021-05-19 NOTE — TELEPHONE ENCOUNTER
DOCUMENTATION OF PAR STATUS:    1. Name of Medication & Dose: tacrolimus (PROGRAF) 1 MG Cap     2. Name of Prescription Coverage Company & phone #: [facility]    3. Date Prior Auth Submitted: 5/19/2021    4. What information was given to obtain insurance decision? Cover my meds    5. Prior Auth Status? Pending    6. Patient Notified: yes

## 2021-05-19 NOTE — ASSESSMENT & PLAN NOTE
Patient underwent successful kidney transplant at [facility] on March 30th.  He states that he has been doing well and feeling well since the transplant. He is tolerating his antirejection medications well.  His blood pressure is under good control and he is off all of his antihypertensive medications.  His blood pressure at home is usually in the 120/80 or less.  Today in the office he is 130/68, but he has been up all night for his work as a  and is running on caffeine only.  He is following up with  [facility] every two weeks at this time, and hopes to get this down to monthly with his next visit.  He does need refills of his medications today, and he knows to avoid nephrotoxins.  As [facility] is following him so closely right now, I will see him back every three months.

## 2021-05-19 NOTE — ASSESSMENT & PLAN NOTE
Patient did undergo a kidney transplant at [facility] on March 30th, and is being followed by [facility] regularly.

## 2021-06-24 ENCOUNTER — APPOINTMENT (OUTPATIENT)
Dept: PHYSICAL MEDICINE AND REHAB | Facility: MEDICAL CENTER | Age: 66
End: 2021-06-24
Payer: MEDICARE

## 2021-07-07 ENCOUNTER — HOSPITAL ENCOUNTER (OUTPATIENT)
Dept: LAB | Facility: MEDICAL CENTER | Age: 66
End: 2021-07-07
Attending: INTERNAL MEDICINE
Payer: MEDICARE

## 2021-07-07 PROCEDURE — 83735 ASSAY OF MAGNESIUM: CPT

## 2021-07-07 PROCEDURE — 80053 COMPREHEN METABOLIC PANEL: CPT

## 2021-07-07 PROCEDURE — 36415 COLL VENOUS BLD VENIPUNCTURE: CPT

## 2021-07-07 PROCEDURE — 85027 COMPLETE CBC AUTOMATED: CPT

## 2021-07-07 PROCEDURE — 84156 ASSAY OF PROTEIN URINE: CPT

## 2021-07-07 PROCEDURE — 81003 URINALYSIS AUTO W/O SCOPE: CPT

## 2021-07-07 PROCEDURE — 85610 PROTHROMBIN TIME: CPT

## 2021-07-07 PROCEDURE — 82570 ASSAY OF URINE CREATININE: CPT

## 2021-07-07 PROCEDURE — 80197 ASSAY OF TACROLIMUS: CPT

## 2021-07-07 PROCEDURE — 85007 BL SMEAR W/DIFF WBC COUNT: CPT

## 2021-07-07 PROCEDURE — 82550 ASSAY OF CK (CPK): CPT

## 2021-07-07 PROCEDURE — 87799 DETECT AGENT NOS DNA QUANT: CPT

## 2021-07-07 PROCEDURE — 84100 ASSAY OF PHOSPHORUS: CPT

## 2021-07-08 LAB
ALBUMIN SERPL BCP-MCNC: 3.9 G/DL (ref 3.2–4.9)
ALBUMIN/GLOB SERPL: 1.4 G/DL
ALP SERPL-CCNC: 89 U/L (ref 30–99)
ALT SERPL-CCNC: 9 U/L (ref 2–50)
ANION GAP SERPL CALC-SCNC: 10 MMOL/L (ref 7–16)
APPEARANCE UR: CLEAR
AST SERPL-CCNC: 14 U/L (ref 12–45)
BASOPHILS # BLD AUTO: 1.7 % (ref 0–1.8)
BASOPHILS # BLD: 0.04 K/UL (ref 0–0.12)
BILIRUB SERPL-MCNC: <0.2 MG/DL (ref 0.1–1.5)
BILIRUB UR QL STRIP.AUTO: NEGATIVE
BUN SERPL-MCNC: 30 MG/DL (ref 8–22)
CALCIUM SERPL-MCNC: 9.4 MG/DL (ref 8.5–10.5)
CHLORIDE SERPL-SCNC: 108 MMOL/L (ref 96–112)
CK SERPL-CCNC: 40 U/L (ref 0–154)
CO2 SERPL-SCNC: 21 MMOL/L (ref 20–33)
COLOR UR: YELLOW
CREAT SERPL-MCNC: 1.59 MG/DL (ref 0.5–1.4)
CREAT UR-MCNC: 70.29 MG/DL
EOSINOPHIL # BLD AUTO: 0.13 K/UL (ref 0–0.51)
EOSINOPHIL NFR BLD: 5.2 % (ref 0–6.9)
ERYTHROCYTE [DISTWIDTH] IN BLOOD BY AUTOMATED COUNT: 46.6 FL (ref 35.9–50)
GLOBULIN SER CALC-MCNC: 2.8 G/DL (ref 1.9–3.5)
GLUCOSE SERPL-MCNC: 89 MG/DL (ref 65–99)
GLUCOSE UR STRIP.AUTO-MCNC: NEGATIVE MG/DL
HCT VFR BLD AUTO: 41 % (ref 42–52)
HGB BLD-MCNC: 12.5 G/DL (ref 14–18)
INR PPP: 1.33 (ref 0.87–1.13)
KETONES UR STRIP.AUTO-MCNC: NEGATIVE MG/DL
LEUKOCYTE ESTERASE UR QL STRIP.AUTO: NEGATIVE
LYMPHOCYTES # BLD AUTO: 0.59 K/UL (ref 1–4.8)
LYMPHOCYTES NFR BLD: 23.5 % (ref 22–41)
MAGNESIUM SERPL-MCNC: 1.9 MG/DL (ref 1.5–2.5)
MANUAL DIFF BLD: NORMAL
MCH RBC QN AUTO: 29.3 PG (ref 27–33)
MCHC RBC AUTO-ENTMCNC: 30.5 G/DL (ref 33.7–35.3)
MCV RBC AUTO: 96.2 FL (ref 81.4–97.8)
METAMYELOCYTES NFR BLD MANUAL: 0.9 %
MICRO URNS: NORMAL
MONOCYTES # BLD AUTO: 0.37 K/UL (ref 0–0.85)
MONOCYTES NFR BLD AUTO: 14.8 % (ref 0–13.4)
MORPHOLOGY BLD-IMP: NORMAL
NEUTROPHILS # BLD AUTO: 1.35 K/UL (ref 1.82–7.42)
NEUTROPHILS NFR BLD: 50.4 % (ref 44–72)
NEUTS BAND NFR BLD MANUAL: 3.5 % (ref 0–10)
NITRITE UR QL STRIP.AUTO: NEGATIVE
NRBC # BLD AUTO: 0 K/UL
NRBC BLD-RTO: 0 /100 WBC
PH UR STRIP.AUTO: 5.5 [PH] (ref 5–8)
PHOSPHATE SERPL-MCNC: 2.7 MG/DL (ref 2.5–4.5)
PLATELET # BLD AUTO: 314 K/UL (ref 164–446)
PLATELET BLD QL SMEAR: NORMAL
PMV BLD AUTO: 9.4 FL (ref 9–12.9)
POTASSIUM SERPL-SCNC: 4.3 MMOL/L (ref 3.6–5.5)
PROT SERPL-MCNC: 6.7 G/DL (ref 6–8.2)
PROT UR QL STRIP: NEGATIVE MG/DL
PROT UR-MCNC: 8 MG/DL (ref 0–15)
PROT/CREAT UR: 114 MG/G (ref 15–68)
PROTHROMBIN TIME: 16.1 SEC (ref 12–14.6)
RBC # BLD AUTO: 4.26 M/UL (ref 4.7–6.1)
RBC BLD AUTO: NORMAL
RBC UR QL AUTO: NEGATIVE
SODIUM SERPL-SCNC: 139 MMOL/L (ref 135–145)
SP GR UR STRIP.AUTO: 1.01
UROBILINOGEN UR STRIP.AUTO-MCNC: 0.2 MG/DL
WBC # BLD AUTO: 2.5 K/UL (ref 4.8–10.8)

## 2021-07-09 LAB — TACROLIMUS BLD-MCNC: 8.9 NG/ML

## 2021-07-11 LAB
BKV DNA # SPEC NAA+PROBE: ABNORMAL CPY/ML
BKV DNA SPEC NAA+PROBE-LOG#: 4.2 LOG
DIAGNOSTIC IMP SPEC-IMP: DETECTED

## 2021-07-19 NOTE — TELEPHONE ENCOUNTER
Patient: Mary Bueno    Procedure(s):  HYSTEROSCOPY, DIAGNOSTIC, WITH DILATION AND CURETTAGE OF UTERUS    Diagnosis: Breakthrough bleeding on Nexplanon [N92.1, Z97.5]  Endometrial thickening on ultrasound [R93.89]  Diagnosis Additional Information: No value filed.    Anesthesia Type:   No value filed.     Note:    Oropharynx: oropharynx clear of all foreign objects  Level of Consciousness: drowsy  Oxygen Supplementation: room air    Independent Airway: airway patency satisfactory and stable  Dentition: dentition unchanged  Vital Signs Stable: post-procedure vital signs reviewed and stable  Report to RN Given: handoff report given  Patient transferred to: Phase II    Handoff Report: Identifed the Patient, Identified the Reponsible Provider, Reviewed the pertinent medical history, Discussed the surgical course, Reviewed Intra-OP anesthesia mangement and issues during anesthesia, Set expectations for post-procedure period and Allowed opportunity for questions and acknowledgement of understanding      Vitals: (Last set prior to Anesthesia Care Transfer)  CRNA VITALS  7/19/2021 1010 - 7/19/2021 1043      7/19/2021             Pulse:  69    SpO2:  94 %        Electronically Signed By: Isha Bobo  July 19, 2021  10:43 AM   Was the patient seen in the last year in this department? Yes    Does patient have an active prescription for medications requested? Yes    Received Request Via: Patient     Patient requests change in pharmacy.     [facility] [address] [place], [place] [address] [place] [id number] [facility] [address]  Phone: [phone] Fax: [phone]

## 2021-09-02 ENCOUNTER — OFFICE VISIT (OUTPATIENT)
Dept: MEDICAL GROUP | Facility: PHYSICIAN GROUP | Age: 66
End: 2021-09-02
Payer: MEDICARE

## 2021-09-02 VITALS
HEART RATE: 91 BPM | TEMPERATURE: 98.4 F | WEIGHT: 185 LBS | DIASTOLIC BLOOD PRESSURE: 66 MMHG | RESPIRATION RATE: 18 BRPM | HEIGHT: 70 IN | BODY MASS INDEX: 26.48 KG/M2 | SYSTOLIC BLOOD PRESSURE: 118 MMHG | OXYGEN SATURATION: 98 %

## 2021-09-02 DIAGNOSIS — M71.071 ABSCESS OF BURSA OF RIGHT FOOT: ICD-10-CM

## 2021-09-02 DIAGNOSIS — E78.5 DYSLIPIDEMIA: ICD-10-CM

## 2021-09-02 DIAGNOSIS — K22.2 ESOPHAGEAL STRICTURE: ICD-10-CM

## 2021-09-02 PROCEDURE — 99214 OFFICE O/P EST MOD 30 MIN: CPT | Performed by: NURSE PRACTITIONER

## 2021-09-02 RX ORDER — PANTOPRAZOLE SODIUM 20 MG/1
20 TABLET, DELAYED RELEASE ORAL EVERY MORNING
Qty: 90 TABLET | Refills: 1 | Status: SHIPPED | OUTPATIENT
Start: 2021-09-02 | End: 2022-03-10 | Stop reason: SDUPTHER

## 2021-09-02 RX ORDER — CINACALCET 30 MG/1
1 TABLET, FILM COATED ORAL
COMMUNITY
Start: 2021-07-27 | End: 2021-09-02 | Stop reason: SDUPTHER

## 2021-09-02 RX ORDER — PANTOPRAZOLE SODIUM 20 MG/1
20 TABLET, DELAYED RELEASE ORAL
COMMUNITY
Start: 2021-06-22 | End: 2022-03-10

## 2021-09-02 RX ORDER — MYCOPHENOLIC ACID 180 MG/1
180 TABLET, DELAYED RELEASE ORAL
COMMUNITY
Start: 2021-07-14 | End: 2022-07-09

## 2021-09-02 RX ORDER — PRAVASTATIN SODIUM 20 MG
20 TABLET ORAL DAILY
Qty: 90 TABLET | Refills: 3 | Status: SHIPPED | OUTPATIENT
Start: 2021-09-02 | End: 2022-03-10 | Stop reason: SDUPTHER

## 2021-09-02 RX ORDER — CINACALCET 30 MG/1
30 TABLET, FILM COATED ORAL
Qty: 90 TABLET | Refills: 3 | Status: SHIPPED | OUTPATIENT
Start: 2021-09-02 | End: 2022-08-28

## 2021-09-02 RX ORDER — CLINDAMYCIN HYDROCHLORIDE 150 MG/1
CAPSULE ORAL
COMMUNITY
Start: 2021-08-11 | End: 2021-09-02

## 2021-09-02 RX ORDER — ASPIRIN 81 MG/1
TABLET ORAL
COMMUNITY
Start: 2021-04-02 | End: 2021-09-02

## 2021-09-02 RX ORDER — PRAVASTATIN SODIUM 20 MG
1 TABLET ORAL
COMMUNITY
Start: 2021-08-27 | End: 2021-11-25

## 2021-09-02 ASSESSMENT — FIBROSIS 4 INDEX: FIB4 SCORE: 0.97

## 2021-09-02 NOTE — PROGRESS NOTES
Subjective:     CC:  Right foot abscess.    HPI:   Garry presents today for follow up of his right foot abscess.  He was in [place], [place] and developed a right foot abscess and had it drained at the hospital there.  We will request these records.  He was on antibiotics and has finished his course.  He reports that he feels better and is mostly improved, though his big toe is swollen now where he was not prior.  He denies fever, chills or night sweats.  He reports the pain is much improved and is now more toward the end of the day after he has been walking.      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   • Localized osteoarthritis of right shoulder 2019   • Pain     feet, knees, shoulders, hands, back        Social History     Tobacco Use   • Smoking status: Former Smoker     Packs/day: 0.50     Years: 5.00     Pack years: 2.50     Types: Cigarettes     Quit date: 10/21/1996     Years since quittin.8   • Smokeless tobacco: Never Used   Vaping Use   • Vaping Use: Never used   Substance Use Topics   • Alcohol use: No     Alcohol/week: 0.0 oz   • Drug use: No       Current Outpatient Medications Ordered in Epic   Medication Sig Dispense Refill   • mycophenolate (MYFORTIC) 180 MG EC tablet Take 180 mg by mouth.     • pantoprazole (PROTONIX) 20 MG tablet Take 20 mg by mouth.     • pravastatin (PRAVACHOL) 20 MG Tab Take 1 Tablet by mouth.     • pravastatin (PRAVACHOL) 20 MG Tab Take 1 Tablet by mouth every day. 90  "Tablet 3   • pantoprazole (PROTONIX) 20 MG tablet Take 1 Tablet by mouth every morning. 90 Tablet 1   • cinacalcet (SENSIPAR) 30 MG Tab Take 1 Tablet by mouth every day for 360 days. 90 Tablet 3   • acetaminophen (TYLENOL) 500 MG Tab Take 500-1,000 mg by mouth.     • tacrolimus (PROGRAF) 1 MG Cap Take 3 capsules by mouth in the morning and 4 capsules by mouth in evening; Take doses every 12 hours. Take morning dose AFTER blood draw on lab days. (DO NOT CHANGE MFG FOR REFILLS) +++ ICD 10:Z 94.0 +++ 210 capsule 3   • mycophenolate (MYFORTIC) 180 MG EC tablet Take 3 Tablets by mouth every day. 240 tablet 3   • SENSIPAR 30 MG Tab Take 1 Tab by mouth every day. (Patient taking differently: Take 60 mg by mouth every morning.) 30 Tab 11     No current [facility]-ordered facility-administered medications on file.       Allergies:  Penicillins and Cephalexin    Health Maintenance: Vaccines deferred until patient completes Covid series x3 per [facility].      ROS:  Gen: no fevers/chills, no changes in weight  Eyes: no changes in vision  ENT: no sore throat, no hearing loss, no bloody nose  Pulm: no sob, no cough  CV: no chest pain, no palpitations  GI: no nausea/vomiting, no diarrhea  : no dysuria  MSk: no myalgias  Skin: no rash  Neuro: no headaches, no numbness/tingling  Heme/Lymph: no easy bruising      Objective:       Exam:  /66   Pulse 91   Temp 36.9 °C (98.4 °F)   Resp 18   Ht 1.778 m (5' 10\")   Wt 83.9 kg (185 lb)   SpO2 98%   BMI 26.54 kg/m²  Body mass index is 26.54 kg/m².    Gen: Alert and oriented, No apparent distress.  Neck: Neck is supple without lymphadenopathy.  No carotid bruits.  Lungs: Normal effort, CTA bilaterally, no wheezes, rhonchi, or rales  CV: Regular rate and rhythm. No murmurs, rubs, or gallops.  Ext: No clubbing, cyanosis, edema.  On examination of his right foot, there is minimal tenderness to palpation over the right big toe and ball of his foot region. The right big toe does have some " swelling. There looks to be a healing area of the abscess drainage site.  There is no drainage noted.  No erythema or streaking. No fluctuance felt and no signs of infection.     Labs: None.    Assessment & Plan:     65 y.o. male with the following -     1. Abscess of bursa of right foot  New condition.  Patient states that he had an abscess of his right foot, on the ball of the foot, which was drained on August 11, 2021 at a hospital in [place], [place].  Patient states that he was placed on clindamycin and has finished his course of antibiotics. He has not seen anyone for follow up of his foot.  He states that overall it feels better, but it is still having some scant drainage.  He denies fever, chills, or night sweats. He states that the big toe is swollen, but the rest of the area looks much improved to him. He does present with an Ace wrap and gauze today.  On examination of his right foot, there is minimal tenderness to palpation over the right big toe and ball of his foot region. The right big toe does have some swelling. There looks to be a healing area of the abscess drainage site.  There is no drainage noted.  No erythema or streaking. No fluctuance felt and no signs of infection.  Discussed with patient that I would like him to leave this open to air as much as possible and see how he does. He will follow up in two weeks to see how he is doing, sooner if worse.     2. Dyslipidemia  Chronic condition, stable.  Patient takes pravastatin 20 mg daily and tolerates this without side-effects.   Continue current regimen.    - pravastatin (PRAVACHOL) 20 MG Tab; Take 1 Tablet by mouth every day.  Dispense: 90 Tablet; Refill: 3    3. Esophageal stricture  Chronic condition, stable.  He takes pantoprazole 20 mg daily with excellent control of his symptoms.  Continue current regimen.    - pantoprazole (PROTONIX) 20 MG tablet; Take 1 Tablet by mouth every morning.  Dispense: 90 Tablet; Refill: 1    4. Solitary  kidney  Chronic condition, stable.  S/p kidney transplant at [facility], and followed by [facility] regularly. Patient has been cleared to receive his Covid vaccine series and would like to get this today.  Advised him that he can walk in to [employer], [employer] and [employer] who all have the vaccines available and he will do this today.  Patient requested refill of Sensipar, which I did fill for him today. I gave him instructions to contact [facility] to make sure that he is still supposed to be taking this medication status post transplant and he is no longer on dialysis.  Patient verbalized that he will talk to them prior to picking the medication up.    - cinacalcet (SENSIPAR) 30 MG Tab; Take 1 Tablet by mouth every day for 360 days.  Dispense: 90 Tablet; Refill: 3      Return in about 6 months (around 3/2/2022).    I have placed the below orders and discussed them with an approved delegating provider.  The MA is performing the below orders under the direction of Ita Miller MD.    Please note that this dictation was created using voice recognition software. I have made every reasonable attempt to correct obvious errors, but I expect that there are errors of grammar and possibly content that I did not discover before finalizing the note.

## 2021-09-02 NOTE — ASSESSMENT & PLAN NOTE
Chronic condition, stable.  Patient takes pravastatin 20 mg daily and tolerates this without side-effects.   Continue current regimen.

## 2021-09-02 NOTE — ASSESSMENT & PLAN NOTE
New condition.  Patient states that he had an abscess of his right foot, on the ball of the foot, which was drained on August 11, 2021 at a hospital in [place], [place].  Patient states that he was placed on clindamycin and has finished his course of antibiotics. He has not seen anyone for follow up of his foot.  He states that overall it feels better, but it is still having some scant drainage.  He denies fever, chills, or night sweats. He states that the big toe is swollen, but the rest of the area looks much improved to him. He does present with an Ace wrap and gauze today.  On examination of his right foot, there is minimal tenderness to palpation over the right big toe and ball of his foot region. The right big toe does have some swelling. There looks to be a healing area of the abscess drainage site.  There is no drainage noted.  No erythema or streaking. No fluctuance felt and no signs of infection.  Discussed with patient that I would like him to leave this open to air as much as possible and see how he does. He will follow up in two weeks to see how he is doing, sooner if worse.

## 2021-09-02 NOTE — ASSESSMENT & PLAN NOTE
Chronic condition, stable.  He takes pantoprazole 20 mg daily with excellent control of his symptoms.  Continue current regimen.

## 2021-09-02 NOTE — ASSESSMENT & PLAN NOTE
Chronic condition, stable.  S/p kidney transplant at [facility], and followed by [facility] regularly. Patient has been cleared to receive his Covid vaccine series and would like to get this today.  Advised him that he can walk in to [facility], [facility] and [facility] who all have the vaccines available and he will do this today.

## 2021-09-03 ENCOUNTER — TELEPHONE (OUTPATIENT)
Dept: MEDICAL GROUP | Facility: PHYSICIAN GROUP | Age: 66
End: 2021-09-03

## 2021-09-03 NOTE — LETTER
[facility]  MADELEINE Leija  [address]  [place] [address]  Fax: [phone]   Authorization for Release/Disclosure of   Protected Health Information   Name: TRISTAN LOVE : 1955 SSN: [id number]   Address: [address] [place] [address] [address]  [place] [address] Phone:    [phone] (home)    I authorize the entity listed below to release/disclose the PHI below to:   [facility]ly [facility]/MADELEINE Leija and MADELEINE Leija   Provider or Entity Name:[facility]     Address  [address]   Phone:[phone]      Fax:[phone]     Reason for request: continuity of care   Information to be released:    [  ] LAST COLONOSCOPY,  including any PATH REPORT and follow-up  [  ] LAST FIT/COLOGUARD RESULT [  ] LAST DEXA  [  ] LAST MAMMOGRAM  [  ] LAST PAP  [  ] LAST LABS [  ] RETINA EXAM REPORT  [  ] IMMUNIZATION RECORDS  [ XXX ] Release all info      [  ] Check here and initial the line next to each item to release ALL health information INCLUDING  _____ Care and treatment for drug and / or alcohol abuse  _____ HIV testing, infection status, or AIDS  _____ Genetic Testing    DATES OF SERVICE OR TIME PERIOD TO BE DISCLOSED: _____________  I understand and acknowledge that:  * This Authorization may be revoked at any time by you in writing, except if your health information has already been used or disclosed.  * Your health information that will be used or disclosed as a result of you signing this authorization could be re-disclosed by the recipient. If this occurs, your re-disclosed health information may no longer be protected by State or Federal laws.  * You may refuse to sign this Authorization. Your refusal will not affect your ability to obtain treatment.  * This Authorization becomes effective upon signing and will  on (date) __________.      If no date is indicated, this Authorization will   one (1) year from the signature date.    Name: Garry Benites    Signature:Continuity of care   Date:     9/3/2021       PLEASE FAX REQUESTED RECORDS BACK TO: [phone]

## 2021-09-03 NOTE — TELEPHONE ENCOUNTER
Sent records request to [facility] for pt previous health care hx.

## 2021-09-16 ENCOUNTER — HOSPITAL ENCOUNTER (OUTPATIENT)
Dept: LAB | Facility: MEDICAL CENTER | Age: 66
End: 2021-09-16
Attending: INTERNAL MEDICINE
Payer: MEDICARE

## 2021-09-16 LAB
ALBUMIN SERPL BCP-MCNC: 3.5 G/DL (ref 3.2–4.9)
ALBUMIN/GLOB SERPL: 0.9 G/DL
ALP SERPL-CCNC: 125 U/L (ref 30–99)
ALT SERPL-CCNC: 8 U/L (ref 2–50)
ANION GAP SERPL CALC-SCNC: 10 MMOL/L (ref 7–16)
AST SERPL-CCNC: 13 U/L (ref 12–45)
BASOPHILS # BLD AUTO: 0.9 % (ref 0–1.8)
BASOPHILS # BLD: 0.05 K/UL (ref 0–0.12)
BILIRUB SERPL-MCNC: 0.2 MG/DL (ref 0.1–1.5)
BUN SERPL-MCNC: 25 MG/DL (ref 8–22)
CALCIUM SERPL-MCNC: 9.7 MG/DL (ref 8.5–10.5)
CHLORIDE SERPL-SCNC: 106 MMOL/L (ref 96–112)
CK SERPL-CCNC: 34 U/L (ref 0–154)
CO2 SERPL-SCNC: 21 MMOL/L (ref 20–33)
CREAT SERPL-MCNC: 1.56 MG/DL (ref 0.5–1.4)
CREAT UR-MCNC: 40.86 MG/DL
EOSINOPHIL # BLD AUTO: 0.18 K/UL (ref 0–0.51)
EOSINOPHIL NFR BLD: 3.3 % (ref 0–6.9)
ERYTHROCYTE [DISTWIDTH] IN BLOOD BY AUTOMATED COUNT: 47.9 FL (ref 35.9–50)
FASTING STATUS PATIENT QL REPORTED: NORMAL
GLOBULIN SER CALC-MCNC: 3.9 G/DL (ref 1.9–3.5)
GLUCOSE SERPL-MCNC: 84 MG/DL (ref 65–99)
HCT VFR BLD AUTO: 42.3 % (ref 42–52)
HGB BLD-MCNC: 12.8 G/DL (ref 14–18)
IMM GRANULOCYTES # BLD AUTO: 0.02 K/UL (ref 0–0.11)
IMM GRANULOCYTES NFR BLD AUTO: 0.4 % (ref 0–0.9)
LYMPHOCYTES # BLD AUTO: 1.14 K/UL (ref 1–4.8)
LYMPHOCYTES NFR BLD: 20.8 % (ref 22–41)
MAGNESIUM SERPL-MCNC: 1.9 MG/DL (ref 1.5–2.5)
MCH RBC QN AUTO: 27.4 PG (ref 27–33)
MCHC RBC AUTO-ENTMCNC: 30.3 G/DL (ref 33.7–35.3)
MCV RBC AUTO: 90.4 FL (ref 81.4–97.8)
MONOCYTES # BLD AUTO: 0.52 K/UL (ref 0–0.85)
MONOCYTES NFR BLD AUTO: 9.5 % (ref 0–13.4)
NEUTROPHILS # BLD AUTO: 3.58 K/UL (ref 1.82–7.42)
NEUTROPHILS NFR BLD: 65.1 % (ref 44–72)
NRBC # BLD AUTO: 0 K/UL
NRBC BLD-RTO: 0 /100 WBC
PHOSPHATE SERPL-MCNC: 3.7 MG/DL (ref 2.5–4.5)
PLATELET # BLD AUTO: 257 K/UL (ref 164–446)
PMV BLD AUTO: 8.7 FL (ref 9–12.9)
POTASSIUM SERPL-SCNC: 5.4 MMOL/L (ref 3.6–5.5)
PROT SERPL-MCNC: 7.4 G/DL (ref 6–8.2)
PROT UR-MCNC: 7 MG/DL (ref 0–15)
PROT/CREAT UR: 171 MG/G (ref 15–68)
RBC # BLD AUTO: 4.68 M/UL (ref 4.7–6.1)
SODIUM SERPL-SCNC: 137 MMOL/L (ref 135–145)
WBC # BLD AUTO: 5.5 K/UL (ref 4.8–10.8)

## 2021-09-16 PROCEDURE — 83735 ASSAY OF MAGNESIUM: CPT

## 2021-09-16 PROCEDURE — 82570 ASSAY OF URINE CREATININE: CPT

## 2021-09-16 PROCEDURE — 80197 ASSAY OF TACROLIMUS: CPT

## 2021-09-16 PROCEDURE — 80053 COMPREHEN METABOLIC PANEL: CPT

## 2021-09-16 PROCEDURE — 87799 DETECT AGENT NOS DNA QUANT: CPT

## 2021-09-16 PROCEDURE — 82550 ASSAY OF CK (CPK): CPT

## 2021-09-16 PROCEDURE — 84156 ASSAY OF PROTEIN URINE: CPT

## 2021-09-16 PROCEDURE — 80193 DRUG ASSAY LEFLUNOMIDE: CPT

## 2021-09-16 PROCEDURE — 81003 URINALYSIS AUTO W/O SCOPE: CPT

## 2021-09-16 PROCEDURE — 85025 COMPLETE CBC W/AUTO DIFF WBC: CPT

## 2021-09-16 PROCEDURE — 36415 COLL VENOUS BLD VENIPUNCTURE: CPT

## 2021-09-16 PROCEDURE — 84100 ASSAY OF PHOSPHORUS: CPT

## 2021-09-17 LAB
APPEARANCE UR: CLEAR
BILIRUB UR QL STRIP.AUTO: NEGATIVE
COLOR UR: YELLOW
GLUCOSE UR STRIP.AUTO-MCNC: NEGATIVE MG/DL
KETONES UR STRIP.AUTO-MCNC: NEGATIVE MG/DL
LEUKOCYTE ESTERASE UR QL STRIP.AUTO: NEGATIVE
MICRO URNS: NORMAL
NITRITE UR QL STRIP.AUTO: NEGATIVE
PH UR STRIP.AUTO: 6.5 [PH] (ref 5–8)
PROT UR QL STRIP: NEGATIVE MG/DL
RBC UR QL AUTO: NEGATIVE
SP GR UR STRIP.AUTO: 1.01
UROBILINOGEN UR STRIP.AUTO-MCNC: 0.2 MG/DL

## 2021-09-18 LAB — TACROLIMUS BLD-MCNC: 7.4 NG/ML

## 2021-09-19 LAB
BKV DNA # SPEC NAA+PROBE: ABNORMAL {COPIES}/ML
BKV DNA SPEC NAA+PROBE-LOG#: 3 LOG
DIAGNOSTIC IMP SPEC-IMP: DETECTED

## 2021-09-20 LAB — TERIFLUNOMIDE SERPL-MCNC: 19.33 UG/ML

## 2021-09-21 LAB
BKV DNA # UR NAA+PROBE: ABNORMAL
BKV DNA SPEC NAA+PROBE-LOG#: 6.1 LOG CPY/ML
DIAGNOSTIC IMP SPEC-IMP: DETECTED

## 2021-09-24 ENCOUNTER — HOSPITAL ENCOUNTER (OUTPATIENT)
Dept: LAB | Facility: MEDICAL CENTER | Age: 66
End: 2021-09-24
Attending: INTERNAL MEDICINE
Payer: MEDICARE

## 2021-09-24 LAB
ALBUMIN SERPL BCP-MCNC: 4.1 G/DL (ref 3.2–4.9)
ALBUMIN/GLOB SERPL: 1.2 G/DL
ALP SERPL-CCNC: 129 U/L (ref 30–99)
ALT SERPL-CCNC: 9 U/L (ref 2–50)
ANION GAP SERPL CALC-SCNC: 10 MMOL/L (ref 7–16)
APPEARANCE UR: CLEAR
AST SERPL-CCNC: 18 U/L (ref 12–45)
BASOPHILS # BLD AUTO: 0.9 % (ref 0–1.8)
BASOPHILS # BLD: 0.04 K/UL (ref 0–0.12)
BILIRUB SERPL-MCNC: 0.2 MG/DL (ref 0.1–1.5)
BILIRUB UR QL STRIP.AUTO: NEGATIVE
BUN SERPL-MCNC: 29 MG/DL (ref 8–22)
CALCIUM SERPL-MCNC: 9.6 MG/DL (ref 8.5–10.5)
CHLORIDE SERPL-SCNC: 108 MMOL/L (ref 96–112)
CK SERPL-CCNC: 61 U/L (ref 0–154)
CO2 SERPL-SCNC: 20 MMOL/L (ref 20–33)
COLOR UR: YELLOW
CREAT SERPL-MCNC: 1.64 MG/DL (ref 0.5–1.4)
CREAT UR-MCNC: 60.76 MG/DL
EOSINOPHIL # BLD AUTO: 0.16 K/UL (ref 0–0.51)
EOSINOPHIL NFR BLD: 3.6 % (ref 0–6.9)
ERYTHROCYTE [DISTWIDTH] IN BLOOD BY AUTOMATED COUNT: 50.9 FL (ref 35.9–50)
GLOBULIN SER CALC-MCNC: 3.3 G/DL (ref 1.9–3.5)
GLUCOSE SERPL-MCNC: 78 MG/DL (ref 65–99)
GLUCOSE UR STRIP.AUTO-MCNC: NEGATIVE MG/DL
HCT VFR BLD AUTO: 43.2 % (ref 42–52)
HGB BLD-MCNC: 13 G/DL (ref 14–18)
IMM GRANULOCYTES # BLD AUTO: 0.02 K/UL (ref 0–0.11)
IMM GRANULOCYTES NFR BLD AUTO: 0.5 % (ref 0–0.9)
KETONES UR STRIP.AUTO-MCNC: NEGATIVE MG/DL
LEUKOCYTE ESTERASE UR QL STRIP.AUTO: NEGATIVE
LYMPHOCYTES # BLD AUTO: 1.05 K/UL (ref 1–4.8)
LYMPHOCYTES NFR BLD: 23.9 % (ref 22–41)
MAGNESIUM SERPL-MCNC: 2 MG/DL (ref 1.5–2.5)
MCH RBC QN AUTO: 27.8 PG (ref 27–33)
MCHC RBC AUTO-ENTMCNC: 30.1 G/DL (ref 33.7–35.3)
MCV RBC AUTO: 92.5 FL (ref 81.4–97.8)
MICRO URNS: NORMAL
MONOCYTES # BLD AUTO: 0.44 K/UL (ref 0–0.85)
MONOCYTES NFR BLD AUTO: 10 % (ref 0–13.4)
NEUTROPHILS # BLD AUTO: 2.68 K/UL (ref 1.82–7.42)
NEUTROPHILS NFR BLD: 61.1 % (ref 44–72)
NITRITE UR QL STRIP.AUTO: NEGATIVE
NRBC # BLD AUTO: 0 K/UL
NRBC BLD-RTO: 0 /100 WBC
PH UR STRIP.AUTO: 6.5 [PH] (ref 5–8)
PHOSPHATE SERPL-MCNC: 2.7 MG/DL (ref 2.5–4.5)
PLATELET # BLD AUTO: 262 K/UL (ref 164–446)
PMV BLD AUTO: 9.5 FL (ref 9–12.9)
POTASSIUM SERPL-SCNC: 5 MMOL/L (ref 3.6–5.5)
PROT SERPL-MCNC: 7.4 G/DL (ref 6–8.2)
PROT UR QL STRIP: NEGATIVE MG/DL
PROT UR-MCNC: 9 MG/DL (ref 0–15)
PROT/CREAT UR: 148 MG/G (ref 15–68)
RBC # BLD AUTO: 4.67 M/UL (ref 4.7–6.1)
RBC UR QL AUTO: NEGATIVE
SODIUM SERPL-SCNC: 138 MMOL/L (ref 135–145)
SP GR UR STRIP.AUTO: 1.02
UROBILINOGEN UR STRIP.AUTO-MCNC: 0.2 MG/DL
WBC # BLD AUTO: 4.4 K/UL (ref 4.8–10.8)

## 2021-09-24 PROCEDURE — 85025 COMPLETE CBC W/AUTO DIFF WBC: CPT

## 2021-09-24 PROCEDURE — 84100 ASSAY OF PHOSPHORUS: CPT

## 2021-09-24 PROCEDURE — 83735 ASSAY OF MAGNESIUM: CPT

## 2021-09-24 PROCEDURE — 81003 URINALYSIS AUTO W/O SCOPE: CPT

## 2021-09-24 PROCEDURE — 84156 ASSAY OF PROTEIN URINE: CPT

## 2021-09-24 PROCEDURE — 82550 ASSAY OF CK (CPK): CPT

## 2021-09-24 PROCEDURE — 36415 COLL VENOUS BLD VENIPUNCTURE: CPT

## 2021-09-24 PROCEDURE — 87799 DETECT AGENT NOS DNA QUANT: CPT

## 2021-09-24 PROCEDURE — 80197 ASSAY OF TACROLIMUS: CPT

## 2021-09-24 PROCEDURE — 80193 DRUG ASSAY LEFLUNOMIDE: CPT

## 2021-09-24 PROCEDURE — 80053 COMPREHEN METABOLIC PANEL: CPT

## 2021-09-24 PROCEDURE — 82570 ASSAY OF URINE CREATININE: CPT

## 2021-09-26 LAB — TACROLIMUS BLD-MCNC: 6 NG/ML

## 2021-09-27 LAB
BKV DNA # SPEC NAA+PROBE: 464 CPY/ML
BKV DNA SPEC NAA+PROBE-LOG#: 2.7 LOG
DIAGNOSTIC IMP SPEC-IMP: DETECTED
TERIFLUNOMIDE SERPL-MCNC: 26.5 UG/ML

## 2021-09-29 LAB
BKV DNA # UR NAA+PROBE: ABNORMAL CPY/ML
BKV DNA SPEC NAA+PROBE-LOG#: 4.9 LOG CPY/ML
DIAGNOSTIC IMP SPEC-IMP: DETECTED

## 2021-10-01 ENCOUNTER — HOSPITAL ENCOUNTER (OUTPATIENT)
Dept: LAB | Facility: MEDICAL CENTER | Age: 66
End: 2021-10-01
Attending: INTERNAL MEDICINE
Payer: MEDICARE

## 2021-10-01 LAB
ALBUMIN SERPL BCP-MCNC: 3.8 G/DL (ref 3.2–4.9)
ALBUMIN/GLOB SERPL: 1 G/DL
ALP SERPL-CCNC: 161 U/L (ref 30–99)
ALT SERPL-CCNC: 15 U/L (ref 2–50)
ANION GAP SERPL CALC-SCNC: 8 MMOL/L (ref 7–16)
APPEARANCE UR: CLEAR
AST SERPL-CCNC: 19 U/L (ref 12–45)
BASOPHILS # BLD AUTO: 0.7 % (ref 0–1.8)
BASOPHILS # BLD: 0.05 K/UL (ref 0–0.12)
BILIRUB SERPL-MCNC: 0.5 MG/DL (ref 0.1–1.5)
BILIRUB UR QL STRIP.AUTO: NEGATIVE
BUN SERPL-MCNC: 27 MG/DL (ref 8–22)
CALCIUM SERPL-MCNC: 9.8 MG/DL (ref 8.5–10.5)
CHLORIDE SERPL-SCNC: 105 MMOL/L (ref 96–112)
CK SERPL-CCNC: 46 U/L (ref 0–154)
CO2 SERPL-SCNC: 24 MMOL/L (ref 20–33)
COLOR UR: YELLOW
CREAT SERPL-MCNC: 1.82 MG/DL (ref 0.5–1.4)
CREAT UR-MCNC: 72.19 MG/DL
EOSINOPHIL # BLD AUTO: 0.24 K/UL (ref 0–0.51)
EOSINOPHIL NFR BLD: 3.6 % (ref 0–6.9)
ERYTHROCYTE [DISTWIDTH] IN BLOOD BY AUTOMATED COUNT: 50.9 FL (ref 35.9–50)
GLOBULIN SER CALC-MCNC: 3.9 G/DL (ref 1.9–3.5)
GLUCOSE SERPL-MCNC: 91 MG/DL (ref 65–99)
GLUCOSE UR STRIP.AUTO-MCNC: NEGATIVE MG/DL
HCT VFR BLD AUTO: 47.3 % (ref 42–52)
HGB BLD-MCNC: 14.5 G/DL (ref 14–18)
IMM GRANULOCYTES # BLD AUTO: 0.03 K/UL (ref 0–0.11)
IMM GRANULOCYTES NFR BLD AUTO: 0.4 % (ref 0–0.9)
KETONES UR STRIP.AUTO-MCNC: NEGATIVE MG/DL
LEUKOCYTE ESTERASE UR QL STRIP.AUTO: NEGATIVE
LYMPHOCYTES # BLD AUTO: 0.88 K/UL (ref 1–4.8)
LYMPHOCYTES NFR BLD: 13 % (ref 22–41)
MAGNESIUM SERPL-MCNC: 1.9 MG/DL (ref 1.5–2.5)
MCH RBC QN AUTO: 27.9 PG (ref 27–33)
MCHC RBC AUTO-ENTMCNC: 30.7 G/DL (ref 33.7–35.3)
MCV RBC AUTO: 91.1 FL (ref 81.4–97.8)
MICRO URNS: NORMAL
MONOCYTES # BLD AUTO: 0.72 K/UL (ref 0–0.85)
MONOCYTES NFR BLD AUTO: 10.7 % (ref 0–13.4)
NEUTROPHILS # BLD AUTO: 4.83 K/UL (ref 1.82–7.42)
NEUTROPHILS NFR BLD: 71.6 % (ref 44–72)
NITRITE UR QL STRIP.AUTO: NEGATIVE
NRBC # BLD AUTO: 0 K/UL
NRBC BLD-RTO: 0 /100 WBC
PH UR STRIP.AUTO: 6.5 [PH] (ref 5–8)
PHOSPHATE SERPL-MCNC: 3 MG/DL (ref 2.5–4.5)
PLATELET # BLD AUTO: 257 K/UL (ref 164–446)
PMV BLD AUTO: 9.2 FL (ref 9–12.9)
POTASSIUM SERPL-SCNC: 5 MMOL/L (ref 3.6–5.5)
PROT SERPL-MCNC: 7.7 G/DL (ref 6–8.2)
PROT UR QL STRIP: NEGATIVE MG/DL
PROT UR-MCNC: 15 MG/DL (ref 0–15)
PROT/CREAT UR: 208 MG/G (ref 15–68)
RBC # BLD AUTO: 5.19 M/UL (ref 4.7–6.1)
RBC UR QL AUTO: NEGATIVE
SODIUM SERPL-SCNC: 137 MMOL/L (ref 135–145)
SP GR UR STRIP.AUTO: 1.01
UROBILINOGEN UR STRIP.AUTO-MCNC: 0.2 MG/DL
WBC # BLD AUTO: 6.8 K/UL (ref 4.8–10.8)

## 2021-10-01 PROCEDURE — 87799 DETECT AGENT NOS DNA QUANT: CPT

## 2021-10-01 PROCEDURE — 85025 COMPLETE CBC W/AUTO DIFF WBC: CPT

## 2021-10-01 PROCEDURE — 80193 DRUG ASSAY LEFLUNOMIDE: CPT

## 2021-10-01 PROCEDURE — 80197 ASSAY OF TACROLIMUS: CPT

## 2021-10-01 PROCEDURE — 80053 COMPREHEN METABOLIC PANEL: CPT

## 2021-10-01 PROCEDURE — 82570 ASSAY OF URINE CREATININE: CPT

## 2021-10-01 PROCEDURE — 84100 ASSAY OF PHOSPHORUS: CPT

## 2021-10-01 PROCEDURE — 82550 ASSAY OF CK (CPK): CPT

## 2021-10-01 PROCEDURE — 81003 URINALYSIS AUTO W/O SCOPE: CPT

## 2021-10-01 PROCEDURE — 83735 ASSAY OF MAGNESIUM: CPT

## 2021-10-01 PROCEDURE — 36415 COLL VENOUS BLD VENIPUNCTURE: CPT

## 2021-10-01 PROCEDURE — 84156 ASSAY OF PROTEIN URINE: CPT

## 2021-10-03 LAB — TACROLIMUS BLD-MCNC: 5.2 NG/ML

## 2021-10-04 LAB — TERIFLUNOMIDE SERPL-MCNC: 29.89 UG/ML

## 2021-10-05 LAB
BKV DNA # UR NAA+PROBE: ABNORMAL
BKV DNA SPEC NAA+PROBE-LOG#: 5 LOG CPY/ML
DIAGNOSTIC IMP SPEC-IMP: DETECTED

## 2021-10-06 LAB
BKV DNA # SPEC NAA+PROBE: ABNORMAL CPY/ML
BKV DNA SPEC NAA+PROBE-LOG#: ABNORMAL LOG
DIAGNOSTIC IMP SPEC-IMP: DETECTED

## 2021-10-07 ENCOUNTER — HOSPITAL ENCOUNTER (OUTPATIENT)
Dept: LAB | Facility: MEDICAL CENTER | Age: 66
End: 2021-10-07
Attending: INTERNAL MEDICINE
Payer: MEDICARE

## 2021-10-07 LAB
ALBUMIN SERPL BCP-MCNC: 4.4 G/DL (ref 3.2–4.9)
ALBUMIN/GLOB SERPL: 1.3 G/DL
ALP SERPL-CCNC: 136 U/L (ref 30–99)
ALT SERPL-CCNC: 14 U/L (ref 2–50)
ANION GAP SERPL CALC-SCNC: 12 MMOL/L (ref 7–16)
APPEARANCE UR: CLEAR
AST SERPL-CCNC: 23 U/L (ref 12–45)
BASOPHILS # BLD AUTO: 1.1 % (ref 0–1.8)
BASOPHILS # BLD: 0.05 K/UL (ref 0–0.12)
BILIRUB SERPL-MCNC: 0.3 MG/DL (ref 0.1–1.5)
BILIRUB UR QL STRIP.AUTO: NEGATIVE
BUN SERPL-MCNC: 21 MG/DL (ref 8–22)
CALCIUM SERPL-MCNC: 10 MG/DL (ref 8.5–10.5)
CHLORIDE SERPL-SCNC: 101 MMOL/L (ref 96–112)
CK SERPL-CCNC: 70 U/L (ref 0–154)
CO2 SERPL-SCNC: 23 MMOL/L (ref 20–33)
COLOR UR: YELLOW
CREAT SERPL-MCNC: 1.44 MG/DL (ref 0.5–1.4)
EOSINOPHIL # BLD AUTO: 0.26 K/UL (ref 0–0.51)
EOSINOPHIL NFR BLD: 5.8 % (ref 0–6.9)
ERYTHROCYTE [DISTWIDTH] IN BLOOD BY AUTOMATED COUNT: 49.5 FL (ref 35.9–50)
GLOBULIN SER CALC-MCNC: 3.5 G/DL (ref 1.9–3.5)
GLUCOSE SERPL-MCNC: 95 MG/DL (ref 65–99)
GLUCOSE UR STRIP.AUTO-MCNC: NEGATIVE MG/DL
HCT VFR BLD AUTO: 43.8 % (ref 42–52)
HGB BLD-MCNC: 13.6 G/DL (ref 14–18)
IMM GRANULOCYTES # BLD AUTO: 0.02 K/UL (ref 0–0.11)
IMM GRANULOCYTES NFR BLD AUTO: 0.4 % (ref 0–0.9)
KETONES UR STRIP.AUTO-MCNC: NEGATIVE MG/DL
LEUKOCYTE ESTERASE UR QL STRIP.AUTO: NEGATIVE
LYMPHOCYTES # BLD AUTO: 1.1 K/UL (ref 1–4.8)
LYMPHOCYTES NFR BLD: 24.6 % (ref 22–41)
MAGNESIUM SERPL-MCNC: 1.8 MG/DL (ref 1.5–2.5)
MCH RBC QN AUTO: 27.7 PG (ref 27–33)
MCHC RBC AUTO-ENTMCNC: 31.1 G/DL (ref 33.7–35.3)
MCV RBC AUTO: 89.2 FL (ref 81.4–97.8)
MICRO URNS: NORMAL
MONOCYTES # BLD AUTO: 0.49 K/UL (ref 0–0.85)
MONOCYTES NFR BLD AUTO: 11 % (ref 0–13.4)
NEUTROPHILS # BLD AUTO: 2.55 K/UL (ref 1.82–7.42)
NEUTROPHILS NFR BLD: 57.1 % (ref 44–72)
NITRITE UR QL STRIP.AUTO: NEGATIVE
NRBC # BLD AUTO: 0 K/UL
NRBC BLD-RTO: 0 /100 WBC
PH UR STRIP.AUTO: 7 [PH] (ref 5–8)
PHOSPHATE SERPL-MCNC: 2.6 MG/DL (ref 2.5–4.5)
PLATELET # BLD AUTO: 285 K/UL (ref 164–446)
PMV BLD AUTO: 9.3 FL (ref 9–12.9)
POTASSIUM SERPL-SCNC: 4.8 MMOL/L (ref 3.6–5.5)
PROT SERPL-MCNC: 7.9 G/DL (ref 6–8.2)
PROT UR QL STRIP: NEGATIVE MG/DL
RBC # BLD AUTO: 4.91 M/UL (ref 4.7–6.1)
RBC UR QL AUTO: NEGATIVE
SODIUM SERPL-SCNC: 136 MMOL/L (ref 135–145)
SP GR UR STRIP.AUTO: 1.01
UROBILINOGEN UR STRIP.AUTO-MCNC: 0.2 MG/DL
WBC # BLD AUTO: 4.5 K/UL (ref 4.8–10.8)

## 2021-10-07 PROCEDURE — 36415 COLL VENOUS BLD VENIPUNCTURE: CPT

## 2021-10-07 PROCEDURE — 80197 ASSAY OF TACROLIMUS: CPT

## 2021-10-07 PROCEDURE — 80193 DRUG ASSAY LEFLUNOMIDE: CPT

## 2021-10-07 PROCEDURE — 84100 ASSAY OF PHOSPHORUS: CPT

## 2021-10-07 PROCEDURE — 85025 COMPLETE CBC W/AUTO DIFF WBC: CPT

## 2021-10-07 PROCEDURE — 82570 ASSAY OF URINE CREATININE: CPT

## 2021-10-07 PROCEDURE — 87799 DETECT AGENT NOS DNA QUANT: CPT

## 2021-10-07 PROCEDURE — 81003 URINALYSIS AUTO W/O SCOPE: CPT

## 2021-10-07 PROCEDURE — 83735 ASSAY OF MAGNESIUM: CPT

## 2021-10-07 PROCEDURE — 82550 ASSAY OF CK (CPK): CPT

## 2021-10-07 PROCEDURE — 80053 COMPREHEN METABOLIC PANEL: CPT

## 2021-10-07 PROCEDURE — 84156 ASSAY OF PROTEIN URINE: CPT

## 2021-10-08 LAB
CREAT UR-MCNC: 30.55 MG/DL
PROT UR-MCNC: 7 MG/DL (ref 0–15)
PROT/CREAT UR: 229 MG/G (ref 15–68)

## 2021-10-09 LAB — TACROLIMUS BLD-MCNC: 6.7 NG/ML

## 2021-10-11 LAB
BKV DNA # SPEC NAA+PROBE: 904 CPY/ML
BKV DNA # UR NAA+PROBE: ABNORMAL CPY/ML
BKV DNA SPEC NAA+PROBE-LOG#: 3 LOG
BKV DNA SPEC NAA+PROBE-LOG#: 3.4 LOG CPY/ML
DIAGNOSTIC IMP SPEC-IMP: DETECTED
DIAGNOSTIC IMP SPEC-IMP: DETECTED
TERIFLUNOMIDE SERPL-MCNC: 30.3 UG/ML

## 2022-03-09 RX ORDER — LEFLUNOMIDE 20 MG/1
2 TABLET ORAL DAILY
COMMUNITY
Start: 2021-09-03 | End: 2022-08-30

## 2022-03-09 RX ORDER — PANTOPRAZOLE SODIUM 20 MG/1
1 TABLET, DELAYED RELEASE ORAL
COMMUNITY
Start: 2021-06-22 | End: 2022-03-10

## 2022-03-09 RX ORDER — LEFLUNOMIDE 20 MG/1
20 TABLET ORAL 2 TIMES DAILY
COMMUNITY
Start: 2022-02-13

## 2022-03-10 ENCOUNTER — OFFICE VISIT (OUTPATIENT)
Dept: MEDICAL GROUP | Facility: PHYSICIAN GROUP | Age: 67
End: 2022-03-10
Payer: MEDICARE

## 2022-03-10 VITALS
WEIGHT: 180.13 LBS | DIASTOLIC BLOOD PRESSURE: 84 MMHG | HEIGHT: 70 IN | TEMPERATURE: 98 F | HEART RATE: 74 BPM | RESPIRATION RATE: 20 BRPM | BODY MASS INDEX: 25.79 KG/M2 | SYSTOLIC BLOOD PRESSURE: 160 MMHG | OXYGEN SATURATION: 96 %

## 2022-03-10 DIAGNOSIS — E78.5 DYSLIPIDEMIA: ICD-10-CM

## 2022-03-10 DIAGNOSIS — K22.2 ESOPHAGEAL STRICTURE: ICD-10-CM

## 2022-03-10 DIAGNOSIS — M10.041 ACUTE IDIOPATHIC GOUT OF RIGHT HAND: ICD-10-CM

## 2022-03-10 DIAGNOSIS — Z94.0 KIDNEY TRANSPLANT RECIPIENT: Primary | ICD-10-CM

## 2022-03-10 PROCEDURE — 99214 OFFICE O/P EST MOD 30 MIN: CPT | Performed by: NURSE PRACTITIONER

## 2022-03-10 RX ORDER — PANTOPRAZOLE SODIUM 20 MG/1
20 TABLET, DELAYED RELEASE ORAL EVERY MORNING
Qty: 90 TABLET | Refills: 3 | Status: SHIPPED | OUTPATIENT
Start: 2022-03-10 | End: 2023-04-18 | Stop reason: SDUPTHER

## 2022-03-10 RX ORDER — PRAVASTATIN SODIUM 20 MG
20 TABLET ORAL DAILY
Qty: 90 TABLET | Refills: 3 | Status: SHIPPED | OUTPATIENT
Start: 2022-03-10 | End: 2023-02-23

## 2022-03-10 RX ORDER — PREDNISONE 10 MG/1
30 TABLET ORAL DAILY
Qty: 15 TABLET | Refills: 0 | Status: SHIPPED | OUTPATIENT
Start: 2022-03-10 | End: 2022-03-15

## 2022-03-10 ASSESSMENT — PATIENT HEALTH QUESTIONNAIRE - PHQ9: CLINICAL INTERPRETATION OF PHQ2 SCORE: 0

## 2022-03-10 ASSESSMENT — FIBROSIS 4 INDEX: FIB4 SCORE: 1.42

## 2022-03-20 PROBLEM — Z94.0 KIDNEY TRANSPLANT RECIPIENT: Chronic | Status: ACTIVE | Noted: 2021-05-19

## 2022-03-20 PROBLEM — M10.041 ACUTE IDIOPATHIC GOUT OF RIGHT HAND: Chronic | Status: ACTIVE | Noted: 2022-03-20

## 2022-03-20 PROBLEM — M10.041 ACUTE IDIOPATHIC GOUT OF RIGHT HAND: Status: ACTIVE | Noted: 2022-03-20

## 2022-03-20 PROBLEM — E78.5 DYSLIPIDEMIA: Chronic | Status: ACTIVE | Noted: 2019-06-19

## 2022-03-20 RX ORDER — CINACALCET 30 MG/1
1 TABLET, FILM COATED ORAL
COMMUNITY
Start: 2022-03-11 | End: 2023-03-06

## 2022-03-21 NOTE — ASSESSMENT & PLAN NOTE
Chronic condition, stable.  Patient received successful kidney transplant at [facility] on March 30, 2021.  He has been followed closely by [facility] since that time. 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  He is doing well with his treatment, and does need refills of his transplant medications, one of which has been denied by his insurance company. I did speak to Alejandra, his RN coordinator with [facility], and she will have the pharmacotherapy service at [facility] contact patient and take this over as this is out of my scope.

## 2022-03-21 NOTE — ASSESSMENT & PLAN NOTE
Acute condition.  Patient states that he recently had an acute flareup of gout two days ago. This is hot, red and very painful.  This is affecting the right hand.  On examination, the right metacarpal and trapezium are erythematous, swollen and hot. Patient has limited ROM due to pain. He is extremely tender to palpation over the area.  Discussed treatment with a steroid burst, which I did get okay through his kidney team at [facility] today.  Patient will follow up in two weeks if this is not improved.  Otherwise, I will see him back in three months.

## 2022-03-21 NOTE — ASSESSMENT & PLAN NOTE
Chronic condition, stable.  Patient takes pantoproazole 20 mg daily with excellent control of his symptoms. He does need a refill of his medication today. He will continue current regimen.

## 2022-03-21 NOTE — ASSESSMENT & PLAN NOTE
Chronic condition, stable.  Patient takes pravastatin 20 mg daily.  He tolerates this well without adverse side-effects.  His last lipid profile was well controlled with an LDL of 62 in 2/2021.  He is due for updated lipid profile now and this was ordered today.  Continue current regimen.

## 2022-03-21 NOTE — PROGRESS NOTES
Subjective:     CC: The primary encounter diagnosis was Kidney transplant recipient. Diagnoses of Acute idiopathic gout of right hand, Esophageal stricture, and Dyslipidemia were also pertinent to this visit.      HPI:   Garry is an established patient of [facility] here for follow-up.  We discussed the following problems:    1. Kidney transplant recipient  Chronic condition, stable.  Patient follows with [facility] [place] closely.    2. Acute idiopathic gout of right hand  Acute condition. Patient here for evaluation today.    3. Esophageal stricture  Chronic condition, stable.  Patient here for follow up today.    4. Dyslipidemia  Chronic condition, stable.  Patient here for follow up today.       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   • Localized osteoarthritis of right shoulder 2019   • Pain     feet, knees, shoulders, hands, back        Social History     Tobacco Use   • Smoking status: Former Smoker     Packs/day: 0.50     Years: 5.00     Pack years: 2.50     Types: Cigarettes     Quit date: 10/21/1996     Years since quittin.4   • Smokeless tobacco: Never Used   Vaping Use   • Vaping Use: Never used   Substance Use Topics   • Alcohol use: No     Alcohol/week: 0.0 oz   • Drug use: No       Current Outpatient Medications Ordered in Epic   Medication Sig Dispense Refill   • pantoprazole (PROTONIX) 20 MG tablet Take 1 Tablet by mouth every morning. 90 Tablet 3   • pravastatin (PRAVACHOL) 20 MG Tab Take 1 Tablet by mouth every  "day. 90 Tablet 3   • leflunomide (ARAVA) 20 MG Tab Take 2 Tablets by mouth every day.     • leflunomide (ARAVA) 20 MG Tab Take 40 mg by mouth every day.     • mycophenolate (MYFORTIC) 180 MG EC tablet Take 180 mg by mouth.     • cinacalcet (SENSIPAR) 30 MG Tab Take 1 Tablet by mouth every day for 360 days. 90 Tablet 3   • acetaminophen (TYLENOL) 500 MG Tab Take 500-1,000 mg by mouth.     • tacrolimus (PROGRAF) 1 MG Cap Take 3 capsules by mouth in the morning and 4 capsules by mouth in evening; Take doses every 12 hours. Take morning dose AFTER blood draw on lab days. (DO NOT CHANGE MFG FOR REFILLS) +++ ICD 10:Z 94.0 +++ 210 capsule 3   • mycophenolate (MYFORTIC) 180 MG EC tablet Take 3 Tablets by mouth every day. 240 tablet 3   • SENSIPAR 30 MG Tab Take 1 Tab by mouth every day. (Patient taking differently: Take 60 mg by mouth every morning.) 30 Tab 11     No current [facility]-ordered facility-administered medications on file.       Allergies:  Penicillins and Cephalexin    Health Maintenance: Deferred vaccines to [facility]    ROS:  Gen: no fevers/chills, no changes in weight  Eyes: no changes in vision  ENT: no sore throat, no hearing loss, no bloody nose  Pulm: no sob, no cough  CV: no chest pain, no palpitations  GI: no nausea/vomiting, no diarrhea  : no dysuria  MSk: no myalgias  Skin: no rash  Neuro: no headaches, no numbness/tingling  Heme/Lymph: no easy bruising      Objective:       Exam:  /84 (BP Location: Left arm, Patient Position: Sitting, BP Cuff Size: Adult)   Pulse 74   Temp 36.7 °C (98 °F) (Temporal)   Resp 20   Ht 1.778 m (5' 10\")   Wt 81.7 kg (180 lb 2 oz)   SpO2 96%   BMI 25.85 kg/m²  Body mass index is 25.85 kg/m².    Gen: Alert and oriented, No apparent distress.  Neck: Neck is supple without lymphadenopathy.    Lungs: Normal effort, CTA bilaterally, no wheezes, rhonchi, or rales  CV: Regular rate and rhythm. No murmurs, rubs, or gallops.  Ext: No clubbing, cyanosis, edema.  Right hand " with acute tenderness to palpation over the first metacarpal and trapezium. Significant erythema, warmth and edema noted.    Labs: Dated: None    Assessment & Plan:     66 y.o. male with the following -     Acute idiopathic gout of right hand  Acute condition.  Patient states that he recently had an acute flareup of gout two days ago. This is hot, red and very painful.  This is affecting the right hand.  On examination, the right metacarpal and trapezium are erythematous, swollen and hot. Patient has limited ROM due to pain. He is extremely tender to palpation over the area.  Discussed treatment with a steroid burst, which I did get okay through his kidney team at [facility] today.  Patient will follow up in two weeks if this is not improved.  Otherwise, I will see him back in three months.     Dyslipidemia  Chronic condition, stable.  Patient takes pravastatin 20 mg daily.  He tolerates this well without adverse side-effects.  His last lipid profile was well controlled with an LDL of 62 in 2/2021.  He is due for updated lipid profile now and this was ordered today.  Continue current regimen.      Esophageal stricture  Chronic condition, stable.  Patient takes pantoproazole 20 mg daily with excellent control of his symptoms. He does need a refill of his medication today. He will continue current regimen.    Kidney transplant recipient  Chronic condition, stable.  Patient received successful kidney transplant at [facility] on March 30, 2021.  He has been followed closely by [facility] since that time. 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  He is doing well with his treatment, and does need refills of his transplant medications, one of which has been denied by his insurance company. I did speak to Alejandra, his RN coordinator with [facility], and she will have the pharmacotherapy service at   [facility] contact patient and take this over as this is out of my scope.        Orders Placed This Encounter   • Lipid Profile   • leflunomide (ARAVA) 20 MG Tab   • leflunomide (ARAVA) 20 MG Tab   • DISCONTD: pantoprazole (PROTONIX) 20 MG tablet   • pantoprazole (PROTONIX) 20 MG tablet   • pravastatin (PRAVACHOL) 20 MG Tab   • predniSONE (DELTASONE) 10 MG Tab          Return in about 3 months (around 6/10/2022).    I have placed the below orders and discussed them with an approved delegating provider.  The MA is performing the below orders under the direction of Ita Miller MD.    Please note that this dictation was created using voice recognition software. I have made every reasonable attempt to correct obvious errors, but I expect that there are errors of grammar and possibly content that I did not discover before finalizing the note.

## 2022-03-24 ENCOUNTER — HOSPITAL ENCOUNTER (OUTPATIENT)
Dept: LAB | Facility: MEDICAL CENTER | Age: 67
End: 2022-03-24
Attending: INTERNAL MEDICINE
Payer: MEDICARE

## 2022-03-24 LAB
ALBUMIN SERPL BCP-MCNC: 3.6 G/DL (ref 3.2–4.9)
ALBUMIN/GLOB SERPL: 1.6 G/DL
ALP SERPL-CCNC: 126 U/L (ref 30–99)
ALT SERPL-CCNC: 10 U/L (ref 2–50)
ANION GAP SERPL CALC-SCNC: 11 MMOL/L (ref 7–16)
APPEARANCE UR: CLEAR
AST SERPL-CCNC: 13 U/L (ref 12–45)
BACTERIA #/AREA URNS HPF: NEGATIVE /HPF
BASOPHILS # BLD AUTO: 0.9 % (ref 0–1.8)
BASOPHILS # BLD: 0.04 K/UL (ref 0–0.12)
BILIRUB SERPL-MCNC: <0.2 MG/DL (ref 0.1–1.5)
BILIRUB UR QL STRIP.AUTO: NEGATIVE
BUN SERPL-MCNC: 30 MG/DL (ref 8–22)
CALCIUM SERPL-MCNC: 9.2 MG/DL (ref 8.5–10.5)
CHLORIDE SERPL-SCNC: 107 MMOL/L (ref 96–112)
CO2 SERPL-SCNC: 24 MMOL/L (ref 20–33)
COLOR UR: YELLOW
CREAT SERPL-MCNC: 1.94 MG/DL (ref 0.5–1.4)
CREAT UR-MCNC: 99.8 MG/DL
EOSINOPHIL # BLD AUTO: 0.17 K/UL (ref 0–0.51)
EOSINOPHIL NFR BLD: 3.7 % (ref 0–6.9)
EPI CELLS #/AREA URNS HPF: NEGATIVE /HPF
ERYTHROCYTE [DISTWIDTH] IN BLOOD BY AUTOMATED COUNT: 51.8 FL (ref 35.9–50)
GFR SERPLBLD CREATININE-BSD FMLA CKD-EPI: 37 ML/MIN/1.73 M 2
GLOBULIN SER CALC-MCNC: 2.3 G/DL (ref 1.9–3.5)
GLUCOSE SERPL-MCNC: 98 MG/DL (ref 65–99)
GLUCOSE UR STRIP.AUTO-MCNC: NEGATIVE MG/DL
HCT VFR BLD AUTO: 41.4 % (ref 42–52)
HGB BLD-MCNC: 12.5 G/DL (ref 14–18)
HYALINE CASTS #/AREA URNS LPF: ABNORMAL /LPF
IMM GRANULOCYTES # BLD AUTO: 0.04 K/UL (ref 0–0.11)
IMM GRANULOCYTES NFR BLD AUTO: 0.9 % (ref 0–0.9)
INR PPP: 1.18 (ref 0.87–1.13)
KETONES UR STRIP.AUTO-MCNC: NEGATIVE MG/DL
LEUKOCYTE ESTERASE UR QL STRIP.AUTO: NEGATIVE
LYMPHOCYTES # BLD AUTO: 1.06 K/UL (ref 1–4.8)
LYMPHOCYTES NFR BLD: 22.9 % (ref 22–41)
MAGNESIUM SERPL-MCNC: 2.1 MG/DL (ref 1.5–2.5)
MCH RBC QN AUTO: 27.9 PG (ref 27–33)
MCHC RBC AUTO-ENTMCNC: 30.2 G/DL (ref 33.7–35.3)
MCV RBC AUTO: 92.4 FL (ref 81.4–97.8)
MICRO URNS: ABNORMAL
MONOCYTES # BLD AUTO: 0.63 K/UL (ref 0–0.85)
MONOCYTES NFR BLD AUTO: 13.6 % (ref 0–13.4)
NEUTROPHILS # BLD AUTO: 2.69 K/UL (ref 1.82–7.42)
NEUTROPHILS NFR BLD: 58 % (ref 44–72)
NITRITE UR QL STRIP.AUTO: NEGATIVE
NRBC # BLD AUTO: 0 K/UL
NRBC BLD-RTO: 0 /100 WBC
PH UR STRIP.AUTO: 6 [PH] (ref 5–8)
PHOSPHATE SERPL-MCNC: 3.2 MG/DL (ref 2.5–4.5)
PLATELET # BLD AUTO: 228 K/UL (ref 164–446)
PMV BLD AUTO: 9.6 FL (ref 9–12.9)
POTASSIUM SERPL-SCNC: 5.5 MMOL/L (ref 3.6–5.5)
PROT SERPL-MCNC: 5.9 G/DL (ref 6–8.2)
PROT UR QL STRIP: 30 MG/DL
PROT UR-MCNC: 25 MG/DL (ref 0–15)
PROT/CREAT UR: 251 MG/G (ref 15–68)
PROTHROMBIN TIME: 14.7 SEC (ref 12–14.6)
RBC # BLD AUTO: 4.48 M/UL (ref 4.7–6.1)
RBC # URNS HPF: ABNORMAL /HPF
RBC UR QL AUTO: NEGATIVE
SODIUM SERPL-SCNC: 142 MMOL/L (ref 135–145)
SP GR UR STRIP.AUTO: 1.02
UROBILINOGEN UR STRIP.AUTO-MCNC: 0.2 MG/DL
WBC # BLD AUTO: 4.6 K/UL (ref 4.8–10.8)
WBC #/AREA URNS HPF: ABNORMAL /HPF

## 2022-03-24 PROCEDURE — 83735 ASSAY OF MAGNESIUM: CPT

## 2022-03-24 PROCEDURE — 87799 DETECT AGENT NOS DNA QUANT: CPT

## 2022-03-24 PROCEDURE — 84156 ASSAY OF PROTEIN URINE: CPT

## 2022-03-24 PROCEDURE — 84100 ASSAY OF PHOSPHORUS: CPT

## 2022-03-24 PROCEDURE — 81001 URINALYSIS AUTO W/SCOPE: CPT

## 2022-03-24 PROCEDURE — 80053 COMPREHEN METABOLIC PANEL: CPT

## 2022-03-24 PROCEDURE — 80197 ASSAY OF TACROLIMUS: CPT

## 2022-03-24 PROCEDURE — 82570 ASSAY OF URINE CREATININE: CPT

## 2022-03-24 PROCEDURE — 36415 COLL VENOUS BLD VENIPUNCTURE: CPT

## 2022-03-24 PROCEDURE — 85610 PROTHROMBIN TIME: CPT

## 2022-03-24 PROCEDURE — 85025 COMPLETE CBC W/AUTO DIFF WBC: CPT

## 2022-03-25 LAB — TACROLIMUS BLD-MCNC: 5.2 NG/ML

## 2022-03-29 LAB
BKV DNA # SPEC NAA+PROBE: ABNORMAL CPY/ML
BKV DNA # UR NAA+PROBE: ABNORMAL
BKV DNA SPEC NAA+PROBE-LOG#: 6.1 LOG CPY/ML
BKV DNA SPEC NAA+PROBE-LOG#: ABNORMAL LOG
DIAGNOSTIC IMP SPEC-IMP: DETECTED
DIAGNOSTIC IMP SPEC-IMP: DETECTED

## 2022-07-19 ENCOUNTER — HOSPITAL ENCOUNTER (OUTPATIENT)
Dept: LAB | Facility: MEDICAL CENTER | Age: 67
End: 2022-07-19
Attending: INTERNAL MEDICINE
Payer: MEDICARE

## 2022-07-19 LAB
25(OH)D3 SERPL-MCNC: 30 NG/ML (ref 30–100)
ALBUMIN SERPL BCP-MCNC: 4.2 G/DL (ref 3.2–4.9)
ALBUMIN/GLOB SERPL: 1.2 G/DL
ALP SERPL-CCNC: 133 U/L (ref 30–99)
ALT SERPL-CCNC: 10 U/L (ref 2–50)
ANION GAP SERPL CALC-SCNC: 13 MMOL/L (ref 7–16)
APPEARANCE UR: CLEAR
AST SERPL-CCNC: 15 U/L (ref 12–45)
BACTERIA #/AREA URNS HPF: ABNORMAL /HPF
BASOPHILS # BLD AUTO: 0 % (ref 0–1.8)
BASOPHILS # BLD: 0 K/UL (ref 0–0.12)
BILIRUB SERPL-MCNC: 0.2 MG/DL (ref 0.1–1.5)
BILIRUB UR QL STRIP.AUTO: NEGATIVE
BUN SERPL-MCNC: 41 MG/DL (ref 8–22)
CA-I SERPL-SCNC: 1.2 MMOL/L (ref 1.1–1.3)
CALCIUM SERPL-MCNC: 9.6 MG/DL (ref 8.5–10.5)
CHLORIDE SERPL-SCNC: 105 MMOL/L (ref 96–112)
CO2 SERPL-SCNC: 20 MMOL/L (ref 20–33)
COLOR UR: YELLOW
CREAT SERPL-MCNC: 2.29 MG/DL (ref 0.5–1.4)
CREAT UR-MCNC: 61.69 MG/DL
CREAT UR-MCNC: 62.55 MG/DL
EOSINOPHIL # BLD AUTO: 0.36 K/UL (ref 0–0.51)
EOSINOPHIL NFR BLD: 5.3 % (ref 0–6.9)
EPI CELLS #/AREA URNS HPF: NEGATIVE /HPF
ERYTHROCYTE [DISTWIDTH] IN BLOOD BY AUTOMATED COUNT: 53.4 FL (ref 35.9–50)
FASTING STATUS PATIENT QL REPORTED: NORMAL
FERRITIN SERPL-MCNC: 774 NG/ML (ref 22–322)
GFR SERPLBLD CREATININE-BSD FMLA CKD-EPI: 31 ML/MIN/1.73 M 2
GLOBULIN SER CALC-MCNC: 3.6 G/DL (ref 1.9–3.5)
GLUCOSE SERPL-MCNC: 74 MG/DL (ref 65–99)
GLUCOSE UR STRIP.AUTO-MCNC: NEGATIVE MG/DL
HCT VFR BLD AUTO: 48.8 % (ref 42–52)
HGB BLD-MCNC: 14.4 G/DL (ref 14–18)
HYALINE CASTS #/AREA URNS LPF: ABNORMAL /LPF
IRON SATN MFR SERPL: 15 % (ref 15–55)
IRON SERPL-MCNC: 33 UG/DL (ref 50–180)
KETONES UR STRIP.AUTO-MCNC: NEGATIVE MG/DL
LEUKOCYTE ESTERASE UR QL STRIP.AUTO: ABNORMAL
LYMPHOCYTES # BLD AUTO: 1.31 K/UL (ref 1–4.8)
LYMPHOCYTES NFR BLD: 19.3 % (ref 22–41)
MAGNESIUM SERPL-MCNC: 1.9 MG/DL (ref 1.5–2.5)
MANUAL DIFF BLD: NORMAL
MCH RBC QN AUTO: 26.3 PG (ref 27–33)
MCHC RBC AUTO-ENTMCNC: 29.5 G/DL (ref 33.7–35.3)
MCV RBC AUTO: 89.2 FL (ref 81.4–97.8)
MICRO URNS: ABNORMAL
MICROALBUMIN UR-MCNC: 26.4 MG/DL
MICROALBUMIN/CREAT UR: 428 MG/G (ref 0–30)
MONOCYTES # BLD AUTO: 0.78 K/UL (ref 0–0.85)
MONOCYTES NFR BLD AUTO: 11.4 % (ref 0–13.4)
MORPHOLOGY BLD-IMP: NORMAL
NEUTROPHILS # BLD AUTO: 4.35 K/UL (ref 1.82–7.42)
NEUTROPHILS NFR BLD: 64 % (ref 44–72)
NITRITE UR QL STRIP.AUTO: NEGATIVE
NRBC # BLD AUTO: 0 K/UL
NRBC BLD-RTO: 0 /100 WBC
PH UR STRIP.AUTO: 7 [PH] (ref 5–8)
PHOSPHATE SERPL-MCNC: 3.4 MG/DL (ref 2.5–4.5)
PLATELET # BLD AUTO: 310 K/UL (ref 164–446)
PLATELET BLD QL SMEAR: NORMAL
PMV BLD AUTO: 9.8 FL (ref 9–12.9)
POTASSIUM SERPL-SCNC: 5.3 MMOL/L (ref 3.6–5.5)
PROT SERPL-MCNC: 7.8 G/DL (ref 6–8.2)
PROT UR QL STRIP: 100 MG/DL
PROT UR-MCNC: 51 MG/DL (ref 0–15)
PROT/CREAT UR: 815 MG/G (ref 15–68)
PTH-INTACT SERPL-MCNC: 208 PG/ML (ref 14–72)
RBC # BLD AUTO: 5.47 M/UL (ref 4.7–6.1)
RBC # URNS HPF: ABNORMAL /HPF
RBC BLD AUTO: NORMAL
RBC UR QL AUTO: NEGATIVE
SODIUM SERPL-SCNC: 138 MMOL/L (ref 135–145)
SP GR UR STRIP.AUTO: 1.01
TIBC SERPL-MCNC: 225 UG/DL (ref 250–450)
UIBC SERPL-MCNC: 192 UG/DL (ref 110–370)
URATE SERPL-MCNC: 6.9 MG/DL (ref 2.5–8.3)
UROBILINOGEN UR STRIP.AUTO-MCNC: 0.2 MG/DL
WBC # BLD AUTO: 6.8 K/UL (ref 4.8–10.8)
WBC #/AREA URNS HPF: ABNORMAL /HPF

## 2022-07-19 PROCEDURE — 83550 IRON BINDING TEST: CPT

## 2022-07-19 PROCEDURE — 83735 ASSAY OF MAGNESIUM: CPT

## 2022-07-19 PROCEDURE — 82043 UR ALBUMIN QUANTITATIVE: CPT

## 2022-07-19 PROCEDURE — 36415 COLL VENOUS BLD VENIPUNCTURE: CPT

## 2022-07-19 PROCEDURE — 80197 ASSAY OF TACROLIMUS: CPT

## 2022-07-19 PROCEDURE — 82330 ASSAY OF CALCIUM: CPT

## 2022-07-19 PROCEDURE — 87086 URINE CULTURE/COLONY COUNT: CPT

## 2022-07-19 PROCEDURE — 85007 BL SMEAR W/DIFF WBC COUNT: CPT

## 2022-07-19 PROCEDURE — 85025 COMPLETE CBC W/AUTO DIFF WBC: CPT

## 2022-07-19 PROCEDURE — 84100 ASSAY OF PHOSPHORUS: CPT

## 2022-07-19 PROCEDURE — 83540 ASSAY OF IRON: CPT

## 2022-07-19 PROCEDURE — 84156 ASSAY OF PROTEIN URINE: CPT

## 2022-07-19 PROCEDURE — 87799 DETECT AGENT NOS DNA QUANT: CPT

## 2022-07-19 PROCEDURE — 81001 URINALYSIS AUTO W/SCOPE: CPT

## 2022-07-19 PROCEDURE — 80053 COMPREHEN METABOLIC PANEL: CPT

## 2022-07-19 PROCEDURE — 84550 ASSAY OF BLOOD/URIC ACID: CPT

## 2022-07-19 PROCEDURE — 82306 VITAMIN D 25 HYDROXY: CPT

## 2022-07-19 PROCEDURE — 82728 ASSAY OF FERRITIN: CPT

## 2022-07-19 PROCEDURE — 83970 ASSAY OF PARATHORMONE: CPT

## 2022-07-19 PROCEDURE — 82570 ASSAY OF URINE CREATININE: CPT

## 2022-07-21 LAB
BACTERIA UR CULT: NORMAL
SIGNIFICANT IND 70042: NORMAL
SITE SITE: NORMAL
SOURCE SOURCE: NORMAL

## 2022-07-22 LAB — TACROLIMUS BLD-MCNC: 6.2 NG/ML

## 2022-07-25 LAB
BKV DNA # SPEC NAA+PROBE: <390 CPY/ML
BKV DNA SPEC NAA+PROBE-LOG#: <2.6 LOG
DIAGNOSTIC IMP SPEC-IMP: NOT DETECTED

## 2022-08-11 ENCOUNTER — HOSPITAL ENCOUNTER (OUTPATIENT)
Dept: LAB | Facility: MEDICAL CENTER | Age: 67
End: 2022-08-11
Attending: INTERNAL MEDICINE
Payer: MEDICARE

## 2022-08-11 LAB
ALBUMIN SERPL BCP-MCNC: 4.3 G/DL (ref 3.2–4.9)
ALBUMIN/GLOB SERPL: 1.2 G/DL
ALP SERPL-CCNC: 124 U/L (ref 30–99)
ALT SERPL-CCNC: 15 U/L (ref 2–50)
ANION GAP SERPL CALC-SCNC: 10 MMOL/L (ref 7–16)
APPEARANCE UR: CLEAR
AST SERPL-CCNC: 19 U/L (ref 12–45)
BACTERIA #/AREA URNS HPF: NEGATIVE /HPF
BASOPHILS # BLD AUTO: 1.2 % (ref 0–1.8)
BASOPHILS # BLD: 0.08 K/UL (ref 0–0.12)
BILIRUB SERPL-MCNC: 0.5 MG/DL (ref 0.1–1.5)
BILIRUB UR QL STRIP.AUTO: NEGATIVE
BUN SERPL-MCNC: 34 MG/DL (ref 8–22)
CALCIUM SERPL-MCNC: 9.6 MG/DL (ref 8.5–10.5)
CHLORIDE SERPL-SCNC: 107 MMOL/L (ref 96–112)
CO2 SERPL-SCNC: 22 MMOL/L (ref 20–33)
COLOR UR: YELLOW
CREAT SERPL-MCNC: 1.87 MG/DL (ref 0.5–1.4)
CREAT UR-MCNC: 77.45 MG/DL
EOSINOPHIL # BLD AUTO: 0.32 K/UL (ref 0–0.51)
EOSINOPHIL NFR BLD: 5 % (ref 0–6.9)
EPI CELLS #/AREA URNS HPF: ABNORMAL /HPF
ERYTHROCYTE [DISTWIDTH] IN BLOOD BY AUTOMATED COUNT: 53.6 FL (ref 35.9–50)
FASTING STATUS PATIENT QL REPORTED: NORMAL
GFR SERPLBLD CREATININE-BSD FMLA CKD-EPI: 39 ML/MIN/1.73 M 2
GLOBULIN SER CALC-MCNC: 3.7 G/DL (ref 1.9–3.5)
GLUCOSE SERPL-MCNC: 94 MG/DL (ref 65–99)
GLUCOSE UR STRIP.AUTO-MCNC: NEGATIVE MG/DL
HCT VFR BLD AUTO: 51.1 % (ref 42–52)
HGB BLD-MCNC: 15.8 G/DL (ref 14–18)
IMM GRANULOCYTES # BLD AUTO: 0.08 K/UL (ref 0–0.11)
IMM GRANULOCYTES NFR BLD AUTO: 1.2 % (ref 0–0.9)
INR PPP: 1.14 (ref 0.87–1.13)
KETONES UR STRIP.AUTO-MCNC: NEGATIVE MG/DL
LEUKOCYTE ESTERASE UR QL STRIP.AUTO: ABNORMAL
LYMPHOCYTES # BLD AUTO: 1.15 K/UL (ref 1–4.8)
LYMPHOCYTES NFR BLD: 17.9 % (ref 22–41)
MAGNESIUM SERPL-MCNC: 1.8 MG/DL (ref 1.5–2.5)
MCH RBC QN AUTO: 27.2 PG (ref 27–33)
MCHC RBC AUTO-ENTMCNC: 30.9 G/DL (ref 33.7–35.3)
MCV RBC AUTO: 88.1 FL (ref 81.4–97.8)
MICRO URNS: ABNORMAL
MONOCYTES # BLD AUTO: 0.65 K/UL (ref 0–0.85)
MONOCYTES NFR BLD AUTO: 10.1 % (ref 0–13.4)
NEUTROPHILS # BLD AUTO: 4.15 K/UL (ref 1.82–7.42)
NEUTROPHILS NFR BLD: 64.6 % (ref 44–72)
NITRITE UR QL STRIP.AUTO: NEGATIVE
NRBC # BLD AUTO: 0 K/UL
NRBC BLD-RTO: 0 /100 WBC
PH UR STRIP.AUTO: 6.5 [PH] (ref 5–8)
PHOSPHATE SERPL-MCNC: 3.3 MG/DL (ref 2.5–4.5)
PLATELET # BLD AUTO: 227 K/UL (ref 164–446)
PMV BLD AUTO: 9.3 FL (ref 9–12.9)
POTASSIUM SERPL-SCNC: 5.4 MMOL/L (ref 3.6–5.5)
PROT SERPL-MCNC: 8 G/DL (ref 6–8.2)
PROT UR QL STRIP: 30 MG/DL
PROT UR-MCNC: 32 MG/DL (ref 0–15)
PROT/CREAT UR: 413 MG/G (ref 15–68)
PROTHROMBIN TIME: 14.4 SEC (ref 12–14.6)
RBC # BLD AUTO: 5.8 M/UL (ref 4.7–6.1)
RBC # URNS HPF: ABNORMAL /HPF
RBC UR QL AUTO: NEGATIVE
SODIUM SERPL-SCNC: 139 MMOL/L (ref 135–145)
SP GR UR STRIP.AUTO: 1.02
UROBILINOGEN UR STRIP.AUTO-MCNC: 0.2 MG/DL
WBC # BLD AUTO: 6.4 K/UL (ref 4.8–10.8)
WBC #/AREA URNS HPF: ABNORMAL /HPF

## 2022-08-11 PROCEDURE — 84100 ASSAY OF PHOSPHORUS: CPT

## 2022-08-11 PROCEDURE — 36415 COLL VENOUS BLD VENIPUNCTURE: CPT

## 2022-08-11 PROCEDURE — 85025 COMPLETE CBC W/AUTO DIFF WBC: CPT

## 2022-08-11 PROCEDURE — 84156 ASSAY OF PROTEIN URINE: CPT

## 2022-08-11 PROCEDURE — 83735 ASSAY OF MAGNESIUM: CPT

## 2022-08-11 PROCEDURE — 85610 PROTHROMBIN TIME: CPT

## 2022-08-11 PROCEDURE — 82570 ASSAY OF URINE CREATININE: CPT

## 2022-08-11 PROCEDURE — 80053 COMPREHEN METABOLIC PANEL: CPT

## 2022-08-11 PROCEDURE — 80197 ASSAY OF TACROLIMUS: CPT

## 2022-08-11 PROCEDURE — 87086 URINE CULTURE/COLONY COUNT: CPT

## 2022-08-11 PROCEDURE — 81001 URINALYSIS AUTO W/SCOPE: CPT

## 2022-08-11 PROCEDURE — 87799 DETECT AGENT NOS DNA QUANT: CPT | Mod: 91

## 2022-08-12 LAB — TACROLIMUS BLD-MCNC: 5.3 NG/ML

## 2022-08-13 LAB
BACTERIA UR CULT: NORMAL
SIGNIFICANT IND 70042: NORMAL
SITE SITE: NORMAL
SOURCE SOURCE: NORMAL

## 2022-08-16 NOTE — DISCHARGE PLANNING
Meds-to-Beds: Discharge prescription orders listed below delivered to patient's bedside. Pt refused metoprolol. RN notified. Told pt to call with questions.        Josafat Benites   Home Medication Instructions GLADYS:[id number]    Printed on:01/17/21 9013   Medication Information                      colchicine (COLCRYS) 0.6 MG Tab  Take 0.5 Tablet by mouth every 48 hours.             dexamethasone (DECADRON) 4 MG Tab  Take 1.5 Tabs by mouth every day for 3 days.             pantoprazole (PROTONIX) 20 MG tablet  Take 1 Tab by mouth every day.             SENSIPAR 30 MG Tab  Take 1 Tab by mouth every day.                 Jacey Peterson, PharmD     1

## 2022-08-17 LAB
BKV DNA # SPEC NAA+PROBE: ABNORMAL CPY/ML
BKV DNA # UR NAA+PROBE: ABNORMAL CPY/ML
BKV DNA SPEC NAA+PROBE-LOG#: 4.9 LOG CPY/ML
BKV DNA SPEC NAA+PROBE-LOG#: ABNORMAL LOG
DIAGNOSTIC IMP SPEC-IMP: DETECTED
DIAGNOSTIC IMP SPEC-IMP: DETECTED

## 2022-10-12 ENCOUNTER — HOSPITAL ENCOUNTER (OUTPATIENT)
Dept: LAB | Facility: MEDICAL CENTER | Age: 67
End: 2022-10-12
Attending: INTERNAL MEDICINE
Payer: MEDICARE

## 2022-10-12 LAB
25(OH)D3 SERPL-MCNC: 33 NG/ML (ref 30–100)
ALBUMIN SERPL BCP-MCNC: 3.9 G/DL (ref 3.2–4.9)
ALBUMIN/GLOB SERPL: 1.1 G/DL
ALP SERPL-CCNC: 115 U/L (ref 30–99)
ALT SERPL-CCNC: 14 U/L (ref 2–50)
ANION GAP SERPL CALC-SCNC: 11 MMOL/L (ref 7–16)
APPEARANCE UR: CLEAR
AST SERPL-CCNC: 14 U/L (ref 12–45)
BACTERIA #/AREA URNS HPF: NEGATIVE /HPF
BASOPHILS # BLD AUTO: 1.1 % (ref 0–1.8)
BASOPHILS # BLD: 0.08 K/UL (ref 0–0.12)
BILIRUB SERPL-MCNC: 0.5 MG/DL (ref 0.1–1.5)
BILIRUB UR QL STRIP.AUTO: NEGATIVE
BUN SERPL-MCNC: 42 MG/DL (ref 8–22)
CALCIUM SERPL-MCNC: 9.5 MG/DL (ref 8.5–10.5)
CALCIUM SERPL-MCNC: 9.5 MG/DL (ref 8.5–10.5)
CHLORIDE SERPL-SCNC: 103 MMOL/L (ref 96–112)
CO2 SERPL-SCNC: 23 MMOL/L (ref 20–33)
COLOR UR: YELLOW
CREAT SERPL-MCNC: 2.07 MG/DL (ref 0.5–1.4)
CREAT UR-MCNC: 54.98 MG/DL
CREAT UR-MCNC: 55.23 MG/DL
EOSINOPHIL # BLD AUTO: 0.38 K/UL (ref 0–0.51)
EOSINOPHIL NFR BLD: 5 % (ref 0–6.9)
EPI CELLS #/AREA URNS HPF: NEGATIVE /HPF
ERYTHROCYTE [DISTWIDTH] IN BLOOD BY AUTOMATED COUNT: 57.4 FL (ref 35.9–50)
FASTING STATUS PATIENT QL REPORTED: NORMAL
FERRITIN SERPL-MCNC: 1025 NG/ML (ref 22–322)
GFR SERPLBLD CREATININE-BSD FMLA CKD-EPI: 34 ML/MIN/1.73 M 2
GLOBULIN SER CALC-MCNC: 3.4 G/DL (ref 1.9–3.5)
GLUCOSE SERPL-MCNC: 84 MG/DL (ref 65–99)
GLUCOSE UR STRIP.AUTO-MCNC: NEGATIVE MG/DL
HCT VFR BLD AUTO: 43.9 % (ref 42–52)
HGB BLD-MCNC: 13.5 G/DL (ref 14–18)
IMM GRANULOCYTES # BLD AUTO: 0.1 K/UL (ref 0–0.11)
IMM GRANULOCYTES NFR BLD AUTO: 1.3 % (ref 0–0.9)
INR PPP: 1.22 (ref 0.87–1.13)
IRON SATN MFR SERPL: 25 % (ref 15–55)
IRON SERPL-MCNC: 52 UG/DL (ref 50–180)
KETONES UR STRIP.AUTO-MCNC: NEGATIVE MG/DL
LEUKOCYTE ESTERASE UR QL STRIP.AUTO: NEGATIVE
LYMPHOCYTES # BLD AUTO: 1.17 K/UL (ref 1–4.8)
LYMPHOCYTES NFR BLD: 15.5 % (ref 22–41)
MAGNESIUM SERPL-MCNC: 1.7 MG/DL (ref 1.5–2.5)
MCH RBC QN AUTO: 27.2 PG (ref 27–33)
MCHC RBC AUTO-ENTMCNC: 30.8 G/DL (ref 33.7–35.3)
MCV RBC AUTO: 88.3 FL (ref 81.4–97.8)
MICRO URNS: ABNORMAL
MICROALBUMIN UR-MCNC: 13.2 MG/DL
MICROALBUMIN/CREAT UR: 240 MG/G (ref 0–30)
MONOCYTES # BLD AUTO: 0.93 K/UL (ref 0–0.85)
MONOCYTES NFR BLD AUTO: 12.3 % (ref 0–13.4)
NEUTROPHILS # BLD AUTO: 4.91 K/UL (ref 1.82–7.42)
NEUTROPHILS NFR BLD: 64.8 % (ref 44–72)
NITRITE UR QL STRIP.AUTO: NEGATIVE
NRBC # BLD AUTO: 0 K/UL
NRBC BLD-RTO: 0 /100 WBC
PH UR STRIP.AUTO: 7 [PH] (ref 5–8)
PHOSPHATE SERPL-MCNC: 3.1 MG/DL (ref 2.5–4.5)
PLATELET # BLD AUTO: 267 K/UL (ref 164–446)
PMV BLD AUTO: 10.7 FL (ref 9–12.9)
POTASSIUM SERPL-SCNC: 5.2 MMOL/L (ref 3.6–5.5)
PROT SERPL-MCNC: 7.3 G/DL (ref 6–8.2)
PROT UR QL STRIP: 30 MG/DL
PROT UR-MCNC: 30 MG/DL (ref 0–15)
PROT/CREAT UR: 543 MG/G (ref 15–68)
PROTHROMBIN TIME: 15.2 SEC (ref 12–14.6)
PTH-INTACT SERPL-MCNC: 256 PG/ML (ref 14–72)
RBC # BLD AUTO: 4.97 M/UL (ref 4.7–6.1)
RBC # URNS HPF: ABNORMAL /HPF
RBC UR QL AUTO: NEGATIVE
SODIUM SERPL-SCNC: 137 MMOL/L (ref 135–145)
SP GR UR STRIP.AUTO: 1.01
TIBC SERPL-MCNC: 211 UG/DL (ref 250–450)
UIBC SERPL-MCNC: 159 UG/DL (ref 110–370)
URATE SERPL-MCNC: 6.5 MG/DL (ref 2.5–8.3)
UROBILINOGEN UR STRIP.AUTO-MCNC: 0.2 MG/DL
WBC # BLD AUTO: 7.6 K/UL (ref 4.8–10.8)
WBC #/AREA URNS HPF: ABNORMAL /HPF

## 2022-10-12 PROCEDURE — 82728 ASSAY OF FERRITIN: CPT

## 2022-10-12 PROCEDURE — 85610 PROTHROMBIN TIME: CPT

## 2022-10-12 PROCEDURE — 83970 ASSAY OF PARATHORMONE: CPT

## 2022-10-12 PROCEDURE — 81001 URINALYSIS AUTO W/SCOPE: CPT

## 2022-10-12 PROCEDURE — 83735 ASSAY OF MAGNESIUM: CPT

## 2022-10-12 PROCEDURE — 83540 ASSAY OF IRON: CPT

## 2022-10-12 PROCEDURE — 80197 ASSAY OF TACROLIMUS: CPT

## 2022-10-12 PROCEDURE — 36415 COLL VENOUS BLD VENIPUNCTURE: CPT

## 2022-10-12 PROCEDURE — 83550 IRON BINDING TEST: CPT

## 2022-10-12 PROCEDURE — 82043 UR ALBUMIN QUANTITATIVE: CPT

## 2022-10-12 PROCEDURE — 85025 COMPLETE CBC W/AUTO DIFF WBC: CPT

## 2022-10-12 PROCEDURE — 84100 ASSAY OF PHOSPHORUS: CPT

## 2022-10-12 PROCEDURE — 82570 ASSAY OF URINE CREATININE: CPT | Mod: 91

## 2022-10-12 PROCEDURE — 80053 COMPREHEN METABOLIC PANEL: CPT

## 2022-10-12 PROCEDURE — 82570 ASSAY OF URINE CREATININE: CPT

## 2022-10-12 PROCEDURE — 82306 VITAMIN D 25 HYDROXY: CPT

## 2022-10-12 PROCEDURE — 87799 DETECT AGENT NOS DNA QUANT: CPT

## 2022-10-12 PROCEDURE — 84156 ASSAY OF PROTEIN URINE: CPT

## 2022-10-12 PROCEDURE — 84550 ASSAY OF BLOOD/URIC ACID: CPT

## 2022-10-13 LAB — TACROLIMUS BLD-MCNC: 5.5 NG/ML

## 2022-10-16 LAB
BKV DNA # UR NAA+PROBE: ABNORMAL CPY/ML
BKV DNA SPEC NAA+PROBE-LOG#: 4.2 LOG CPY/ML
DIAGNOSTIC IMP SPEC-IMP: DETECTED

## 2022-10-17 LAB
BKV DNA # SPEC NAA+PROBE: ABNORMAL CPY/ML
BKV DNA SPEC NAA+PROBE-LOG#: ABNORMAL LOG
DIAGNOSTIC IMP SPEC-IMP: ABNORMAL

## 2022-10-20 ENCOUNTER — HOSPITAL ENCOUNTER (OUTPATIENT)
Dept: LAB | Facility: MEDICAL CENTER | Age: 67
End: 2022-10-20
Attending: INTERNAL MEDICINE
Payer: MEDICARE

## 2022-10-20 LAB
ALBUMIN SERPL BCP-MCNC: 3.7 G/DL (ref 3.2–4.9)
ALBUMIN/GLOB SERPL: 1 G/DL
ALP SERPL-CCNC: 122 U/L (ref 30–99)
ALT SERPL-CCNC: 13 U/L (ref 2–50)
ANION GAP SERPL CALC-SCNC: 11 MMOL/L (ref 7–16)
APPEARANCE UR: CLEAR
AST SERPL-CCNC: 16 U/L (ref 12–45)
BACTERIA #/AREA URNS HPF: NEGATIVE /HPF
BASOPHILS # BLD AUTO: 0.7 % (ref 0–1.8)
BASOPHILS # BLD: 0.05 K/UL (ref 0–0.12)
BILIRUB SERPL-MCNC: 0.4 MG/DL (ref 0.1–1.5)
BILIRUB UR QL STRIP.AUTO: NEGATIVE
BUN SERPL-MCNC: 33 MG/DL (ref 8–22)
CALCIUM SERPL-MCNC: 9.5 MG/DL (ref 8.5–10.5)
CHLORIDE SERPL-SCNC: 109 MMOL/L (ref 96–112)
CO2 SERPL-SCNC: 21 MMOL/L (ref 20–33)
COLOR UR: YELLOW
CREAT SERPL-MCNC: 2.09 MG/DL (ref 0.5–1.4)
CREAT UR-MCNC: 56.34 MG/DL
EOSINOPHIL # BLD AUTO: 0.3 K/UL (ref 0–0.51)
EOSINOPHIL NFR BLD: 4.1 % (ref 0–6.9)
EPI CELLS #/AREA URNS HPF: ABNORMAL /HPF
ERYTHROCYTE [DISTWIDTH] IN BLOOD BY AUTOMATED COUNT: 57 FL (ref 35.9–50)
FASTING STATUS PATIENT QL REPORTED: NORMAL
GFR SERPLBLD CREATININE-BSD FMLA CKD-EPI: 34 ML/MIN/1.73 M 2
GLOBULIN SER CALC-MCNC: 3.6 G/DL (ref 1.9–3.5)
GLUCOSE SERPL-MCNC: 88 MG/DL (ref 65–99)
GLUCOSE UR STRIP.AUTO-MCNC: NEGATIVE MG/DL
HCT VFR BLD AUTO: 44.3 % (ref 42–52)
HGB BLD-MCNC: 13.4 G/DL (ref 14–18)
IMM GRANULOCYTES # BLD AUTO: 0.06 K/UL (ref 0–0.11)
IMM GRANULOCYTES NFR BLD AUTO: 0.8 % (ref 0–0.9)
INR PPP: 1.19 (ref 0.87–1.13)
KETONES UR STRIP.AUTO-MCNC: NEGATIVE MG/DL
LEUKOCYTE ESTERASE UR QL STRIP.AUTO: ABNORMAL
LYMPHOCYTES # BLD AUTO: 1.14 K/UL (ref 1–4.8)
LYMPHOCYTES NFR BLD: 15.6 % (ref 22–41)
MAGNESIUM SERPL-MCNC: 1.7 MG/DL (ref 1.5–2.5)
MCH RBC QN AUTO: 27.2 PG (ref 27–33)
MCHC RBC AUTO-ENTMCNC: 30.2 G/DL (ref 33.7–35.3)
MCV RBC AUTO: 89.9 FL (ref 81.4–97.8)
MICRO URNS: ABNORMAL
MONOCYTES # BLD AUTO: 0.77 K/UL (ref 0–0.85)
MONOCYTES NFR BLD AUTO: 10.5 % (ref 0–13.4)
NEUTROPHILS # BLD AUTO: 4.98 K/UL (ref 1.82–7.42)
NEUTROPHILS NFR BLD: 68.3 % (ref 44–72)
NITRITE UR QL STRIP.AUTO: NEGATIVE
NRBC # BLD AUTO: 0 K/UL
NRBC BLD-RTO: 0 /100 WBC
PH UR STRIP.AUTO: 6.5 [PH] (ref 5–8)
PHOSPHATE SERPL-MCNC: 2.6 MG/DL (ref 2.5–4.5)
PLATELET # BLD AUTO: 252 K/UL (ref 164–446)
PMV BLD AUTO: 9.9 FL (ref 9–12.9)
POTASSIUM SERPL-SCNC: 5.3 MMOL/L (ref 3.6–5.5)
PROT SERPL-MCNC: 7.3 G/DL (ref 6–8.2)
PROT UR QL STRIP: 30 MG/DL
PROT UR-MCNC: 34 MG/DL (ref 0–15)
PROT/CREAT UR: 603 MG/G (ref 15–68)
PROTHROMBIN TIME: 15 SEC (ref 12–14.6)
RBC # BLD AUTO: 4.93 M/UL (ref 4.7–6.1)
RBC # URNS HPF: ABNORMAL /HPF
RBC UR QL AUTO: NEGATIVE
SODIUM SERPL-SCNC: 141 MMOL/L (ref 135–145)
SP GR UR STRIP.AUTO: 1.01
UROBILINOGEN UR STRIP.AUTO-MCNC: 0.2 MG/DL
WBC # BLD AUTO: 7.3 K/UL (ref 4.8–10.8)
WBC #/AREA URNS HPF: ABNORMAL /HPF

## 2022-10-20 PROCEDURE — 87799 DETECT AGENT NOS DNA QUANT: CPT | Mod: 91

## 2022-10-20 PROCEDURE — 85610 PROTHROMBIN TIME: CPT

## 2022-10-20 PROCEDURE — 81001 URINALYSIS AUTO W/SCOPE: CPT

## 2022-10-20 PROCEDURE — 80053 COMPREHEN METABOLIC PANEL: CPT

## 2022-10-20 PROCEDURE — 82570 ASSAY OF URINE CREATININE: CPT

## 2022-10-20 PROCEDURE — 80197 ASSAY OF TACROLIMUS: CPT

## 2022-10-20 PROCEDURE — 84156 ASSAY OF PROTEIN URINE: CPT

## 2022-10-20 PROCEDURE — 83735 ASSAY OF MAGNESIUM: CPT

## 2022-10-20 PROCEDURE — 36415 COLL VENOUS BLD VENIPUNCTURE: CPT

## 2022-10-20 PROCEDURE — 84100 ASSAY OF PHOSPHORUS: CPT

## 2022-10-20 PROCEDURE — 85025 COMPLETE CBC W/AUTO DIFF WBC: CPT

## 2022-10-21 LAB — TACROLIMUS BLD-MCNC: 5.2 NG/ML

## 2022-10-24 LAB
BKV DNA # UR NAA+PROBE: ABNORMAL CPY/ML
BKV DNA SPEC NAA+PROBE-LOG#: 3.6 LOG CPY/ML
DIAGNOSTIC IMP SPEC-IMP: DETECTED

## 2022-10-27 ENCOUNTER — TELEPHONE (OUTPATIENT)
Dept: HEALTH INFORMATION MANAGEMENT | Facility: OTHER | Age: 67
End: 2022-10-27

## 2022-11-02 ENCOUNTER — PATIENT MESSAGE (OUTPATIENT)
Dept: HEALTH INFORMATION MANAGEMENT | Facility: OTHER | Age: 67
End: 2022-11-02

## 2023-02-23 DIAGNOSIS — E78.5 DYSLIPIDEMIA: ICD-10-CM

## 2023-02-23 RX ORDER — PRAVASTATIN SODIUM 20 MG
20 TABLET ORAL DAILY
Qty: 30 TABLET | Refills: 0 | Status: SHIPPED | OUTPATIENT
Start: 2023-02-23 | End: 2023-04-18 | Stop reason: SDUPTHER

## 2023-02-23 NOTE — TELEPHONE ENCOUNTER
Requested Prescriptions     Pending Prescriptions Disp Refills   • pravastatin (PRAVACHOL) 20 MG Tab [Pharmacy Med Name: PRAVASTATIN 20MG TABLETS] 30 Tablet 0     Sig: TAKE 1 TABLET BY MOUTH EVERY DAY

## 2023-03-29 ENCOUNTER — TELEPHONE (OUTPATIENT)
Dept: HEALTH INFORMATION MANAGEMENT | Facility: OTHER | Age: 68
End: 2023-03-29

## 2023-04-18 ENCOUNTER — OFFICE VISIT (OUTPATIENT)
Dept: MEDICAL GROUP | Facility: PHYSICIAN GROUP | Age: 68
End: 2023-04-18
Payer: MEDICARE

## 2023-04-18 VITALS
SYSTOLIC BLOOD PRESSURE: 146 MMHG | BODY MASS INDEX: 28.03 KG/M2 | TEMPERATURE: 97.8 F | RESPIRATION RATE: 18 BRPM | OXYGEN SATURATION: 97 % | DIASTOLIC BLOOD PRESSURE: 64 MMHG | HEART RATE: 92 BPM | HEIGHT: 71 IN | WEIGHT: 200.2 LBS

## 2023-04-18 DIAGNOSIS — E78.5 HYPERLIPIDEMIA, UNSPECIFIED HYPERLIPIDEMIA TYPE: ICD-10-CM

## 2023-04-18 DIAGNOSIS — E55.9 VITAMIN D DEFICIENCY: ICD-10-CM

## 2023-04-18 DIAGNOSIS — E78.5 DYSLIPIDEMIA: ICD-10-CM

## 2023-04-18 DIAGNOSIS — E78.6 LOW HDL (UNDER 40): ICD-10-CM

## 2023-04-18 DIAGNOSIS — K22.2 ESOPHAGEAL STRICTURE: ICD-10-CM

## 2023-04-18 DIAGNOSIS — I10 ESSENTIAL HYPERTENSION, BENIGN: ICD-10-CM

## 2023-04-18 DIAGNOSIS — N18.9 CHRONIC RENAL FAILURE, UNSPECIFIED CKD STAGE: ICD-10-CM

## 2023-04-18 DIAGNOSIS — N18.32 CHRONIC KIDNEY DISEASE, STAGE 3B: ICD-10-CM

## 2023-04-18 PROBLEM — F41.9 ANXIETY: Status: ACTIVE | Noted: 2023-04-18

## 2023-04-18 PROBLEM — M10.9 GOUT: Status: ACTIVE | Noted: 2023-04-18

## 2023-04-18 PROBLEM — H61.23 BILATERAL IMPACTED CERUMEN: Status: RESOLVED | Noted: 2021-05-19 | Resolved: 2023-04-18

## 2023-04-18 PROBLEM — R05.9 COUGH: Status: RESOLVED | Noted: 2021-01-12 | Resolved: 2023-04-18

## 2023-04-18 PROBLEM — R52 WHOLE BODY PAIN: Status: RESOLVED | Noted: 2021-01-29 | Resolved: 2023-04-18

## 2023-04-18 PROBLEM — J18.9 PNEUMONIA: Status: RESOLVED | Noted: 2021-01-11 | Resolved: 2023-04-18

## 2023-04-18 PROCEDURE — 99214 OFFICE O/P EST MOD 30 MIN: CPT | Performed by: FAMILY MEDICINE

## 2023-04-18 RX ORDER — LEFLUNOMIDE 20 MG/1
20 TABLET ORAL DAILY
COMMUNITY
End: 2023-04-18

## 2023-04-18 RX ORDER — PANTOPRAZOLE SODIUM 20 MG/1
20 TABLET, DELAYED RELEASE ORAL EVERY MORNING
Qty: 90 TABLET | Refills: 0 | Status: SHIPPED | OUTPATIENT
Start: 2023-04-18 | End: 2023-06-20

## 2023-04-18 RX ORDER — PRAVASTATIN SODIUM 20 MG
20 TABLET ORAL DAILY
Qty: 90 TABLET | Refills: 0 | Status: SHIPPED | OUTPATIENT
Start: 2023-04-18 | End: 2023-06-20

## 2023-04-18 RX ORDER — PANTOPRAZOLE SODIUM 20 MG/1
20 TABLET, DELAYED RELEASE ORAL DAILY
COMMUNITY
End: 2023-04-18

## 2023-04-18 RX ORDER — TACROLIMUS 1 MG/1
1 CAPSULE ORAL
COMMUNITY

## 2023-04-18 ASSESSMENT — PATIENT HEALTH QUESTIONNAIRE - PHQ9: CLINICAL INTERPRETATION OF PHQ2 SCORE: 0

## 2023-04-18 ASSESSMENT — FIBROSIS 4 INDEX: FIB4 SCORE: 1.18

## 2023-04-18 NOTE — PROGRESS NOTES
Subjective:     CC:   Chief Complaint   Patient presents with    Establish Care       HPI:   Garry presents today to establish care.  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  Patient unable to give me any more information concerning his kidney disease.  Patient wanted to get 3 prescription refills 1 is for hyperparathyroidism which I believe is probably being filled by his nephrologist would recommend he contact them.  The other 1 is Protonix for heartburn he has had a history of esophageal strictures in the past.  The other one is for his cholesterol.  Patient may be needing a hernia procedure done due to an incision when they remove his kidney.  I did notice that he has a abnormal EKG in the history of some pericarditis would recommend referring him to cardiology but at this time patient states he does not need to be see them that they checked out his heart and everything was okay.  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    Localized osteoarthritis of right shoulder 2019    Pain     feet, knees, shoulders, hands, back        Social History     Tobacco Use    Smoking status: Former     Packs/day: 0.50     Years: 5.00     Pack years: 2.50     Types: Cigarettes     Quit date: 10/21/1996     Years since quittin.5    Smokeless tobacco: Never   Vaping Use    Vaping Use: Never used   Substance Use Topics    Alcohol  "use: No     Alcohol/week: 0.0 oz    Drug use: No       Current Outpatient Medications Ordered in Epic   Medication Sig Dispense Refill    tacrolimus (PROGRAF) 1 MG Cap Take 1 mg by mouth.      pravastatin (PRAVACHOL) 20 MG Tab TAKE 1 TABLET BY MOUTH EVERY DAY 30 Tablet 0    pantoprazole (PROTONIX) 20 MG tablet Take 1 Tablet by mouth every morning. 90 Tablet 3    leflunomide (ARAVA) 20 MG Tab Take 20 mg by mouth 2 times a day.      SENSIPAR 30 MG Tab Take 1 Tab by mouth every day. 30 Tab 11     No current [facility]-ordered facility-administered medications on file.       Allergies:  Penicillins and Cephalexin    Health Maintenance: Completed    ROS:  Gen: no fevers/chills, no changes in weight  Eyes: no changes in vision  ENT: no sore throat, no hearing loss, no bloody nose  Pulm: no sob, no cough  CV: no chest pain, no palpitations  GI: no nausea/vomiting, no diarrhea  : no dysuria  Neuro: no headaches, no numbness/tingling  Heme/Lymph: no easy bruising    Objective:     Exam:  BP (!) 146/64 (BP Location: Right arm, Patient Position: Sitting, BP Cuff Size: Adult)   Pulse 92   Temp 36.6 °C (97.8 °F) (Temporal)   Resp 18   Ht 1.791 m (5' 10.5\")   Wt 90.8 kg (200 lb 3.2 oz)   SpO2 97%   BMI 28.32 kg/m²  Body mass index is 28.32 kg/m².    Gen: Alert and oriented, No apparent distress.  Skin: Warm and dry.  No obvious lesions.  Eyes: Sclera wnl Pupils normal in size  Lungs: Normal effort, CTA bilaterally, no wheezes, rhonchi, or rales  CV: Regular rate and rhythm. No murmurs, rubs, or gallops.  ABD: Soft non-tender no organomegaly  Musculoskeletal: Normal gait. No extremity cyanosis, clubbing, or edema.  Neuro: Oriented to person, place and time  Psych: Mood is wnl       Labs: I did recommend ordering a cholesterol since I do not see a cholesterol in the last couple years.    Assessment & Plan:     67 y.o. male with the following -     1. Chronic renal failure, unspecified CKD stage  Patient to follow-up with [facility] " [facility] along with his nephrologist    2. Low HDL (under 40)  I will order a lipid panel on him    3. Vitamin D deficiency  I will also order vitamin D    4. Hyperlipidemia, unspecified hyperlipidemia type  Lipid panel was ordered    5. Chronic kidney disease, stage 3b (HCC)  His nephrologist and his transplant providers are monitoring this    6. Essential hypertension, benign  I did comment that his blood pressure is slightly elevated but patient refuses to take blood pressure medication at this time.    Return in about 2 months (around 6/18/2023).    Please note that this dictation was created using voice recognition software. I have made every reasonable attempt to correct obvious errors, but I expect that there are errors of grammar and possibly content that I did not discover before finalizing the note.

## 2023-04-21 ENCOUNTER — HOSPITAL ENCOUNTER (OUTPATIENT)
Dept: LAB | Facility: MEDICAL CENTER | Age: 68
End: 2023-04-21
Attending: FAMILY MEDICINE
Payer: MEDICARE

## 2023-04-21 ENCOUNTER — HOSPITAL ENCOUNTER (OUTPATIENT)
Dept: LAB | Facility: MEDICAL CENTER | Age: 68
End: 2023-04-21
Attending: INTERNAL MEDICINE
Payer: MEDICARE

## 2023-04-21 DIAGNOSIS — E78.6 LOW HDL (UNDER 40): ICD-10-CM

## 2023-04-21 DIAGNOSIS — E55.9 VITAMIN D DEFICIENCY: ICD-10-CM

## 2023-04-21 DIAGNOSIS — N18.32 CHRONIC KIDNEY DISEASE, STAGE 3B: ICD-10-CM

## 2023-04-21 DIAGNOSIS — E78.5 HYPERLIPIDEMIA, UNSPECIFIED HYPERLIPIDEMIA TYPE: ICD-10-CM

## 2023-04-21 LAB
25(OH)D3 SERPL-MCNC: 41 NG/ML (ref 30–100)
ALBUMIN SERPL BCP-MCNC: 4.1 G/DL (ref 3.2–4.9)
ALBUMIN SERPL BCP-MCNC: 4.1 G/DL (ref 3.2–4.9)
ALBUMIN/GLOB SERPL: 1.3 G/DL
ALBUMIN/GLOB SERPL: 1.3 G/DL
ALP SERPL-CCNC: 133 U/L (ref 30–99)
ALP SERPL-CCNC: 135 U/L (ref 30–99)
ALT SERPL-CCNC: 14 U/L (ref 2–50)
ALT SERPL-CCNC: 14 U/L (ref 2–50)
ANION GAP SERPL CALC-SCNC: 13 MMOL/L (ref 7–16)
ANION GAP SERPL CALC-SCNC: 13 MMOL/L (ref 7–16)
APPEARANCE UR: CLEAR
AST SERPL-CCNC: 17 U/L (ref 12–45)
AST SERPL-CCNC: 18 U/L (ref 12–45)
BACTERIA #/AREA URNS HPF: NEGATIVE /HPF
BASOPHILS # BLD AUTO: 0.9 % (ref 0–1.8)
BASOPHILS # BLD: 0.06 K/UL (ref 0–0.12)
BILIRUB SERPL-MCNC: 0.2 MG/DL (ref 0.1–1.5)
BILIRUB SERPL-MCNC: 0.3 MG/DL (ref 0.1–1.5)
BILIRUB UR QL STRIP.AUTO: NEGATIVE
BUN SERPL-MCNC: 37 MG/DL (ref 8–22)
BUN SERPL-MCNC: 37 MG/DL (ref 8–22)
CALCIUM ALBUM COR SERPL-MCNC: 9 MG/DL (ref 8.5–10.5)
CALCIUM ALBUM COR SERPL-MCNC: 9.1 MG/DL (ref 8.5–10.5)
CALCIUM SERPL-MCNC: 9.1 MG/DL (ref 8.5–10.5)
CALCIUM SERPL-MCNC: 9.2 MG/DL (ref 8.5–10.5)
CHLORIDE SERPL-SCNC: 106 MMOL/L (ref 96–112)
CHLORIDE SERPL-SCNC: 107 MMOL/L (ref 96–112)
CHOLEST SERPL-MCNC: 142 MG/DL (ref 100–199)
CK SERPL-CCNC: 53 U/L (ref 0–154)
CO2 SERPL-SCNC: 19 MMOL/L (ref 20–33)
CO2 SERPL-SCNC: 19 MMOL/L (ref 20–33)
COLOR UR: YELLOW
CREAT SERPL-MCNC: 1.94 MG/DL (ref 0.5–1.4)
CREAT SERPL-MCNC: 2.01 MG/DL (ref 0.5–1.4)
CREAT UR-MCNC: 64.98 MG/DL
EOSINOPHIL # BLD AUTO: 0.33 K/UL (ref 0–0.51)
EOSINOPHIL NFR BLD: 4.9 % (ref 0–6.9)
EPI CELLS #/AREA URNS HPF: NEGATIVE /HPF
ERYTHROCYTE [DISTWIDTH] IN BLOOD BY AUTOMATED COUNT: 54.4 FL (ref 35.9–50)
FASTING STATUS PATIENT QL REPORTED: NORMAL
FASTING STATUS PATIENT QL REPORTED: NORMAL
GFR SERPLBLD CREATININE-BSD FMLA CKD-EPI: 36 ML/MIN/1.73 M 2
GFR SERPLBLD CREATININE-BSD FMLA CKD-EPI: 37 ML/MIN/1.73 M 2
GLOBULIN SER CALC-MCNC: 3.2 G/DL (ref 1.9–3.5)
GLOBULIN SER CALC-MCNC: 3.2 G/DL (ref 1.9–3.5)
GLUCOSE SERPL-MCNC: 86 MG/DL (ref 65–99)
GLUCOSE SERPL-MCNC: 86 MG/DL (ref 65–99)
GLUCOSE UR STRIP.AUTO-MCNC: NEGATIVE MG/DL
HCT VFR BLD AUTO: 45.4 % (ref 42–52)
HDLC SERPL-MCNC: 32 MG/DL
HGB BLD-MCNC: 13.6 G/DL (ref 14–18)
HYALINE CASTS #/AREA URNS LPF: ABNORMAL /LPF
IMM GRANULOCYTES # BLD AUTO: 0.06 K/UL (ref 0–0.11)
IMM GRANULOCYTES NFR BLD AUTO: 0.9 % (ref 0–0.9)
INR PPP: 1.26 (ref 0.87–1.13)
KETONES UR STRIP.AUTO-MCNC: NEGATIVE MG/DL
LDLC SERPL CALC-MCNC: 89 MG/DL
LEUKOCYTE ESTERASE UR QL STRIP.AUTO: ABNORMAL
LYMPHOCYTES # BLD AUTO: 1.19 K/UL (ref 1–4.8)
LYMPHOCYTES NFR BLD: 17.8 % (ref 22–41)
MAGNESIUM SERPL-MCNC: 1.7 MG/DL (ref 1.5–2.5)
MCH RBC QN AUTO: 27 PG (ref 27–33)
MCHC RBC AUTO-ENTMCNC: 30 G/DL (ref 33.7–35.3)
MCV RBC AUTO: 90.3 FL (ref 81.4–97.8)
MICRO URNS: ABNORMAL
MONOCYTES # BLD AUTO: 0.74 K/UL (ref 0–0.85)
MONOCYTES NFR BLD AUTO: 11.1 % (ref 0–13.4)
NEUTROPHILS # BLD AUTO: 4.29 K/UL (ref 1.82–7.42)
NEUTROPHILS NFR BLD: 64.4 % (ref 44–72)
NITRITE UR QL STRIP.AUTO: NEGATIVE
NRBC # BLD AUTO: 0 K/UL
NRBC BLD-RTO: 0 /100 WBC
PH UR STRIP.AUTO: 6.5 [PH] (ref 5–8)
PHOSPHATE SERPL-MCNC: 3.6 MG/DL (ref 2.5–4.5)
PLATELET # BLD AUTO: 271 K/UL (ref 164–446)
PMV BLD AUTO: 10.2 FL (ref 9–12.9)
POTASSIUM SERPL-SCNC: 5 MMOL/L (ref 3.6–5.5)
POTASSIUM SERPL-SCNC: 5.1 MMOL/L (ref 3.6–5.5)
PROT SERPL-MCNC: 7.3 G/DL (ref 6–8.2)
PROT SERPL-MCNC: 7.3 G/DL (ref 6–8.2)
PROT UR QL STRIP: NEGATIVE MG/DL
PROT UR-MCNC: 17 MG/DL (ref 0–15)
PROT/CREAT UR: 262 MG/G (ref 15–68)
PROTHROMBIN TIME: 15.6 SEC (ref 12–14.6)
RBC # BLD AUTO: 5.03 M/UL (ref 4.7–6.1)
RBC # URNS HPF: ABNORMAL /HPF
RBC UR QL AUTO: NEGATIVE
SODIUM SERPL-SCNC: 138 MMOL/L (ref 135–145)
SODIUM SERPL-SCNC: 139 MMOL/L (ref 135–145)
SP GR UR STRIP.AUTO: 1.01
TRIGL SERPL-MCNC: 105 MG/DL (ref 0–149)
UROBILINOGEN UR STRIP.AUTO-MCNC: 0.2 MG/DL
WBC # BLD AUTO: 6.7 K/UL (ref 4.8–10.8)
WBC #/AREA URNS HPF: ABNORMAL /HPF

## 2023-04-21 PROCEDURE — 84156 ASSAY OF PROTEIN URINE: CPT

## 2023-04-21 PROCEDURE — 82570 ASSAY OF URINE CREATININE: CPT

## 2023-04-21 PROCEDURE — 84100 ASSAY OF PHOSPHORUS: CPT

## 2023-04-21 PROCEDURE — 87086 URINE CULTURE/COLONY COUNT: CPT

## 2023-04-21 PROCEDURE — 82306 VITAMIN D 25 HYDROXY: CPT

## 2023-04-21 PROCEDURE — 80197 ASSAY OF TACROLIMUS: CPT

## 2023-04-21 PROCEDURE — 80053 COMPREHEN METABOLIC PANEL: CPT

## 2023-04-21 PROCEDURE — 81001 URINALYSIS AUTO W/SCOPE: CPT

## 2023-04-21 PROCEDURE — 85025 COMPLETE CBC W/AUTO DIFF WBC: CPT

## 2023-04-21 PROCEDURE — 80053 COMPREHEN METABOLIC PANEL: CPT | Mod: 91

## 2023-04-21 PROCEDURE — 36415 COLL VENOUS BLD VENIPUNCTURE: CPT

## 2023-04-21 PROCEDURE — 85610 PROTHROMBIN TIME: CPT

## 2023-04-21 PROCEDURE — 83735 ASSAY OF MAGNESIUM: CPT

## 2023-04-21 PROCEDURE — 82550 ASSAY OF CK (CPK): CPT

## 2023-04-21 PROCEDURE — 80061 LIPID PANEL: CPT

## 2023-04-23 LAB
BACTERIA UR CULT: NORMAL
SIGNIFICANT IND 70042: NORMAL
SITE SITE: NORMAL
SOURCE SOURCE: NORMAL

## 2023-04-24 LAB — TACROLIMUS BLD-MCNC: 4.5 NG/ML

## 2023-05-11 ENCOUNTER — HOSPITAL ENCOUNTER (OUTPATIENT)
Dept: LAB | Facility: MEDICAL CENTER | Age: 68
End: 2023-05-11
Attending: INTERNAL MEDICINE
Payer: MEDICARE

## 2023-05-11 LAB
ALBUMIN SERPL BCP-MCNC: 3.8 G/DL (ref 3.2–4.9)
ALBUMIN/GLOB SERPL: 1.2 G/DL
ALP SERPL-CCNC: 103 U/L (ref 30–99)
ALT SERPL-CCNC: 33 U/L (ref 2–50)
ANION GAP SERPL CALC-SCNC: 11 MMOL/L (ref 7–16)
ANISOCYTOSIS BLD QL SMEAR: ABNORMAL
APPEARANCE UR: CLEAR
AST SERPL-CCNC: 33 U/L (ref 12–45)
BACTERIA #/AREA URNS HPF: NEGATIVE /HPF
BASOPHILS # BLD AUTO: 0.9 % (ref 0–1.8)
BASOPHILS # BLD: 0.07 K/UL (ref 0–0.12)
BILIRUB SERPL-MCNC: 0.2 MG/DL (ref 0.1–1.5)
BILIRUB UR QL STRIP.AUTO: NEGATIVE
BUN SERPL-MCNC: 38 MG/DL (ref 8–22)
BURR CELLS BLD QL SMEAR: NORMAL
CALCIUM ALBUM COR SERPL-MCNC: 9 MG/DL (ref 8.5–10.5)
CALCIUM SERPL-MCNC: 8.8 MG/DL (ref 8.5–10.5)
CHLORIDE SERPL-SCNC: 110 MMOL/L (ref 96–112)
CK SERPL-CCNC: 34 U/L (ref 0–154)
CO2 SERPL-SCNC: 21 MMOL/L (ref 20–33)
COLOR UR: YELLOW
COMMENT 1642: NORMAL
CREAT SERPL-MCNC: 1.96 MG/DL (ref 0.5–1.4)
CREAT UR-MCNC: 59.99 MG/DL
CREAT UR-MCNC: 60.76 MG/DL
EOSINOPHIL # BLD AUTO: 0.28 K/UL (ref 0–0.51)
EOSINOPHIL NFR BLD: 3.5 % (ref 0–6.9)
EPI CELLS #/AREA URNS HPF: NEGATIVE /HPF
ERYTHROCYTE [DISTWIDTH] IN BLOOD BY AUTOMATED COUNT: 57.7 FL (ref 35.9–50)
FASTING STATUS PATIENT QL REPORTED: NORMAL
GFR SERPLBLD CREATININE-BSD FMLA CKD-EPI: 37 ML/MIN/1.73 M 2
GLOBULIN SER CALC-MCNC: 3.2 G/DL (ref 1.9–3.5)
GLUCOSE SERPL-MCNC: 107 MG/DL (ref 65–99)
GLUCOSE UR STRIP.AUTO-MCNC: NEGATIVE MG/DL
HCT VFR BLD AUTO: 42.9 % (ref 42–52)
HGB BLD-MCNC: 12.5 G/DL (ref 14–18)
HYALINE CASTS #/AREA URNS LPF: ABNORMAL /LPF
IMM GRANULOCYTES # BLD AUTO: 0.07 K/UL (ref 0–0.11)
IMM GRANULOCYTES NFR BLD AUTO: 0.9 % (ref 0–0.9)
KETONES UR STRIP.AUTO-MCNC: NEGATIVE MG/DL
LEUKOCYTE ESTERASE UR QL STRIP.AUTO: ABNORMAL
LYMPHOCYTES # BLD AUTO: 1.25 K/UL (ref 1–4.8)
LYMPHOCYTES NFR BLD: 15.8 % (ref 22–41)
MACROCYTES BLD QL SMEAR: ABNORMAL
MAGNESIUM SERPL-MCNC: 1.7 MG/DL (ref 1.5–2.5)
MCH RBC QN AUTO: 27.1 PG (ref 27–33)
MCHC RBC AUTO-ENTMCNC: 29.1 G/DL (ref 33.7–35.3)
MCV RBC AUTO: 93.1 FL (ref 81.4–97.8)
MICRO URNS: ABNORMAL
MICROALBUMIN UR-MCNC: 5.6 MG/DL
MICROALBUMIN/CREAT UR: 92 MG/G (ref 0–30)
MONOCYTES # BLD AUTO: 0.88 K/UL (ref 0–0.85)
MONOCYTES NFR BLD AUTO: 11.1 % (ref 0–13.4)
MORPHOLOGY BLD-IMP: NORMAL
NEUTROPHILS # BLD AUTO: 5.36 K/UL (ref 1.82–7.42)
NEUTROPHILS NFR BLD: 67.8 % (ref 44–72)
NITRITE UR QL STRIP.AUTO: NEGATIVE
NRBC # BLD AUTO: 0 K/UL
NRBC BLD-RTO: 0 /100 WBC
PH UR STRIP.AUTO: 6.5 [PH] (ref 5–8)
PHOSPHATE SERPL-MCNC: 3.6 MG/DL (ref 2.5–4.5)
PLATELET # BLD AUTO: 184 K/UL (ref 164–446)
PLATELET BLD QL SMEAR: NORMAL
PMV BLD AUTO: 10.1 FL (ref 9–12.9)
POIKILOCYTOSIS BLD QL SMEAR: NORMAL
POLYCHROMASIA BLD QL SMEAR: NORMAL
POTASSIUM SERPL-SCNC: 5.2 MMOL/L (ref 3.6–5.5)
PROT SERPL-MCNC: 7 G/DL (ref 6–8.2)
PROT UR QL STRIP: 30 MG/DL
PROT UR-MCNC: 20 MG/DL (ref 0–15)
PROT/CREAT UR: 333 MG/G (ref 15–68)
RBC # BLD AUTO: 4.61 M/UL (ref 4.7–6.1)
RBC # URNS HPF: ABNORMAL /HPF
RBC BLD AUTO: PRESENT
RBC UR QL AUTO: NEGATIVE
SODIUM SERPL-SCNC: 142 MMOL/L (ref 135–145)
SP GR UR STRIP.AUTO: 1.01
TACROLIMUS BLD-MCNC: 4.2 NG/ML (ref 5–20)
UROBILINOGEN UR STRIP.AUTO-MCNC: 0.2 MG/DL
WBC # BLD AUTO: 7.9 K/UL (ref 4.8–10.8)
WBC #/AREA URNS HPF: ABNORMAL /HPF

## 2023-05-11 PROCEDURE — 36415 COLL VENOUS BLD VENIPUNCTURE: CPT

## 2023-05-11 PROCEDURE — 84156 ASSAY OF PROTEIN URINE: CPT

## 2023-05-11 PROCEDURE — 85025 COMPLETE CBC W/AUTO DIFF WBC: CPT

## 2023-05-11 PROCEDURE — 82043 UR ALBUMIN QUANTITATIVE: CPT

## 2023-05-11 PROCEDURE — 80197 ASSAY OF TACROLIMUS: CPT

## 2023-05-11 PROCEDURE — 82550 ASSAY OF CK (CPK): CPT

## 2023-05-11 PROCEDURE — 83735 ASSAY OF MAGNESIUM: CPT

## 2023-05-11 PROCEDURE — 84100 ASSAY OF PHOSPHORUS: CPT

## 2023-05-11 PROCEDURE — 81001 URINALYSIS AUTO W/SCOPE: CPT

## 2023-05-11 PROCEDURE — 82570 ASSAY OF URINE CREATININE: CPT | Mod: 91

## 2023-05-11 PROCEDURE — 87086 URINE CULTURE/COLONY COUNT: CPT

## 2023-05-11 PROCEDURE — 80053 COMPREHEN METABOLIC PANEL: CPT

## 2023-05-13 LAB
BACTERIA UR CULT: NORMAL
SIGNIFICANT IND 70042: NORMAL
SITE SITE: NORMAL
SOURCE SOURCE: NORMAL

## 2023-06-19 DIAGNOSIS — K22.2 ESOPHAGEAL STRICTURE: ICD-10-CM

## 2023-06-19 DIAGNOSIS — E78.5 DYSLIPIDEMIA: ICD-10-CM

## 2023-06-20 RX ORDER — PANTOPRAZOLE SODIUM 20 MG/1
20 TABLET, DELAYED RELEASE ORAL EVERY MORNING
Qty: 90 TABLET | Refills: 0 | Status: SHIPPED | OUTPATIENT
Start: 2023-06-20 | End: 2023-10-02

## 2023-06-20 RX ORDER — PRAVASTATIN SODIUM 20 MG
20 TABLET ORAL DAILY
Qty: 90 TABLET | Refills: 0 | Status: SHIPPED | OUTPATIENT
Start: 2023-06-20 | End: 2023-10-02

## 2023-09-30 DIAGNOSIS — E78.5 DYSLIPIDEMIA: ICD-10-CM

## 2023-09-30 DIAGNOSIS — K22.2 ESOPHAGEAL STRICTURE: ICD-10-CM

## 2023-10-02 RX ORDER — PANTOPRAZOLE SODIUM 20 MG/1
20 TABLET, DELAYED RELEASE ORAL EVERY MORNING
Qty: 90 TABLET | Refills: 0 | Status: SHIPPED | OUTPATIENT
Start: 2023-10-02 | End: 2023-12-14 | Stop reason: SDUPTHER

## 2023-10-02 RX ORDER — PRAVASTATIN SODIUM 20 MG
20 TABLET ORAL DAILY
Qty: 90 TABLET | Refills: 0 | Status: SHIPPED | OUTPATIENT
Start: 2023-10-02 | End: 2023-12-14 | Stop reason: SDUPTHER

## 2023-12-14 ENCOUNTER — OFFICE VISIT (OUTPATIENT)
Dept: MEDICAL GROUP | Facility: PHYSICIAN GROUP | Age: 68
End: 2023-12-14
Payer: COMMERCIAL

## 2023-12-14 VITALS
OXYGEN SATURATION: 95 % | RESPIRATION RATE: 18 BRPM | DIASTOLIC BLOOD PRESSURE: 60 MMHG | BODY MASS INDEX: 25.97 KG/M2 | HEIGHT: 70 IN | SYSTOLIC BLOOD PRESSURE: 134 MMHG | WEIGHT: 181.38 LBS | HEART RATE: 98 BPM | TEMPERATURE: 97.3 F

## 2023-12-14 DIAGNOSIS — E78.5 DYSLIPIDEMIA: ICD-10-CM

## 2023-12-14 DIAGNOSIS — Z23 NEED FOR VACCINATION: ICD-10-CM

## 2023-12-14 DIAGNOSIS — M1A.0421 IDIOPATHIC CHRONIC GOUT OF LEFT HAND WITH TOPHUS: ICD-10-CM

## 2023-12-14 DIAGNOSIS — Z12.12 SCREENING FOR COLORECTAL CANCER: ICD-10-CM

## 2023-12-14 DIAGNOSIS — K22.2 ESOPHAGEAL STRICTURE: ICD-10-CM

## 2023-12-14 DIAGNOSIS — Z12.11 SCREENING FOR COLORECTAL CANCER: ICD-10-CM

## 2023-12-14 PROCEDURE — 3078F DIAST BP <80 MM HG: CPT

## 2023-12-14 PROCEDURE — G0008 ADMIN INFLUENZA VIRUS VAC: HCPCS

## 2023-12-14 PROCEDURE — 3075F SYST BP GE 130 - 139MM HG: CPT

## 2023-12-14 PROCEDURE — 99214 OFFICE O/P EST MOD 30 MIN: CPT | Mod: 25

## 2023-12-14 PROCEDURE — 90662 IIV NO PRSV INCREASED AG IM: CPT

## 2023-12-14 RX ORDER — PRAVASTATIN SODIUM 20 MG
20 TABLET ORAL DAILY
Qty: 90 TABLET | Refills: 0 | Status: SHIPPED | OUTPATIENT
Start: 2023-12-14

## 2023-12-14 RX ORDER — PANTOPRAZOLE SODIUM 20 MG/1
20 TABLET, DELAYED RELEASE ORAL EVERY MORNING
Qty: 90 TABLET | Refills: 0 | Status: SHIPPED | OUTPATIENT
Start: 2023-12-14

## 2023-12-14 RX ORDER — METHYLPREDNISOLONE 4 MG/1
TABLET ORAL
Qty: 21 TABLET | Refills: 0 | Status: SHIPPED | OUTPATIENT
Start: 2023-12-14 | End: 2024-01-03

## 2023-12-14 ASSESSMENT — ENCOUNTER SYMPTOMS
BLOOD IN STOOL: 0
DIARRHEA: 0
WHEEZING: 0
PALPITATIONS: 0
HEADACHES: 0
COUGH: 0
CONSTIPATION: 0
CHILLS: 0
SHORTNESS OF BREATH: 0
TINGLING: 0
WEIGHT LOSS: 0
FEVER: 0

## 2023-12-14 ASSESSMENT — FIBROSIS 4 INDEX: FIB4 SCORE: 2.17

## 2023-12-14 NOTE — PROGRESS NOTES
Subjective:     Chief Complaint   Patient presents with    Establish Care     Garry Benites is a 68 y.o. male, who is here today to establish care and discuss gout.   His prior PCP was Dr. Whitley Espino.    Problem   Acute Idiopathic Gout of Right Hand   Esophageal Stricture    Chronic condition, stable.    Currently taking pantoprazole 20 mg daily with excellent control of his symptoms.  Avoids triggers     Dyslipidemia    Chronic condition, stable.   Currently taking pravastatin 20 mg daily.    Tolerating well without adverse side-effects.    Lab Results   Component Value Date/Time    CHOLSTRLTOT 142 04/21/2023 0705    TRIGLYCERIDE 105 04/21/2023 0705    HDL 32 (A) 04/21/2023 0705    LDL 89 04/21/2023 0705      Chronic Gout With Tophus    Chronic problem.  Patient presents today complaining of gout flare to his left hand.  States his left hand has become swollen and painful.  Recent hospitalization for hernia repair and revision due to infection.  History of kidney transplant.        Allergies: Penicillins and Cephalexin    Current Outpatient Medications:     pantoprazole (PROTONIX) 20 MG tablet, Take 1 Tablet by mouth every morning., Disp: 90 Tablet, Rfl: 0    pravastatin (PRAVACHOL) 20 MG Tab, Take 1 Tablet by mouth every day., Disp: 90 Tablet, Rfl: 0    methylPREDNISolone (MEDROL DOSEPAK) 4 MG Tablet Therapy Pack, As directed on the packaging label., Disp: 21 Tablet, Rfl: 0    tacrolimus (PROGRAF) 1 MG Cap, Take 1 mg by mouth., Disp: , Rfl:     leflunomide (ARAVA) 20 MG Tab, Take 20 mg by mouth 2 times a day., Disp: , Rfl:     SENSIPAR 30 MG Tab, Take 1 Tab by mouth every day., Disp: 30 Tab, Rfl: 11     Review of Systems   Constitutional:  Negative for chills, fever and weight loss.   Respiratory:  Negative for cough, shortness of breath and wheezing.    Cardiovascular:  Negative for chest pain, palpitations and leg swelling.   Gastrointestinal:  Negative for blood in stool, constipation and diarrhea.  "  Genitourinary:  Negative for hematuria.   Neurological:  Negative for tingling and headaches.     Health Maintenance: Completed    Objective:     /60   Pulse 98   Temp 36.3 °C (97.3 °F) (Temporal)   Resp 18   Ht 1.778 m (5' 10\")   Wt 82.3 kg (181 lb 6 oz)   SpO2 95%  Body mass index is 26.02 kg/m².    Physical Exam  Vitals reviewed.   Constitutional:       Appearance: Normal appearance.   Cardiovascular:      Rate and Rhythm: Normal rate and regular rhythm.   Pulmonary:      Effort: Pulmonary effort is normal.      Breath sounds: Normal breath sounds.   Abdominal:      General: Abdomen is flat.   Musculoskeletal:         General: Normal range of motion.      Cervical back: Normal range of motion.   Skin:     General: Skin is warm and dry.   Neurological:      General: No focal deficit present.      Mental Status: He is alert.   Psychiatric:         Mood and Affect: Mood normal.         Behavior: Behavior normal.      Assessment & Plan:     The following plan was discussed through shared decision making with the patient:    1. 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  Patient presents complaining of gout flare to his left hand.  Due to kidney transplant history and no recent labs we will start him on a steroid Dosepak.  We will also check a uric acid level.  Patient reports he will follow-up with his kidney doctor in the next couple weeks with lab work.  Patient will follow-up in approximately 2 weeks to see how this is going and further lab work for possible prophylactic management.  - URIC ACID; Future  - methylPREDNISolone (MEDROL DOSEPAK) 4 MG Tablet Therapy Pack; As directed on the packaging label.  Dispense: 21 Tablet; Refill: 0    2. Esophageal stricture  Chronic, controlled.  Patient doing well on medication denies any symptoms at this time.  We will continue current dosing, refills provided.  - pantoprazole (PROTONIX) 20 MG tablet; Take 1 Tablet by mouth every " morning.  Dispense: 90 Tablet; Refill: 0    3. Dyslipidemia  Chronic, controlled.   The patient is on a statin for primary prevention and tolerating it well. The last cholesterol panel in 04/2023 was all within normal limits so we will continue the current dosing.  Refills provided  - pravastatin (PRAVACHOL) 20 MG Tab; Take 1 Tablet by mouth every day.  Dispense: 90 Tablet; Refill: 0    4. Screening for colorectal cancer  - Referral to GI for Colonoscopy    5. Need for vaccination  - Influenza Vaccine, High Dose (65+ Only)    Return in about 3 weeks (around 1/4/2024) for Labs.         Please note that this dictation was created using voice recognition software. I have made every reasonable attempt to correct obvious errors, but I expect that there are errors of grammar and possibly content that I did not discover before finalizing the note.    LYNNE Correia  [facility]  [facility]

## 2023-12-15 ENCOUNTER — HOSPITAL ENCOUNTER (OUTPATIENT)
Dept: LAB | Facility: MEDICAL CENTER | Age: 68
End: 2023-12-15
Attending: INTERNAL MEDICINE
Payer: COMMERCIAL

## 2023-12-15 ENCOUNTER — HOSPITAL ENCOUNTER (OUTPATIENT)
Dept: LAB | Facility: MEDICAL CENTER | Age: 68
End: 2023-12-15
Payer: COMMERCIAL

## 2023-12-15 DIAGNOSIS — M1A.0421 IDIOPATHIC CHRONIC GOUT OF LEFT HAND WITH TOPHUS: ICD-10-CM

## 2023-12-15 LAB
ALBUMIN SERPL BCP-MCNC: 4 G/DL (ref 3.2–4.9)
ALBUMIN/GLOB SERPL: 1.1 G/DL
ALP SERPL-CCNC: 173 U/L (ref 30–99)
ALT SERPL-CCNC: 25 U/L (ref 2–50)
ANION GAP SERPL CALC-SCNC: 11 MMOL/L (ref 7–16)
APPEARANCE UR: CLEAR
AST SERPL-CCNC: 18 U/L (ref 12–45)
BACTERIA #/AREA URNS HPF: NEGATIVE /HPF
BASOPHILS # BLD AUTO: 0.4 % (ref 0–1.8)
BASOPHILS # BLD: 0.03 K/UL (ref 0–0.12)
BILIRUB SERPL-MCNC: 0.2 MG/DL (ref 0.1–1.5)
BILIRUB UR QL STRIP.AUTO: NEGATIVE
BUN SERPL-MCNC: 44 MG/DL (ref 8–22)
CALCIUM ALBUM COR SERPL-MCNC: 9.8 MG/DL (ref 8.5–10.5)
CALCIUM SERPL-MCNC: 9.8 MG/DL (ref 8.5–10.5)
CHLORIDE SERPL-SCNC: 106 MMOL/L (ref 96–112)
CK SERPL-CCNC: 41 U/L (ref 0–154)
CO2 SERPL-SCNC: 20 MMOL/L (ref 20–33)
COLOR UR: YELLOW
CREAT SERPL-MCNC: 2.36 MG/DL (ref 0.5–1.4)
CREAT UR-MCNC: 57.59 MG/DL
EOSINOPHIL # BLD AUTO: 0.23 K/UL (ref 0–0.51)
EOSINOPHIL NFR BLD: 3.3 % (ref 0–6.9)
EPI CELLS #/AREA URNS HPF: ABNORMAL /HPF
ERYTHROCYTE [DISTWIDTH] IN BLOOD BY AUTOMATED COUNT: 51.6 FL (ref 35.9–50)
GFR SERPLBLD CREATININE-BSD FMLA CKD-EPI: 29 ML/MIN/1.73 M 2
GLOBULIN SER CALC-MCNC: 3.8 G/DL (ref 1.9–3.5)
GLUCOSE SERPL-MCNC: 88 MG/DL (ref 65–99)
GLUCOSE UR STRIP.AUTO-MCNC: NEGATIVE MG/DL
HCT VFR BLD AUTO: 38.2 % (ref 42–52)
HGB BLD-MCNC: 11.8 G/DL (ref 14–18)
HYALINE CASTS #/AREA URNS LPF: ABNORMAL /LPF
IMM GRANULOCYTES # BLD AUTO: 0.06 K/UL (ref 0–0.11)
IMM GRANULOCYTES NFR BLD AUTO: 0.9 % (ref 0–0.9)
KETONES UR STRIP.AUTO-MCNC: NEGATIVE MG/DL
LEUKOCYTE ESTERASE UR QL STRIP.AUTO: NEGATIVE
LYMPHOCYTES # BLD AUTO: 1.4 K/UL (ref 1–4.8)
LYMPHOCYTES NFR BLD: 19.9 % (ref 22–41)
MAGNESIUM SERPL-MCNC: 1.7 MG/DL (ref 1.5–2.5)
MCH RBC QN AUTO: 27.4 PG (ref 27–33)
MCHC RBC AUTO-ENTMCNC: 30.9 G/DL (ref 32.3–36.5)
MCV RBC AUTO: 88.8 FL (ref 81.4–97.8)
MICRO URNS: ABNORMAL
MONOCYTES # BLD AUTO: 0.65 K/UL (ref 0–0.85)
MONOCYTES NFR BLD AUTO: 9.3 % (ref 0–13.4)
NEUTROPHILS # BLD AUTO: 4.65 K/UL (ref 1.82–7.42)
NEUTROPHILS NFR BLD: 66.2 % (ref 44–72)
NITRITE UR QL STRIP.AUTO: NEGATIVE
NRBC # BLD AUTO: 0 K/UL
NRBC BLD-RTO: 0 /100 WBC (ref 0–0.2)
PH UR STRIP.AUTO: 5.5 [PH] (ref 5–8)
PHOSPHATE SERPL-MCNC: 3.9 MG/DL (ref 2.5–4.5)
PLATELET # BLD AUTO: 319 K/UL (ref 164–446)
PMV BLD AUTO: 9.3 FL (ref 9–12.9)
POTASSIUM SERPL-SCNC: 6.5 MMOL/L (ref 3.6–5.5)
PROT SERPL-MCNC: 7.8 G/DL (ref 6–8.2)
PROT UR QL STRIP: 30 MG/DL
PROT UR-MCNC: 32 MG/DL (ref 0–15)
PROT/CREAT UR: 556 MG/G (ref 15–68)
RBC # BLD AUTO: 4.3 M/UL (ref 4.7–6.1)
RBC # URNS HPF: ABNORMAL /HPF
RBC UR QL AUTO: NEGATIVE
SODIUM SERPL-SCNC: 137 MMOL/L (ref 135–145)
SP GR UR STRIP.AUTO: 1.01
TACROLIMUS BLD-MCNC: 10.9 NG/ML (ref 5–20)
URATE SERPL-MCNC: 6.6 MG/DL (ref 2.5–8.3)
UROBILINOGEN UR STRIP.AUTO-MCNC: 0.2 MG/DL
WBC # BLD AUTO: 7 K/UL (ref 4.8–10.8)
WBC #/AREA URNS HPF: ABNORMAL /HPF

## 2023-12-15 PROCEDURE — 80053 COMPREHEN METABOLIC PANEL: CPT

## 2023-12-15 PROCEDURE — 82570 ASSAY OF URINE CREATININE: CPT

## 2023-12-15 PROCEDURE — 36415 COLL VENOUS BLD VENIPUNCTURE: CPT

## 2023-12-15 PROCEDURE — 84100 ASSAY OF PHOSPHORUS: CPT

## 2023-12-15 PROCEDURE — 83735 ASSAY OF MAGNESIUM: CPT

## 2023-12-15 PROCEDURE — 85025 COMPLETE CBC W/AUTO DIFF WBC: CPT

## 2023-12-15 PROCEDURE — 80197 ASSAY OF TACROLIMUS: CPT

## 2023-12-15 PROCEDURE — 81001 URINALYSIS AUTO W/SCOPE: CPT

## 2023-12-15 PROCEDURE — 84156 ASSAY OF PROTEIN URINE: CPT

## 2023-12-15 PROCEDURE — 82550 ASSAY OF CK (CPK): CPT

## 2023-12-15 PROCEDURE — 84550 ASSAY OF BLOOD/URIC ACID: CPT

## 2024-01-03 ENCOUNTER — OFFICE VISIT (OUTPATIENT)
Dept: MEDICAL GROUP | Facility: PHYSICIAN GROUP | Age: 69
End: 2024-01-03
Payer: COMMERCIAL

## 2024-01-03 VITALS
HEIGHT: 70 IN | SYSTOLIC BLOOD PRESSURE: 142 MMHG | WEIGHT: 188 LBS | RESPIRATION RATE: 18 BRPM | DIASTOLIC BLOOD PRESSURE: 78 MMHG | HEART RATE: 96 BPM | BODY MASS INDEX: 26.92 KG/M2 | TEMPERATURE: 98.1 F | OXYGEN SATURATION: 96 %

## 2024-01-03 DIAGNOSIS — R93.41 ABNORMAL ULTRASOUND OF BLADDER: ICD-10-CM

## 2024-01-03 DIAGNOSIS — Z94.0 KIDNEY TRANSPLANT RECIPIENT: Chronic | ICD-10-CM

## 2024-01-03 PROCEDURE — 3077F SYST BP >= 140 MM HG: CPT | Performed by: FAMILY MEDICINE

## 2024-01-03 PROCEDURE — 3078F DIAST BP <80 MM HG: CPT | Performed by: FAMILY MEDICINE

## 2024-01-03 PROCEDURE — 99213 OFFICE O/P EST LOW 20 MIN: CPT | Performed by: FAMILY MEDICINE

## 2024-01-03 ASSESSMENT — FIBROSIS 4 INDEX: FIB4 SCORE: 0.76

## 2024-01-03 ASSESSMENT — PATIENT HEALTH QUESTIONNAIRE - PHQ9: CLINICAL INTERPRETATION OF PHQ2 SCORE: 0

## 2024-01-03 NOTE — ASSESSMENT & PLAN NOTE
This is a chronic problem.  Patient needs an ultrasound order for follow-up on his transplant.  He was recently seen in that the emergency room due to an elevated potassium level after was rechecked it was normal.  He does follow-up with nephrology on a regular basis.

## 2024-01-03 NOTE — ASSESSMENT & PLAN NOTE
This is a new issue.  Patient had a CT scan of the abdomen that showed some thickening to the bladder wall along with some hydronephrosis.  He has a right kidney transplant patient.  He was told that he needed a follow-up ultrasound by his nephrologist and he has a scheduled for this Friday.  He needs an order for that.

## 2024-01-03 NOTE — PROGRESS NOTES
Subjective:     CC: Here for couple of issues and to discuss his condition.    HPI:   Garry presents today with the following medical concerns:    Abnormal ultrasound of bladder  This is a new issue.  Patient had a CT scan of the abdomen that showed some thickening to the bladder wall along with some hydronephrosis.  He has a right kidney transplant patient.  He was told that he needed a follow-up ultrasound by his nephrologist and he has a scheduled for this Friday.  He needs an order for that.    Kidney transplant recipient  This is a chronic problem.  Patient needs an ultrasound order for follow-up on his transplant.  He was recently seen in that the emergency room due to an elevated potassium level after was rechecked it was normal.  He does follow-up with nephrology on a regular basis.    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    Localized osteoarthritis of right shoulder 2019    Pain     feet, knees, shoulders, hands, back        Social History     Tobacco Use    Smoking status: Former     Current packs/day: 0.00     Average packs/day: 0.5 packs/day for 5.0 years (2.5 ttl pk-yrs)     Types: Cigarettes     Start date: 10/21/1991     Quit date: 10/21/1996     Years since quittin.2    Smokeless tobacco: Never   Vaping Use    Vaping Use: Never used   Substance Use Topics    Alcohol use: No     Alcohol/week: 0.0 oz    Drug use: No       Current Outpatient Medications Ordered in Epic   Medication Sig Dispense Refill    pantoprazole  "(PROTONIX) 20 MG tablet Take 1 Tablet by mouth every morning. 90 Tablet 0    pravastatin (PRAVACHOL) 20 MG Tab Take 1 Tablet by mouth every day. 90 Tablet 0    tacrolimus (PROGRAF) 1 MG Cap Take 1 mg by mouth.      leflunomide (ARAVA) 20 MG Tab Take 20 mg by mouth 2 times a day.      SENSIPAR 30 MG Tab Take 1 Tab by mouth every day. 30 Tab 11     No current Epic-ordered facility-administered medications on file.       Allergies:  Penicillins and Cephalexin    Health Maintenance: Completed    ROS:  Gen: no fevers/chills, no changes in weight  Eyes: no changes in vision  ENT: no sore throat, no hearing loss, no bloody nose  Pulm: no sob, no cough  CV: no chest pain, no palpitations  GI: no nausea/vomiting, no diarrhea  : no dysuria  Skin: no rash  Neuro: no headaches, no numbness/tingling  Heme/Lymph: no easy bruising      Objective:       Exam:  BP (!) 142/78 (BP Location: Right arm, Patient Position: Sitting, BP Cuff Size: Adult)   Pulse 96   Temp 36.7 °C (98.1 °F) (Temporal)   Resp 18   Ht 1.778 m (5' 10\")   Wt 85.3 kg (188 lb)   SpO2 96%   BMI 26.98 kg/m²  Body mass index is 26.98 kg/m².    Gen: Alert and oriented, No apparent distress.  Ext: No clubbing, cyanosis, edema.      Labs: Recent lab test results reviewed with patient.    Assessment & Plan:     68 y.o. male with the following -     1. Kidney transplant recipient  This is a chronic problem.  Ultrasound order given to patient.  Follow-up with nephrology.  - US-ABDOMEN COMPLETE SURVEY; Future    2. Abnormal ultrasound of bladder  This is a chronic problem.  Previous CT scan result reviewed.  Follow-up with nephrology.  - US-ABDOMEN COMPLETE SURVEY; Future      Return if symptoms worsen or fail to improve, for with PCP.    Please note that this dictation was created using voice recognition software. I have made every reasonable attempt to correct obvious errors, but I expect that there are errors of grammar and possibly content that I did not " discover before finalizing the note.

## 2024-02-21 ENCOUNTER — HOSPITAL ENCOUNTER (OUTPATIENT)
Dept: LAB | Facility: MEDICAL CENTER | Age: 69
End: 2024-02-21
Attending: INTERNAL MEDICINE
Payer: COMMERCIAL

## 2024-02-21 LAB
ALBUMIN SERPL BCP-MCNC: 4 G/DL (ref 3.2–4.9)
ALBUMIN/GLOB SERPL: 1.3 G/DL
ALP SERPL-CCNC: 154 U/L (ref 30–99)
ALT SERPL-CCNC: 30 U/L (ref 2–50)
ANION GAP SERPL CALC-SCNC: 11 MMOL/L (ref 7–16)
ANISOCYTOSIS BLD QL SMEAR: ABNORMAL
APPEARANCE UR: ABNORMAL
AST SERPL-CCNC: 25 U/L (ref 12–45)
BACTERIA #/AREA URNS HPF: ABNORMAL /HPF
BASOPHILS # BLD AUTO: 0.9 % (ref 0–1.8)
BASOPHILS # BLD: 0.08 K/UL (ref 0–0.12)
BILIRUB SERPL-MCNC: 0.2 MG/DL (ref 0.1–1.5)
BILIRUB UR QL STRIP.AUTO: NEGATIVE
BUN SERPL-MCNC: 40 MG/DL (ref 8–22)
BURR CELLS BLD QL SMEAR: NORMAL
CALCIUM ALBUM COR SERPL-MCNC: 8.9 MG/DL (ref 8.5–10.5)
CALCIUM SERPL-MCNC: 8.9 MG/DL (ref 8.5–10.5)
CHLORIDE SERPL-SCNC: 104 MMOL/L (ref 96–112)
CK SERPL-CCNC: 47 U/L (ref 0–154)
CO2 SERPL-SCNC: 23 MMOL/L (ref 20–33)
COLOR UR: YELLOW
CREAT SERPL-MCNC: 2.35 MG/DL (ref 0.5–1.4)
CREAT UR-MCNC: 63.19 MG/DL
EOSINOPHIL # BLD AUTO: 0.23 K/UL (ref 0–0.51)
EOSINOPHIL NFR BLD: 2.6 % (ref 0–6.9)
EPI CELLS #/AREA URNS HPF: NEGATIVE /HPF
ERYTHROCYTE [DISTWIDTH] IN BLOOD BY AUTOMATED COUNT: 56.9 FL (ref 35.9–50)
GFR SERPLBLD CREATININE-BSD FMLA CKD-EPI: 29 ML/MIN/1.73 M 2
GLOBULIN SER CALC-MCNC: 3.2 G/DL (ref 1.9–3.5)
GLUCOSE SERPL-MCNC: 90 MG/DL (ref 65–99)
GLUCOSE UR STRIP.AUTO-MCNC: NEGATIVE MG/DL
HCT VFR BLD AUTO: 46.5 % (ref 42–52)
HGB BLD-MCNC: 13.8 G/DL (ref 14–18)
HYALINE CASTS #/AREA URNS LPF: ABNORMAL /LPF
KETONES UR STRIP.AUTO-MCNC: NEGATIVE MG/DL
LEUKOCYTE ESTERASE UR QL STRIP.AUTO: ABNORMAL
LYMPHOCYTES # BLD AUTO: 1.54 K/UL (ref 1–4.8)
LYMPHOCYTES NFR BLD: 17.5 % (ref 22–41)
MAGNESIUM SERPL-MCNC: 1.9 MG/DL (ref 1.5–2.5)
MANUAL DIFF BLD: NORMAL
MCH RBC QN AUTO: 27.8 PG (ref 27–33)
MCHC RBC AUTO-ENTMCNC: 29.7 G/DL (ref 32.3–36.5)
MCV RBC AUTO: 93.8 FL (ref 81.4–97.8)
MICRO URNS: ABNORMAL
MICROCYTES BLD QL SMEAR: ABNORMAL
MONOCYTES # BLD AUTO: 0.7 K/UL (ref 0–0.85)
MONOCYTES NFR BLD AUTO: 7.9 % (ref 0–13.4)
MORPHOLOGY BLD-IMP: NORMAL
NEUTROPHILS # BLD AUTO: 6.26 K/UL (ref 1.82–7.42)
NEUTROPHILS NFR BLD: 71.1 % (ref 44–72)
NITRITE UR QL STRIP.AUTO: NEGATIVE
NRBC # BLD AUTO: 0 K/UL
NRBC BLD-RTO: 0 /100 WBC (ref 0–0.2)
OVALOCYTES BLD QL SMEAR: NORMAL
PH UR STRIP.AUTO: 7.5 [PH] (ref 5–8)
PHOSPHATE SERPL-MCNC: 3.1 MG/DL (ref 2.5–4.5)
PLATELET # BLD AUTO: 221 K/UL (ref 164–446)
PLATELET BLD QL SMEAR: NORMAL
PMV BLD AUTO: 10 FL (ref 9–12.9)
POIKILOCYTOSIS BLD QL SMEAR: NORMAL
POTASSIUM SERPL-SCNC: 5.5 MMOL/L (ref 3.6–5.5)
PROT SERPL-MCNC: 7.2 G/DL (ref 6–8.2)
PROT UR QL STRIP: 100 MG/DL
PROT UR-MCNC: 68 MG/DL (ref 0–15)
PROT/CREAT UR: 1076 MG/G (ref 15–68)
RBC # BLD AUTO: 4.96 M/UL (ref 4.7–6.1)
RBC # URNS HPF: ABNORMAL /HPF
RBC BLD AUTO: PRESENT
RBC UR QL AUTO: ABNORMAL
SODIUM SERPL-SCNC: 138 MMOL/L (ref 135–145)
SP GR UR STRIP.AUTO: 1.01
UROBILINOGEN UR STRIP.AUTO-MCNC: 0.2 MG/DL
WBC # BLD AUTO: 8.8 K/UL (ref 4.8–10.8)
WBC #/AREA URNS HPF: ABNORMAL /HPF
WBC TOXIC VACUOLES BLD QL SMEAR: NORMAL

## 2024-02-21 PROCEDURE — 87077 CULTURE AEROBIC IDENTIFY: CPT

## 2024-02-21 PROCEDURE — 82550 ASSAY OF CK (CPK): CPT

## 2024-02-21 PROCEDURE — 82570 ASSAY OF URINE CREATININE: CPT

## 2024-02-21 PROCEDURE — 80197 ASSAY OF TACROLIMUS: CPT

## 2024-02-21 PROCEDURE — 36415 COLL VENOUS BLD VENIPUNCTURE: CPT

## 2024-02-21 PROCEDURE — 87086 URINE CULTURE/COLONY COUNT: CPT

## 2024-02-21 PROCEDURE — 84156 ASSAY OF PROTEIN URINE: CPT

## 2024-02-21 PROCEDURE — 84100 ASSAY OF PHOSPHORUS: CPT

## 2024-02-21 PROCEDURE — 83735 ASSAY OF MAGNESIUM: CPT

## 2024-02-21 PROCEDURE — 81001 URINALYSIS AUTO W/SCOPE: CPT

## 2024-02-21 PROCEDURE — 85027 COMPLETE CBC AUTOMATED: CPT

## 2024-02-21 PROCEDURE — 80053 COMPREHEN METABOLIC PANEL: CPT

## 2024-02-21 PROCEDURE — 85007 BL SMEAR W/DIFF WBC COUNT: CPT

## 2024-02-23 LAB
BACTERIA UR CULT: ABNORMAL
BACTERIA UR CULT: ABNORMAL
SIGNIFICANT IND 70042: ABNORMAL
SITE SITE: ABNORMAL
SOURCE SOURCE: ABNORMAL
TACROLIMUS BLD-MCNC: 5 NG/ML (ref 5–20)

## 2024-03-11 ENCOUNTER — HOSPITAL ENCOUNTER (OUTPATIENT)
Dept: LAB | Facility: MEDICAL CENTER | Age: 69
End: 2024-03-11
Attending: INTERNAL MEDICINE
Payer: COMMERCIAL

## 2024-03-11 LAB
ALBUMIN SERPL BCP-MCNC: 4.3 G/DL (ref 3.2–4.9)
ALBUMIN/GLOB SERPL: 1.5 G/DL
ALP SERPL-CCNC: 156 U/L (ref 30–99)
ALT SERPL-CCNC: 35 U/L (ref 2–50)
ANION GAP SERPL CALC-SCNC: 14 MMOL/L (ref 7–16)
APPEARANCE UR: CLEAR
AST SERPL-CCNC: 28 U/L (ref 12–45)
BACTERIA #/AREA URNS HPF: NEGATIVE /HPF
BASOPHILS # BLD AUTO: 1 % (ref 0–1.8)
BASOPHILS # BLD: 0.06 K/UL (ref 0–0.12)
BILIRUB SERPL-MCNC: 0.2 MG/DL (ref 0.1–1.5)
BILIRUB UR QL STRIP.AUTO: NEGATIVE
BUN SERPL-MCNC: 44 MG/DL (ref 8–22)
CALCIUM ALBUM COR SERPL-MCNC: 8.9 MG/DL (ref 8.5–10.5)
CALCIUM SERPL-MCNC: 9.1 MG/DL (ref 8.5–10.5)
CHLORIDE SERPL-SCNC: 105 MMOL/L (ref 96–112)
CK SERPL-CCNC: 64 U/L (ref 0–154)
CO2 SERPL-SCNC: 21 MMOL/L (ref 20–33)
COLOR UR: YELLOW
CREAT SERPL-MCNC: 2.54 MG/DL (ref 0.5–1.4)
CREAT UR-MCNC: 46.09 MG/DL
EOSINOPHIL # BLD AUTO: 0.28 K/UL (ref 0–0.51)
EOSINOPHIL NFR BLD: 4.6 % (ref 0–6.9)
EPI CELLS #/AREA URNS HPF: NEGATIVE /HPF
ERYTHROCYTE [DISTWIDTH] IN BLOOD BY AUTOMATED COUNT: 52.6 FL (ref 35.9–50)
GFR SERPLBLD CREATININE-BSD FMLA CKD-EPI: 27 ML/MIN/1.73 M 2
GLOBULIN SER CALC-MCNC: 2.9 G/DL (ref 1.9–3.5)
GLUCOSE SERPL-MCNC: 88 MG/DL (ref 65–99)
GLUCOSE UR STRIP.AUTO-MCNC: NEGATIVE MG/DL
HCT VFR BLD AUTO: 44.9 % (ref 42–52)
HGB BLD-MCNC: 14 G/DL (ref 14–18)
HYALINE CASTS #/AREA URNS LPF: ABNORMAL /LPF
IMM GRANULOCYTES # BLD AUTO: 0.03 K/UL (ref 0–0.11)
IMM GRANULOCYTES NFR BLD AUTO: 0.5 % (ref 0–0.9)
KETONES UR STRIP.AUTO-MCNC: NEGATIVE MG/DL
LEUKOCYTE ESTERASE UR QL STRIP.AUTO: ABNORMAL
LYMPHOCYTES # BLD AUTO: 1.36 K/UL (ref 1–4.8)
LYMPHOCYTES NFR BLD: 22.4 % (ref 22–41)
MAGNESIUM SERPL-MCNC: 1.7 MG/DL (ref 1.5–2.5)
MCH RBC QN AUTO: 27.9 PG (ref 27–33)
MCHC RBC AUTO-ENTMCNC: 31.2 G/DL (ref 32.3–36.5)
MCV RBC AUTO: 89.6 FL (ref 81.4–97.8)
MICRO URNS: ABNORMAL
MONOCYTES # BLD AUTO: 0.64 K/UL (ref 0–0.85)
MONOCYTES NFR BLD AUTO: 10.6 % (ref 0–13.4)
NEUTROPHILS # BLD AUTO: 3.69 K/UL (ref 1.82–7.42)
NEUTROPHILS NFR BLD: 60.9 % (ref 44–72)
NITRITE UR QL STRIP.AUTO: NEGATIVE
NRBC # BLD AUTO: 0 K/UL
NRBC BLD-RTO: 0 /100 WBC (ref 0–0.2)
PH UR STRIP.AUTO: 7 [PH] (ref 5–8)
PHOSPHATE SERPL-MCNC: 2.7 MG/DL (ref 2.5–4.5)
PLATELET # BLD AUTO: 210 K/UL (ref 164–446)
PMV BLD AUTO: 9.4 FL (ref 9–12.9)
POTASSIUM SERPL-SCNC: 5.7 MMOL/L (ref 3.6–5.5)
PROT SERPL-MCNC: 7.2 G/DL (ref 6–8.2)
PROT UR QL STRIP: 30 MG/DL
PROT UR-MCNC: 24 MG/DL (ref 0–15)
PROT/CREAT UR: 521 MG/G (ref 15–68)
RBC # BLD AUTO: 5.01 M/UL (ref 4.7–6.1)
RBC # URNS HPF: ABNORMAL /HPF
RBC UR QL AUTO: NEGATIVE
SODIUM SERPL-SCNC: 140 MMOL/L (ref 135–145)
SP GR UR STRIP.AUTO: 1.01
TACROLIMUS BLD-MCNC: 4.2 NG/ML (ref 5–20)
UROBILINOGEN UR STRIP.AUTO-MCNC: 0.2 MG/DL
WBC # BLD AUTO: 6.1 K/UL (ref 4.8–10.8)
WBC #/AREA URNS HPF: ABNORMAL /HPF

## 2024-03-11 PROCEDURE — 84156 ASSAY OF PROTEIN URINE: CPT

## 2024-03-11 PROCEDURE — 84100 ASSAY OF PHOSPHORUS: CPT

## 2024-03-11 PROCEDURE — 80053 COMPREHEN METABOLIC PANEL: CPT

## 2024-03-11 PROCEDURE — 80197 ASSAY OF TACROLIMUS: CPT

## 2024-03-11 PROCEDURE — 83735 ASSAY OF MAGNESIUM: CPT

## 2024-03-11 PROCEDURE — 81001 URINALYSIS AUTO W/SCOPE: CPT

## 2024-03-11 PROCEDURE — 87086 URINE CULTURE/COLONY COUNT: CPT

## 2024-03-11 PROCEDURE — 87077 CULTURE AEROBIC IDENTIFY: CPT

## 2024-03-11 PROCEDURE — 85025 COMPLETE CBC W/AUTO DIFF WBC: CPT

## 2024-03-11 PROCEDURE — 82570 ASSAY OF URINE CREATININE: CPT

## 2024-03-11 PROCEDURE — 36415 COLL VENOUS BLD VENIPUNCTURE: CPT

## 2024-03-11 PROCEDURE — 82550 ASSAY OF CK (CPK): CPT

## 2024-03-14 LAB
BACTERIA UR CULT: NORMAL
SIGNIFICANT IND 70042: NORMAL
SITE SITE: NORMAL
SOURCE SOURCE: NORMAL

## 2024-03-25 ENCOUNTER — HOSPITAL ENCOUNTER (OUTPATIENT)
Dept: LAB | Facility: MEDICAL CENTER | Age: 69
End: 2024-03-25
Attending: STUDENT IN AN ORGANIZED HEALTH CARE EDUCATION/TRAINING PROGRAM
Payer: COMMERCIAL

## 2024-03-25 ENCOUNTER — HOSPITAL ENCOUNTER (OUTPATIENT)
Dept: LAB | Facility: MEDICAL CENTER | Age: 69
End: 2024-03-25
Attending: INTERNAL MEDICINE
Payer: COMMERCIAL

## 2024-03-25 LAB
ALBUMIN SERPL BCP-MCNC: 4.4 G/DL (ref 3.2–4.9)
ALBUMIN/GLOB SERPL: 1.5 G/DL
ALP SERPL-CCNC: 164 U/L (ref 30–99)
ALT SERPL-CCNC: 36 U/L (ref 2–50)
ANION GAP SERPL CALC-SCNC: 15 MMOL/L (ref 7–16)
APPEARANCE UR: CLEAR
AST SERPL-CCNC: 30 U/L (ref 12–45)
BACTERIA #/AREA URNS HPF: NEGATIVE /HPF
BASOPHILS # BLD AUTO: 0.9 % (ref 0–1.8)
BASOPHILS # BLD: 0.06 K/UL (ref 0–0.12)
BILIRUB SERPL-MCNC: 0.2 MG/DL (ref 0.1–1.5)
BILIRUB UR QL STRIP.AUTO: NEGATIVE
BUN SERPL-MCNC: 44 MG/DL (ref 8–22)
CALCIUM ALBUM COR SERPL-MCNC: 9.1 MG/DL (ref 8.5–10.5)
CALCIUM SERPL-MCNC: 9.4 MG/DL (ref 8.5–10.5)
CHLORIDE SERPL-SCNC: 103 MMOL/L (ref 96–112)
CK SERPL-CCNC: 68 U/L (ref 0–154)
CO2 SERPL-SCNC: 21 MMOL/L (ref 20–33)
COLOR UR: YELLOW
CREAT SERPL-MCNC: 2.59 MG/DL (ref 0.5–1.4)
CREAT UR-MCNC: 47.93 MG/DL
EOSINOPHIL # BLD AUTO: 0.35 K/UL (ref 0–0.51)
EOSINOPHIL NFR BLD: 5.4 % (ref 0–6.9)
EPI CELLS #/AREA URNS HPF: NEGATIVE /HPF
ERYTHROCYTE [DISTWIDTH] IN BLOOD BY AUTOMATED COUNT: 49.2 FL (ref 35.9–50)
GFR SERPLBLD CREATININE-BSD FMLA CKD-EPI: 26 ML/MIN/1.73 M 2
GLOBULIN SER CALC-MCNC: 2.9 G/DL (ref 1.9–3.5)
GLUCOSE SERPL-MCNC: 96 MG/DL (ref 65–99)
GLUCOSE UR STRIP.AUTO-MCNC: NEGATIVE MG/DL
HCT VFR BLD AUTO: 45.4 % (ref 42–52)
HGB BLD-MCNC: 14 G/DL (ref 14–18)
HYALINE CASTS #/AREA URNS LPF: ABNORMAL /LPF
IMM GRANULOCYTES # BLD AUTO: 0.02 K/UL (ref 0–0.11)
IMM GRANULOCYTES NFR BLD AUTO: 0.3 % (ref 0–0.9)
KETONES UR STRIP.AUTO-MCNC: NEGATIVE MG/DL
LEUKOCYTE ESTERASE UR QL STRIP.AUTO: NEGATIVE
LYMPHOCYTES # BLD AUTO: 1.45 K/UL (ref 1–4.8)
LYMPHOCYTES NFR BLD: 22.3 % (ref 22–41)
MAGNESIUM SERPL-MCNC: 1.8 MG/DL (ref 1.5–2.5)
MCH RBC QN AUTO: 27.5 PG (ref 27–33)
MCHC RBC AUTO-ENTMCNC: 30.8 G/DL (ref 32.3–36.5)
MCV RBC AUTO: 89.2 FL (ref 81.4–97.8)
MICRO URNS: ABNORMAL
MONOCYTES # BLD AUTO: 0.73 K/UL (ref 0–0.85)
MONOCYTES NFR BLD AUTO: 11.2 % (ref 0–13.4)
NEUTROPHILS # BLD AUTO: 3.9 K/UL (ref 1.82–7.42)
NEUTROPHILS NFR BLD: 59.9 % (ref 44–72)
NITRITE UR QL STRIP.AUTO: NEGATIVE
NRBC # BLD AUTO: 0 K/UL
NRBC BLD-RTO: 0 /100 WBC (ref 0–0.2)
PH UR STRIP.AUTO: 7 [PH] (ref 5–8)
PHOSPHATE SERPL-MCNC: 3.5 MG/DL (ref 2.5–4.5)
PLATELET # BLD AUTO: 220 K/UL (ref 164–446)
PMV BLD AUTO: 9.4 FL (ref 9–12.9)
POTASSIUM SERPL-SCNC: 6.1 MMOL/L (ref 3.6–5.5)
PROT SERPL-MCNC: 7.3 G/DL (ref 6–8.2)
PROT UR QL STRIP: 30 MG/DL
PROT UR-MCNC: 22 MG/DL (ref 0–15)
PROT/CREAT UR: 459 MG/G (ref 15–68)
RBC # BLD AUTO: 5.09 M/UL (ref 4.7–6.1)
RBC # URNS HPF: ABNORMAL /HPF
RBC UR QL AUTO: NEGATIVE
SODIUM SERPL-SCNC: 139 MMOL/L (ref 135–145)
SP GR UR STRIP.AUTO: 1.01
TACROLIMUS BLD-MCNC: 6.7 NG/ML (ref 5–20)
UROBILINOGEN UR STRIP.AUTO-MCNC: 0.2 MG/DL
WBC # BLD AUTO: 6.5 K/UL (ref 4.8–10.8)
WBC #/AREA URNS HPF: ABNORMAL /HPF

## 2024-03-25 PROCEDURE — 80053 COMPREHEN METABOLIC PANEL: CPT

## 2024-03-25 PROCEDURE — 82550 ASSAY OF CK (CPK): CPT

## 2024-03-25 PROCEDURE — 85025 COMPLETE CBC W/AUTO DIFF WBC: CPT

## 2024-03-25 PROCEDURE — 83735 ASSAY OF MAGNESIUM: CPT

## 2024-03-25 PROCEDURE — 81001 URINALYSIS AUTO W/SCOPE: CPT

## 2024-03-25 PROCEDURE — 82570 ASSAY OF URINE CREATININE: CPT

## 2024-03-25 PROCEDURE — 80197 ASSAY OF TACROLIMUS: CPT

## 2024-03-25 PROCEDURE — 84156 ASSAY OF PROTEIN URINE: CPT

## 2024-03-25 PROCEDURE — 36415 COLL VENOUS BLD VENIPUNCTURE: CPT

## 2024-03-25 PROCEDURE — 84100 ASSAY OF PHOSPHORUS: CPT

## 2024-04-01 ENCOUNTER — HOSPITAL ENCOUNTER (OUTPATIENT)
Dept: LAB | Facility: MEDICAL CENTER | Age: 69
End: 2024-04-01
Attending: INTERNAL MEDICINE
Payer: COMMERCIAL

## 2024-04-01 LAB
ALBUMIN SERPL BCP-MCNC: 4.2 G/DL (ref 3.2–4.9)
ALBUMIN/GLOB SERPL: 1.4 G/DL
ALP SERPL-CCNC: 164 U/L (ref 30–99)
ALT SERPL-CCNC: 28 U/L (ref 2–50)
ANION GAP SERPL CALC-SCNC: 14 MMOL/L (ref 7–16)
APPEARANCE UR: CLEAR
AST SERPL-CCNC: 30 U/L (ref 12–45)
BACTERIA #/AREA URNS HPF: NEGATIVE /HPF
BASOPHILS # BLD AUTO: 0.9 % (ref 0–1.8)
BASOPHILS # BLD: 0.05 K/UL (ref 0–0.12)
BILIRUB SERPL-MCNC: 0.2 MG/DL (ref 0.1–1.5)
BILIRUB UR QL STRIP.AUTO: NEGATIVE
BUN SERPL-MCNC: 47 MG/DL (ref 8–22)
CALCIUM ALBUM COR SERPL-MCNC: 8.9 MG/DL (ref 8.5–10.5)
CALCIUM SERPL-MCNC: 9.1 MG/DL (ref 8.5–10.5)
CHLORIDE SERPL-SCNC: 108 MMOL/L (ref 96–112)
CK SERPL-CCNC: 73 U/L (ref 0–154)
CO2 SERPL-SCNC: 20 MMOL/L (ref 20–33)
COLOR UR: YELLOW
CREAT SERPL-MCNC: 2.22 MG/DL (ref 0.5–1.4)
CREAT UR-MCNC: 51.75 MG/DL
EOSINOPHIL # BLD AUTO: 0.39 K/UL (ref 0–0.51)
EOSINOPHIL NFR BLD: 7.2 % (ref 0–6.9)
EPI CELLS #/AREA URNS HPF: NEGATIVE /HPF
ERYTHROCYTE [DISTWIDTH] IN BLOOD BY AUTOMATED COUNT: 48.1 FL (ref 35.9–50)
GFR SERPLBLD CREATININE-BSD FMLA CKD-EPI: 31 ML/MIN/1.73 M 2
GLOBULIN SER CALC-MCNC: 3.1 G/DL (ref 1.9–3.5)
GLUCOSE SERPL-MCNC: 107 MG/DL (ref 65–99)
GLUCOSE UR STRIP.AUTO-MCNC: NEGATIVE MG/DL
HCT VFR BLD AUTO: 56.6 % (ref 42–52)
HGB BLD-MCNC: 17.6 G/DL (ref 14–18)
HYALINE CASTS #/AREA URNS LPF: ABNORMAL /LPF
IMM GRANULOCYTES # BLD AUTO: 0.03 K/UL (ref 0–0.11)
IMM GRANULOCYTES NFR BLD AUTO: 0.6 % (ref 0–0.9)
KETONES UR STRIP.AUTO-MCNC: NEGATIVE MG/DL
LEUKOCYTE ESTERASE UR QL STRIP.AUTO: NEGATIVE
LYMPHOCYTES # BLD AUTO: 1.18 K/UL (ref 1–4.8)
LYMPHOCYTES NFR BLD: 21.9 % (ref 22–41)
MAGNESIUM SERPL-MCNC: 1.8 MG/DL (ref 1.5–2.5)
MCH RBC QN AUTO: 27.3 PG (ref 27–33)
MCHC RBC AUTO-ENTMCNC: 31.1 G/DL (ref 32.3–36.5)
MCV RBC AUTO: 87.9 FL (ref 81.4–97.8)
MICRO URNS: ABNORMAL
MONOCYTES # BLD AUTO: 0.57 K/UL (ref 0–0.85)
MONOCYTES NFR BLD AUTO: 10.6 % (ref 0–13.4)
NEUTROPHILS # BLD AUTO: 3.18 K/UL (ref 1.82–7.42)
NEUTROPHILS NFR BLD: 58.8 % (ref 44–72)
NITRITE UR QL STRIP.AUTO: NEGATIVE
NRBC # BLD AUTO: 0 K/UL
NRBC BLD-RTO: 0 /100 WBC (ref 0–0.2)
PH UR STRIP.AUTO: 6 [PH] (ref 5–8)
PHOSPHATE SERPL-MCNC: 3.5 MG/DL (ref 2.5–4.5)
PLATELET # BLD AUTO: 161 K/UL (ref 164–446)
PMV BLD AUTO: 9 FL (ref 9–12.9)
POTASSIUM SERPL-SCNC: 5.2 MMOL/L (ref 3.6–5.5)
PROT SERPL-MCNC: 7.3 G/DL (ref 6–8.2)
PROT UR QL STRIP: 30 MG/DL
PROT UR-MCNC: 18 MG/DL (ref 0–15)
PROT/CREAT UR: 348 MG/G (ref 15–68)
RBC # BLD AUTO: 6.44 M/UL (ref 4.7–6.1)
RBC # URNS HPF: ABNORMAL /HPF
RBC UR QL AUTO: NEGATIVE
SODIUM SERPL-SCNC: 142 MMOL/L (ref 135–145)
SP GR UR STRIP.AUTO: 1.01
TACROLIMUS BLD-MCNC: 6.1 NG/ML (ref 5–20)
UROBILINOGEN UR STRIP.AUTO-MCNC: 0.2 MG/DL
WBC # BLD AUTO: 5.4 K/UL (ref 4.8–10.8)
WBC #/AREA URNS HPF: ABNORMAL /HPF

## 2024-04-01 PROCEDURE — 82550 ASSAY OF CK (CPK): CPT

## 2024-04-01 PROCEDURE — 36415 COLL VENOUS BLD VENIPUNCTURE: CPT

## 2024-04-01 PROCEDURE — 84100 ASSAY OF PHOSPHORUS: CPT

## 2024-04-01 PROCEDURE — 85025 COMPLETE CBC W/AUTO DIFF WBC: CPT

## 2024-04-01 PROCEDURE — 80197 ASSAY OF TACROLIMUS: CPT

## 2024-04-01 PROCEDURE — 84156 ASSAY OF PROTEIN URINE: CPT

## 2024-04-01 PROCEDURE — 83735 ASSAY OF MAGNESIUM: CPT

## 2024-04-01 PROCEDURE — 80053 COMPREHEN METABOLIC PANEL: CPT

## 2024-04-01 PROCEDURE — 82570 ASSAY OF URINE CREATININE: CPT

## 2024-04-01 PROCEDURE — 81001 URINALYSIS AUTO W/SCOPE: CPT

## 2024-04-02 DIAGNOSIS — E78.5 DYSLIPIDEMIA: ICD-10-CM

## 2024-04-03 RX ORDER — PRAVASTATIN SODIUM 20 MG
20 TABLET ORAL DAILY
Qty: 90 TABLET | Refills: 0 | Status: SHIPPED | OUTPATIENT
Start: 2024-04-03

## 2024-04-03 NOTE — TELEPHONE ENCOUNTER
Received request via: Pharmacy    Was the patient seen in the last year in this department? Yes    Does the patient have an active prescription (recently filled or refills available) for medication(s) requested? No    [facility]     Does the patient have nursing home Plus and need 100 day supply (blood pressure, diabetes and cholesterol meds only)? Patient does not have SCP

## 2024-04-12 DIAGNOSIS — K22.2 ESOPHAGEAL STRICTURE: ICD-10-CM

## 2024-04-12 NOTE — TELEPHONE ENCOUNTER
Received request via: Pharmacy    Was the patient seen in the last year in this department? Yes    Does the patient have an active prescription (recently filled or refills available) for medication(s) requested? No    Pharmacy Name: [facility]     Does the patient have half-way Plus and need 100 day supply (blood pressure, diabetes and cholesterol meds only)? Patient does not have SCP

## 2024-04-15 RX ORDER — PANTOPRAZOLE SODIUM 20 MG/1
20 TABLET, DELAYED RELEASE ORAL EVERY MORNING
Qty: 90 TABLET | Refills: 0 | Status: SHIPPED | OUTPATIENT
Start: 2024-04-15 | End: 2024-04-19

## 2024-04-19 ENCOUNTER — HOSPITAL ENCOUNTER (OUTPATIENT)
Dept: LAB | Facility: MEDICAL CENTER | Age: 69
End: 2024-04-19
Attending: INTERNAL MEDICINE
Payer: COMMERCIAL

## 2024-04-19 DIAGNOSIS — K22.2 ESOPHAGEAL STRICTURE: ICD-10-CM

## 2024-04-19 LAB
ALBUMIN SERPL BCP-MCNC: 4.3 G/DL (ref 3.2–4.9)
ALBUMIN/GLOB SERPL: 1.5 G/DL
ALP SERPL-CCNC: 129 U/L (ref 30–99)
ALT SERPL-CCNC: 20 U/L (ref 2–50)
ANION GAP SERPL CALC-SCNC: 11 MMOL/L (ref 7–16)
APPEARANCE UR: CLEAR
AST SERPL-CCNC: 23 U/L (ref 12–45)
BASOPHILS # BLD AUTO: 0.9 % (ref 0–1.8)
BASOPHILS # BLD: 0.06 K/UL (ref 0–0.12)
BILIRUB SERPL-MCNC: 0.2 MG/DL (ref 0.1–1.5)
BILIRUB UR QL STRIP.AUTO: NEGATIVE
BUN SERPL-MCNC: 39 MG/DL (ref 8–22)
CALCIUM ALBUM COR SERPL-MCNC: 8.9 MG/DL (ref 8.5–10.5)
CALCIUM SERPL-MCNC: 9.1 MG/DL (ref 8.5–10.5)
CHLORIDE SERPL-SCNC: 105 MMOL/L (ref 96–112)
CK SERPL-CCNC: 61 U/L (ref 0–154)
CO2 SERPL-SCNC: 23 MMOL/L (ref 20–33)
COLOR UR: YELLOW
CREAT SERPL-MCNC: 2.34 MG/DL (ref 0.5–1.4)
CREAT UR-MCNC: 45.41 MG/DL
EOSINOPHIL # BLD AUTO: 0.31 K/UL (ref 0–0.51)
EOSINOPHIL NFR BLD: 4.7 % (ref 0–6.9)
ERYTHROCYTE [DISTWIDTH] IN BLOOD BY AUTOMATED COUNT: 48.6 FL (ref 35.9–50)
GFR SERPLBLD CREATININE-BSD FMLA CKD-EPI: 29 ML/MIN/1.73 M 2
GLOBULIN SER CALC-MCNC: 2.8 G/DL (ref 1.9–3.5)
GLUCOSE SERPL-MCNC: 92 MG/DL (ref 65–99)
GLUCOSE UR STRIP.AUTO-MCNC: NEGATIVE MG/DL
HCT VFR BLD AUTO: 45.9 % (ref 42–52)
HGB BLD-MCNC: 14.1 G/DL (ref 14–18)
IMM GRANULOCYTES # BLD AUTO: 0.05 K/UL (ref 0–0.11)
IMM GRANULOCYTES NFR BLD AUTO: 0.8 % (ref 0–0.9)
KETONES UR STRIP.AUTO-MCNC: NEGATIVE MG/DL
LEUKOCYTE ESTERASE UR QL STRIP.AUTO: NEGATIVE
LYMPHOCYTES # BLD AUTO: 1.21 K/UL (ref 1–4.8)
LYMPHOCYTES NFR BLD: 18.5 % (ref 22–41)
MAGNESIUM SERPL-MCNC: 1.9 MG/DL (ref 1.5–2.5)
MCH RBC QN AUTO: 27.3 PG (ref 27–33)
MCHC RBC AUTO-ENTMCNC: 30.7 G/DL (ref 32.3–36.5)
MCV RBC AUTO: 89 FL (ref 81.4–97.8)
MICRO URNS: NORMAL
MONOCYTES # BLD AUTO: 0.57 K/UL (ref 0–0.85)
MONOCYTES NFR BLD AUTO: 8.7 % (ref 0–13.4)
NEUTROPHILS # BLD AUTO: 4.33 K/UL (ref 1.82–7.42)
NEUTROPHILS NFR BLD: 66.4 % (ref 44–72)
NITRITE UR QL STRIP.AUTO: NEGATIVE
NRBC # BLD AUTO: 0 K/UL
NRBC BLD-RTO: 0 /100 WBC (ref 0–0.2)
PH UR STRIP.AUTO: 6.5 [PH] (ref 5–8)
PHOSPHATE SERPL-MCNC: 2.9 MG/DL (ref 2.5–4.5)
PLATELET # BLD AUTO: 231 K/UL (ref 164–446)
PMV BLD AUTO: 10.2 FL (ref 9–12.9)
POTASSIUM SERPL-SCNC: 5.8 MMOL/L (ref 3.6–5.5)
PROT SERPL-MCNC: 7.1 G/DL (ref 6–8.2)
PROT UR QL STRIP: NEGATIVE MG/DL
PROT UR-MCNC: 18 MG/DL (ref 0–15)
PROT/CREAT UR: 396 MG/G (ref 15–68)
RBC # BLD AUTO: 5.16 M/UL (ref 4.7–6.1)
RBC UR QL AUTO: NEGATIVE
SODIUM SERPL-SCNC: 139 MMOL/L (ref 135–145)
SP GR UR STRIP.AUTO: 1.01
TACROLIMUS BLD-MCNC: 6.1 NG/ML (ref 5–20)
UROBILINOGEN UR STRIP.AUTO-MCNC: 0.2 MG/DL
WBC # BLD AUTO: 6.5 K/UL (ref 4.8–10.8)

## 2024-04-19 PROCEDURE — 36415 COLL VENOUS BLD VENIPUNCTURE: CPT

## 2024-04-19 PROCEDURE — 85025 COMPLETE CBC W/AUTO DIFF WBC: CPT

## 2024-04-19 PROCEDURE — 84156 ASSAY OF PROTEIN URINE: CPT

## 2024-04-19 PROCEDURE — 84100 ASSAY OF PHOSPHORUS: CPT

## 2024-04-19 PROCEDURE — 83735 ASSAY OF MAGNESIUM: CPT

## 2024-04-19 PROCEDURE — 87799 DETECT AGENT NOS DNA QUANT: CPT

## 2024-04-19 PROCEDURE — 80053 COMPREHEN METABOLIC PANEL: CPT

## 2024-04-19 PROCEDURE — 80197 ASSAY OF TACROLIMUS: CPT

## 2024-04-19 PROCEDURE — 87086 URINE CULTURE/COLONY COUNT: CPT

## 2024-04-19 PROCEDURE — 82570 ASSAY OF URINE CREATININE: CPT

## 2024-04-19 PROCEDURE — 82550 ASSAY OF CK (CPK): CPT

## 2024-04-19 PROCEDURE — 81003 URINALYSIS AUTO W/O SCOPE: CPT

## 2024-04-19 RX ORDER — PANTOPRAZOLE SODIUM 20 MG/1
20 TABLET, DELAYED RELEASE ORAL EVERY MORNING
Qty: 90 TABLET | Refills: 0 | Status: SHIPPED | OUTPATIENT
Start: 2024-04-19

## 2024-04-21 LAB
BACTERIA UR CULT: NORMAL
SIGNIFICANT IND 70042: NORMAL
SITE SITE: NORMAL
SOURCE SOURCE: NORMAL

## 2024-04-22 LAB
BK PLASMA INTERP, QNT NAAT NL11711: DETECTED
BK PLASMA IU/ML, QNT NAAT NL11709: ABNORMAL IU/ML
BK PLASMA LOG IU/ML, QNT NAAT NL11710: ABNORMAL LOG IU/ML
EXTRA TUBE BLU BLU: NORMAL

## 2024-04-23 ENCOUNTER — HOSPITAL ENCOUNTER (OUTPATIENT)
Dept: LAB | Facility: MEDICAL CENTER | Age: 69
End: 2024-04-23
Attending: INTERNAL MEDICINE
Payer: COMMERCIAL

## 2024-04-23 LAB
ALBUMIN SERPL BCP-MCNC: 4.1 G/DL (ref 3.2–4.9)
ALBUMIN/GLOB SERPL: 1.5 G/DL
ALP SERPL-CCNC: 126 U/L (ref 30–99)
ALT SERPL-CCNC: 25 U/L (ref 2–50)
ANION GAP SERPL CALC-SCNC: 13 MMOL/L (ref 7–16)
APPEARANCE UR: CLEAR
AST SERPL-CCNC: 25 U/L (ref 12–45)
BACTERIA #/AREA URNS HPF: NEGATIVE /HPF
BASOPHILS # BLD AUTO: 0.7 % (ref 0–1.8)
BASOPHILS # BLD: 0.05 K/UL (ref 0–0.12)
BILIRUB SERPL-MCNC: 0.2 MG/DL (ref 0.1–1.5)
BILIRUB UR QL STRIP.AUTO: NEGATIVE
BUN SERPL-MCNC: 47 MG/DL (ref 8–22)
CALCIUM ALBUM COR SERPL-MCNC: 8.6 MG/DL (ref 8.5–10.5)
CALCIUM SERPL-MCNC: 8.7 MG/DL (ref 8.5–10.5)
CHLORIDE SERPL-SCNC: 107 MMOL/L (ref 96–112)
CK SERPL-CCNC: 77 U/L (ref 0–154)
CO2 SERPL-SCNC: 21 MMOL/L (ref 20–33)
COLOR UR: YELLOW
CREAT SERPL-MCNC: 2.49 MG/DL (ref 0.5–1.4)
CREAT UR-MCNC: 86.18 MG/DL
EOSINOPHIL # BLD AUTO: 0.36 K/UL (ref 0–0.51)
EOSINOPHIL NFR BLD: 5.1 % (ref 0–6.9)
EPI CELLS #/AREA URNS HPF: NEGATIVE /HPF
ERYTHROCYTE [DISTWIDTH] IN BLOOD BY AUTOMATED COUNT: 50.3 FL (ref 35.9–50)
GFR SERPLBLD CREATININE-BSD FMLA CKD-EPI: 27 ML/MIN/1.73 M 2
GLOBULIN SER CALC-MCNC: 2.7 G/DL (ref 1.9–3.5)
GLUCOSE SERPL-MCNC: 108 MG/DL (ref 65–99)
GLUCOSE UR STRIP.AUTO-MCNC: NEGATIVE MG/DL
HCT VFR BLD AUTO: 43.4 % (ref 42–52)
HGB BLD-MCNC: 13 G/DL (ref 14–18)
HYALINE CASTS #/AREA URNS LPF: ABNORMAL /LPF
IMM GRANULOCYTES # BLD AUTO: 0.03 K/UL (ref 0–0.11)
IMM GRANULOCYTES NFR BLD AUTO: 0.4 % (ref 0–0.9)
INR PPP: 1.24 (ref 0.87–1.13)
KETONES UR STRIP.AUTO-MCNC: NEGATIVE MG/DL
LEUKOCYTE ESTERASE UR QL STRIP.AUTO: NEGATIVE
LYMPHOCYTES # BLD AUTO: 1.01 K/UL (ref 1–4.8)
LYMPHOCYTES NFR BLD: 14.3 % (ref 22–41)
MAGNESIUM SERPL-MCNC: 1.8 MG/DL (ref 1.5–2.5)
MCH RBC QN AUTO: 27 PG (ref 27–33)
MCHC RBC AUTO-ENTMCNC: 30 G/DL (ref 32.3–36.5)
MCV RBC AUTO: 90 FL (ref 81.4–97.8)
MICRO URNS: ABNORMAL
MONOCYTES # BLD AUTO: 0.71 K/UL (ref 0–0.85)
MONOCYTES NFR BLD AUTO: 10 % (ref 0–13.4)
NEUTROPHILS # BLD AUTO: 4.91 K/UL (ref 1.82–7.42)
NEUTROPHILS NFR BLD: 69.5 % (ref 44–72)
NITRITE UR QL STRIP.AUTO: NEGATIVE
NRBC # BLD AUTO: 0 K/UL
NRBC BLD-RTO: 0 /100 WBC (ref 0–0.2)
PH UR STRIP.AUTO: 5.5 [PH] (ref 5–8)
PHOSPHATE SERPL-MCNC: 2.9 MG/DL (ref 2.5–4.5)
PLATELET # BLD AUTO: 207 K/UL (ref 164–446)
PMV BLD AUTO: 9.3 FL (ref 9–12.9)
POTASSIUM SERPL-SCNC: 5.1 MMOL/L (ref 3.6–5.5)
PROT SERPL-MCNC: 6.8 G/DL (ref 6–8.2)
PROT UR QL STRIP: 30 MG/DL
PROT UR-MCNC: 25 MG/DL (ref 0–15)
PROT/CREAT UR: 290 MG/G (ref 15–68)
PROTHROMBIN TIME: 15.7 SEC (ref 12–14.6)
RBC # BLD AUTO: 4.82 M/UL (ref 4.7–6.1)
RBC # URNS HPF: ABNORMAL /HPF
RBC UR QL AUTO: NEGATIVE
SODIUM SERPL-SCNC: 141 MMOL/L (ref 135–145)
SP GR UR STRIP.AUTO: 1.02
UROBILINOGEN UR STRIP.AUTO-MCNC: 0.2 MG/DL
WBC # BLD AUTO: 7.1 K/UL (ref 4.8–10.8)
WBC #/AREA URNS HPF: ABNORMAL /HPF

## 2024-04-23 PROCEDURE — 80197 ASSAY OF TACROLIMUS: CPT

## 2024-04-23 PROCEDURE — 84100 ASSAY OF PHOSPHORUS: CPT

## 2024-04-23 PROCEDURE — 36415 COLL VENOUS BLD VENIPUNCTURE: CPT

## 2024-04-23 PROCEDURE — 83735 ASSAY OF MAGNESIUM: CPT

## 2024-04-23 PROCEDURE — 85610 PROTHROMBIN TIME: CPT

## 2024-04-23 PROCEDURE — 82570 ASSAY OF URINE CREATININE: CPT

## 2024-04-23 PROCEDURE — 82550 ASSAY OF CK (CPK): CPT

## 2024-04-23 PROCEDURE — 80053 COMPREHEN METABOLIC PANEL: CPT

## 2024-04-23 PROCEDURE — 85025 COMPLETE CBC W/AUTO DIFF WBC: CPT

## 2024-04-23 PROCEDURE — 87799 DETECT AGENT NOS DNA QUANT: CPT

## 2024-04-23 PROCEDURE — 84156 ASSAY OF PROTEIN URINE: CPT

## 2024-04-23 PROCEDURE — 81001 URINALYSIS AUTO W/SCOPE: CPT

## 2024-04-24 LAB — TACROLIMUS BLD-MCNC: 5.7 NG/ML (ref 5–20)

## 2024-04-25 LAB
BK PLASMA INTERP, QNT NAAT NL11711: DETECTED
BK PLASMA IU/ML, QNT NAAT NL11709: ABNORMAL IU/ML
BK PLASMA LOG IU/ML, QNT NAAT NL11710: ABNORMAL LOG IU/ML

## 2024-07-01 DIAGNOSIS — E78.5 DYSLIPIDEMIA: ICD-10-CM

## 2024-07-01 DIAGNOSIS — K22.2 ESOPHAGEAL STRICTURE: ICD-10-CM

## 2024-07-01 DIAGNOSIS — E78.5 DYSLIPIDEMIA: Chronic | ICD-10-CM

## 2024-07-01 RX ORDER — PRAVASTATIN SODIUM 20 MG
20 TABLET ORAL DAILY
Qty: 90 TABLET | Refills: 0 | Status: SHIPPED | OUTPATIENT
Start: 2024-07-01

## 2024-07-01 RX ORDER — PANTOPRAZOLE SODIUM 20 MG/1
20 TABLET, DELAYED RELEASE ORAL EVERY MORNING
Qty: 90 TABLET | Refills: 0 | Status: SHIPPED | OUTPATIENT
Start: 2024-07-01

## 2024-07-22 ENCOUNTER — HOSPITAL ENCOUNTER (OUTPATIENT)
Dept: LAB | Facility: MEDICAL CENTER | Age: 69
End: 2024-07-22
Attending: INTERNAL MEDICINE
Payer: COMMERCIAL

## 2024-07-22 LAB
ALBUMIN SERPL BCP-MCNC: 4.2 G/DL (ref 3.2–4.9)
ALBUMIN/GLOB SERPL: 1.4 G/DL
ALP SERPL-CCNC: 147 U/L (ref 30–99)
ALT SERPL-CCNC: 30 U/L (ref 2–50)
ANION GAP SERPL CALC-SCNC: 12 MMOL/L (ref 7–16)
APPEARANCE UR: CLEAR
AST SERPL-CCNC: 32 U/L (ref 12–45)
BACTERIA #/AREA URNS HPF: NEGATIVE /HPF
BASOPHILS # BLD AUTO: 1 % (ref 0–1.8)
BASOPHILS # BLD: 0.06 K/UL (ref 0–0.12)
BILIRUB SERPL-MCNC: 0.4 MG/DL (ref 0.1–1.5)
BILIRUB UR QL STRIP.AUTO: NEGATIVE
BUN SERPL-MCNC: 37 MG/DL (ref 8–22)
CALCIUM ALBUM COR SERPL-MCNC: 8.9 MG/DL (ref 8.5–10.5)
CALCIUM SERPL-MCNC: 9.1 MG/DL (ref 8.5–10.5)
CHLORIDE SERPL-SCNC: 106 MMOL/L (ref 96–112)
CK SERPL-CCNC: 118 U/L (ref 0–154)
CO2 SERPL-SCNC: 24 MMOL/L (ref 20–33)
COLOR UR: YELLOW
CREAT SERPL-MCNC: 2.31 MG/DL (ref 0.5–1.4)
CREAT UR-MCNC: 43.45 MG/DL
EOSINOPHIL # BLD AUTO: 0.34 K/UL (ref 0–0.51)
EOSINOPHIL NFR BLD: 5.4 % (ref 0–6.9)
EPI CELLS #/AREA URNS HPF: NEGATIVE /HPF
ERYTHROCYTE [DISTWIDTH] IN BLOOD BY AUTOMATED COUNT: 52.9 FL (ref 35.9–50)
GFR SERPLBLD CREATININE-BSD FMLA CKD-EPI: 30 ML/MIN/1.73 M 2
GLOBULIN SER CALC-MCNC: 2.9 G/DL (ref 1.9–3.5)
GLUCOSE SERPL-MCNC: 92 MG/DL (ref 65–99)
GLUCOSE UR STRIP.AUTO-MCNC: NEGATIVE MG/DL
HCT VFR BLD AUTO: 48.7 % (ref 42–52)
HGB BLD-MCNC: 14.8 G/DL (ref 14–18)
HYALINE CASTS #/AREA URNS LPF: ABNORMAL /LPF
IMM GRANULOCYTES # BLD AUTO: 0.04 K/UL (ref 0–0.11)
IMM GRANULOCYTES NFR BLD AUTO: 0.6 % (ref 0–0.9)
KETONES UR STRIP.AUTO-MCNC: NEGATIVE MG/DL
LEUKOCYTE ESTERASE UR QL STRIP.AUTO: ABNORMAL
LYMPHOCYTES # BLD AUTO: 1.17 K/UL (ref 1–4.8)
LYMPHOCYTES NFR BLD: 18.7 % (ref 22–41)
MAGNESIUM SERPL-MCNC: 1.7 MG/DL (ref 1.5–2.5)
MCH RBC QN AUTO: 26.8 PG (ref 27–33)
MCHC RBC AUTO-ENTMCNC: 30.4 G/DL (ref 32.3–36.5)
MCV RBC AUTO: 88.2 FL (ref 81.4–97.8)
MICRO URNS: ABNORMAL
MONOCYTES # BLD AUTO: 0.64 K/UL (ref 0–0.85)
MONOCYTES NFR BLD AUTO: 10.2 % (ref 0–13.4)
NEUTROPHILS # BLD AUTO: 4.02 K/UL (ref 1.82–7.42)
NEUTROPHILS NFR BLD: 64.1 % (ref 44–72)
NITRITE UR QL STRIP.AUTO: NEGATIVE
NRBC # BLD AUTO: 0 K/UL
NRBC BLD-RTO: 0 /100 WBC (ref 0–0.2)
PH UR STRIP.AUTO: 7 [PH] (ref 5–8)
PHOSPHATE SERPL-MCNC: 2.8 MG/DL (ref 2.5–4.5)
PLATELET # BLD AUTO: 213 K/UL (ref 164–446)
PMV BLD AUTO: 10.3 FL (ref 9–12.9)
POTASSIUM SERPL-SCNC: 5.3 MMOL/L (ref 3.6–5.5)
PROT SERPL-MCNC: 7.1 G/DL (ref 6–8.2)
PROT UR QL STRIP: 30 MG/DL
PROT UR-MCNC: 30 MG/DL (ref 0–15)
PROT/CREAT UR: 690 MG/G (ref 15–68)
RBC # BLD AUTO: 5.52 M/UL (ref 4.7–6.1)
RBC # URNS HPF: ABNORMAL /HPF
RBC UR QL AUTO: NEGATIVE
SODIUM SERPL-SCNC: 142 MMOL/L (ref 135–145)
SP GR UR STRIP.AUTO: 1.01
UROBILINOGEN UR STRIP.AUTO-MCNC: 0.2 MG/DL
WBC # BLD AUTO: 6.3 K/UL (ref 4.8–10.8)
WBC #/AREA URNS HPF: ABNORMAL /HPF

## 2024-07-22 PROCEDURE — 84100 ASSAY OF PHOSPHORUS: CPT

## 2024-07-22 PROCEDURE — 85025 COMPLETE CBC W/AUTO DIFF WBC: CPT

## 2024-07-22 PROCEDURE — 81001 URINALYSIS AUTO W/SCOPE: CPT

## 2024-07-22 PROCEDURE — 83735 ASSAY OF MAGNESIUM: CPT

## 2024-07-22 PROCEDURE — 80053 COMPREHEN METABOLIC PANEL: CPT

## 2024-07-22 PROCEDURE — 36415 COLL VENOUS BLD VENIPUNCTURE: CPT

## 2024-07-22 PROCEDURE — 80197 ASSAY OF TACROLIMUS: CPT

## 2024-07-22 PROCEDURE — 82550 ASSAY OF CK (CPK): CPT

## 2024-07-22 PROCEDURE — 84156 ASSAY OF PROTEIN URINE: CPT

## 2024-07-22 PROCEDURE — 87086 URINE CULTURE/COLONY COUNT: CPT

## 2024-07-22 PROCEDURE — 82570 ASSAY OF URINE CREATININE: CPT

## 2024-07-23 LAB — TACROLIMUS BLD-MCNC: 7.9 NG/ML (ref 5–20)

## 2024-07-25 LAB
BACTERIA UR CULT: NORMAL
SIGNIFICANT IND 70042: NORMAL
SITE SITE: NORMAL
SOURCE SOURCE: NORMAL

## 2024-08-21 ENCOUNTER — HOSPITAL ENCOUNTER (OUTPATIENT)
Dept: LAB | Facility: MEDICAL CENTER | Age: 69
End: 2024-08-21
Attending: INTERNAL MEDICINE
Payer: COMMERCIAL

## 2024-08-21 LAB
ALBUMIN SERPL BCP-MCNC: 4.3 G/DL (ref 3.2–4.9)
ALBUMIN/GLOB SERPL: 1.6 G/DL
ALP SERPL-CCNC: 122 U/L (ref 30–99)
ALT SERPL-CCNC: 20 U/L (ref 2–50)
ANION GAP SERPL CALC-SCNC: 15 MMOL/L (ref 7–16)
APPEARANCE UR: CLEAR
AST SERPL-CCNC: 23 U/L (ref 12–45)
BACTERIA #/AREA URNS HPF: NEGATIVE /HPF
BASOPHILS # BLD AUTO: 1.3 % (ref 0–1.8)
BASOPHILS # BLD: 0.07 K/UL (ref 0–0.12)
BILIRUB SERPL-MCNC: 0.6 MG/DL (ref 0.1–1.5)
BILIRUB UR QL STRIP.AUTO: NEGATIVE
BUN SERPL-MCNC: 38 MG/DL (ref 8–22)
CALCIUM ALBUM COR SERPL-MCNC: 8.6 MG/DL (ref 8.5–10.5)
CALCIUM SERPL-MCNC: 8.8 MG/DL (ref 8.5–10.5)
CHLORIDE SERPL-SCNC: 105 MMOL/L (ref 96–112)
CK SERPL-CCNC: 72 U/L (ref 0–154)
CO2 SERPL-SCNC: 20 MMOL/L (ref 20–33)
COLOR UR: YELLOW
CREAT SERPL-MCNC: 2.02 MG/DL (ref 0.5–1.4)
CREAT UR-MCNC: 52.27 MG/DL
EOSINOPHIL # BLD AUTO: 0.41 K/UL (ref 0–0.51)
EOSINOPHIL NFR BLD: 7.8 % (ref 0–6.9)
EPI CELLS #/AREA URNS HPF: NEGATIVE /HPF
ERYTHROCYTE [DISTWIDTH] IN BLOOD BY AUTOMATED COUNT: 55.2 FL (ref 35.9–50)
GFR SERPLBLD CREATININE-BSD FMLA CKD-EPI: 35 ML/MIN/1.73 M 2
GLOBULIN SER CALC-MCNC: 2.7 G/DL (ref 1.9–3.5)
GLUCOSE SERPL-MCNC: 90 MG/DL (ref 65–99)
GLUCOSE UR STRIP.AUTO-MCNC: NEGATIVE MG/DL
HCT VFR BLD AUTO: 46.3 % (ref 42–52)
HGB BLD-MCNC: 14.2 G/DL (ref 14–18)
HYALINE CASTS #/AREA URNS LPF: ABNORMAL /LPF
IMM GRANULOCYTES # BLD AUTO: 0.02 K/UL (ref 0–0.11)
IMM GRANULOCYTES NFR BLD AUTO: 0.4 % (ref 0–0.9)
INR PPP: 1.15 (ref 0.87–1.13)
KETONES UR STRIP.AUTO-MCNC: NEGATIVE MG/DL
LEUKOCYTE ESTERASE UR QL STRIP.AUTO: NEGATIVE
LYMPHOCYTES # BLD AUTO: 1.02 K/UL (ref 1–4.8)
LYMPHOCYTES NFR BLD: 19.4 % (ref 22–41)
MAGNESIUM SERPL-MCNC: 1.7 MG/DL (ref 1.5–2.5)
MCH RBC QN AUTO: 27 PG (ref 27–33)
MCHC RBC AUTO-ENTMCNC: 30.7 G/DL (ref 32.3–36.5)
MCV RBC AUTO: 88 FL (ref 81.4–97.8)
MICRO URNS: ABNORMAL
MONOCYTES # BLD AUTO: 0.54 K/UL (ref 0–0.85)
MONOCYTES NFR BLD AUTO: 10.3 % (ref 0–13.4)
NEUTROPHILS # BLD AUTO: 3.19 K/UL (ref 1.82–7.42)
NEUTROPHILS NFR BLD: 60.8 % (ref 44–72)
NITRITE UR QL STRIP.AUTO: NEGATIVE
NRBC # BLD AUTO: 0 K/UL
NRBC BLD-RTO: 0 /100 WBC (ref 0–0.2)
PH UR STRIP.AUTO: 7 [PH] (ref 5–8)
PHOSPHATE SERPL-MCNC: 3 MG/DL (ref 2.5–4.5)
PLATELET # BLD AUTO: 193 K/UL (ref 164–446)
PMV BLD AUTO: 10.3 FL (ref 9–12.9)
POTASSIUM SERPL-SCNC: 4.7 MMOL/L (ref 3.6–5.5)
PROT SERPL-MCNC: 7 G/DL (ref 6–8.2)
PROT UR QL STRIP: 30 MG/DL
PROT UR-MCNC: 30 MG/DL (ref 0–15)
PROT/CREAT UR: 574 MG/G (ref 15–68)
PROTHROMBIN TIME: 14.9 SEC (ref 12–14.6)
RBC # BLD AUTO: 5.26 M/UL (ref 4.7–6.1)
RBC # URNS HPF: ABNORMAL /HPF
RBC UR QL AUTO: NEGATIVE
SODIUM SERPL-SCNC: 140 MMOL/L (ref 135–145)
SP GR UR STRIP.AUTO: 1.01
UROBILINOGEN UR STRIP.AUTO-MCNC: 0.2 MG/DL
WBC # BLD AUTO: 5.3 K/UL (ref 4.8–10.8)
WBC #/AREA URNS HPF: ABNORMAL /HPF

## 2024-08-21 PROCEDURE — 84100 ASSAY OF PHOSPHORUS: CPT

## 2024-08-21 PROCEDURE — 85025 COMPLETE CBC W/AUTO DIFF WBC: CPT

## 2024-08-21 PROCEDURE — 85610 PROTHROMBIN TIME: CPT

## 2024-08-21 PROCEDURE — 87077 CULTURE AEROBIC IDENTIFY: CPT

## 2024-08-21 PROCEDURE — 36415 COLL VENOUS BLD VENIPUNCTURE: CPT

## 2024-08-21 PROCEDURE — 80053 COMPREHEN METABOLIC PANEL: CPT

## 2024-08-21 PROCEDURE — 81001 URINALYSIS AUTO W/SCOPE: CPT

## 2024-08-21 PROCEDURE — 80197 ASSAY OF TACROLIMUS: CPT

## 2024-08-21 PROCEDURE — 84156 ASSAY OF PROTEIN URINE: CPT

## 2024-08-21 PROCEDURE — 87086 URINE CULTURE/COLONY COUNT: CPT

## 2024-08-21 PROCEDURE — 82570 ASSAY OF URINE CREATININE: CPT

## 2024-08-21 PROCEDURE — 83735 ASSAY OF MAGNESIUM: CPT

## 2024-08-21 PROCEDURE — 82550 ASSAY OF CK (CPK): CPT

## 2024-08-21 PROCEDURE — 87799 DETECT AGENT NOS DNA QUANT: CPT

## 2024-08-22 LAB — TACROLIMUS BLD-MCNC: 3.6 NG/ML (ref 5–20)

## 2024-08-23 LAB
BACTERIA UR CULT: NORMAL
BK PLASMA INTERP, QNT NAAT NL11711: NOT DETECTED
BK PLASMA IU/ML, QNT NAAT NL11709: NOT DETECTED IU/ML
BK PLASMA LOG IU/ML, QNT NAAT NL11710: NOT DETECTED LOG IU/ML
SIGNIFICANT IND 70042: NORMAL
SITE SITE: NORMAL
SOURCE SOURCE: NORMAL

## 2024-09-04 ENCOUNTER — HOSPITAL ENCOUNTER (OUTPATIENT)
Dept: LAB | Facility: MEDICAL CENTER | Age: 69
End: 2024-09-04
Attending: INTERNAL MEDICINE
Payer: COMMERCIAL

## 2024-09-04 LAB
ALBUMIN SERPL BCP-MCNC: 3.9 G/DL (ref 3.2–4.9)
ALBUMIN/GLOB SERPL: 1.2 G/DL
ALP SERPL-CCNC: 117 U/L (ref 30–99)
ALT SERPL-CCNC: 17 U/L (ref 2–50)
ANION GAP SERPL CALC-SCNC: 13 MMOL/L (ref 7–16)
APPEARANCE UR: CLEAR
AST SERPL-CCNC: 21 U/L (ref 12–45)
BACTERIA #/AREA URNS HPF: NEGATIVE /HPF
BASOPHILS # BLD AUTO: 0.6 % (ref 0–1.8)
BASOPHILS # BLD: 0.04 K/UL (ref 0–0.12)
BILIRUB SERPL-MCNC: 0.3 MG/DL (ref 0.1–1.5)
BILIRUB UR QL STRIP.AUTO: NEGATIVE
BUN SERPL-MCNC: 41 MG/DL (ref 8–22)
CALCIUM ALBUM COR SERPL-MCNC: 9 MG/DL (ref 8.5–10.5)
CALCIUM SERPL-MCNC: 8.9 MG/DL (ref 8.5–10.5)
CHLORIDE SERPL-SCNC: 108 MMOL/L (ref 96–112)
CK SERPL-CCNC: 59 U/L (ref 0–154)
CO2 SERPL-SCNC: 20 MMOL/L (ref 20–33)
COLOR UR: YELLOW
CREAT SERPL-MCNC: 1.88 MG/DL (ref 0.5–1.4)
CREAT UR-MCNC: 49.38 MG/DL
EOSINOPHIL # BLD AUTO: 0.36 K/UL (ref 0–0.51)
EOSINOPHIL NFR BLD: 5.4 % (ref 0–6.9)
EPI CELLS #/AREA URNS HPF: NEGATIVE /HPF
ERYTHROCYTE [DISTWIDTH] IN BLOOD BY AUTOMATED COUNT: 54.3 FL (ref 35.9–50)
GFR SERPLBLD CREATININE-BSD FMLA CKD-EPI: 38 ML/MIN/1.73 M 2
GLOBULIN SER CALC-MCNC: 3.2 G/DL (ref 1.9–3.5)
GLUCOSE SERPL-MCNC: 94 MG/DL (ref 65–99)
GLUCOSE UR STRIP.AUTO-MCNC: NEGATIVE MG/DL
HCT VFR BLD AUTO: 45.4 % (ref 42–52)
HGB BLD-MCNC: 14 G/DL (ref 14–18)
HYALINE CASTS #/AREA URNS LPF: ABNORMAL /LPF
IMM GRANULOCYTES # BLD AUTO: 0.04 K/UL (ref 0–0.11)
IMM GRANULOCYTES NFR BLD AUTO: 0.6 % (ref 0–0.9)
INR PPP: 1.31 (ref 0.87–1.13)
KETONES UR STRIP.AUTO-MCNC: NEGATIVE MG/DL
LEUKOCYTE ESTERASE UR QL STRIP.AUTO: NEGATIVE
LYMPHOCYTES # BLD AUTO: 1.21 K/UL (ref 1–4.8)
LYMPHOCYTES NFR BLD: 18.2 % (ref 22–41)
MAGNESIUM SERPL-MCNC: 1.6 MG/DL (ref 1.5–2.5)
MCH RBC QN AUTO: 26.9 PG (ref 27–33)
MCHC RBC AUTO-ENTMCNC: 30.8 G/DL (ref 32.3–36.5)
MCV RBC AUTO: 87.3 FL (ref 81.4–97.8)
MICRO URNS: ABNORMAL
MONOCYTES # BLD AUTO: 0.77 K/UL (ref 0–0.85)
MONOCYTES NFR BLD AUTO: 11.6 % (ref 0–13.4)
NEUTROPHILS # BLD AUTO: 4.22 K/UL (ref 1.82–7.42)
NEUTROPHILS NFR BLD: 63.6 % (ref 44–72)
NITRITE UR QL STRIP.AUTO: NEGATIVE
NRBC # BLD AUTO: 0 K/UL
NRBC BLD-RTO: 0 /100 WBC (ref 0–0.2)
PH UR STRIP.AUTO: 6.5 [PH] (ref 5–8)
PHOSPHATE SERPL-MCNC: 2.7 MG/DL (ref 2.5–4.5)
PLATELET # BLD AUTO: 186 K/UL (ref 164–446)
PMV BLD AUTO: 9.1 FL (ref 9–12.9)
POTASSIUM SERPL-SCNC: 5.3 MMOL/L (ref 3.6–5.5)
PROT SERPL-MCNC: 7.1 G/DL (ref 6–8.2)
PROT UR QL STRIP: 30 MG/DL
PROT UR-MCNC: 25 MG/DL (ref 0–15)
PROT/CREAT UR: 506 MG/G (ref 15–68)
PROTHROMBIN TIME: 16.4 SEC (ref 12–14.6)
RBC # BLD AUTO: 5.2 M/UL (ref 4.7–6.1)
RBC # URNS HPF: ABNORMAL /HPF
RBC UR QL AUTO: NEGATIVE
SODIUM SERPL-SCNC: 141 MMOL/L (ref 135–145)
SP GR UR STRIP.AUTO: 1.01
TACROLIMUS BLD-MCNC: 7.1 NG/ML (ref 5–20)
UROBILINOGEN UR STRIP.AUTO-MCNC: 0.2 MG/DL
WBC # BLD AUTO: 6.6 K/UL (ref 4.8–10.8)
WBC #/AREA URNS HPF: ABNORMAL /HPF

## 2024-09-04 PROCEDURE — 82550 ASSAY OF CK (CPK): CPT

## 2024-09-04 PROCEDURE — 83735 ASSAY OF MAGNESIUM: CPT

## 2024-09-04 PROCEDURE — 85025 COMPLETE CBC W/AUTO DIFF WBC: CPT

## 2024-09-04 PROCEDURE — 80053 COMPREHEN METABOLIC PANEL: CPT

## 2024-09-04 PROCEDURE — 85610 PROTHROMBIN TIME: CPT

## 2024-09-04 PROCEDURE — 80197 ASSAY OF TACROLIMUS: CPT

## 2024-09-04 PROCEDURE — 87799 DETECT AGENT NOS DNA QUANT: CPT

## 2024-09-04 PROCEDURE — 84100 ASSAY OF PHOSPHORUS: CPT

## 2024-09-04 PROCEDURE — 36415 COLL VENOUS BLD VENIPUNCTURE: CPT

## 2024-09-04 PROCEDURE — 84156 ASSAY OF PROTEIN URINE: CPT

## 2024-09-04 PROCEDURE — 81001 URINALYSIS AUTO W/SCOPE: CPT

## 2024-09-04 PROCEDURE — 82570 ASSAY OF URINE CREATININE: CPT

## 2024-09-06 LAB
BK PLASMA INTERP, QNT NAAT NL11711: DETECTED
BK PLASMA IU/ML, QNT NAAT NL11709: 23 IU/ML
BK PLASMA LOG IU/ML, QNT NAAT NL11710: 1.36 LOG IU/ML

## 2024-09-25 DIAGNOSIS — E78.5 DYSLIPIDEMIA: ICD-10-CM

## 2024-09-25 RX ORDER — PRAVASTATIN SODIUM 20 MG
20 TABLET ORAL DAILY
Qty: 90 TABLET | Refills: 0 | Status: SHIPPED | OUTPATIENT
Start: 2024-09-25

## 2024-09-25 NOTE — TELEPHONE ENCOUNTER
Received request via: Pharmacy    Was the patient seen in the last year in this department? Yes    Does the patient have an active prescription (recently filled or refills available) for medication(s) requested? No    Pharmacy Name: [facility]     Does the patient have skilled nursing Plus and need 100-day supply? (This applies to ALL medications) Patient does not have SCP

## 2024-09-29 DIAGNOSIS — K22.2 ESOPHAGEAL STRICTURE: ICD-10-CM

## 2024-10-01 RX ORDER — PANTOPRAZOLE SODIUM 20 MG/1
20 TABLET, DELAYED RELEASE ORAL EVERY MORNING
Qty: 90 TABLET | Refills: 0 | Status: SHIPPED | OUTPATIENT
Start: 2024-10-01

## 2024-10-03 NOTE — TELEPHONE ENCOUNTER
[facility] Employee Diabetes Program - Be Well With Diabetes    Quarterly Check-in  Quarterly Reminder sent to patient for the DM Program - See Mychart message or Letter for more information  Requirement complete  Sent Mychart/Letter  Shiva Gordon, PharmD, BCACP  Ambulatory Care Clinical Pharmacist- [facility] Clinical Pharmacy  Department, toll free: [phone]      For Pharmacy Admin Tracking Only    Program: [employer]  Time Spent (min): 5     Orders from 9/23/20 still active. Called patient who verbalized understanding and was appreciative for the return phone call.

## 2025-01-02 DIAGNOSIS — K22.2 ESOPHAGEAL STRICTURE: ICD-10-CM

## 2025-01-02 DIAGNOSIS — E78.5 DYSLIPIDEMIA: ICD-10-CM

## 2025-01-02 NOTE — TELEPHONE ENCOUNTER
Received request via: Pharmacy    Was the patient seen in the last year in this department? Yes    Does the patient have an active prescription (recently filled or refills available) for medication(s) requested? No    Does the patient have [facility] Plus and need 100-day supply? (This applies to ALL medications) Patient does not have SCP

## 2025-01-06 RX ORDER — PANTOPRAZOLE SODIUM 20 MG/1
20 TABLET, DELAYED RELEASE ORAL EVERY MORNING
Qty: 90 TABLET | Refills: 0 | Status: SHIPPED | OUTPATIENT
Start: 2025-01-06 | End: 2025-01-29

## 2025-01-06 RX ORDER — PRAVASTATIN SODIUM 20 MG
20 TABLET ORAL DAILY
Qty: 90 TABLET | Refills: 0 | Status: SHIPPED | OUTPATIENT
Start: 2025-01-06

## 2025-01-15 ENCOUNTER — OFFICE VISIT (OUTPATIENT)
Dept: MEDICAL GROUP | Facility: PHYSICIAN GROUP | Age: 70
End: 2025-01-15
Payer: COMMERCIAL

## 2025-01-15 VITALS
HEART RATE: 85 BPM | DIASTOLIC BLOOD PRESSURE: 62 MMHG | WEIGHT: 191.2 LBS | HEIGHT: 71 IN | SYSTOLIC BLOOD PRESSURE: 130 MMHG | TEMPERATURE: 97.5 F | OXYGEN SATURATION: 94 % | BODY MASS INDEX: 26.77 KG/M2

## 2025-01-15 DIAGNOSIS — N17.9 ACUTE RENAL FAILURE SUPERIMPOSED ON STAGE 4 CHRONIC KIDNEY DISEASE, UNSPECIFIED ACUTE RENAL FAILURE TYPE (HCC): ICD-10-CM

## 2025-01-15 DIAGNOSIS — M1A.9XX1 CHRONIC GOUT WITH TOPHUS, UNSPECIFIED CAUSE, UNSPECIFIED SITE: ICD-10-CM

## 2025-01-15 DIAGNOSIS — R13.10 DYSPHAGIA, UNSPECIFIED TYPE: ICD-10-CM

## 2025-01-15 DIAGNOSIS — K21.9 GASTROESOPHAGEAL REFLUX DISEASE, UNSPECIFIED WHETHER ESOPHAGITIS PRESENT: ICD-10-CM

## 2025-01-15 DIAGNOSIS — Z94.0 KIDNEY TRANSPLANT RECIPIENT: Chronic | ICD-10-CM

## 2025-01-15 DIAGNOSIS — E87.5 HYPERKALEMIA: ICD-10-CM

## 2025-01-15 DIAGNOSIS — I82.402 LEG DVT (DEEP VENOUS THROMBOEMBOLISM), ACUTE, LEFT (HCC): ICD-10-CM

## 2025-01-15 DIAGNOSIS — E78.5 DYSLIPIDEMIA: Chronic | ICD-10-CM

## 2025-01-15 DIAGNOSIS — N18.4 ACUTE RENAL FAILURE SUPERIMPOSED ON STAGE 4 CHRONIC KIDNEY DISEASE, UNSPECIFIED ACUTE RENAL FAILURE TYPE (HCC): ICD-10-CM

## 2025-01-15 PROBLEM — N18.9 ACUTE ON CHRONIC KIDNEY FAILURE (HCC): Status: ACTIVE | Noted: 2025-01-15

## 2025-01-15 PROCEDURE — 3075F SYST BP GE 130 - 139MM HG: CPT | Performed by: INTERNAL MEDICINE

## 2025-01-15 PROCEDURE — 99215 OFFICE O/P EST HI 40 MIN: CPT | Performed by: INTERNAL MEDICINE

## 2025-01-15 PROCEDURE — 3078F DIAST BP <80 MM HG: CPT | Performed by: INTERNAL MEDICINE

## 2025-01-15 ASSESSMENT — FIBROSIS 4 INDEX: FIB4 SCORE: 1.23

## 2025-01-15 ASSESSMENT — PATIENT HEALTH QUESTIONNAIRE - PHQ9: CLINICAL INTERPRETATION OF PHQ2 SCORE: 0

## 2025-01-16 ENCOUNTER — HOSPITAL ENCOUNTER (OUTPATIENT)
Dept: LAB | Facility: MEDICAL CENTER | Age: 70
End: 2025-01-16
Attending: INTERNAL MEDICINE
Payer: COMMERCIAL

## 2025-01-16 DIAGNOSIS — E78.5 DYSLIPIDEMIA: Chronic | ICD-10-CM

## 2025-01-16 LAB
AMORPHOUS CRYSTALS 1764: PRESENT /HPF
ANISOCYTOSIS BLD QL SMEAR: ABNORMAL
APPEARANCE UR: CLEAR
BACTERIA #/AREA URNS HPF: ABNORMAL /HPF
BASOPHILS # BLD AUTO: 0.3 % (ref 0–1.8)
BASOPHILS # BLD: 0.02 K/UL (ref 0–0.12)
BILIRUB UR QL STRIP.AUTO: NEGATIVE
BURR CELLS BLD QL SMEAR: NORMAL
CASTS URNS QL MICRO: ABNORMAL /LPF (ref 0–2)
COLOR UR: YELLOW
COMMENT 1642: NORMAL
CREAT UR-MCNC: 78.27 MG/DL
EOSINOPHIL # BLD AUTO: 0.39 K/UL (ref 0–0.51)
EOSINOPHIL NFR BLD: 5.2 % (ref 0–6.9)
EPITHELIAL CELLS 1715: ABNORMAL /HPF (ref 0–5)
ERYTHROCYTE [DISTWIDTH] IN BLOOD BY AUTOMATED COUNT: 54.9 FL (ref 35.9–50)
GLUCOSE UR STRIP.AUTO-MCNC: NEGATIVE MG/DL
HCT VFR BLD AUTO: 38 % (ref 42–52)
HGB BLD-MCNC: 11.2 G/DL (ref 14–18)
IMM GRANULOCYTES # BLD AUTO: 0.06 K/UL (ref 0–0.11)
IMM GRANULOCYTES NFR BLD AUTO: 0.8 % (ref 0–0.9)
INR PPP: 1.56 (ref 0.87–1.13)
KETONES UR STRIP.AUTO-MCNC: NEGATIVE MG/DL
LEUKOCYTE ESTERASE UR QL STRIP.AUTO: NEGATIVE
LG PLATELETS BLD QL SMEAR: NORMAL
LYMPHOCYTES # BLD AUTO: 0.78 K/UL (ref 1–4.8)
LYMPHOCYTES NFR BLD: 10.4 % (ref 22–41)
MACROCYTES BLD QL SMEAR: ABNORMAL
MCH RBC QN AUTO: 27.1 PG (ref 27–33)
MCHC RBC AUTO-ENTMCNC: 29.5 G/DL (ref 32.3–36.5)
MCV RBC AUTO: 92 FL (ref 81.4–97.8)
MICRO URNS: ABNORMAL
MONOCYTES # BLD AUTO: 0.79 K/UL (ref 0–0.85)
MONOCYTES NFR BLD AUTO: 10.6 % (ref 0–13.4)
MORPHOLOGY BLD-IMP: NORMAL
NEUTROPHILS # BLD AUTO: 5.44 K/UL (ref 1.82–7.42)
NEUTROPHILS NFR BLD: 72.7 % (ref 44–72)
NITRITE UR QL STRIP.AUTO: NEGATIVE
NRBC # BLD AUTO: 0 K/UL
NRBC BLD-RTO: 0 /100 WBC (ref 0–0.2)
OVALOCYTES BLD QL SMEAR: NORMAL
PH UR STRIP.AUTO: 6 [PH] (ref 5–8)
PLATELET # BLD AUTO: 251 K/UL (ref 164–446)
PLATELET BLD QL SMEAR: NORMAL
PMV BLD AUTO: 10.2 FL (ref 9–12.9)
POIKILOCYTOSIS BLD QL SMEAR: NORMAL
PROT UR QL STRIP: 30 MG/DL
PROT UR-MCNC: 45 MG/DL (ref 0–15)
PROT/CREAT UR: 575 MG/G (ref 15–68)
PROTHROMBIN TIME: 18.7 SEC (ref 12–14.6)
RBC # BLD AUTO: 4.13 M/UL (ref 4.7–6.1)
RBC # URNS HPF: ABNORMAL /HPF (ref 0–2)
RBC BLD AUTO: PRESENT
RBC UR QL AUTO: NEGATIVE
SCHISTOCYTES BLD QL SMEAR: NORMAL
SP GR UR STRIP.AUTO: 1.02
UROBILINOGEN UR STRIP.AUTO-MCNC: 0.2 EU/DL
WBC # BLD AUTO: 7.5 K/UL (ref 4.8–10.8)
WBC #/AREA URNS HPF: ABNORMAL /HPF

## 2025-01-16 PROCEDURE — 80053 COMPREHEN METABOLIC PANEL: CPT

## 2025-01-16 PROCEDURE — 84156 ASSAY OF PROTEIN URINE: CPT

## 2025-01-16 PROCEDURE — 85025 COMPLETE CBC W/AUTO DIFF WBC: CPT

## 2025-01-16 PROCEDURE — 82550 ASSAY OF CK (CPK): CPT

## 2025-01-16 PROCEDURE — 80061 LIPID PANEL: CPT

## 2025-01-16 PROCEDURE — 82570 ASSAY OF URINE CREATININE: CPT

## 2025-01-16 PROCEDURE — 85610 PROTHROMBIN TIME: CPT

## 2025-01-16 PROCEDURE — 84100 ASSAY OF PHOSPHORUS: CPT

## 2025-01-16 PROCEDURE — 83735 ASSAY OF MAGNESIUM: CPT

## 2025-01-16 PROCEDURE — 80197 ASSAY OF TACROLIMUS: CPT

## 2025-01-16 PROCEDURE — 87799 DETECT AGENT NOS DNA QUANT: CPT

## 2025-01-16 PROCEDURE — 81001 URINALYSIS AUTO W/SCOPE: CPT

## 2025-01-16 PROCEDURE — 36415 COLL VENOUS BLD VENIPUNCTURE: CPT

## 2025-01-16 NOTE — ASSESSMENT & PLAN NOTE
This is a chronic condition.  The patient presently taking pantoprazole.  Patient reported dysphagia as above.  Patient denies abdominal pain or GI bleeding.

## 2025-01-16 NOTE — ASSESSMENT & PLAN NOTE
This is a chronic condition.  The patient reported recurrent problems with swallowing noted since last 2 months.  Patient reported food getting stuck with swallowing.  The patient denies fever or chills.

## 2025-01-16 NOTE — PROGRESS NOTES
Garry Benites is a 69 y.o. male who presents for Hospital Follow-up.    TRANSITIONAL CARE MANAGEMENT     Pcp LETICIA Correia.      HPI:   Recently hospitalized for     Leg DVT (deep venous thromboembolism), acute, left (HCC)  The patient was admitted to [facility] January 1, 2025 and was discharged on January 3, 2025    Pt was diagnosed with acute left leg DVT.  Patient presently taking Eliquis 5 mg twice daily.  Patient reported that he is tolerating medication well.  Denies any history of bleeding.    Patient reported that he had history of DVT previously.      Acute on chronic kidney failure (HCC)  During the recent hospitalization  Test showed creatinine 2.7 BUN 21 and GFR 25.9.    Patient with solitary kidney with right renal transplant patient presently followed by nephrology at [facility].    Patient reported that he had orders to repeat the lab test and will follow-up with his nephrologist in a few days.    Dysphagia  This is a chronic condition.  The patient reported recurrent problems with swallowing noted since last 2 months.  Patient reported food getting stuck with swallowing.  The patient denies fever or chills.    Kidney transplant recipient  This is a chronic condition.  Patient reported that he had kidney transplant done approximately 4 years ago.  Patient presently followed by specialist at [facility].  Patient presently taking leflunomide, and Prograf.  Patient tolerating medication well.  No new symptoms reported.    Dyslipidemia  Chronic condition.  The patient presently taking pravastatin.  No new symptoms reported.    GERD (gastroesophageal reflux disease)  This is a chronic condition.  The patient presently taking pantoprazole.  Patient reported dysphagia as above.  Patient denies abdominal pain or GI bleeding.    Gout  This is a chronic condition.  Patient followed by PCP.  During the recent hospitalization the patient reported gout flareup.  He was given prednisone  "20 Mg twice daily for 5 days.    Hyperkalemia  Chronic condition.  Patient was noted with hyperkalemia during the most recent hospitalization.  Patient was treated with kelma lab test on January 3, 2025 showed potassium 5.4    Patient reported that he has the lab test ordered to repeat the potassium level and he will get that done in the next couple of days.  Currently the patient asymptomatic.     Current medicines (including reconciliation performed today)  Current Outpatient Medications   Medication Sig Dispense Refill    apixaban (ELIQUIS) 5mg Tab Take 5 mg by mouth 2 times a day.      pantoprazole (PROTONIX) 20 MG tablet TAKE 1 TABLET BY MOUTH EVERY MORNING 90 Tablet 0    pravastatin (PRAVACHOL) 20 MG Tab TAKE 1 TABLET BY MOUTH EVERY DAY 90 Tablet 0    tacrolimus (PROGRAF) 1 MG Cap Take 1 mg by mouth.      leflunomide (ARAVA) 20 MG Tab Take 20 mg by mouth 2 times a day.      SENSIPAR 30 MG Tab Take 1 Tab by mouth every day. 30 Tab 11     No current facility-administered medications for this visit.       Allergies:   Penicillins and Cephalexin    Social History     Tobacco Use    Smoking status: Former     Current packs/day: 0.00     Average packs/day: 0.5 packs/day for 5.0 years (2.5 ttl pk-yrs)     Types: Cigarettes     Start date: 10/21/1991     Quit date: 10/21/1996     Years since quittin.2    Smokeless tobacco: Never   Vaping Use    Vaping status: Never Used   Substance Use Topics    Alcohol use: No     Alcohol/week: 0.0 oz    Drug use: No       ROS:  As noted in HPI otherwise negative    Objective:     /62   Pulse 85   Temp 36.4 °C (97.5 °F) (Temporal)   Ht 1.791 m (5' 10.5\")   Wt 86.7 kg (191 lb 3.2 oz)   SpO2 94%    Body mass index is 27.05 kg/m².      General: alert in no apparent distress.  Cardiovascular: regular rate and rhythm  Pulmonary: lungs : no wheezing   Gastrointestinal: BS present.            Assessment and Plan:     1. Leg DVT (deep venous thromboembolism), acute, left " (HCC)  Acute condition.  Recommend patient to continue apixaban 5 mg twice daily.  Patient reported prior history of DVT.  - Referral to Vascular Medicine to get a recommendation regarding the appropriate duration of anticoagulation therapy.    2. Acute renal failure superimposed on stage 4 chronic kidney disease, unspecified acute renal failure type (HCC)  Acute condition.  Lab test result discussed with the patient.  Patient stated that he has lab test ordered to repeat the blood test and will follow-up with his nephrologist.        3. Dysphagia, unspecified type  Chronic condition.  Uncontrolled.  Cause unclear.  Rule out esophageal stenosis versus neoplasm versus others.  - Referral to Gastroenterology    4. Kidney transplant recipient  Chronic condition.  The patient will continue taking Arava 20 Mg twice daily and Prograf 1 mg as directed.  The patient will follow-up with his kidney specialist and transplant team at [facility]    5. Dyslipidemia  Chronic condition.  Continue pravastatin 20 Mg daily.  Patient to follow-up with PCP    6. Gastroesophageal reflux disease, unspecified whether esophagitis present  Chronic condition.  Continue pantoprazole 20 Mg daily.  Patient to follow-up with PCP    7. Chronic gout with tophus, unspecified cause, unspecified site  Chronic condition.  The patient currently asymptomatic.  Patient to follow-up with his PCP      8. Hyperkalemia  Chronic condition.  Patient reported that he has lab test ordered to repeat potassium level and will follow-up with his nephrologist      I strongly recommend patient to follow-up with the PCP in the next 7 days.        - Chart and discharge summary were reviewed.   - Hospitalization and results reviewed with patient.   - Medications reviewed including instructions regarding high risk medications, dosing and side effects.    Please note that this dictation was created using voice recognition software. I have made every reasonable attempt to  correct obvious errors, but I expect that there are errors of grammar and possibly content that I did not discover before finalizing the note.        Time spent:   44   minutes -    Reviewed medical records including:  Hospital record at [facility].  July 15, 2024 surgery note  April 25, 2024 transplant clinic note  January 3, 2024 medical office note  December 14, 2023 medical office note  April 18, 2023 medical office note  April 7, 2022 transplant clinic note      That includes face to face time and non-face to face time.  Time for chart review before the visit, the actual patient visit, and time spent on documentation in the EMR after the visit.  Chart review/prep, review of other providers' records, Independent review of imagings/labs ,  time for history/examination , pt's counseling/education, ordering, prescribing, treatment plan discussed with patient, and care coordination.

## 2025-01-16 NOTE — ASSESSMENT & PLAN NOTE
This is a chronic condition.  Patient reported that he had kidney transplant done approximately 4 years ago.  Patient presently followed by specialist at [facility].  Patient presently taking leflunomide, and Prograf.  Patient tolerating medication well.  No new symptoms reported.

## 2025-01-16 NOTE — ASSESSMENT & PLAN NOTE
The patient was admitted to [facility] January 1, 2025 and was discharged on January 3, 2025    Pt was diagnosed with acute left leg DVT.  Patient presently taking Eliquis 5 mg twice daily.  Patient reported that he is tolerating medication well.  Denies any history of bleeding.    Patient reported that he had history of DVT previously.

## 2025-01-16 NOTE — ASSESSMENT & PLAN NOTE
Chronic condition.  Patient was noted with hyperkalemia during the most recent hospitalization.  Patient was treated with Lokelma lab test on January 3, 2025 showed potassium 5.4    Patient reported that he has the lab test ordered to repeat the potassium level and he will get that done in the next couple of days.  Currently the patient asymptomatic.

## 2025-01-16 NOTE — ASSESSMENT & PLAN NOTE
During the recent hospitalization  Test showed creatinine 2.7 BUN 21 and GFR 25.9.    Patient with solitary kidney with right renal transplant patient presently followed by nephrology at [facility].    Patient reported that he had orders to repeat the lab test and will follow-up with his nephrologist in a few days.

## 2025-01-16 NOTE — ASSESSMENT & PLAN NOTE
This is a chronic condition.  Patient followed by PCP.  During the recent hospitalization the patient reported gout flareup.  He was given prednisone 20 Mg twice daily for 5 days.

## 2025-01-17 LAB
ALBUMIN SERPL BCP-MCNC: 3.7 G/DL (ref 3.2–4.9)
ALBUMIN/GLOB SERPL: 1.2 G/DL
ALP SERPL-CCNC: 338 U/L (ref 30–99)
ALT SERPL-CCNC: 41 U/L (ref 2–50)
ANION GAP SERPL CALC-SCNC: 15 MMOL/L (ref 7–16)
AST SERPL-CCNC: 36 U/L (ref 12–45)
BILIRUB SERPL-MCNC: 0.4 MG/DL (ref 0.1–1.5)
BUN SERPL-MCNC: 45 MG/DL (ref 8–22)
CALCIUM ALBUM COR SERPL-MCNC: 8.7 MG/DL (ref 8.5–10.5)
CALCIUM SERPL-MCNC: 8.5 MG/DL (ref 8.5–10.5)
CHLORIDE SERPL-SCNC: 107 MMOL/L (ref 96–112)
CHOLEST SERPL-MCNC: 112 MG/DL (ref 100–199)
CK SERPL-CCNC: 38 U/L (ref 0–154)
CO2 SERPL-SCNC: 18 MMOL/L (ref 20–33)
CREAT SERPL-MCNC: 2.32 MG/DL (ref 0.5–1.4)
FASTING STATUS PATIENT QL REPORTED: NORMAL
GFR SERPLBLD CREATININE-BSD FMLA CKD-EPI: 30 ML/MIN/1.73 M 2
GLOBULIN SER CALC-MCNC: 3 G/DL (ref 1.9–3.5)
GLUCOSE SERPL-MCNC: 89 MG/DL (ref 65–99)
HDLC SERPL-MCNC: 25 MG/DL
LDLC SERPL CALC-MCNC: 62 MG/DL
MAGNESIUM SERPL-MCNC: 1.4 MG/DL (ref 1.5–2.5)
PHOSPHATE SERPL-MCNC: 3.4 MG/DL (ref 2.5–4.5)
POTASSIUM SERPL-SCNC: 5.3 MMOL/L (ref 3.6–5.5)
PROT SERPL-MCNC: 6.7 G/DL (ref 6–8.2)
SODIUM SERPL-SCNC: 140 MMOL/L (ref 135–145)
TRIGL SERPL-MCNC: 127 MG/DL (ref 0–149)

## 2025-01-19 LAB — TACROLIMUS BLD-MCNC: 15.9 NG/ML (ref 5–20)

## 2025-01-20 ENCOUNTER — TELEPHONE (OUTPATIENT)
Dept: MEDICAL GROUP | Facility: PHYSICIAN GROUP | Age: 70
End: 2025-01-20
Payer: COMMERCIAL

## 2025-01-20 NOTE — TELEPHONE ENCOUNTER
Can you please see the referral notes and submit the referrals for both the Vascular and Gastroenterology.     Can you please advise.    Thank you,  Lynn Patiño  Medical Assistant

## 2025-01-21 LAB
BK PLASMA INTERP, QNT NAAT NL11711: DETECTED
BK PLASMA IU/ML, QNT NAAT NL11709: 64 IU/ML
BK PLASMA LOG IU/ML, QNT NAAT NL11710: 1.81 LOG IU/ML

## 2025-01-27 DIAGNOSIS — K22.2 ESOPHAGEAL STRICTURE: ICD-10-CM

## 2025-01-28 NOTE — TELEPHONE ENCOUNTER
Received request via: Pharmacy    Was the patient seen in the last year in this department? Yes    Does the patient have an active prescription (recently filled or refills available) for medication(s) requested? No    Does the patient have [facility] Plus and need 100-day supply? (This applies to ALL medications) Patient does not have SCP   97.8

## 2025-01-29 ENCOUNTER — HOSPITAL ENCOUNTER (OUTPATIENT)
Dept: LAB | Facility: MEDICAL CENTER | Age: 70
End: 2025-01-29
Attending: INTERNAL MEDICINE
Payer: COMMERCIAL

## 2025-01-29 LAB
ALBUMIN SERPL BCP-MCNC: 3.9 G/DL (ref 3.2–4.9)
ALBUMIN/GLOB SERPL: 1.1 G/DL
ALP SERPL-CCNC: 277 U/L (ref 30–99)
ALT SERPL-CCNC: 15 U/L (ref 2–50)
ANION GAP SERPL CALC-SCNC: 15 MMOL/L (ref 7–16)
APPEARANCE UR: CLEAR
AST SERPL-CCNC: 17 U/L (ref 12–45)
BACTERIA #/AREA URNS HPF: ABNORMAL /HPF
BASOPHILS # BLD AUTO: 0.9 % (ref 0–1.8)
BASOPHILS # BLD: 0.07 K/UL (ref 0–0.12)
BILIRUB SERPL-MCNC: 0.3 MG/DL (ref 0.1–1.5)
BILIRUB UR QL STRIP.AUTO: NEGATIVE
BUN SERPL-MCNC: 51 MG/DL (ref 8–22)
BURR CELLS BLD QL SMEAR: NORMAL
CALCIUM ALBUM COR SERPL-MCNC: 9.6 MG/DL (ref 8.5–10.5)
CALCIUM SERPL-MCNC: 9.5 MG/DL (ref 8.5–10.5)
CASTS URNS QL MICRO: ABNORMAL /LPF (ref 0–2)
CHLORIDE SERPL-SCNC: 105 MMOL/L (ref 96–112)
CK SERPL-CCNC: 49 U/L (ref 0–154)
CO2 SERPL-SCNC: 20 MMOL/L (ref 20–33)
COLOR UR: YELLOW
COMMENT 1642: NORMAL
CREAT SERPL-MCNC: 3 MG/DL (ref 0.5–1.4)
CREAT UR-MCNC: 76.1 MG/DL
EOSINOPHIL # BLD AUTO: 1.37 K/UL (ref 0–0.51)
EOSINOPHIL NFR BLD: 18.1 % (ref 0–6.9)
EPITHELIAL CELLS 1715: ABNORMAL /HPF (ref 0–5)
ERYTHROCYTE [DISTWIDTH] IN BLOOD BY AUTOMATED COUNT: 50.6 FL (ref 35.9–50)
GFR SERPLBLD CREATININE-BSD FMLA CKD-EPI: 22 ML/MIN/1.73 M 2
GLOBULIN SER CALC-MCNC: 3.6 G/DL (ref 1.9–3.5)
GLUCOSE SERPL-MCNC: 89 MG/DL (ref 65–99)
GLUCOSE UR STRIP.AUTO-MCNC: NEGATIVE MG/DL
HCT VFR BLD AUTO: 39.6 % (ref 42–52)
HGB BLD-MCNC: 11.8 G/DL (ref 14–18)
IMM GRANULOCYTES # BLD AUTO: 0.09 K/UL (ref 0–0.11)
IMM GRANULOCYTES NFR BLD AUTO: 1.2 % (ref 0–0.9)
INR PPP: 1.42 (ref 0.87–1.13)
KETONES UR STRIP.AUTO-MCNC: NEGATIVE MG/DL
LEUKOCYTE ESTERASE UR QL STRIP.AUTO: NEGATIVE
LYMPHOCYTES # BLD AUTO: 0.96 K/UL (ref 1–4.8)
LYMPHOCYTES NFR BLD: 12.7 % (ref 22–41)
MAGNESIUM SERPL-MCNC: 1.7 MG/DL (ref 1.5–2.5)
MCH RBC QN AUTO: 26.6 PG (ref 27–33)
MCHC RBC AUTO-ENTMCNC: 29.8 G/DL (ref 32.3–36.5)
MCV RBC AUTO: 89.2 FL (ref 81.4–97.8)
MICRO URNS: ABNORMAL
MONOCYTES # BLD AUTO: 0.75 K/UL (ref 0–0.85)
MONOCYTES NFR BLD AUTO: 9.9 % (ref 0–13.4)
MORPHOLOGY BLD-IMP: NORMAL
NEUTROPHILS # BLD AUTO: 4.34 K/UL (ref 1.82–7.42)
NEUTROPHILS NFR BLD: 57.2 % (ref 44–72)
NITRITE UR QL STRIP.AUTO: NEGATIVE
NRBC # BLD AUTO: 0 K/UL
NRBC BLD-RTO: 0 /100 WBC (ref 0–0.2)
OVALOCYTES BLD QL SMEAR: NORMAL
PH UR STRIP.AUTO: 6 [PH] (ref 5–8)
PHOSPHATE SERPL-MCNC: 3.4 MG/DL (ref 2.5–4.5)
PLATELET # BLD AUTO: 290 K/UL (ref 164–446)
PLATELET BLD QL SMEAR: NORMAL
PMV BLD AUTO: 9.7 FL (ref 9–12.9)
POIKILOCYTOSIS BLD QL SMEAR: NORMAL
POTASSIUM SERPL-SCNC: 6 MMOL/L (ref 3.6–5.5)
PROT SERPL-MCNC: 7.5 G/DL (ref 6–8.2)
PROT UR QL STRIP: 30 MG/DL
PROT UR-MCNC: 37 MG/DL (ref 0–15)
PROT/CREAT UR: 486 MG/G (ref 15–68)
PROTHROMBIN TIME: 17.4 SEC (ref 12–14.6)
RBC # BLD AUTO: 4.44 M/UL (ref 4.7–6.1)
RBC # URNS HPF: ABNORMAL /HPF (ref 0–2)
RBC BLD AUTO: PRESENT
RBC UR QL AUTO: NEGATIVE
SCHISTOCYTES BLD QL SMEAR: NORMAL
SODIUM SERPL-SCNC: 140 MMOL/L (ref 135–145)
SP GR UR STRIP.AUTO: 1.02
UROBILINOGEN UR STRIP.AUTO-MCNC: 0.2 EU/DL
WBC # BLD AUTO: 7.6 K/UL (ref 4.8–10.8)
WBC #/AREA URNS HPF: ABNORMAL /HPF

## 2025-01-29 PROCEDURE — 82550 ASSAY OF CK (CPK): CPT

## 2025-01-29 PROCEDURE — 80197 ASSAY OF TACROLIMUS: CPT

## 2025-01-29 PROCEDURE — 87799 DETECT AGENT NOS DNA QUANT: CPT

## 2025-01-29 PROCEDURE — 85025 COMPLETE CBC W/AUTO DIFF WBC: CPT

## 2025-01-29 PROCEDURE — 82570 ASSAY OF URINE CREATININE: CPT

## 2025-01-29 PROCEDURE — 84100 ASSAY OF PHOSPHORUS: CPT

## 2025-01-29 PROCEDURE — 85610 PROTHROMBIN TIME: CPT

## 2025-01-29 PROCEDURE — 83735 ASSAY OF MAGNESIUM: CPT

## 2025-01-29 PROCEDURE — 36415 COLL VENOUS BLD VENIPUNCTURE: CPT

## 2025-01-29 PROCEDURE — 80053 COMPREHEN METABOLIC PANEL: CPT

## 2025-01-29 PROCEDURE — 84156 ASSAY OF PROTEIN URINE: CPT

## 2025-01-29 PROCEDURE — 81001 URINALYSIS AUTO W/SCOPE: CPT

## 2025-01-29 RX ORDER — PANTOPRAZOLE SODIUM 20 MG/1
20 TABLET, DELAYED RELEASE ORAL EVERY MORNING
Qty: 90 TABLET | Refills: 0 | Status: SHIPPED | OUTPATIENT
Start: 2025-01-29

## 2025-01-30 LAB — TACROLIMUS BLD-MCNC: 10.7 NG/ML (ref 5–20)

## 2025-02-01 LAB
BK PLASMA INTERP, QNT NAAT NL11711: DETECTED
BK PLASMA IU/ML, QNT NAAT NL11709: 33 IU/ML
BK PLASMA LOG IU/ML, QNT NAAT NL11710: 1.52 LOG IU/ML

## 2025-02-17 ENCOUNTER — RESULTS FOLLOW-UP (OUTPATIENT)
Dept: MEDICAL GROUP | Facility: PHYSICIAN GROUP | Age: 70
End: 2025-02-17

## 2025-05-09 ENCOUNTER — APPOINTMENT (OUTPATIENT)
Dept: LAB | Facility: MEDICAL CENTER | Age: 70
End: 2025-05-09
Attending: INTERNAL MEDICINE
Payer: COMMERCIAL

## 2025-05-09 ENCOUNTER — HOSPITAL ENCOUNTER (OUTPATIENT)
Dept: LAB | Facility: MEDICAL CENTER | Age: 70
End: 2025-05-09
Attending: STUDENT IN AN ORGANIZED HEALTH CARE EDUCATION/TRAINING PROGRAM
Payer: COMMERCIAL

## 2025-05-09 LAB
25(OH)D3 SERPL-MCNC: 35 NG/ML (ref 30–100)
ALBUMIN SERPL BCP-MCNC: 3.8 G/DL (ref 3.2–4.9)
ALBUMIN/GLOB SERPL: 1.2 G/DL
ALP SERPL-CCNC: 203 U/L (ref 30–99)
ALT SERPL-CCNC: 71 U/L (ref 2–50)
ANION GAP SERPL CALC-SCNC: 8 MMOL/L (ref 7–16)
APPEARANCE UR: CLEAR
AST SERPL-CCNC: 79 U/L (ref 12–45)
BACTERIA #/AREA URNS HPF: NORMAL /HPF
BASOPHILS # BLD AUTO: 0.7 % (ref 0–1.8)
BASOPHILS # BLD: 0.04 K/UL (ref 0–0.12)
BILIRUB SERPL-MCNC: <0.2 MG/DL (ref 0.1–1.5)
BILIRUB UR QL STRIP.AUTO: NEGATIVE
BUN SERPL-MCNC: 37 MG/DL (ref 8–22)
CALCIUM ALBUM COR SERPL-MCNC: 9.1 MG/DL (ref 8.5–10.5)
CALCIUM SERPL-MCNC: 8.9 MG/DL (ref 8.5–10.5)
CASTS URNS QL MICRO: NORMAL /LPF (ref 0–2)
CHLORIDE SERPL-SCNC: 106 MMOL/L (ref 96–112)
CO2 SERPL-SCNC: 22 MMOL/L (ref 20–33)
COLOR UR: YELLOW
COMMENT 1642: NORMAL
CREAT SERPL-MCNC: 1.98 MG/DL (ref 0.5–1.4)
CREAT UR-MCNC: 58 MG/DL
CREAT UR-MCNC: 59 MG/DL
EOSINOPHIL # BLD AUTO: 0.39 K/UL (ref 0–0.51)
EOSINOPHIL NFR BLD: 7.3 % (ref 0–6.9)
EPITHELIAL CELLS 1715: NORMAL /HPF (ref 0–5)
ERYTHROCYTE [DISTWIDTH] IN BLOOD BY AUTOMATED COUNT: 52.3 FL (ref 35.9–50)
FASTING STATUS PATIENT QL REPORTED: NORMAL
FERRITIN SERPL-MCNC: 410 NG/ML (ref 22–322)
GFR SERPLBLD CREATININE-BSD FMLA CKD-EPI: 36 ML/MIN/1.73 M 2
GLOBULIN SER CALC-MCNC: 3.2 G/DL (ref 1.9–3.5)
GLUCOSE SERPL-MCNC: 101 MG/DL (ref 65–99)
GLUCOSE UR STRIP.AUTO-MCNC: NEGATIVE MG/DL
HCT VFR BLD AUTO: 39.3 % (ref 42–52)
HGB BLD-MCNC: 11.5 G/DL (ref 14–18)
IMM GRANULOCYTES # BLD AUTO: 0.01 K/UL (ref 0–0.11)
IMM GRANULOCYTES NFR BLD AUTO: 0.2 % (ref 0–0.9)
IRON SATN MFR SERPL: 11 % (ref 15–55)
IRON SERPL-MCNC: 25 UG/DL (ref 50–180)
KETONES UR STRIP.AUTO-MCNC: NEGATIVE MG/DL
LEUKOCYTE ESTERASE UR QL STRIP.AUTO: NEGATIVE
LYMPHOCYTES # BLD AUTO: 0.82 K/UL (ref 1–4.8)
LYMPHOCYTES NFR BLD: 15.3 % (ref 22–41)
MAGNESIUM SERPL-MCNC: 2 MG/DL (ref 1.5–2.5)
MCH RBC QN AUTO: 27.1 PG (ref 27–33)
MCHC RBC AUTO-ENTMCNC: 29.3 G/DL (ref 32.3–36.5)
MCV RBC AUTO: 92.5 FL (ref 81.4–97.8)
MICRO URNS: ABNORMAL
MICROALBUMIN UR-MCNC: 24.4 MG/DL
MICROALBUMIN/CREAT UR: 421 MG/G (ref 0–30)
MONOCYTES # BLD AUTO: 0.57 K/UL (ref 0–0.85)
MONOCYTES NFR BLD AUTO: 10.6 % (ref 0–13.4)
MORPHOLOGY BLD-IMP: NORMAL
NEUTROPHILS # BLD AUTO: 3.53 K/UL (ref 1.82–7.42)
NEUTROPHILS NFR BLD: 65.9 % (ref 44–72)
NITRITE UR QL STRIP.AUTO: NEGATIVE
NRBC # BLD AUTO: 0 K/UL
NRBC BLD-RTO: 0 /100 WBC (ref 0–0.2)
OVALOCYTES BLD QL SMEAR: NORMAL
PH UR STRIP.AUTO: 6 [PH] (ref 5–8)
PLATELET # BLD AUTO: 196 K/UL (ref 164–446)
PLATELET BLD QL SMEAR: NORMAL
PMV BLD AUTO: 10.5 FL (ref 9–12.9)
POIKILOCYTOSIS BLD QL SMEAR: NORMAL
POTASSIUM SERPL-SCNC: 5.2 MMOL/L (ref 3.6–5.5)
PROT SERPL-MCNC: 7 G/DL (ref 6–8.2)
PROT UR QL STRIP: 100 MG/DL
PROT UR-MCNC: 47.7 MG/DL (ref 0–15)
PROT/CREAT UR: 808 MG/G (ref 15–68)
PTH-INTACT SERPL-MCNC: 285 PG/ML (ref 14–72)
RBC # BLD AUTO: 4.25 M/UL (ref 4.7–6.1)
RBC # URNS HPF: NORMAL /HPF (ref 0–2)
RBC BLD AUTO: PRESENT
RBC UR QL AUTO: NEGATIVE
SODIUM SERPL-SCNC: 136 MMOL/L (ref 135–145)
SP GR UR STRIP.AUTO: 1.01
TIBC SERPL-MCNC: 227 UG/DL (ref 250–450)
UIBC SERPL-MCNC: 202 UG/DL (ref 110–370)
URATE SERPL-MCNC: 6.2 MG/DL (ref 2.5–8.3)
UROBILINOGEN UR STRIP.AUTO-MCNC: 0.2 EU/DL
WBC # BLD AUTO: 5.4 K/UL (ref 4.8–10.8)
WBC #/AREA URNS HPF: NORMAL /HPF

## 2025-05-09 PROCEDURE — 83550 IRON BINDING TEST: CPT

## 2025-05-09 PROCEDURE — 84550 ASSAY OF BLOOD/URIC ACID: CPT

## 2025-05-09 PROCEDURE — 84156 ASSAY OF PROTEIN URINE: CPT

## 2025-05-09 PROCEDURE — 87799 DETECT AGENT NOS DNA QUANT: CPT

## 2025-05-09 PROCEDURE — 83735 ASSAY OF MAGNESIUM: CPT

## 2025-05-09 PROCEDURE — 83540 ASSAY OF IRON: CPT

## 2025-05-09 PROCEDURE — 81001 URINALYSIS AUTO W/SCOPE: CPT

## 2025-05-09 PROCEDURE — 80197 ASSAY OF TACROLIMUS: CPT

## 2025-05-09 PROCEDURE — 82043 UR ALBUMIN QUANTITATIVE: CPT

## 2025-05-09 PROCEDURE — 36415 COLL VENOUS BLD VENIPUNCTURE: CPT

## 2025-05-09 PROCEDURE — 83970 ASSAY OF PARATHORMONE: CPT

## 2025-05-09 PROCEDURE — 82306 VITAMIN D 25 HYDROXY: CPT

## 2025-05-09 PROCEDURE — 85025 COMPLETE CBC W/AUTO DIFF WBC: CPT

## 2025-05-09 PROCEDURE — 82570 ASSAY OF URINE CREATININE: CPT

## 2025-05-09 PROCEDURE — 80053 COMPREHEN METABOLIC PANEL: CPT

## 2025-05-09 PROCEDURE — 82728 ASSAY OF FERRITIN: CPT

## 2025-05-10 LAB — TACROLIMUS BLD-MCNC: 4.2 NG/ML (ref 5–20)

## 2025-05-12 LAB
BK PLASMA INTERP, QNT NAAT NL11711: NOT DETECTED
BK PLASMA IU/ML, QNT NAAT NL11709: NOT DETECTED IU/ML
BK PLASMA LOG IU/ML, QNT NAAT NL11710: NOT DETECTED LOG IU/ML

## 2025-07-01 DIAGNOSIS — K22.2 ESOPHAGEAL STRICTURE: ICD-10-CM

## 2025-07-01 RX ORDER — PANTOPRAZOLE SODIUM 20 MG/1
20 TABLET, DELAYED RELEASE ORAL EVERY MORNING
Qty: 90 TABLET | Refills: 0 | Status: SHIPPED | OUTPATIENT
Start: 2025-07-01

## 2025-07-01 NOTE — TELEPHONE ENCOUNTER
Received request via: Pharmacy    Was the patient seen in the last year in this department? Yes    Does the patient have an active prescription (recently filled or refills available) for medication(s) requested? No    Pharmacy Name: [id number] - [place], [place] [address] AT [facility] [address]     Does the patient have longterm Plus and need 100-day supply? (This applies to ALL medications) Patient does not have SCP

## 2025-07-16 ENCOUNTER — HOSPITAL ENCOUNTER (OUTPATIENT)
Dept: LAB | Facility: MEDICAL CENTER | Age: 70
End: 2025-07-16
Attending: STUDENT IN AN ORGANIZED HEALTH CARE EDUCATION/TRAINING PROGRAM
Payer: COMMERCIAL

## 2025-07-16 LAB
ALBUMIN SERPL BCP-MCNC: 4.1 G/DL (ref 3.2–4.9)
ANION GAP SERPL CALC-SCNC: 12 MMOL/L (ref 7–16)
ANISOCYTOSIS BLD QL SMEAR: ABNORMAL
APPEARANCE UR: CLEAR
BACTERIA #/AREA URNS HPF: NORMAL /HPF
BASOPHILS # BLD AUTO: 0.1 % (ref 0–1.8)
BASOPHILS # BLD: 0.01 K/UL (ref 0–0.12)
BILIRUB UR QL STRIP.AUTO: NEGATIVE
BUN SERPL-MCNC: 56 MG/DL (ref 8–22)
CALCIUM ALBUM COR SERPL-MCNC: 9.8 MG/DL (ref 8.5–10.5)
CALCIUM SERPL-MCNC: 9.9 MG/DL (ref 8.5–10.5)
CASTS URNS QL MICRO: NORMAL /LPF (ref 0–2)
CHLORIDE SERPL-SCNC: 106 MMOL/L (ref 96–112)
CO2 SERPL-SCNC: 22 MMOL/L (ref 20–33)
COLOR UR: YELLOW
COMMENT 1642: NORMAL
CREAT SERPL-MCNC: 2.05 MG/DL (ref 0.5–1.4)
CREAT UR-MCNC: 45.1 MG/DL
EOSINOPHIL # BLD AUTO: 0.02 K/UL (ref 0–0.51)
EOSINOPHIL NFR BLD: 0.2 % (ref 0–6.9)
EPITHELIAL CELLS 1715: NORMAL /HPF (ref 0–5)
ERYTHROCYTE [DISTWIDTH] IN BLOOD BY AUTOMATED COUNT: 55.3 FL (ref 35.9–50)
FERRITIN SERPL-MCNC: 321 NG/ML (ref 22–322)
GFR SERPLBLD CREATININE-BSD FMLA CKD-EPI: 34 ML/MIN/1.73 M 2
GLUCOSE SERPL-MCNC: 105 MG/DL (ref 65–99)
GLUCOSE UR STRIP.AUTO-MCNC: NEGATIVE MG/DL
HCT VFR BLD AUTO: 42.2 % (ref 42–52)
HGB BLD-MCNC: 12.5 G/DL (ref 14–18)
HYPOCHROMIA BLD QL SMEAR: ABNORMAL
IMM GRANULOCYTES # BLD AUTO: 0.09 K/UL (ref 0–0.11)
IMM GRANULOCYTES NFR BLD AUTO: 1.1 % (ref 0–0.9)
IRON SATN MFR SERPL: 18 % (ref 15–55)
IRON SERPL-MCNC: 44 UG/DL (ref 50–180)
KETONES UR STRIP.AUTO-MCNC: NEGATIVE MG/DL
LEUKOCYTE ESTERASE UR QL STRIP.AUTO: NEGATIVE
LYMPHOCYTES # BLD AUTO: 1.07 K/UL (ref 1–4.8)
LYMPHOCYTES NFR BLD: 13.3 % (ref 22–41)
MACROCYTES BLD QL SMEAR: ABNORMAL
MAGNESIUM SERPL-MCNC: 2.1 MG/DL (ref 1.5–2.5)
MCH RBC QN AUTO: 26.2 PG (ref 27–33)
MCHC RBC AUTO-ENTMCNC: 29.6 G/DL (ref 32.3–36.5)
MCV RBC AUTO: 88.5 FL (ref 81.4–97.8)
MICRO URNS: ABNORMAL
MONOCYTES # BLD AUTO: 0.69 K/UL (ref 0–0.85)
MONOCYTES NFR BLD AUTO: 8.6 % (ref 0–13.4)
MORPHOLOGY BLD-IMP: NORMAL
NEUTROPHILS # BLD AUTO: 6.15 K/UL (ref 1.82–7.42)
NEUTROPHILS NFR BLD: 76.7 % (ref 44–72)
NITRITE UR QL STRIP.AUTO: NEGATIVE
NRBC # BLD AUTO: 0 K/UL
NRBC BLD-RTO: 0 /100 WBC (ref 0–0.2)
OVALOCYTES BLD QL SMEAR: NORMAL
PH UR STRIP.AUTO: 6 [PH] (ref 5–8)
PHOSPHATE SERPL-MCNC: 3.4 MG/DL (ref 2.5–4.5)
PLATELET # BLD AUTO: 307 K/UL (ref 164–446)
PLATELET BLD QL SMEAR: NORMAL
PMV BLD AUTO: 9.7 FL (ref 9–12.9)
POIKILOCYTOSIS BLD QL SMEAR: NORMAL
POTASSIUM SERPL-SCNC: 6.2 MMOL/L (ref 3.6–5.5)
PROT UR QL STRIP: 300 MG/DL
PROT UR-MCNC: 185 MG/DL (ref 0–15)
PROT/CREAT UR: 4102 MG/G (ref 15–68)
RBC # BLD AUTO: 4.77 M/UL (ref 4.7–6.1)
RBC # URNS HPF: NORMAL /HPF (ref 0–2)
RBC BLD AUTO: PRESENT
RBC UR QL AUTO: ABNORMAL
SODIUM SERPL-SCNC: 140 MMOL/L (ref 135–145)
SP GR UR STRIP.AUTO: 1.02
TACROLIMUS BLD-MCNC: 4.9 NG/ML (ref 5–20)
TIBC SERPL-MCNC: 242 UG/DL (ref 250–450)
UIBC SERPL-MCNC: 198 UG/DL (ref 110–370)
UROBILINOGEN UR STRIP.AUTO-MCNC: 0.2 EU/DL
WBC # BLD AUTO: 8 K/UL (ref 4.8–10.8)
WBC #/AREA URNS HPF: NORMAL /HPF

## 2025-07-16 PROCEDURE — 87799 DETECT AGENT NOS DNA QUANT: CPT

## 2025-07-16 PROCEDURE — 85025 COMPLETE CBC W/AUTO DIFF WBC: CPT

## 2025-07-16 PROCEDURE — 82570 ASSAY OF URINE CREATININE: CPT

## 2025-07-16 PROCEDURE — 83550 IRON BINDING TEST: CPT

## 2025-07-16 PROCEDURE — 83735 ASSAY OF MAGNESIUM: CPT

## 2025-07-16 PROCEDURE — 82728 ASSAY OF FERRITIN: CPT

## 2025-07-16 PROCEDURE — 82043 UR ALBUMIN QUANTITATIVE: CPT

## 2025-07-16 PROCEDURE — 80197 ASSAY OF TACROLIMUS: CPT

## 2025-07-16 PROCEDURE — 83540 ASSAY OF IRON: CPT

## 2025-07-16 PROCEDURE — 81001 URINALYSIS AUTO W/SCOPE: CPT

## 2025-07-16 PROCEDURE — 84156 ASSAY OF PROTEIN URINE: CPT

## 2025-07-16 PROCEDURE — 80069 RENAL FUNCTION PANEL: CPT

## 2025-07-16 PROCEDURE — 36415 COLL VENOUS BLD VENIPUNCTURE: CPT

## 2025-07-18 LAB
COLLECT DURATION TIME SPEC: ABNORMAL HR
CREAT 24H UR-MCNC: 45 MG/DL
MICROALBUMIN 24H UR-MCNC: 123 MG/DL
MICROALBUMIN/CREAT 24H UR: 2733 MG/G (ref 0–30)
SPECIMEN VOL ?TM UR: ABNORMAL ML

## 2025-07-19 LAB
BK PLASMA INTERP, QNT NAAT NL11711: NOT DETECTED
BK PLASMA IU/ML, QNT NAAT NL11709: NOT DETECTED IU/ML
BK PLASMA LOG IU/ML, QNT NAAT NL11710: NOT DETECTED LOG IU/ML
BK UR INTERP, QNT NAAT NL11708: DETECTED
BK UR IU/ML, QNT NAAT NL11706: ABNORMAL IU/ML
BK UR LOG IU/ML, QNT NAAT NL11707: 4.61 LOG IU/ML

## 2025-08-06 ENCOUNTER — HOSPITAL ENCOUNTER (OUTPATIENT)
Dept: LAB | Facility: MEDICAL CENTER | Age: 70
End: 2025-08-06
Attending: STUDENT IN AN ORGANIZED HEALTH CARE EDUCATION/TRAINING PROGRAM
Payer: COMMERCIAL

## 2025-08-06 LAB
ALBUMIN SERPL BCP-MCNC: 3.6 G/DL (ref 3.2–4.9)
ANION GAP SERPL CALC-SCNC: 11 MMOL/L (ref 7–16)
APPEARANCE UR: CLEAR
BACTERIA #/AREA URNS HPF: NORMAL /HPF
BILIRUB UR QL STRIP.AUTO: NEGATIVE
BUN SERPL-MCNC: 51 MG/DL (ref 8–22)
CALCIUM ALBUM COR SERPL-MCNC: 9.6 MG/DL (ref 8.5–10.5)
CALCIUM SERPL-MCNC: 9.3 MG/DL (ref 8.5–10.5)
CASTS URNS QL MICRO: NORMAL /LPF (ref 0–2)
CHLORIDE SERPL-SCNC: 106 MMOL/L (ref 96–112)
CO2 SERPL-SCNC: 23 MMOL/L (ref 20–33)
COLOR UR: YELLOW
CREAT SERPL-MCNC: 2.08 MG/DL (ref 0.5–1.4)
EPITHELIAL CELLS 1715: NORMAL /HPF (ref 0–5)
GFR SERPLBLD CREATININE-BSD FMLA CKD-EPI: 34 ML/MIN/1.73 M 2
GLUCOSE SERPL-MCNC: 84 MG/DL (ref 65–99)
GLUCOSE UR STRIP.AUTO-MCNC: NEGATIVE MG/DL
KETONES UR STRIP.AUTO-MCNC: NEGATIVE MG/DL
LEUKOCYTE ESTERASE UR QL STRIP.AUTO: NEGATIVE
MAGNESIUM SERPL-MCNC: 1.5 MG/DL (ref 1.5–2.5)
MICRO URNS: ABNORMAL
NITRITE UR QL STRIP.AUTO: NEGATIVE
PH UR STRIP.AUTO: 7 [PH] (ref 5–8)
PHOSPHATE SERPL-MCNC: 3.3 MG/DL (ref 2.5–4.5)
POTASSIUM SERPL-SCNC: 5.3 MMOL/L (ref 3.6–5.5)
PROT UR QL STRIP: 100 MG/DL
RBC # URNS HPF: NORMAL /HPF (ref 0–2)
RBC UR QL AUTO: NEGATIVE
SODIUM SERPL-SCNC: 140 MMOL/L (ref 135–145)
SP GR UR STRIP.AUTO: 1.01
TACROLIMUS BLD-MCNC: 4.6 NG/ML (ref 5–20)
UROBILINOGEN UR STRIP.AUTO-MCNC: 0.2 EU/DL
WBC #/AREA URNS HPF: NORMAL /HPF

## 2025-08-06 PROCEDURE — 83735 ASSAY OF MAGNESIUM: CPT

## 2025-08-06 PROCEDURE — 82570 ASSAY OF URINE CREATININE: CPT | Mod: 91

## 2025-08-06 PROCEDURE — 80069 RENAL FUNCTION PANEL: CPT

## 2025-08-06 PROCEDURE — 81001 URINALYSIS AUTO W/SCOPE: CPT

## 2025-08-06 PROCEDURE — 80197 ASSAY OF TACROLIMUS: CPT

## 2025-08-06 PROCEDURE — 82043 UR ALBUMIN QUANTITATIVE: CPT

## 2025-08-06 PROCEDURE — 84156 ASSAY OF PROTEIN URINE: CPT

## 2025-08-06 PROCEDURE — 36415 COLL VENOUS BLD VENIPUNCTURE: CPT

## 2025-08-07 LAB
CREAT UR-MCNC: 38.7 MG/DL
CREAT UR-MCNC: 39 MG/DL
MICROALBUMIN UR-MCNC: 36.4 MG/DL
MICROALBUMIN/CREAT UR: 933 MG/G (ref 0–30)
PROT UR-MCNC: 59.1 MG/DL (ref 0–15)
PROT/CREAT UR: 1527 MG/G (ref 15–68)

## (undated) DEVICE — FORCEP RADIAL JAW 4 STANDARD CAPACITY W/NEEDLE 240CM (40EA/BX)

## (undated) DEVICE — CONTAINER, SPECIMEN, STERILE